# Patient Record
Sex: FEMALE | Race: WHITE | NOT HISPANIC OR LATINO | Employment: OTHER | RURAL
[De-identification: names, ages, dates, MRNs, and addresses within clinical notes are randomized per-mention and may not be internally consistent; named-entity substitution may affect disease eponyms.]

---

## 2024-02-25 ENCOUNTER — HOSPITAL ENCOUNTER (EMERGENCY)
Facility: HOSPITAL | Age: 84
Discharge: STILL ADMITTED OR ADMINISTRATIVE | End: 2024-02-25
Payer: MEDICARE

## 2024-02-25 VITALS
TEMPERATURE: 98 F | HEIGHT: 58 IN | HEART RATE: 79 BPM | RESPIRATION RATE: 15 BRPM | BODY MASS INDEX: 22.04 KG/M2 | SYSTOLIC BLOOD PRESSURE: 166 MMHG | OXYGEN SATURATION: 97 % | WEIGHT: 105 LBS | DIASTOLIC BLOOD PRESSURE: 75 MMHG

## 2024-02-25 DIAGNOSIS — E86.0 DEHYDRATION: ICD-10-CM

## 2024-02-25 DIAGNOSIS — R00.0 TACHYCARDIA: ICD-10-CM

## 2024-02-25 DIAGNOSIS — K56.609 SMALL BOWEL OBSTRUCTION: Primary | ICD-10-CM

## 2024-02-25 DIAGNOSIS — R05.9 COUGH: ICD-10-CM

## 2024-02-25 LAB
ALBUMIN SERPL BCP-MCNC: 4.2 G/DL (ref 3.5–5)
ALBUMIN/GLOB SERPL: 0.8 {RATIO}
ALP SERPL-CCNC: 87 U/L (ref 55–142)
ALT SERPL W P-5'-P-CCNC: 11 U/L (ref 13–56)
ANION GAP SERPL CALCULATED.3IONS-SCNC: 21 MMOL/L (ref 7–16)
AST SERPL W P-5'-P-CCNC: 12 U/L (ref 15–37)
BASOPHILS # BLD AUTO: 0.02 K/UL (ref 0–0.2)
BASOPHILS NFR BLD AUTO: 0.2 % (ref 0–1)
BILIRUB SERPL-MCNC: 0.3 MG/DL (ref ?–1.2)
BILIRUB UR QL STRIP: NEGATIVE
BUN SERPL-MCNC: 42 MG/DL (ref 7–18)
BUN/CREAT SERPL: 18 (ref 6–20)
CALCIUM SERPL-MCNC: 11.6 MG/DL (ref 8.5–10.1)
CHLORIDE SERPL-SCNC: 90 MMOL/L (ref 98–107)
CLARITY UR: CLEAR
CO2 SERPL-SCNC: 25 MMOL/L (ref 21–32)
COLOR UR: YELLOW
CREAT SERPL-MCNC: 2.37 MG/DL (ref 0.55–1.02)
DIFFERENTIAL METHOD BLD: ABNORMAL
EGFR (NO RACE VARIABLE) (RUSH/TITUS): 20 ML/MIN/1.73M2
EOSINOPHIL # BLD AUTO: 0 K/UL (ref 0–0.5)
EOSINOPHIL NFR BLD AUTO: 0 % (ref 1–4)
ERYTHROCYTE [DISTWIDTH] IN BLOOD BY AUTOMATED COUNT: 15.8 % (ref 11.5–14.5)
GLOBULIN SER-MCNC: 5.4 G/DL (ref 2–4)
GLUCOSE SERPL-MCNC: 234 MG/DL (ref 74–106)
GLUCOSE UR STRIP-MCNC: NEGATIVE MG/DL
HCT VFR BLD AUTO: 41.8 % (ref 38–47)
HGB BLD-MCNC: 13.1 G/DL (ref 12–16)
HYALINE CASTS #/AREA URNS LPF: ABNORMAL /LPF
KETONES UR STRIP-SCNC: NEGATIVE MG/DL
LEUKOCYTE ESTERASE UR QL STRIP: NEGATIVE
LIPASE SERPL-CCNC: 46 U/L (ref 16–77)
LYMPHOCYTES # BLD AUTO: 0.79 K/UL (ref 1–4.8)
LYMPHOCYTES NFR BLD AUTO: 6.2 % (ref 27–41)
MCH RBC QN AUTO: 26.4 PG (ref 27–31)
MCHC RBC AUTO-ENTMCNC: 31.3 G/DL (ref 32–36)
MCV RBC AUTO: 84.3 FL (ref 80–96)
MONOCYTES # BLD AUTO: 0.34 K/UL (ref 0–0.8)
MONOCYTES NFR BLD AUTO: 2.7 % (ref 2–6)
MPC BLD CALC-MCNC: 10.4 FL (ref 9.4–12.4)
NEUTROPHILS # BLD AUTO: 11.58 K/UL (ref 1.8–7.7)
NEUTROPHILS NFR BLD AUTO: 90.9 % (ref 53–65)
NITRITE UR QL STRIP: NEGATIVE
OCCULT BLOOD: POSITIVE
PH UR STRIP: 5.5 PH UNITS
PLATELET # BLD AUTO: 584 K/UL (ref 150–400)
POTASSIUM SERPL-SCNC: 3.7 MMOL/L (ref 3.5–5.1)
PROT SERPL-MCNC: 9.6 G/DL (ref 6.4–8.2)
PROT UR QL STRIP: 30
RBC # BLD AUTO: 4.96 M/UL (ref 4.2–5.4)
RBC # UR STRIP: NEGATIVE /UL
SODIUM SERPL-SCNC: 132 MMOL/L (ref 136–145)
SP GR UR STRIP: 1.02
SQUAMOUS #/AREA URNS LPF: ABNORMAL /LPF
TROPONIN I SERPL DL<=0.01 NG/ML-MCNC: 7.3 PG/ML
UROBILINOGEN UR STRIP-ACNC: 0.2 MG/DL
WBC # BLD AUTO: 12.73 K/UL (ref 4.5–11)
WBC #/AREA URNS HPF: ABNORMAL /HPF

## 2024-02-25 PROCEDURE — 80053 COMPREHEN METABOLIC PANEL: CPT | Performed by: NURSE PRACTITIONER

## 2024-02-25 PROCEDURE — 25000003 PHARM REV CODE 250: Performed by: NURSE PRACTITIONER

## 2024-02-25 PROCEDURE — 63600175 PHARM REV CODE 636 W HCPCS

## 2024-02-25 PROCEDURE — 93005 ELECTROCARDIOGRAM TRACING: CPT

## 2024-02-25 PROCEDURE — 84484 ASSAY OF TROPONIN QUANT: CPT | Performed by: NURSE PRACTITIONER

## 2024-02-25 PROCEDURE — 63600175 PHARM REV CODE 636 W HCPCS: Performed by: NURSE PRACTITIONER

## 2024-02-25 PROCEDURE — 96375 TX/PRO/DX INJ NEW DRUG ADDON: CPT

## 2024-02-25 PROCEDURE — 93010 ELECTROCARDIOGRAM REPORT: CPT | Mod: ,,, | Performed by: INTERNAL MEDICINE

## 2024-02-25 PROCEDURE — 82272 OCCULT BLD FECES 1-3 TESTS: CPT | Performed by: NURSE PRACTITIONER

## 2024-02-25 PROCEDURE — 81003 URINALYSIS AUTO W/O SCOPE: CPT | Performed by: NURSE PRACTITIONER

## 2024-02-25 PROCEDURE — 99285 EMERGENCY DEPT VISIT HI MDM: CPT | Mod: GF

## 2024-02-25 PROCEDURE — 99285 EMERGENCY DEPT VISIT HI MDM: CPT | Mod: 25

## 2024-02-25 PROCEDURE — 85025 COMPLETE CBC W/AUTO DIFF WBC: CPT | Performed by: NURSE PRACTITIONER

## 2024-02-25 PROCEDURE — 83690 ASSAY OF LIPASE: CPT | Performed by: NURSE PRACTITIONER

## 2024-02-25 PROCEDURE — 96374 THER/PROPH/DIAG INJ IV PUSH: CPT

## 2024-02-25 RX ORDER — CELECOXIB 200 MG/1
200 CAPSULE ORAL
COMMUNITY
Start: 2023-11-25

## 2024-02-25 RX ORDER — ONDANSETRON HYDROCHLORIDE 2 MG/ML
4 INJECTION, SOLUTION INTRAVENOUS
Status: COMPLETED | OUTPATIENT
Start: 2024-02-25 | End: 2024-02-25

## 2024-02-25 RX ORDER — SODIUM CHLORIDE 9 MG/ML
1000 INJECTION, SOLUTION INTRAVENOUS CONTINUOUS
Status: DISCONTINUED | OUTPATIENT
Start: 2024-02-25 | End: 2024-02-25 | Stop reason: HOSPADM

## 2024-02-25 RX ORDER — ZOLPIDEM TARTRATE 10 MG/1
10 TABLET ORAL NIGHTLY
COMMUNITY

## 2024-02-25 RX ORDER — LEVOTHYROXINE SODIUM 112 UG/1
112 TABLET ORAL EVERY MORNING
COMMUNITY

## 2024-02-25 RX ORDER — TRIAMTERENE/HYDROCHLOROTHIAZID 37.5-25 MG
1 TABLET ORAL EVERY MORNING
Status: ON HOLD | COMMUNITY
End: 2024-03-06 | Stop reason: HOSPADM

## 2024-02-25 RX ORDER — LIDOCAINE HYDROCHLORIDE 20 MG/ML
5 SOLUTION OROPHARYNGEAL
Status: COMPLETED | OUTPATIENT
Start: 2024-02-25 | End: 2024-02-25

## 2024-02-25 RX ORDER — POTASSIUM CHLORIDE 750 MG/1
10 TABLET, EXTENDED RELEASE ORAL
Status: ON HOLD | COMMUNITY
Start: 2024-02-03 | End: 2024-03-06 | Stop reason: HOSPADM

## 2024-02-25 RX ORDER — DILTIAZEM HYDROCHLORIDE 120 MG/1
120 CAPSULE, EXTENDED RELEASE ORAL
COMMUNITY
Start: 2024-02-03

## 2024-02-25 RX ORDER — RIVAROXABAN 20 MG/1
20 TABLET, FILM COATED ORAL
COMMUNITY
Start: 2024-02-03

## 2024-02-25 RX ADMIN — LIDOCAINE HYDROCHLORIDE 5 ML: 20 SOLUTION ORAL at 02:02

## 2024-02-25 RX ADMIN — ONDANSETRON 4 MG: 2 INJECTION INTRAMUSCULAR; INTRAVENOUS at 04:02

## 2024-02-25 RX ADMIN — SODIUM CHLORIDE 1000 ML: 9 INJECTION, SOLUTION INTRAVENOUS at 01:02

## 2024-02-25 NOTE — ED PROVIDER NOTES
Encounter Date: 2/25/2024       History     Chief Complaint   Patient presents with    Vomiting    Diarrhea     83 yr old with PMH of HTN, OA, DVT a few months ago - she is taking xeralto, CA of thyroid approx 40 yrs ago- she continues to take synthroid, colon which required bowel resection and chemo approx 19-20 years ago, renal CA which required partial nephrectomy approx 16 yr ago to ED with c/o vomiting and diarrhea for the past week.  Constipation since yesterday.  Has taken Linzess and stool softener with no relief.  She has increased weakness today.      The history is provided by the patient.   Emesis   This is a new problem. The current episode started several days ago. The problem has been unchanged. The emesis has an appearance of bilious material. Risk factors include ill contacts.     Review of patient's allergies indicates:  No Known Allergies  Past Medical History:   Diagnosis Date    Anticoagulant long-term use     Cancer     Hypertension     Thyroid disease      Past Surgical History:   Procedure Laterality Date    HYSTERECTOMY       History reviewed. No pertinent family history.  Social History     Tobacco Use    Smoking status: Every Day     Current packs/day: 0.50     Average packs/day: 0.5 packs/day for 2.1 years (1.0 ttl pk-yrs)     Types: Cigarettes     Start date: 2/1/2022    Smokeless tobacco: Never    Tobacco comments:     [id number]   Substance Use Topics    Alcohol use: Not Currently    Drug use: Never     Review of Systems   Constitutional: Negative.    HENT:  Positive for ear pain.         Reports ears stopped up   Respiratory: Negative.     Gastrointestinal:  Positive for vomiting.   Genitourinary:  Negative for difficulty urinating.   Skin: Negative.    Psychiatric/Behavioral: Negative.         Physical Exam     Initial Vitals [02/25/24 1206]   BP Pulse Resp Temp SpO2   (!) 166/75 79 15 98 °F (36.7 °C) 97 %      MAP       --         Physical Exam    Nursing note and vitals  reviewed.  HENT:   Right Ear: Hearing, tympanic membrane, external ear and ear canal normal.   Left Ear: Hearing, tympanic membrane, external ear and ear canal normal.   Neck: Neck supple.   Cardiovascular:  Normal rate and regular rhythm.           Pulmonary/Chest: Breath sounds normal.   Abdominal: She exhibits distension. Bowel sounds are decreased. There is generalized abdominal tenderness.   Musculoskeletal:         General: Normal range of motion.      Cervical back: Neck supple.     Neurological: She is alert and oriented to person, place, and time.   Skin: There is pallor.   Psychiatric: She has a normal mood and affect.         Medical Screening Exam   See Full Note    ED Course   Procedures  Labs Reviewed   COMPREHENSIVE METABOLIC PANEL - Abnormal; Notable for the following components:       Result Value    Sodium 132 (*)     Chloride 90 (*)     Anion Gap 21 (*)     Glucose 234 (*)     BUN 42 (*)     Creatinine 2.37 (*)     Calcium 11.6 (*)     Total Protein 9.6 (*)     Globulin 5.4 (*)     ALT 11 (*)     AST 12 (*)     eGFR 20 (*)     All other components within normal limits   URINALYSIS, REFLEX TO URINE CULTURE - Abnormal; Notable for the following components:    Protein, UA 30 (*)     All other components within normal limits   CBC WITH DIFFERENTIAL - Abnormal; Notable for the following components:    WBC 12.73 (*)     MCH 26.4 (*)     MCHC 31.3 (*)     RDW 15.8 (*)     Platelet Count 584 (*)     Neutrophils % 90.9 (*)     Lymphocytes % 6.2 (*)     Neutrophils, Abs 11.58 (*)     Lymphocytes, Absolute 0.79 (*)     Eosinophils % 0.0 (*)     All other components within normal limits   OCCULT BLOOD X 1, STOOL - Abnormal; Notable for the following components:    Occult Blood Positive (*)     All other components within normal limits   URINALYSIS, MICROSCOPIC - Abnormal; Notable for the following components:    Squamous Epithelial Cells, UA Few (*)     Hyaline Casts, UA 0-2 (*)     All other components  within normal limits   LIPASE - Normal   TROPONIN I - Normal   CBC W/ AUTO DIFFERENTIAL    Narrative:     The following orders were created for panel order CBC W/ AUTO DIFFERENTIAL.  Procedure                               Abnormality         Status                     ---------                               -----------         ------                     CBC with Differential[[id number]]       Abnormal            Final result               Manual Differential[[id number]]                                                          Please view results for these tests on the individual orders.        ECG Results              EKG 12-lead (Final result)        Collection Time Result Time QRS Duration OHS QTC Calculation    02/25/24 12:24:12 02/26/24 09:01:45 78 418                     Final result by Interface, Lab In [facility] (02/26/24 09:01:50)                   Narrative:    Test Reason : R00.0,    Vent. Rate : 087 BPM     Atrial Rate : 087 BPM     P-R Int : 196 ms          QRS Dur : 078 ms      QT Int : 348 ms       P-R-T Axes : -08 -16 059 degrees     QTc Int : 418 ms    Normal sinus rhythm  Inferior infarct ,age undetermined  Cannot rule out Anterior infarct ,age undetermined  Abnormal ECG  No previous ECGs available  Confirmed by Evgeny Thomas MD ([id number]) on 2/26/2024 9:01:44 AM    Referred By: [email or web address]   SELF           Confirmed By:Evgeny Thomas MD                                  Imaging Results              CT Abdomen Pelvis  Without Contrast (Final result)  Result time 02/25/24 13:54:10      Final result by Allan Love MD (02/25/24 13:54:10)                   Impression:      1. His findings most compatible with a distal high-grade small bowel obstruction as detailed above.  Concede year in G-tube decompression of the distal esophagus and stomach is.    2.  no other acute abnormality within the abdomen or pelvis.    3.   Probable hemorrhagic or proteinaceous contents within several renal cysts.   Renal ultrasound would be recommended on a nonemergent basis if no prior imaging can be made available for comparison.    Other incidental findings as above.    Place of service: [facility]      Electronically signed by: Allan Love  Date:    02/25/2024  Time:    13:54               Narrative:    EXAMINATION:  CT ABDOMEN PELVIS WITHOUT CONTRAST    CLINICAL HISTORY:  Abdominal pain, acute, nonlocalized;    TECHNIQUE:  5 mm axial images were obtained from the lung bases through the ischial tuberosities without IV contrast. Coronal and sagittal reformatted images were additionally created and submitted for review. Total DLP: 365 mGy/cm.    Dose reduction:    The CT exam was performed using one or more dose reduction techniques: Automatic exposure control, automated adjustment of the MA and/or kVP according to patient size, or use of iterative reconstruction technique.    COMPARISON:  None avail    FINDINGS:  ABDOMEN:    Lung bases: Level lung bases are predominantly clear.  No significant pleural/pericardial effusion.      Liver/gallbladder: Unenhanced liver is grossly unremarkable.  No gallstones.  No biliary ductal dilatation.    Spleen: The spleen is not enlarged.    Pancreas: Negative noncontrast appearance.    Adrenals: Unremarkable.    Kidneys: Both kidneys are symmetric in size and demonstrate no nephrolithiasis.  Predominantly exophytic hypodense lesions bilaterally are most compatible with simple cysts.  There are also several slightly hyperdense similar appearing lesions on the left which are favored to represent cyst containing hemorrhagic or proteinaceous contents.  Solid lesions are however not entirely excluded.    Bowel/mesentery: There is marked small bowel dilatation primarily within the left hemiabdomen, most consistent with distal small-bowel obstruction.  There is suggestion of transition to nondilated small bowel in the pelvis, but a definitive transition point is not definitely identified.   Findings are most compatible with distal high-grade small bowel obstruction.  There is also moderate-severe dilatation of the distal esophagus which is also fluid-filled.    Aorta/Retroperitoneum: No aneurysm. No enlarged retroperitoneal lymph nodes.    PELVIS:    Bladder: Bladder is unremarkable for degree of distention and noncontrast study.    Lymph nodes: No adenopathy    Pelvic organs: Prior hysterectomy.    Fluid: No free fluid    BONES:    Osseous structures: No suspicious appearing osseous abnormalities noted.  Multilevel moderate degenerative changes including disc space loss and endplate sclerosis at the thoracolumbar junction is noted.  Moderate disc space loss at L5-S1 with facet arthropathy also noted.                                       X-Ray Chest AP Portable (Final result)  Result time 02/25/24 13:55:17      Final result by Allan Love MD (02/25/24 13:55:17)                   Impression:      No acute cardiopulmonary process or significant change.    Place of service: [facility]      Electronically signed by: Allan Love  Date:    02/25/2024  Time:    13:55               Narrative:    EXAMINATION:  XR CHEST AP PORTABLE    CLINICAL HISTORY:  Cough, unspecified    TECHNIQUE:  AP portable    COMPARISON:  Prior radiograph 03/26/2019    FINDINGS:  The cardiomediastinal silhouette is stable from prior..  Lungs are predominantly clear with minimal perihilar interstitial prominence suggestive of mild scarring.  There is no pneumothorax or pleural effusion.    There is no acute osseous or soft tissue abnormality.                                       RADIOLOGY REPORT (Final result)  Result time 02/28/24 10:15:49      Final result by Unknown User (02/28/24 10:15:49)                                         Medications   LIDOcaine viscous HCl 2% oral solution 5 mL (5 mLs Oral Given 2/25/24 0721)   LORazepam (ATIVAN) injection 0.5 mg (0.5 mg Intravenous Given 2/25/24 2277)    ondansetron injection 4 mg (4 mg Intravenous Given 2/25/24 2455)     Medical Decision Making  83 yr old with PMH of HTN, OA, DVT a few months ago - she is taking xeralto, CA of thyroid approx 40 yrs ago- she continues to take synthroid, colon which required bowel resection and chemo approx 19-20 years ago, renal CA which required partial nephrectomy approx 16 yr ago to ED with c/o vomiting and diarrhea for the past week.  Constipation since yesterday.  Has taken Linzess and stool softener with no relief.  She has increased weakness today.      Nausea improved after NG tube place.      Pt with over 2000 ml output of greenish gastric content    Problems Addressed:  Small bowel obstruction: acute illness or injury    Amount and/or Complexity of Data Reviewed  Labs: ordered.     Details: Increased BUN/creat  Radiology: ordered.     Details: SBO  Discussion of management or test interpretation with external provider(s): Discussed pt status and POC with Dr. Hinton, on-call general surgeon at [facility] and Dr. Steve MORILLO MD at [facility] who accept for transfer to [facility]. (Per pt request).      Risk  Prescription drug management.               ED Course as of 03/04/24 0739   Sun Feb 25, 2024   1433 NG tube attempt to right nare , unsuccessful, tube rolled into patients mouth.  Pt tearful, anxious, shaky.  Will give Ativan, then try again.   [CG]   1458 Attempt to place NG on the left, tube rolled out mouth again.  Will wait on placement at this time [CG]   1524 Discussed pt status and POC with Dr. Wilson, [facility] ED MD who states will speak with general surg and call back [CG]   1556 Discussed pt status and POC with Dr. Hinton, general surg on-call at [facility], who agrees to consult on pt.  Transfer center states Dr. Wilson will be accepting MD [CG]   1199 NG placed per RINKU Santamaria RN.  Pt tolerate well, noted green gastric content in suction cannister.  Will place on intermittent suction [CG]   1704 EMS here for transport.  Pt NG patent.  Reports  relief of nausea.   [CG]      ED Course User Index  [CG] Tanna Viramontes FNP                           Clinical Impression:   Final diagnoses:  [R00.0] Tachycardia  [R05.9] Cough  [K56.609] Small bowel obstruction (Primary)  [E86.0] Dehydration        ED Disposition Condition    Transfer to Another Facility Stable                Tanna Viramontes FNP  02/25/24 0680       Tanna Viramontes FNP  03/04/24 7873

## 2024-02-26 LAB
OHS QRS DURATION: 78 MS
OHS QTC CALCULATION: 418 MS

## 2024-02-29 ENCOUNTER — HOSPITAL ENCOUNTER (INPATIENT)
Facility: HOSPITAL | Age: 84
LOS: 6 days | Discharge: HOME HEALTH | DRG: 390 | End: 2024-03-06
Attending: HOSPITALIST | Admitting: HOSPITALIST
Payer: MEDICARE

## 2024-02-29 DIAGNOSIS — R53.81 PHYSICAL DECONDITIONING: Primary | ICD-10-CM

## 2024-02-29 DIAGNOSIS — K56.609 SMALL BOWEL OBSTRUCTION: ICD-10-CM

## 2024-02-29 PROCEDURE — 11000004 HC SNF PRIVATE

## 2024-02-29 PROCEDURE — 25000003 PHARM REV CODE 250: Performed by: NURSE PRACTITIONER

## 2024-02-29 PROCEDURE — 27000958

## 2024-02-29 RX ORDER — ZOLPIDEM TARTRATE 5 MG/1
10 TABLET ORAL NIGHTLY PRN
Status: DISCONTINUED | OUTPATIENT
Start: 2024-02-29 | End: 2024-03-06 | Stop reason: HOSPADM

## 2024-02-29 RX ORDER — AMOXICILLIN 250 MG
1 CAPSULE ORAL 2 TIMES DAILY PRN
Status: DISCONTINUED | OUTPATIENT
Start: 2024-02-29 | End: 2024-03-06 | Stop reason: HOSPADM

## 2024-02-29 RX ORDER — CALCIUM CARBONATE 200(500)MG
500 TABLET,CHEWABLE ORAL 2 TIMES DAILY PRN
Status: DISCONTINUED | OUTPATIENT
Start: 2024-02-29 | End: 2024-03-06 | Stop reason: HOSPADM

## 2024-02-29 RX ORDER — POTASSIUM CHLORIDE 750 MG/1
10 CAPSULE, EXTENDED RELEASE ORAL DAILY
Status: DISCONTINUED | OUTPATIENT
Start: 2024-03-01 | End: 2024-03-01

## 2024-02-29 RX ORDER — CELECOXIB 100 MG/1
100 CAPSULE ORAL 2 TIMES DAILY
Status: DISCONTINUED | OUTPATIENT
Start: 2024-02-29 | End: 2024-03-06 | Stop reason: HOSPADM

## 2024-02-29 RX ORDER — LEVOTHYROXINE SODIUM 112 UG/1
112 TABLET ORAL
Status: DISCONTINUED | OUTPATIENT
Start: 2024-03-01 | End: 2024-03-06 | Stop reason: HOSPADM

## 2024-02-29 RX ORDER — TRIAMTERENE/HYDROCHLOROTHIAZID 37.5-25 MG
1 TABLET ORAL DAILY
Status: DISCONTINUED | OUTPATIENT
Start: 2024-03-01 | End: 2024-03-01

## 2024-02-29 RX ORDER — ACETAMINOPHEN 325 MG/1
650 TABLET ORAL EVERY 6 HOURS PRN
Status: DISCONTINUED | OUTPATIENT
Start: 2024-02-29 | End: 2024-03-06 | Stop reason: HOSPADM

## 2024-02-29 RX ORDER — PANTOPRAZOLE SODIUM 40 MG/1
40 TABLET, DELAYED RELEASE ORAL DAILY
Status: DISCONTINUED | OUTPATIENT
Start: 2024-03-01 | End: 2024-03-06 | Stop reason: HOSPADM

## 2024-02-29 RX ORDER — DILTIAZEM HYDROCHLORIDE 120 MG/1
120 CAPSULE, COATED, EXTENDED RELEASE ORAL DAILY
Status: DISCONTINUED | OUTPATIENT
Start: 2024-03-01 | End: 2024-03-06 | Stop reason: HOSPADM

## 2024-02-29 RX ORDER — TALC
6 POWDER (GRAM) TOPICAL NIGHTLY PRN
Status: DISCONTINUED | OUTPATIENT
Start: 2024-02-29 | End: 2024-03-06 | Stop reason: HOSPADM

## 2024-02-29 RX ORDER — GENTAMICIN SULFATE 3 MG/ML
1 SOLUTION/ DROPS OPHTHALMIC 3 TIMES DAILY
Status: DISCONTINUED | OUTPATIENT
Start: 2024-03-01 | End: 2024-03-06 | Stop reason: HOSPADM

## 2024-02-29 RX ADMIN — CELECOXIB 100 MG: 100 CAPSULE ORAL at 08:02

## 2024-02-29 RX ADMIN — ZOLPIDEM TARTRATE 10 MG: 5 TABLET ORAL at 10:02

## 2024-02-29 NOTE — PLAN OF CARE
[facility] Medical Surgical Unit  Initial Discharge Assessment       Primary Care Provider: Rani, Primary Doctor    Admission Diagnosis: Small bowel obstruction [K56.609]    Admission Date: 2/29/2024  Expected Discharge Date:     Transition of Care Barriers: None    Payor: MEDICARE / Plan: MEDICARE PART A & B / Product Type: Government /     Extended Emergency Contact Information  Primary Emergency Contact: Adiel Viramontes  Mobile Phone: [phone]  Relation: Son  Preferred language: English   needed? No    Discharge Plan A: [employer]         [facility] [place] [place] - [address]  [address]  [place] [address]  Phone: [phone] Fax: [phone]      Initial Assessment (most recent)       Adult Discharge Assessment - 02/29/24 1439          Discharge Assessment    Assessment Type Discharge Planning Assessment     Confirmed/corrected address, phone number and insurance Yes     Source of Information family;patient     Does patient/caregiver understand observation status Yes     Communicated QUINN with patient/caregiver Date not available/Unable to determine     Reason For Admission inpatient therapy     People in Home alone     Do you expect to return to your current living situation? Yes     Do you have help at home or someone to help you manage your care at home? Yes     Prior to hospitilization cognitive status: Alert/Oriented     Current cognitive status: Alert/Oriented     Walking or Climbing Stairs Difficulty yes     Walking or Climbing Stairs ambulation difficulty, requires equipment     Dressing/Bathing Difficulty no     Home Layout Able to live on 1st floor     Equipment Currently Used at Home cane, straight     Readmission within 30 days? No     Patient currently being followed by outpatient case management? No     Do you currently have service(s) that help you manage your care at home? No     Do you take prescription medications? Yes     Do you have prescription  coverage? Yes     Do you have any problems affording any of your prescribed medications? No     Is the patient taking medications as prescribed? yes     How do you get to doctors appointments? family or friend will provide;car, drives self     Are you on dialysis? No     Do you take coumadin? No     Discharge Plan A Home Health     DME Needed Upon Discharge  other (see comments)   TBD    Discharge Plan discussed with: Patient     Transition of Care Barriers None        Physical Activity    On average, how many days per week do you engage in moderate to strenuous exercise (like a brisk walk)? 0 days     On average, how many minutes do you engage in exercise at this level? 0 min        Financial Resource Strain    How hard is it for you to pay for the very basics like food, housing, medical care, and heating? Not hard at all        Housing Stability    In the last 12 months, was there a time when you were not able to pay the mortgage or rent on time? No     In the last 12 months, how many places have you lived? 1     In the last 12 months, was there a time when you did not have a steady place to sleep or slept in a shelter (including now)? No        Transportation Needs    In the past 12 months, has lack of transportation kept you from medical appointments or from getting medications? No     In the past 12 months, has lack of transportation kept you from meetings, work, or from getting things needed for daily living? No        Food Insecurity    Within the past 12 months, you worried that your food would run out before you got the money to buy more. Never true     Within the past 12 months, the food you bought just didn't last and you didn't have money to get more. Never true        Stress    Do you feel stress - tense, restless, nervous, or anxious, or unable to sleep at night because your mind is troubled all the time - these days? Only a little        Social Connections    In a typical week, how many times do you  talk on the phone with family, friends, or neighbors? More than three times a week     How often do you get together with friends or relatives? More than three times a week     How often do you attend Christianity or Congregation services? More than 4 times per year     Do you belong to any clubs or organizations such as Christianity groups, unions, fraternal or athletic groups, or school groups? No     How often do you attend meetings of the clubs or organizations you belong to? Never     Are you , , , , never , or living with a partner?         Alcohol Use    Q1: How often do you have a drink containing alcohol? Never     Q2: How many drinks containing alcohol do you have on a typical day when you are drinking? Patient does not drink     Q3: How often do you have six or more drinks on one occasion? Never

## 2024-02-29 NOTE — NURSING
Received awake et alert to room 16 via wheelchair. Daughter present. Oriented to room and call light. Notified MD of arrival.

## 2024-02-29 NOTE — CONSULTS
[facility] - Medical Surgical Unit  Adult Nutrition  Consult Note         Reason for Assessment  Reason For Assessment: consult   Nutrition Risk Screen: no indicators present    Assessment and Plan    Consult received and appreciated. Patient admitted today with no active principal problem noted. Noted patient admitted from [facility]. Patient is ordered a regular diet. No noted chewing/swallowing issues on nursing screen. SLP eval pending.    Patient with documented weight of 115 pounds with a BMI of 24.21. Recommend to continue current diet. Encourage po intakes. RD Following.           Learning Needs/Social Determinants of Health  Learning Assessment       02/29/2024 1300 [facility] (2/29/2024 - Present)   Created by Sujata Skinner, RN - RN (Nurse) Status: Complete                 PRIMARY LEARNER     Primary Learner Name:  Yanet Viramontes  - 02/29/2024 1300    Relationship:  Patient AG - 02/29/2024 1300    Does the primary learner have any barriers to learning?:  No Barriers AG - 02/29/2024 1300    What is the preferred language of the primary learner?:  English AG - 02/29/2024 1300    Is an  required?:  No AG - 02/29/2024 1300    How does the primary learner prefer to learn new concepts?:  Listening, Reading, Demonstration AG - 02/29/2024 1300    How often do you need to have someone help you read instructions, pamphlets, or written material from your doctor or pharmacy?:  Never AG - 02/29/2024 1300        CO-LEARNER #1     No question answered        CO-LEARNER #2     No question answered        SPECIAL TOPICS     No question answered        ANSWERED BY:     No question answered        Edit History       Sujata Skinner, RN - RN (Nurse)   02/29/2024 1300                           Social Determinants of Health     Tobacco Use: High Risk (2/29/2024)    Patient History     Smoking Tobacco Use: Every Day     Smokeless Tobacco Use: Never     Passive  "Exposure: Not on file   Alcohol Use: Not on file   Financial Resource Strain: Not on file   Food Insecurity: Not on file   Transportation Needs: Not on file   Physical Activity: Not on file   Stress: Not on file   Social Connections: Not on file   Housing Stability: Not on file   Depression: Not on file            Malnutrition  No    Nutrition Diagnosis  No Nutritional Diagnosis at this time r    No results for input(s): "GLU", "POCGLU" in the last 72 hours.  Comments on Glucose:     Nutrition Prescription / Recommendations  Recommendation/Intervention: continue diet  as tolerated; encourage po intakes  Goals: po intake 50-75% by follow up  Nutrition Goal Status: new  Current Diet Order: regular  Chewing or Swallowing Difficulty?: no documented chewing/swallowing issue; SLP eval pending  Recommended Diet: Regular  Recommended Oral Supplement: No Oral Supplements  Is Nutrition Support Recommended: [facility] Nutrition Support: No  Is Nutrition Education Recommended: No    Monitor and Evaluation  % current Intake: New Diet order with no P.O. intake documented currently  % intake to meet estimated needs: 50 - 75 %  Food and Nutrient Intake: energy intake, food and beverage intake  Food and Nutrient Adminstration: diet order  Anthropometric Measurements: height/length, weight, weight change, body mass index  Biochemical Data, Medical Tests and Procedures: electrolyte and renal panel, gastrointestinal profile, glucose/endocrine profile, inflammatory profile       Current Medical Diagnosis and Past Medical History     Past Medical History:   Diagnosis Date    Anticoagulant long-term use     Cancer     Hypertension     Thyroid disease        Nutrition/Diet History  Food Allergies: NKFA    Lab/Procedures/Meds  No results for input(s): "NA", "K", "BUN", "CREATININE", "CALCIUM", "ALBUMIN", "CL", "ALT", "AST", "PHOS" in the last 72 hours.  Last A1c: No results found for: "HGBA1C"  Lab Results   Component Value Date    RBC " "4.96 02/25/2024    HGB 13.1 02/25/2024    HCT 41.8 02/25/2024    MCV 84.3 02/25/2024    MCH 26.4 (L) 02/25/2024    MCHC 31.3 (L) 02/25/2024     Pertinent Labs Reviewed: reviewed  Pertinent Medications Reviewed: reviewed  Scheduled Meds:   celecoxib  100 mg Oral BID    [START ON 3/1/2024] diltiaZEM  120 mg Oral Daily    [START ON 3/1/2024] gentamicin  1 drop Both Eyes TID    [START ON 3/1/2024] levothyroxine  112 mcg Oral Before breakfast    [START ON 3/1/2024] pantoprazole  40 mg Oral Daily    [START ON 3/1/2024] potassium chloride  10 mEq Oral Daily    rivaroxaban  10 mg Oral Daily with dinner    [START ON 3/1/2024] triamterene-hydrochlorothiazide 37.5-25 mg  1 tablet Oral Daily     Continuous Infusions:  PRN Meds:.acetaminophen, calcium carbonate, melatonin, senna-docusate 8.6-50 mg, zolpidem    Anthropometrics  Temp: 97.3 °F (36.3 °C)  Height Method: Stated  Height: 4' 9.99" (147.3 cm)  Height (inches): 57.99 in  Weight Method: Bed Scale  Weight: 52.5 kg (115 lb 12.8 oz)  Weight (lb): 115.8 lb  Ideal Body Weight (IBW), Female: 89.95 lb  % Ideal Body Weight, Female (lb): 128.74 %  BMI (Calculated): 24.2  BMI Grade: 18.5-24.9 - normal       Estimated/Assessed Needs      Temp: 97.3 °F (36.3 °C)Oral  Weight Used For Calorie Calculations: 52.2 kg (115 lb)   Energy Need Method: Kcal/kg Energy Calorie Requirements (kcal): 4587-2077  Weight Used For Protein Calculations: 52.2 kg (115 lb)  Protein Requirements: 42-52  Estimated Fluid Requirement Method: RDA Method    RDA Method (mL): 1304       Nutrition by Nursing              Last Bowel Movement: 02/29/24                Nutrition Follow-Up  RD Follow-up?: Yes      Nutrition Discharge Planning: too soon to determine            Available via Secure Chat  "

## 2024-03-01 PROBLEM — E07.9 DISEASE OF THYROID GLAND: Status: ACTIVE | Noted: 2024-03-01

## 2024-03-01 PROBLEM — R53.81 PHYSICAL DECONDITIONING: Status: ACTIVE | Noted: 2024-03-01

## 2024-03-01 PROBLEM — K21.9 GERD (GASTROESOPHAGEAL REFLUX DISEASE): Status: ACTIVE | Noted: 2024-03-01

## 2024-03-01 PROBLEM — Z87.19 S/P SMALL BOWEL OBSTRUCTION: Status: ACTIVE | Noted: 2024-03-01

## 2024-03-01 PROBLEM — I10 HYPERTENSION: Status: ACTIVE | Noted: 2024-03-01

## 2024-03-01 PROBLEM — E87.6 HYPOKALEMIA: Status: ACTIVE | Noted: 2024-03-01

## 2024-03-01 PROBLEM — Z86.718 HISTORY OF DVT (DEEP VEIN THROMBOSIS): Status: ACTIVE | Noted: 2024-02-25

## 2024-03-01 LAB
ANION GAP SERPL CALCULATED.3IONS-SCNC: 9 MMOL/L (ref 7–16)
BASOPHILS # BLD AUTO: 0.01 K/UL (ref 0–0.2)
BASOPHILS NFR BLD AUTO: 0.2 % (ref 0–1)
BUN SERPL-MCNC: 17 MG/DL (ref 7–18)
BUN/CREAT SERPL: 17 (ref 6–20)
CALCIUM SERPL-MCNC: 8 MG/DL (ref 8.5–10.1)
CHLORIDE SERPL-SCNC: 102 MMOL/L (ref 98–107)
CO2 SERPL-SCNC: 29 MMOL/L (ref 21–32)
CREAT SERPL-MCNC: 1 MG/DL (ref 0.55–1.02)
DIFFERENTIAL METHOD BLD: ABNORMAL
EGFR (NO RACE VARIABLE) (RUSH/TITUS): 56 ML/MIN/1.73M2
EOSINOPHIL # BLD AUTO: 0.12 K/UL (ref 0–0.5)
EOSINOPHIL NFR BLD AUTO: 2.2 % (ref 1–4)
ERYTHROCYTE [DISTWIDTH] IN BLOOD BY AUTOMATED COUNT: 15 % (ref 11.5–14.5)
GLUCOSE SERPL-MCNC: 99 MG/DL (ref 74–106)
HCT VFR BLD AUTO: 29.5 % (ref 38–47)
HGB BLD-MCNC: 9.1 G/DL (ref 12–16)
LYMPHOCYTES # BLD AUTO: 0.78 K/UL (ref 1–4.8)
LYMPHOCYTES NFR BLD AUTO: 14.3 % (ref 27–41)
MCH RBC QN AUTO: 26.7 PG (ref 27–31)
MCHC RBC AUTO-ENTMCNC: 30.8 G/DL (ref 32–36)
MCV RBC AUTO: 86.5 FL (ref 80–96)
MONOCYTES # BLD AUTO: 0.57 K/UL (ref 0–0.8)
MONOCYTES NFR BLD AUTO: 10.5 % (ref 2–6)
MPC BLD CALC-MCNC: 10.6 FL (ref 9.4–12.4)
NEUTROPHILS # BLD AUTO: 3.97 K/UL (ref 1.8–7.7)
NEUTROPHILS NFR BLD AUTO: 72.8 % (ref 53–65)
PLATELET # BLD AUTO: 291 K/UL (ref 150–400)
POTASSIUM SERPL-SCNC: 2.8 MMOL/L (ref 3.5–5.1)
RBC # BLD AUTO: 3.41 M/UL (ref 4.2–5.4)
SODIUM SERPL-SCNC: 137 MMOL/L (ref 136–145)
WBC # BLD AUTO: 5.45 K/UL (ref 4.5–11)

## 2024-03-01 PROCEDURE — 97165 OT EVAL LOW COMPLEX 30 MIN: CPT

## 2024-03-01 PROCEDURE — 11000004 HC SNF PRIVATE

## 2024-03-01 PROCEDURE — 25000003 PHARM REV CODE 250: Performed by: NURSE PRACTITIONER

## 2024-03-01 PROCEDURE — 92610 EVALUATE SWALLOWING FUNCTION: CPT

## 2024-03-01 PROCEDURE — 99305 1ST NF CARE MODERATE MDM 35: CPT | Mod: AI,,, | Performed by: HOSPITALIST

## 2024-03-01 PROCEDURE — 27000958

## 2024-03-01 PROCEDURE — 85025 COMPLETE CBC W/AUTO DIFF WBC: CPT | Performed by: NURSE PRACTITIONER

## 2024-03-01 PROCEDURE — 25000003 PHARM REV CODE 250: Performed by: HOSPITALIST

## 2024-03-01 PROCEDURE — 97163 PT EVAL HIGH COMPLEX 45 MIN: CPT

## 2024-03-01 PROCEDURE — 80048 BASIC METABOLIC PNL TOTAL CA: CPT | Performed by: NURSE PRACTITIONER

## 2024-03-01 RX ORDER — POTASSIUM CHLORIDE 20 MEQ/1
40 TABLET, EXTENDED RELEASE ORAL 3 TIMES DAILY
Status: DISCONTINUED | OUTPATIENT
Start: 2024-03-01 | End: 2024-03-02

## 2024-03-01 RX ADMIN — ZINC OXIDE TOPICAL OINT.: at 05:03

## 2024-03-01 RX ADMIN — RIVAROXABAN 10 MG: 10 TABLET, FILM COATED ORAL at 05:03

## 2024-03-01 RX ADMIN — POTASSIUM CHLORIDE 40 MEQ: 1500 TABLET, EXTENDED RELEASE ORAL at 03:03

## 2024-03-01 RX ADMIN — PANTOPRAZOLE SODIUM 40 MG: 40 TABLET, DELAYED RELEASE ORAL at 09:03

## 2024-03-01 RX ADMIN — POTASSIUM CHLORIDE 40 MEQ: 1500 TABLET, EXTENDED RELEASE ORAL at 08:03

## 2024-03-01 RX ADMIN — CELECOXIB 100 MG: 100 CAPSULE ORAL at 09:03

## 2024-03-01 RX ADMIN — CELECOXIB 100 MG: 100 CAPSULE ORAL at 08:03

## 2024-03-01 RX ADMIN — POTASSIUM CHLORIDE 40 MEQ: 1500 TABLET, EXTENDED RELEASE ORAL at 09:03

## 2024-03-01 RX ADMIN — GENTAMICIN SULFATE 1 DROP: 3 SOLUTION/ DROPS OPHTHALMIC at 03:03

## 2024-03-01 RX ADMIN — DILTIAZEM HYDROCHLORIDE 120 MG: 120 CAPSULE, COATED, EXTENDED RELEASE ORAL at 09:03

## 2024-03-01 RX ADMIN — TRIAMTERENE AND HYDROCHLOROTHIAZIDE 1 TABLET: 37.5; 25 TABLET ORAL at 09:03

## 2024-03-01 RX ADMIN — ZOLPIDEM TARTRATE 10 MG: 5 TABLET ORAL at 08:03

## 2024-03-01 RX ADMIN — LEVOTHYROXINE SODIUM 112 MCG: 0.11 TABLET ORAL at 05:03

## 2024-03-01 RX ADMIN — GENTAMICIN SULFATE 1 DROP: 3 SOLUTION/ DROPS OPHTHALMIC at 08:03

## 2024-03-01 RX ADMIN — GENTAMICIN SULFATE 1 DROP: 3 SOLUTION/ DROPS OPHTHALMIC at 09:03

## 2024-03-01 NOTE — PLAN OF CARE
"  Problem: Physical Therapy  Goal: Physical Therapy Goal  Description: STG - 1 week  1. Pt SBA with bed mob.  2. Pt SBA with STS transfers.  3. Pt CGA to amb 100 ft using appropriate assistive device.    LTG - 3 weeks  1. Pt svn with bed mob.  2. Pt svn with transfers.  3. Pt svn to amb 250 ft using appropriate assistive device.  4. Pt's Tinetti Balance/Gait test score will be at least 15/28 points.  5. Pt will negotiate 4" step with SBA.  6. Pt will have gross BLE strength of 4-/5  Outcome: Ongoing, Progressing     "

## 2024-03-01 NOTE — PLAN OF CARE
Problem: Occupational Therapy  Goal: Occupational Therapy Goal  Description: STG: within 2 wks  Pt will perform grooming with setup and independence at sinkside  Pt will bathe with sponge bathing method at sinkside with setup  Pt will perform UE dressing with independence after setup  Pt will perform LE dressing with independence after setup  Pt will sit EOB x 30 min with no assistance  Pt will transfer bed/chair/bsc with svn  Pt will perform standing task x 10 min with svn assistance  Pt will tolerate 45 minutes of tx without fatigue      LT.Restore to max I with self care and mobility.    Outcome: Ongoing, Progressing

## 2024-03-01 NOTE — HOSPITAL COURSE
2/29 Patient supine in bed with eyes open in NAD. Daughter at bedside. Patient is pleasant and answers all questions appropriately. She denies abd pain/tenderness, nausea, or vomiting. States she has been having loose stools. Per nurse patient did have one loose stool this evening since admission to Swing bed.    3/4 Looks great today, working with therapy, no major issues     3/6 Back to baseline wants to go home.

## 2024-03-01 NOTE — ASSESSMENT & PLAN NOTE
Patient has hypokalemia which is Acute and currently uncontrolled. Most recent potassium levels reviewed-   Lab Results   Component Value Date    K 2.8 (L) 03/01/2024   . Will continue potassium replacement per protocol and recheck repeat levels after replacement completed.

## 2024-03-01 NOTE — PROGRESS NOTES
[facility]  Progress Note    Patient Name: Yanet Viramontes  MRN: [id number]  Patient Class: IP- Swing   Admission Date: 2/29/2024  Length of Stay: 1 days  Attending Physician: Daren Nicole DO  Primary Care Provider: Rani, Primary Doctor        Subjective:     Principal Problem:Physical deconditioning        HPI:  84 y/o WF with PMH of HTN, OA, DVT, Thyroid CA 40 years ago, colon CA 20 years ago, and renal CA 16 years ago. Treatment of colon CA required bowel resection and chemo while renal CA required partial nephrectomy. Patient presented to [facility] ED on 2/25/24 with c/o n/v/d x a week with last BM the day before ED visit. CT Abd/Pelvis indicated SBO. NG tube was places with LCS and patient was transfer to [facility] in [place] where SBO resolved without surgical intervention. Today patient is accepted to [facility] for PT/OT due to physical deconditioning.    Overview/Hospital Course:  2/29 Patient supine in bed with eyes open in NAD. Daughter at bedside. Patient is pleasant and answers all questions appropriately. She denies abd pain/tenderness, nausea, or vomiting. States she has been having loose stools. Per nurse patient did have one loose stool this evening since admission to Swing bed.    Review of Systems   Constitutional:  Negative for appetite change, chills and fever.   HENT:  Negative for congestion, sore throat and trouble swallowing.    Respiratory:  Negative for apnea, cough, choking, chest tightness, shortness of breath, wheezing and stridor.    Cardiovascular:  Negative for chest pain, palpitations and leg swelling.   Gastrointestinal:  Negative for abdominal distention, abdominal pain, anal bleeding, blood in stool, constipation, diarrhea, nausea, rectal pain and vomiting.   Genitourinary: Negative.    Musculoskeletal: Negative.    Neurological: Negative.    Psychiatric/Behavioral: Negative.     All other systems reviewed and are  negative.    Objective:     Vital Signs (Most Recent):  Temp: 97.9 °F (36.6 °C) (02/29/24 1914)  Pulse: (!) 55 (02/29/24 1914)  Resp: 18 (02/29/24 1914)  BP: 136/72 (02/29/24 1914)  SpO2: 97 % (02/29/24 1914) Vital Signs (24h Range):  Temp:  [97.3 °F (36.3 °C)-97.9 °F (36.6 °C)] 97.9 °F (36.6 °C)  Pulse:  [53-55] 55  Resp:  [18] 18  SpO2:  [95 %-97 %] 97 %  BP: (120-136)/(38-72) 136/72     Weight: 52.5 kg (115 lb 12.8 oz)  Body mass index is 24.21 kg/m².    Intake/Output Summary (Last 24 hours) at 3/1/2024 0300  Last data filed at 2/29/2024 2234  Gross per 24 hour   Intake 240 ml   Output --   Net 240 ml         Physical Exam  Vitals and nursing note reviewed.   Constitutional:       General: She is not in acute distress.     Appearance: Normal appearance. She is normal weight. She is not ill-appearing.   HENT:      Mouth/Throat:      Mouth: Mucous membranes are moist.   Eyes:      General: No scleral icterus.     Extraocular Movements: Extraocular movements intact.      Conjunctiva/sclera: Conjunctivae normal.   Cardiovascular:      Rate and Rhythm: Normal rate and regular rhythm.      Pulses: Normal pulses.      Heart sounds: Normal heart sounds. No murmur heard.     No friction rub. No gallop.   Pulmonary:      Effort: Pulmonary effort is normal. No respiratory distress.      Breath sounds: Normal breath sounds. No wheezing.   Abdominal:      General: Abdomen is flat. Bowel sounds are normal. There is no distension.      Palpations: Abdomen is soft.      Tenderness: There is no abdominal tenderness.   Musculoskeletal:         General: No tenderness. Normal range of motion.      Cervical back: Normal range of motion and neck supple.   Skin:     General: Skin is warm and dry.      Capillary Refill: Capillary refill takes 2 to 3 seconds.   Neurological:      General: No focal deficit present.      Mental Status: She is alert. She is disoriented.   Psychiatric:         Mood and Affect: Mood normal.         Behavior:  Behavior normal.         Thought Content: Thought content normal.         Judgment: Judgment normal.             Significant Labs: All pertinent labs within the past 24 hours have been reviewed.    Significant Imaging: I have reviewed all pertinent imaging results/findings within the past 24 hours.    Assessment/Plan:      S/p small bowel obstruction          VTE Risk Mitigation (From admission, onward)           Ordered     rivaroxaban tablet 10 mg  With dinner         02/29/24 1315     IP VTE HIGH RISK PATIENT  Once         02/29/24 1313     Place sequential compression device  Until discontinued         02/29/24 1313                    Discharge Planning   QUINN:      Code Status: DNR   Is the patient medically ready for discharge?: No    Reason for patient still in hospital (select all that apply): Patient trending condition, Laboratory test, Treatment, and PT / OT recommendations  Discharge Plan A: Home Health                  YUSUF Root  Department of Hospital Medicine   [facility]

## 2024-03-01 NOTE — PT/OT/SLP EVAL
Speech Language Pathology Evaluation  Bedside Swallow    Patient Name:  Yanet Viramontes   MRN:  [id number]  Admitting Diagnosis: Physical deconditioning    Recommendations:                 General Recommendations:  Follow-up not indicated  Diet recommendations:  Regular, Thin   Aspiration Precautions: Standard aspiration precautions   General Precautions: Standard, aspiration  Communication strategies:  none    Assessment:     Yanet Viramontes is a 83 y.o. female with an SLP diagnosis of potential Dysphagia with hospital admission post small bowel obstruction.  Pt indicated coughing/choking at times during ST screen warranting ST full evaluation.   ST recc: regular  diet, thin liquids with recommendation of small sips cup edge. NO overt s/sx aspiration noted     History:     Past Medical History:   Diagnosis Date    Anticoagulant long-term use     Cancer     Hypertension     Thyroid disease        Past Surgical History:   Procedure Laterality Date    HYSTERECTOMY         Prior Intubation HX:  na    Modified Barium Swallow: na    Chest X-Rays: see chart for recent     Prior diet: regular , thin.    Occupation/hobbies/homemaking: none stated .    Subjective     Pt alert, agree to ST eval. Daughter present   Patient goals: get stronger      Pain/Comfort:       Respiratory Status: Room air    Objective:     Oral Musculature Evaluation  Oral Musculature: WNL  Dentition: upper and lower dentures  Secretion Management: adequate  Mucosal Quality: adequate  Mandibular Strength and Mobility: WNL  Oral Labial Strength and Mobility: WNL  Lingual Strength and Mobility: WNL  Velar Elevation: WNL  Buccal Strength and Mobility: WNL  Volitional Cough: WNL  Volitional Swallow: WNL  Voice Prior to PO Intake: clear    Bedside Swallow Eval:   Consistencies Assessed:  Thin liquids x6  Soft solids x3      Oral Phase:   WFL    Pharyngeal Phase:   throat clearing x1 secondary to multiple back to back sips with 1 swallow    Compensatory  Strategies  Small sips, 1 at a time     Treatment: Eval only    Goals:   Multidisciplinary Problems       SLP Goals       Not on file                    Plan:     Patient to be seen:  1 x/week   Plan of Care expires:  03/01/24 (eval only)  Plan of Care reviewed with:  patient, daughter   SLP Follow-Up:  No       Discharge recommendations:  No Therapy Indicated   Barriers to Discharge:  None    Time Tracking:     SLP Treatment Date:      Speech Start Time:  0930  Speech Stop Time:  0952     Speech Total Time (min):  22 min    Billable Minutes: Eval Swallow and Oral Function 22 03/01/2024

## 2024-03-01 NOTE — PT/OT/SLP EVAL
"Physical Therapy Evaluation    Patient Name:  Yanet Viramontes   MRN:  [id number]    Recommendations:     Discharge Recommendations: Moderate Intensity Therapy   Discharge Equipment Recommendations: none   Barriers to discharge: Inaccessible home and high falls risk    Assessment:     Yanet Viramontes is a 83 y.o. female admitted with a medical diagnosis of Physical deconditioning.  She presents with the following impairments/functional limitations: weakness, impaired endurance, impaired functional mobility, gait instability, impaired balance, impaired cognition, decreased lower extremity function .    Recent hosp'n for SBO without surgical intervention. Pt now debilitated.    Rehab Prognosis: Good; patient would benefit from acute skilled PT services to address these deficits and reach maximum level of function.    Recent Surgery: * No surgery found *      Plan:     During this hospitalization, patient to be seen 5 x/week to address the identified rehab impairments via gait training, therapeutic activities, therapeutic exercises, neuromuscular re-education and progress toward the following goals:    Plan of Care Expires:  24    Subjective     Chief Complaint: Dtr states pt is very weak.  Patient/Family Comments/goals: DC home with family.  Pain/Comfort:  Pain Rating 1: 0/10  Pain Addressed 1: Pre-medicate for activity    Patients cultural, spiritual, Mandaen conflicts given the current situation:      Living Environment:  Pt's son moved into pt's home which in one-story with a 4" threshold step.   Prior to admission, patients level of function was indep without AD.  Equipment used at home: hospital bed, rollator, bedside commode, grab bar, other (see comments) (Pt did not use AD PTA. This DME was from her  .).  DME owned (not currently used): bedside commode, hospital bed, and rollator, grab bars .  Upon discharge, patient will have assistance from family.    Objective:     Communicated with OT prior " "to session.  Patient found up in chair with    upon PT entry to room.    General Precautions: Standard, fall  Orthopedic Precautions:    Braces:    Respiratory Status: Room air    Exams:  LUE Strength: Deficits: grossly 3-/5  RLE ROM: Deficits: tight calf  RLE Strength: Deficits: grossly 3-/5  LLE ROM: Deficits: tight calf    Functional Mobility:  Bed Mobility:     Rolling Left:  minimum assistance  Supine to Sit: minimum assistance  Sit to Supine: min/mod Ax1  Transfers:     Sit to Stand:  min/mod Ax1 with 4 wheeled walker  Bed to Chair: minimum assistance with  4 wheeled walker  using  Stand Pivot  Gait: Amb'd 60 ft with minAx1 using rollator. WBOS. Flexed posture d/t thoracic kyphosis. Lacks step through josselyn, lacks heel strike josselyn.  Tinetti Total Score: 9/28 using rollator   < 18 = High Risk of Falls, 19-23 = Moderate Risk of Falls, > 24 = Low Risk of Falls      AM-PAC 6 CLICK MOBILITY  Total Score:15       Treatment & Education:  Eval completed. Pt's dtr present throughout eval. POC and general goals discussed.    Patient left HOB elevated with call button in reach and dtr. present.    GOALS:   Multidisciplinary Problems       Physical Therapy Goals          Problem: Physical Therapy    Goal Priority Disciplines Outcome Goal Variances Interventions   Physical Therapy Goal     PT, PT/OT Ongoing, Progressing     Description: STG - 1 week  1. Pt SBA with bed mob.  2. Pt SBA with STS transfers.  3. Pt CGA to amb 100 ft using appropriate assistive device.    LTG - 3 weeks  1. Pt svn with bed mob.  2. Pt svn with transfers.  3. Pt svn to amb 250 ft using appropriate assistive device.  4. Pt's Tinetti Balance/Gait test score will be at least 15/28 points.  5. Pt will negotiate 4" step with SBA.  6. Pt will have gross BLE strength of 4-/5                       History:     Past Medical History:   Diagnosis Date    Anticoagulant long-term use     Cancer     Hypertension     Thyroid disease        Past Surgical History: "   Procedure Laterality Date    HYSTERECTOMY         Time Tracking:     PT Received On: 03/01/24  PT Start Time: 1255     PT Stop Time: 1322  PT Total Time (min): 27 min     Billable Minutes: Evaluation 27 min.      03/01/2024

## 2024-03-01 NOTE — PT/OT/SLP EVAL
Occupational Therapy Evaluation     Name: Yanet Viramontes  MRN: [id number]  Admitting Diagnosis: Physical deconditioning  Recent Surgery: * No surgery found *      Recommendations:     Discharge Recommendations: Low Intensity Therapy  Level of Assistance Recommended: Intermittent supervision  Discharge Equipment Recommendations:  (TBD)  Barriers to discharge: None    Assessment:     Yanet Viramontes is a 83 y.o. female with a medical diagnosis of Physical deconditioning. She presents with performance deficits affecting function including weakness, impaired endurance, impaired self care skills, impaired functional mobility, gait instability, impaired balance.     Rehab Prognosis: Good; patient would benefit from acute OT services to address these deficits and reach maximum level of function.    Plan:     Patient to be seen 5 x/week to address the above listed problems via self-care/home management, community/work re-entry, therapeutic activities, therapeutic exercises, wheelchair management/training  Plan of Care Expires: 04/05/24  Plan of Care Reviewed with: patient, caregiver, daughter    Subjective     Chief Complaint: weakness  Patient Comments/Goals: home with her son, intermittent svn  Pain/Comfort:       Patients cultural, spiritual, Jainism conflicts given the current situation: no    Social History:  Living Environment: Patient lives with their child(carin) in a single story home   Prior Level of Function: Prior to admission, patient was independent with all basic adls and IADLs including driving and was modified independent with bathing method  Roles and Routines: Patient was driving prior to admission.  Equipment Used at Home: cane, straight  DME owned (not currently used): single point cane  Assistance Upon Discharge: family    Objective:     Communicated with pt and her daughter, Kassi, prior to session. Patient found HOB elevated with no lines or drains   upon OT entry to room.    General Precautions: Standard,  aspiration, fall   Orthopedic Precautions:  (na)   Braces: N/A    Respiratory Status: Room air    Occupational Performance    Gait belt applied - No    Bed Mobility:   Rolling/Turning to Left with minimum assistance  Scooting to HOB in supine: minimum assistance  Scooting anteriorly to EOB to have both feet planted on floor: minimum assistance  Supine to sit from left side of bed with minimum assistance    Functional Mobility/Transfers:  Sit <> Stand Transfer with minimum assistance with hand-held assist  Bed <> Chair Transfer using Step Transfer technique with minimum assistance with hand-held assist  Functional Mobility: no walking attempted during OT evaluation, just transfer to w/c in preparation for lunch tray about to arrive    Activities of Daily Living:  Feeding: independence  Grooming: set up assistance  Bathing: minimum assistance  Upper Body Dressing: minimum assistance  Lower Body Dressing: minimum assistance  Toileting: minimum assistance    Cognitive/Visual Perceptual:  Cognitive/Psychosocial Skills:    -     Oriented to: Person, Place, Time, Situation  -     Follows Commands/attention: Follows multistep  commands  -     Communication: clear/fluent  -     Memory: No Deficits noted  -     Safety awareness/insight to disability: intact  -     Mood/Affect/Coping skills/emotional control: Appropriate to situation, Cooperative, and Pleasant    Physical Exam:  Balance:    -     Sitting: supervision  -     Standing: minimum assistance  Dominant hand: Right  Upper Extremity Range of Motion:     -       Right Upper Extremity: WFL  -       Left Upper Extremity: WFL  Upper Extremity Strength:    -       Right Upper Extremity: Deficits: 3/5  -       Left Upper Extremity: Deficits: 3/5   Strength:    -       Right Upper Extremity: Deficits: 3+/5  -       Left Upper Extremity: Deficits: 3+/5  Fine Motor Coordination:    -       Intact  Gross motor coordination:   WFL    AMPAC 6 Click ADL:  AMPAC Total Score:  17    Treatment & Education:  Therapist provided facilitation and instruction of proper body mechanics, energy conservation, and fall prevention strategies during tasks listed above  Patient educated on role of OT, POC, and goals for therapy  Patient educated on importance of OOB activities with staff member assistance and sitting OOB majority of the day  Pt educated on OT role/POC.   Importance of OOB activity with staff assistance.  Importance of sitting up in the chair throughout the day as tolerated, especially for meals   Safety during functional t/f and mobility with use of BSC or RW at this time  Importance of assisting with self-care activities   All questions/concerns answered within OT scope of practice     Patient clear to stand pivot transfer with RN/PCT, assist jana mitchell .    Patient left up in chair with call button in reach on tabletop in front of pt, and daughter present.  Pt encouraged to eat and drink well to begin process of improving strength and endurance.  Pt and her daughter relate good understanding.    GOALS:   Multidisciplinary Problems       Occupational Therapy Goals          Problem: Occupational Therapy    Goal Priority Disciplines Outcome Interventions   Occupational Therapy Goal     OT, PT/OT Ongoing, Progressing    Description: STG: within 2 wks  Pt will perform grooming with setup and independence at sinkside  Pt will bathe with sponge bathing method at sinkside with setup  Pt will perform UE dressing with independence after setup  Pt will perform LE dressing with independence after setup  Pt will sit EOB x 30 min with no assistance  Pt will transfer bed/chair/bsc with svn  Pt will perform standing task x 10 min with svn assistance  Pt will tolerate 45 minutes of tx without fatigue      LT.Restore to max I with self care and mobility.                         History:     Past Medical History:   Diagnosis Date    Anticoagulant long-term use     Cancer     Hypertension     Thyroid  disease          Past Surgical History:   Procedure Laterality Date    HYSTERECTOMY         Time Tracking:     OT Date of Treatment: 03/01/24  OT Start Time: 1105  OT Stop Time: 1124  OT Total Time (min): 19 min    Billable Minutes: Evaluation 19    3/1/2024

## 2024-03-01 NOTE — HPI
82 y/o WF with PMH of HTN, OA, DVT, Thyroid CA 40 years ago, colon CA 20 years ago, and renal CA 16 years ago. Treatment of colon CA required bowel resection and chemo while renal CA required partial nephrectomy. Patient presented to [facility] ED on 2/25/24 with c/o n/v/d x a week with last BM the day before ED visit. CT Abd/Pelvis indicated SBO. NG tube was places with LCS and patient was transfer to [facility] in [place] where SBO resolved without surgical intervention. Today patient is accepted to [facility] for PT/OT due to physical deconditioning.

## 2024-03-01 NOTE — ASSESSMENT & PLAN NOTE
Chronic, controlled. Latest blood pressure and vitals reviewed-     Temp:  [97.3 °F (36.3 °C)-98.3 °F (36.8 °C)]   Pulse:  [52-55]   Resp:  [18]   BP: (120-168)/(38-88)   SpO2:  [95 %-97 %] .   Home meds for hypertension were reviewed and noted below.   Hypertension Medications               diltiaZEM HCl (TIAZAC) 120 mg 24 hr capsule Take 120 mg by mouth.    triamterene-hydrochlorothiazide 37.5-25 mg (MAXZIDE-25) 37.5-25 mg per tablet Take 1 tablet by mouth every morning.            While in the hospital, will manage blood pressure as follows; Adjust home antihypertensive regimen as follows- stop HCTZ    Will utilize p.r.n. blood pressure medication only if patient's blood pressure greater than 180/110 and she develops symptoms such as worsening chest pain or shortness of breath.

## 2024-03-01 NOTE — H&P
[facility] Medicine  History & Physical    Patient Name: Yanet Viramontes  MRN: [id number]  Patient Class: IP- Swing  Admission Date: 2/29/2024  Attending Physician: Daren Nicole DO   Primary Care Provider: Rani, Primary Doctor         Patient information was obtained from patient, past medical records, and ER records.     Subjective:     Principal Problem:Physical deconditioning    Chief Complaint: No chief complaint on file.       HPI: 84 y/o WF with PMH of HTN, OA, DVT, Thyroid CA 40 years ago, colon CA 20 years ago, and renal CA 16 years ago. Treatment of colon CA required bowel resection and chemo while renal CA required partial nephrectomy. Patient presented to [facility] ED on 2/25/24 with c/o n/v/d x a week with last BM the day before ED visit. CT Abd/Pelvis indicated SBO. NG tube was places with LCS and patient was transfer to [facility] in [place] where SBO resolved without surgical intervention. Today patient is accepted to [facility] for PT/OT due to physical deconditioning.    Past Medical History:   Diagnosis Date    Anticoagulant long-term use     Cancer     Hypertension     Thyroid disease        Past Surgical History:   Procedure Laterality Date    HYSTERECTOMY         Review of patient's allergies indicates:  No Known Allergies    No current facility-administered medications on file prior to encounter.     Current Outpatient Medications on File Prior to Encounter   Medication Sig    celecoxib (CELEBREX) 200 MG capsule Take 200 mg by mouth.    diltiaZEM HCl (TIAZAC) 120 mg 24 hr capsule Take 120 mg by mouth.    levothyroxine (SYNTHROID) 112 MCG tablet Take 112 mcg by mouth every morning.    potassium chloride SA (K-DUR,KLOR-CON M) 10 MEQ tablet Take 10 mEq by mouth.    triamterene-hydrochlorothiazide 37.5-25 mg (MAXZIDE-25) 37.5-25 mg per tablet Take 1 tablet by mouth every morning.    XARELTO 20 mg Tab Take 20 mg by mouth.    zolpidem (AMBIEN) 10 mg Tab  Take 10 mg by mouth every evening.     Family History    None       Tobacco Use    Smoking status: Every Day     Current packs/day: 0.50     Average packs/day: 0.5 packs/day for 2.1 years (1.0 ttl pk-yrs)     Types: Cigarettes     Start date: 2/1/2022    Smokeless tobacco: Never    Tobacco comments:     [id number]   Substance and Sexual Activity    Alcohol use: Not Currently    Drug use: Never    Sexual activity: Not Currently     Review of Systems   Constitutional:  Negative for appetite change, chills and fever.   Respiratory:  Negative for cough, shortness of breath and wheezing.    Cardiovascular:  Negative for chest pain, palpitations and leg swelling.   Gastrointestinal:  Positive for diarrhea. Negative for abdominal distention, nausea and vomiting.   Genitourinary:  Negative for dysuria.   Skin:  Negative for rash.   Neurological:  Positive for weakness. Negative for dizziness, seizures and syncope.   Psychiatric/Behavioral:  Negative for agitation, behavioral problems and confusion.    All other systems reviewed and are negative.    Objective:     Vital Signs (Most Recent):  Temp: 98.3 °F (36.8 °C) (03/01/24 0753)  Pulse: (!) 52 (03/01/24 0753)  Resp: 18 (03/01/24 0753)  BP: (!) 168/88 (03/01/24 0753)  SpO2: 95 % (03/01/24 0753) Vital Signs (24h Range):  Temp:  [97.3 °F (36.3 °C)-98.3 °F (36.8 °C)] 98.3 °F (36.8 °C)  Pulse:  [52-55] 52  Resp:  [18] 18  SpO2:  [95 %-97 %] 95 %  BP: (120-168)/(38-88) 168/88     Weight: 52.5 kg (115 lb 12.8 oz)  Body mass index is 24.21 kg/m².     Physical Exam  Vitals reviewed.   Constitutional:       General: She is not in acute distress.     Appearance: Normal appearance.   HENT:      Head: Normocephalic and atraumatic.   Eyes:      General: No scleral icterus.     Extraocular Movements: Extraocular movements intact.      Conjunctiva/sclera: Conjunctivae normal.      Pupils: Pupils are equal, round, and reactive to light.   Cardiovascular:      Rate and Rhythm: Normal  rate and regular rhythm.      Heart sounds: No murmur heard.     No friction rub. No gallop.   Pulmonary:      Effort: Pulmonary effort is normal. No respiratory distress.      Breath sounds: Normal breath sounds. No wheezing or rales.   Abdominal:      General: Abdomen is flat. Bowel sounds are normal. There is no distension.      Palpations: Abdomen is soft.      Tenderness: There is no abdominal tenderness. There is no guarding.   Musculoskeletal:         General: No swelling.      Right lower leg: No edema.      Left lower leg: No edema.   Skin:     General: Skin is warm and dry.      Coloration: Skin is not jaundiced.      Findings: No rash.   Neurological:      General: No focal deficit present.      Mental Status: She is alert and oriented to person, place, and time.      Sensory: No sensory deficit.      Motor: Weakness present.   Psychiatric:         Mood and Affect: Mood normal.         Thought Content: Thought content normal.         Judgment: Judgment normal.              CRANIAL NERVES     CN III, IV, VI   Pupils are equal, round, and reactive to light.       Significant Labs: All pertinent labs within the past 24 hours have been reviewed.  Recent Lab Results         03/01/24  0523        Anion Gap 9       Baso # 0.01       Basophil % 0.2       BUN 17       BUN/CREAT RATIO 17       Calcium 8.0       Chloride 102       CO2 29       Creatinine 1.00       Differential Method Auto       eGFR 56       Eos # 0.12       Eos % 2.2       Glucose 99       Hematocrit 29.5       Hemoglobin 9.1       Lymph # 0.78       Lymph % 14.3       MCH 26.7       MCHC 30.8       MCV 86.5       Mono # 0.57       Mono % 10.5       MPV 10.6       Neutrophils, Abs 3.97       Neutrophils Relative 72.8       Platelet Count 291       Potassium 2.8       RBC 3.41       RDW 15.0       Sodium 137       WBC 5.45               Significant Imaging: I have reviewed all pertinent imaging results/findings within the past 24  hours.  Assessment/Plan:     * Physical deconditioning  PT/OT      Hypokalemia  Patient has hypokalemia which is Acute and currently uncontrolled. Most recent potassium levels reviewed-   Lab Results   Component Value Date    K 2.8 (L) 03/01/2024   . Will continue potassium replacement per protocol and recheck repeat levels after replacement completed.     History of DVT (deep vein thrombosis)  Resume Xarelto at renal dose.       Hypertension  Chronic, controlled. Latest blood pressure and vitals reviewed-     Temp:  [97.3 °F (36.3 °C)-98.3 °F (36.8 °C)]   Pulse:  [52-55]   Resp:  [18]   BP: (120-168)/(38-88)   SpO2:  [95 %-97 %] .   Home meds for hypertension were reviewed and noted below.   Hypertension Medications               diltiaZEM HCl (TIAZAC) 120 mg 24 hr capsule Take 120 mg by mouth.    triamterene-hydrochlorothiazide 37.5-25 mg (MAXZIDE-25) 37.5-25 mg per tablet Take 1 tablet by mouth every morning.            While in the hospital, will manage blood pressure as follows; Adjust home antihypertensive regimen as follows- stop HCTZ    Will utilize p.r.n. blood pressure medication only if patient's blood pressure greater than 180/110 and she develops symptoms such as worsening chest pain or shortness of breath.    S/p small bowel obstruction  Monitor PO intake and any issues.          VTE Risk Mitigation (From admission, onward)           Ordered     rivaroxaban tablet 10 mg  With dinner         02/29/24 1315     IP VTE HIGH RISK PATIENT  Once         02/29/24 1313     Place sequential compression device  Until discontinued         02/29/24 1313                               [facility] Medicine  Progress Note    Patient Name: Yanet Viramontes  MRN: [id number]  Patient Class: IP- Swing   Admission Date: 2/29/2024  Length of Stay: 1 days  Attending Physician: Daren Nicole DO  Primary Care Provider: No, Primary Doctor        Subjective:     Principal  Problem:Physical deconditioning        HPI:  84 y/o WF with PMH of HTN, OA, DVT, Thyroid CA 40 years ago, colon CA 20 years ago, and renal CA 16 years ago. Treatment of colon CA required bowel resection and chemo while renal CA required partial nephrectomy. Patient presented to [facility] ED on 2/25/24 with c/o n/v/d x a week with last BM the day before ED visit. CT Abd/Pelvis indicated SBO. NG tube was places with LCS and patient was transfer to [facility] in [place] where SBO resolved without surgical intervention. Today patient is accepted to [facility] for PT/OT due to physical deconditioning.    Overview/Hospital Course:  2/29 Patient supine in bed with eyes open in NAD. Daughter at bedside. Patient is pleasant and answers all questions appropriately. She denies abd pain/tenderness, nausea, or vomiting. States she has been having loose stools. Per nurse patient did have one loose stool this evening since admission to Swing bed.    Review of Systems   Constitutional:  Negative for appetite change, chills and fever.   HENT:  Negative for congestion, sore throat and trouble swallowing.    Respiratory:  Negative for apnea, cough, choking, chest tightness, shortness of breath, wheezing and stridor.    Cardiovascular:  Negative for chest pain, palpitations and leg swelling.   Gastrointestinal:  Negative for abdominal distention, abdominal pain, anal bleeding, blood in stool, constipation, diarrhea, nausea, rectal pain and vomiting.   Genitourinary: Negative.    Musculoskeletal: Negative.    Neurological: Negative.    Psychiatric/Behavioral: Negative.     All other systems reviewed and are negative.    Objective:     Vital Signs (Most Recent):  Temp: 97.9 °F (36.6 °C) (02/29/24 1914)  Pulse: (!) 55 (02/29/24 1914)  Resp: 18 (02/29/24 1914)  BP: 136/72 (02/29/24 1914)  SpO2: 97 % (02/29/24 1914) Vital Signs (24h Range):  Temp:  [97.3 °F (36.3 °C)-97.9 °F (36.6 °C)] 97.9 °F (36.6 °C)  Pulse:  [53-55] 55  Resp:  [18]  18  SpO2:  [95 %-97 %] 97 %  BP: (120-136)/(38-72) 136/72     Weight: 52.5 kg (115 lb 12.8 oz)  Body mass index is 24.21 kg/m².    Intake/Output Summary (Last 24 hours) at 3/1/2024 0300  Last data filed at 2/29/2024 2234  Gross per 24 hour   Intake 240 ml   Output --   Net 240 ml         Physical Exam  Vitals and nursing note reviewed.   Constitutional:       General: She is not in acute distress.     Appearance: Normal appearance. She is normal weight. She is not ill-appearing.   HENT:      Mouth/Throat:      Mouth: Mucous membranes are moist.   Eyes:      General: No scleral icterus.     Extraocular Movements: Extraocular movements intact.      Conjunctiva/sclera: Conjunctivae normal.   Cardiovascular:      Rate and Rhythm: Normal rate and regular rhythm.      Pulses: Normal pulses.      Heart sounds: Normal heart sounds. No murmur heard.     No friction rub. No gallop.   Pulmonary:      Effort: Pulmonary effort is normal. No respiratory distress.      Breath sounds: Normal breath sounds. No wheezing.   Abdominal:      General: Abdomen is flat. Bowel sounds are normal. There is no distension.      Palpations: Abdomen is soft.      Tenderness: There is no abdominal tenderness.   Musculoskeletal:         General: No tenderness. Normal range of motion.      Cervical back: Normal range of motion and neck supple.   Skin:     General: Skin is warm and dry.      Capillary Refill: Capillary refill takes 2 to 3 seconds.   Neurological:      General: No focal deficit present.      Mental Status: She is alert. She is disoriented.   Psychiatric:         Mood and Affect: Mood normal.         Behavior: Behavior normal.         Thought Content: Thought content normal.         Judgment: Judgment normal.             Significant Labs: All pertinent labs within the past 24 hours have been reviewed.    Significant Imaging: I have reviewed all pertinent imaging results/findings within the past 24 hours.    Assessment/Plan:      S/p  small bowel obstruction          VTE Risk Mitigation (From admission, onward)           Ordered     rivaroxaban tablet 10 mg  With dinner         02/29/24 1315     IP VTE HIGH RISK PATIENT  Once         02/29/24 1313     Place sequential compression device  Until discontinued         02/29/24 1313                    Discharge Planning   QUINN:      Code Status: DNR   Is the patient medically ready for discharge?: No    Reason for patient still in hospital (select all that apply): Patient trending condition, Laboratory test, Treatment, and PT / OT recommendations  Discharge Plan A: Home Health                  YUSUF Root  Department of [facility] Medicine   [facility]      Daren Nicole DO  Department of [facility] Medicine  [facility]

## 2024-03-01 NOTE — SUBJECTIVE & OBJECTIVE
Review of Systems   Constitutional:  Negative for appetite change, chills and fever.   HENT:  Negative for congestion, sore throat and trouble swallowing.    Respiratory:  Negative for apnea, cough, choking, chest tightness, shortness of breath, wheezing and stridor.    Cardiovascular:  Negative for chest pain, palpitations and leg swelling.   Gastrointestinal:  Negative for abdominal distention, abdominal pain, anal bleeding, blood in stool, constipation, diarrhea, nausea, rectal pain and vomiting.   Genitourinary: Negative.    Musculoskeletal: Negative.    Neurological: Negative.    Psychiatric/Behavioral: Negative.     All other systems reviewed and are negative.    Objective:     Vital Signs (Most Recent):  Temp: 97.9 °F (36.6 °C) (02/29/24 1914)  Pulse: (!) 55 (02/29/24 1914)  Resp: 18 (02/29/24 1914)  BP: 136/72 (02/29/24 1914)  SpO2: 97 % (02/29/24 1914) Vital Signs (24h Range):  Temp:  [97.3 °F (36.3 °C)-97.9 °F (36.6 °C)] 97.9 °F (36.6 °C)  Pulse:  [53-55] 55  Resp:  [18] 18  SpO2:  [95 %-97 %] 97 %  BP: (120-136)/(38-72) 136/72     Weight: 52.5 kg (115 lb 12.8 oz)  Body mass index is 24.21 kg/m².    Intake/Output Summary (Last 24 hours) at 3/1/2024 0300  Last data filed at 2/29/2024 2234  Gross per 24 hour   Intake 240 ml   Output --   Net 240 ml         Physical Exam  Vitals and nursing note reviewed.   Constitutional:       General: She is not in acute distress.     Appearance: Normal appearance. She is normal weight. She is not ill-appearing.   HENT:      Mouth/Throat:      Mouth: Mucous membranes are moist.   Eyes:      General: No scleral icterus.     Extraocular Movements: Extraocular movements intact.      Conjunctiva/sclera: Conjunctivae normal.   Cardiovascular:      Rate and Rhythm: Normal rate and regular rhythm.      Pulses: Normal pulses.      Heart sounds: Normal heart sounds. No murmur heard.     No friction rub. No gallop.   Pulmonary:      Effort: Pulmonary effort is normal. No respiratory  distress.      Breath sounds: Normal breath sounds. No wheezing.   Abdominal:      General: Abdomen is flat. Bowel sounds are normal. There is no distension.      Palpations: Abdomen is soft.      Tenderness: There is no abdominal tenderness.   Musculoskeletal:         General: No tenderness. Normal range of motion.      Cervical back: Normal range of motion and neck supple.   Skin:     General: Skin is warm and dry.      Capillary Refill: Capillary refill takes 2 to 3 seconds.   Neurological:      General: No focal deficit present.      Mental Status: She is alert. She is disoriented.   Psychiatric:         Mood and Affect: Mood normal.         Behavior: Behavior normal.         Thought Content: Thought content normal.         Judgment: Judgment normal.             Significant Labs: All pertinent labs within the past 24 hours have been reviewed.    Significant Imaging: I have reviewed all pertinent imaging results/findings within the past 24 hours.

## 2024-03-01 NOTE — SUBJECTIVE & OBJECTIVE
Past Medical History:   Diagnosis Date    Anticoagulant long-term use     Cancer     Hypertension     Thyroid disease        Past Surgical History:   Procedure Laterality Date    HYSTERECTOMY         Review of patient's allergies indicates:  No Known Allergies    No current facility-administered medications on file prior to encounter.     Current Outpatient Medications on File Prior to Encounter   Medication Sig    celecoxib (CELEBREX) 200 MG capsule Take 200 mg by mouth.    diltiaZEM HCl (TIAZAC) 120 mg 24 hr capsule Take 120 mg by mouth.    levothyroxine (SYNTHROID) 112 MCG tablet Take 112 mcg by mouth every morning.    potassium chloride SA (K-DUR,KLOR-CON M) 10 MEQ tablet Take 10 mEq by mouth.    triamterene-hydrochlorothiazide 37.5-25 mg (MAXZIDE-25) 37.5-25 mg per tablet Take 1 tablet by mouth every morning.    XARELTO 20 mg Tab Take 20 mg by mouth.    zolpidem (AMBIEN) 10 mg Tab Take 10 mg by mouth every evening.     Family History    None       Tobacco Use    Smoking status: Every Day     Current packs/day: 0.50     Average packs/day: 0.5 packs/day for 2.1 years (1.0 ttl pk-yrs)     Types: Cigarettes     Start date: 2/1/2022    Smokeless tobacco: Never    Tobacco comments:     [id number]   Substance and Sexual Activity    Alcohol use: Not Currently    Drug use: Never    Sexual activity: Not Currently     Review of Systems   Constitutional:  Negative for appetite change, chills and fever.   Respiratory:  Negative for cough, shortness of breath and wheezing.    Cardiovascular:  Negative for chest pain, palpitations and leg swelling.   Gastrointestinal:  Positive for diarrhea. Negative for abdominal distention, nausea and vomiting.   Genitourinary:  Negative for dysuria.   Skin:  Negative for rash.   Neurological:  Positive for weakness. Negative for dizziness, seizures and syncope.   Psychiatric/Behavioral:  Negative for agitation, behavioral problems and confusion.    All other systems reviewed and are  negative.    Objective:     Vital Signs (Most Recent):  Temp: 98.3 °F (36.8 °C) (03/01/24 0753)  Pulse: (!) 52 (03/01/24 0753)  Resp: 18 (03/01/24 0753)  BP: (!) 168/88 (03/01/24 0753)  SpO2: 95 % (03/01/24 0753) Vital Signs (24h Range):  Temp:  [97.3 °F (36.3 °C)-98.3 °F (36.8 °C)] 98.3 °F (36.8 °C)  Pulse:  [52-55] 52  Resp:  [18] 18  SpO2:  [95 %-97 %] 95 %  BP: (120-168)/(38-88) 168/88     Weight: 52.5 kg (115 lb 12.8 oz)  Body mass index is 24.21 kg/m².     Physical Exam  Vitals reviewed.   Constitutional:       General: She is not in acute distress.     Appearance: Normal appearance.   HENT:      Head: Normocephalic and atraumatic.   Eyes:      General: No scleral icterus.     Extraocular Movements: Extraocular movements intact.      Conjunctiva/sclera: Conjunctivae normal.      Pupils: Pupils are equal, round, and reactive to light.   Cardiovascular:      Rate and Rhythm: Normal rate and regular rhythm.      Heart sounds: No murmur heard.     No friction rub. No gallop.   Pulmonary:      Effort: Pulmonary effort is normal. No respiratory distress.      Breath sounds: Normal breath sounds. No wheezing or rales.   Abdominal:      General: Abdomen is flat. Bowel sounds are normal. There is no distension.      Palpations: Abdomen is soft.      Tenderness: There is no abdominal tenderness. There is no guarding.   Musculoskeletal:         General: No swelling.      Right lower leg: No edema.      Left lower leg: No edema.   Skin:     General: Skin is warm and dry.      Coloration: Skin is not jaundiced.      Findings: No rash.   Neurological:      General: No focal deficit present.      Mental Status: She is alert and oriented to person, place, and time.      Sensory: No sensory deficit.      Motor: Weakness present.   Psychiatric:         Mood and Affect: Mood normal.         Thought Content: Thought content normal.         Judgment: Judgment normal.              CRANIAL NERVES     CN III, IV, VI   Pupils are  equal, round, and reactive to light.       Significant Labs: All pertinent labs within the past 24 hours have been reviewed.  Recent Lab Results         03/01/24  0523        Anion Gap 9       Baso # 0.01       Basophil % 0.2       BUN 17       BUN/CREAT RATIO 17       Calcium 8.0       Chloride 102       CO2 29       Creatinine 1.00       Differential Method Auto       eGFR 56       Eos # 0.12       Eos % 2.2       Glucose 99       Hematocrit 29.5       Hemoglobin 9.1       Lymph # 0.78       Lymph % 14.3       MCH 26.7       MCHC 30.8       MCV 86.5       Mono # 0.57       Mono % 10.5       MPV 10.6       Neutrophils, Abs 3.97       Neutrophils Relative 72.8       Platelet Count 291       Potassium 2.8       RBC 3.41       RDW 15.0       Sodium 137       WBC 5.45               Significant Imaging: I have reviewed all pertinent imaging results/findings within the past 24 hours.

## 2024-03-02 LAB
ANION GAP SERPL CALCULATED.3IONS-SCNC: 11 MMOL/L (ref 7–16)
BUN SERPL-MCNC: 13 MG/DL (ref 7–18)
BUN/CREAT SERPL: 11 (ref 6–20)
CALCIUM SERPL-MCNC: 8.2 MG/DL (ref 8.5–10.1)
CHLORIDE SERPL-SCNC: 103 MMOL/L (ref 98–107)
CO2 SERPL-SCNC: 30 MMOL/L (ref 21–32)
CREAT SERPL-MCNC: 1.21 MG/DL (ref 0.55–1.02)
EGFR (NO RACE VARIABLE) (RUSH/TITUS): 44 ML/MIN/1.73M2
GLUCOSE SERPL-MCNC: 98 MG/DL (ref 74–106)
POTASSIUM SERPL-SCNC: 4.6 MMOL/L (ref 3.5–5.1)
SODIUM SERPL-SCNC: 139 MMOL/L (ref 136–145)

## 2024-03-02 PROCEDURE — 80048 BASIC METABOLIC PNL TOTAL CA: CPT | Performed by: HOSPITALIST

## 2024-03-02 PROCEDURE — 11000004 HC SNF PRIVATE

## 2024-03-02 PROCEDURE — 27000958

## 2024-03-02 PROCEDURE — 25000003 PHARM REV CODE 250: Performed by: HOSPITALIST

## 2024-03-02 PROCEDURE — 25000003 PHARM REV CODE 250: Performed by: NURSE PRACTITIONER

## 2024-03-02 RX ORDER — POTASSIUM CHLORIDE 750 MG/1
10 CAPSULE, EXTENDED RELEASE ORAL DAILY
Status: DISCONTINUED | OUTPATIENT
Start: 2024-03-03 | End: 2024-03-06 | Stop reason: HOSPADM

## 2024-03-02 RX ADMIN — GENTAMICIN SULFATE 1 DROP: 3 SOLUTION/ DROPS OPHTHALMIC at 05:03

## 2024-03-02 RX ADMIN — CELECOXIB 100 MG: 100 CAPSULE ORAL at 08:03

## 2024-03-02 RX ADMIN — GENTAMICIN SULFATE 1 DROP: 3 SOLUTION/ DROPS OPHTHALMIC at 08:03

## 2024-03-02 RX ADMIN — ZOLPIDEM TARTRATE 10 MG: 5 TABLET ORAL at 08:03

## 2024-03-02 RX ADMIN — POTASSIUM CHLORIDE 40 MEQ: 1500 TABLET, EXTENDED RELEASE ORAL at 08:03

## 2024-03-02 RX ADMIN — RIVAROXABAN 10 MG: 10 TABLET, FILM COATED ORAL at 05:03

## 2024-03-02 RX ADMIN — PANTOPRAZOLE SODIUM 40 MG: 40 TABLET, DELAYED RELEASE ORAL at 08:03

## 2024-03-02 RX ADMIN — DILTIAZEM HYDROCHLORIDE 120 MG: 120 CAPSULE, COATED, EXTENDED RELEASE ORAL at 08:03

## 2024-03-02 RX ADMIN — LEVOTHYROXINE SODIUM 112 MCG: 0.11 TABLET ORAL at 06:03

## 2024-03-03 LAB
ANION GAP SERPL CALCULATED.3IONS-SCNC: 15 MMOL/L (ref 7–16)
BUN SERPL-MCNC: 16 MG/DL (ref 7–18)
BUN/CREAT SERPL: 14 (ref 6–20)
CALCIUM SERPL-MCNC: 8.8 MG/DL (ref 8.5–10.1)
CHLORIDE SERPL-SCNC: 102 MMOL/L (ref 98–107)
CO2 SERPL-SCNC: 24 MMOL/L (ref 21–32)
CREAT SERPL-MCNC: 1.12 MG/DL (ref 0.55–1.02)
EGFR (NO RACE VARIABLE) (RUSH/TITUS): 49 ML/MIN/1.73M2
GLUCOSE SERPL-MCNC: 94 MG/DL (ref 74–106)
POTASSIUM SERPL-SCNC: 4.6 MMOL/L (ref 3.5–5.1)
SODIUM SERPL-SCNC: 136 MMOL/L (ref 136–145)

## 2024-03-03 PROCEDURE — 25000003 PHARM REV CODE 250: Performed by: NURSE PRACTITIONER

## 2024-03-03 PROCEDURE — 80048 BASIC METABOLIC PNL TOTAL CA: CPT | Performed by: HOSPITALIST

## 2024-03-03 PROCEDURE — 27000958

## 2024-03-03 PROCEDURE — 11000004 HC SNF PRIVATE

## 2024-03-03 RX ADMIN — GENTAMICIN SULFATE 1 DROP: 3 SOLUTION/ DROPS OPHTHALMIC at 08:03

## 2024-03-03 RX ADMIN — RIVAROXABAN 10 MG: 10 TABLET, FILM COATED ORAL at 05:03

## 2024-03-03 RX ADMIN — CELECOXIB 100 MG: 100 CAPSULE ORAL at 08:03

## 2024-03-03 RX ADMIN — DILTIAZEM HYDROCHLORIDE 120 MG: 120 CAPSULE, COATED, EXTENDED RELEASE ORAL at 08:03

## 2024-03-03 RX ADMIN — POTASSIUM CHLORIDE 10 MEQ: 750 CAPSULE, EXTENDED RELEASE ORAL at 08:03

## 2024-03-03 RX ADMIN — GENTAMICIN SULFATE 1 DROP: 3 SOLUTION/ DROPS OPHTHALMIC at 03:03

## 2024-03-03 RX ADMIN — LEVOTHYROXINE SODIUM 112 MCG: 0.11 TABLET ORAL at 05:03

## 2024-03-03 RX ADMIN — PANTOPRAZOLE SODIUM 40 MG: 40 TABLET, DELAYED RELEASE ORAL at 08:03

## 2024-03-03 RX ADMIN — ZOLPIDEM TARTRATE 10 MG: 5 TABLET ORAL at 08:03

## 2024-03-03 NOTE — PLAN OF CARE
Problem: Fall Injury Risk  Goal: Absence of Fall and Fall-Related Injury  Outcome: Ongoing, Progressing     Problem: Adult Inpatient Plan of Care  Goal: Readiness for Transition of Care  Outcome: Ongoing, Progressing

## 2024-03-04 LAB
ANION GAP SERPL CALCULATED.3IONS-SCNC: 13 MMOL/L (ref 7–16)
BUN SERPL-MCNC: 16 MG/DL (ref 7–18)
BUN/CREAT SERPL: 14 (ref 6–20)
CALCIUM SERPL-MCNC: 8.5 MG/DL (ref 8.5–10.1)
CHLORIDE SERPL-SCNC: 103 MMOL/L (ref 98–107)
CO2 SERPL-SCNC: 23 MMOL/L (ref 21–32)
CREAT SERPL-MCNC: 1.12 MG/DL (ref 0.55–1.02)
EGFR (NO RACE VARIABLE) (RUSH/TITUS): 49 ML/MIN/1.73M2
GLUCOSE SERPL-MCNC: 91 MG/DL (ref 74–106)
POTASSIUM SERPL-SCNC: 4.5 MMOL/L (ref 3.5–5.1)
SODIUM SERPL-SCNC: 134 MMOL/L (ref 136–145)

## 2024-03-04 PROCEDURE — 97110 THERAPEUTIC EXERCISES: CPT

## 2024-03-04 PROCEDURE — 80048 BASIC METABOLIC PNL TOTAL CA: CPT | Performed by: HOSPITALIST

## 2024-03-04 PROCEDURE — 25000003 PHARM REV CODE 250: Performed by: HOSPITALIST

## 2024-03-04 PROCEDURE — 11000004 HC SNF PRIVATE

## 2024-03-04 PROCEDURE — 99307 SBSQ NF CARE SF MDM 10: CPT | Mod: ,,, | Performed by: HOSPITALIST

## 2024-03-04 PROCEDURE — 97116 GAIT TRAINING THERAPY: CPT

## 2024-03-04 PROCEDURE — 97530 THERAPEUTIC ACTIVITIES: CPT

## 2024-03-04 PROCEDURE — 27000958

## 2024-03-04 PROCEDURE — 25000003 PHARM REV CODE 250: Performed by: NURSE PRACTITIONER

## 2024-03-04 RX ADMIN — CELECOXIB 100 MG: 100 CAPSULE ORAL at 08:03

## 2024-03-04 RX ADMIN — ZOLPIDEM TARTRATE 10 MG: 5 TABLET ORAL at 08:03

## 2024-03-04 RX ADMIN — DILTIAZEM HYDROCHLORIDE 120 MG: 120 CAPSULE, COATED, EXTENDED RELEASE ORAL at 08:03

## 2024-03-04 RX ADMIN — RIVAROXABAN 10 MG: 10 TABLET, FILM COATED ORAL at 05:03

## 2024-03-04 RX ADMIN — GENTAMICIN SULFATE 1 DROP: 3 SOLUTION/ DROPS OPHTHALMIC at 08:03

## 2024-03-04 RX ADMIN — PANTOPRAZOLE SODIUM 40 MG: 40 TABLET, DELAYED RELEASE ORAL at 08:03

## 2024-03-04 RX ADMIN — GENTAMICIN SULFATE 1 DROP: 3 SOLUTION/ DROPS OPHTHALMIC at 02:03

## 2024-03-04 RX ADMIN — POTASSIUM CHLORIDE 10 MEQ: 750 CAPSULE, EXTENDED RELEASE ORAL at 08:03

## 2024-03-04 RX ADMIN — LEVOTHYROXINE SODIUM 112 MCG: 0.11 TABLET ORAL at 05:03

## 2024-03-04 NOTE — PT/OT/SLP PROGRESS
Occupational Therapy  Treatment    Yanet Viramontes   MRN: [id number]   Admitting Diagnosis: Physical deconditioning    OT Date of Treatment: 03/04/24   OT Start Time: 0931  OT Stop Time: 1010  OT Total Time (min): 39 min    Billable Minutes:  Therapeutic Activity 12 and Therapeutic Exercise 27    OT/CRISELDA: OT          General Precautions: Standard, aspiration, fall  Orthopedic Precautions:  (na)  Braces: N/A  Respiratory Status: Room air         Subjective:  Communicated with pt prior to session.  Pt sitting up in bedside chair already dressed upon OT getting to pt room to initiate tx  Pain/Comfort  Pain Rating 1: 0/10    Objective:        Functional Mobility:  Bed Mobility: not attempted with OT today       Transfers: min a/CGA        Functional Ambulation: see PT    Activities of Daily Living:     Feeding adaptive equipment:  none needed     UE adaptive equipment: none     LE adaptive equipment: TBD                    Bathing adaptive equipment: TBD    Balance:   Static Sit: FAIR+: Able to take MINIMAL challenges from all directions  Dynamic Sit: FAIR+: Maintains balance through MINIMAL excursions of active trunk motion  Static Stand: POOR+: Needs MINIMAL assist to maintain  Dynamic stand: POOR+: Needs MIN (minimal ) assist during gait    Therapeutic Activities and Exercises:  To improve endurance, strength, OT facilitated pt with:  UBE x 6 min with no RB's throughout, low level resist, F/R motion  RED PUTTY to simulate kitchen prep task and improve FM and  x 10 min    To improve reach, AROM, FM/ and trunk rotation with core forward leaning engagement:   Clothespins vertical board BUE weighted with no wristweights but in standing with crossing midline component as well as reaching above head component as facilitated by OT with CGA throughout  seated Reciprocal pulleys x 5 min in sh flexion and abduction, alternatively  Reacher was distributed to pt with opportunity for practice with small balls and items on floor  "to grasp and release into a basket; pt was successful with all but last small slick ball    To increase functional mobility skills:  EOM scooting x 1 rounds with no assist after initiation and min a transfer from w/c to mat to w/c again      AM-PAC 6 CLICK ADL   How much help from another person does this patient currently need?   1 = Unable, Total/Dependent Assistance  2 = A lot, Maximum/Moderate Assistance  3 = A little, Minimum/Contact Guard/Supervision  4 = None, Modified Turner/Independent    Putting on and taking off regular lower body clothing? : 3  Bathing (including washing, rinsing, drying)?: 3  Toileting, which includes using toilet, bedpan, or urinal? : 3  Putting on and taking off regular upper body clothing?: 3  Taking care of personal grooming such as brushing teeth?: 4  Eating meals?: 4  Daily Activity Total Score: 20     AM-PAC Raw Score CMS "G-Code Modifier Level of Impairment Assistance   6 % Total / Unable   7 - 8 CM 80 - 100% Maximal Assist   9-13 CL 60 - 80% Moderate Assist   14 - 19 CK 40 - 60% Moderate Assist   20 - 22 CJ 20 - 40% Minimal Assist   23 CI 1-20% SBA / CGA   24 CH 0% Independent/ Mod I       Patient left up in chair with call button in reach and nursing notified    ASSESSMENT:  Yanet Viramontes is a 83 y.o. female with a medical diagnosis of Physical deconditioning and presents with no new complaints today.    Rehab identified problem list/impairments:  weakness, impaired endurance, impaired self care skills, impaired functional mobility, gait instability, impaired balance    Rehab potential is excellent.    Activity tolerance: Excellent    Discharge recommendations: Low Intensity Therapy   Barriers to discharge: Barriers to Discharge: None    Equipment recommendations:  (TBD)    GOALS:   Multidisciplinary Problems       Occupational Therapy Goals          Problem: Occupational Therapy    Goal Priority Disciplines Outcome Interventions   Occupational Therapy Goal     OT, " PT/OT Ongoing, Progressing    Description: STG: within 2 wks  Pt will perform grooming with setup and independence at sinkside ONGOING/PROGRESSING  Pt will bathe with sponge bathing method at sinkside with setup ONGOING/PROGRESSING  Pt will perform UE dressing with independence after setup ONGOING/PROGRESSING  Pt will perform LE dressing with independence after setup ONGOING/PROGRESSING  Pt will sit EOB x 30 min with no assistance ONGOING/PROGRESSING  Pt will transfer bed/chair/bsc with svn ONGOING/PROGRESSING  Pt will perform standing task x 10 min with svn assistance ONGOING/PROGRESSING  Pt will tolerate 45 minutes of tx without fatigue ONGOING/PROGRESSING      LT.Restore to max I with self care and mobility.                         Plan:  Patient to be seen 5 x/week to address the above listed problems via self-care/home management, community/work re-entry, therapeutic activities, therapeutic exercises, wheelchair management/training  Plan of Care expires: 24  Plan of Care reviewed with: patient, caregiver, daughter         2024

## 2024-03-04 NOTE — PLAN OF CARE
Problem: Occupational Therapy  Goal: Occupational Therapy Goal  Description: STG: within 2 wks  Pt will perform grooming with setup and independence at sinkside ONGOING/PROGRESSING  Pt will bathe with sponge bathing method at sinkside with setup ONGOING/PROGRESSING  Pt will perform UE dressing with independence after setup ONGOING/PROGRESSING  Pt will perform LE dressing with independence after setup ONGOING/PROGRESSING  Pt will sit EOB x 30 min with no assistance ONGOING/PROGRESSING  Pt will transfer bed/chair/bsc with svn ONGOING/PROGRESSING  Pt will perform standing task x 10 min with svn assistance ONGOING/PROGRESSING  Pt will tolerate 45 minutes of tx without fatigue ONGOING/PROGRESSING      LT.Restore to max I with self care and mobility.    Outcome: Ongoing, Progressing

## 2024-03-04 NOTE — SUBJECTIVE & OBJECTIVE
Interval History: doing good, working with therapy     Review of Systems   HENT:          Hard of hearing    Respiratory:  Negative for shortness of breath.    Cardiovascular:  Negative for chest pain.   Gastrointestinal:  Negative for abdominal pain, nausea and vomiting.   Musculoskeletal:  Positive for arthralgias.   Psychiatric/Behavioral:  Negative for agitation and confusion.    All other systems reviewed and are negative.    Objective:     Vital Signs (Most Recent):  Temp: 97.3 °F (36.3 °C) (03/04/24 0745)  Pulse: (!) 58 (03/04/24 0745)  Resp: 18 (03/04/24 0745)  BP: (!) 152/64 (03/04/24 0745)  SpO2: (!) 94 % (03/04/24 0745) Vital Signs (24h Range):  Temp:  [97.3 °F (36.3 °C)-97.5 °F (36.4 °C)] 97.3 °F (36.3 °C)  Pulse:  [58-72] 58  Resp:  [18] 18  SpO2:  [94 %-97 %] 94 %  BP: (109-152)/(64-65) 152/64     Weight: 52.5 kg (115 lb 12.8 oz)  Body mass index is 24.21 kg/m².    Intake/Output Summary (Last 24 hours) at 3/4/2024 1227  Last data filed at 3/3/2024 1748  Gross per 24 hour   Intake 480 ml   Output --   Net 480 ml         Physical Exam  Vitals reviewed.   Constitutional:       General: She is not in acute distress.     Appearance: Normal appearance.   HENT:      Head: Normocephalic and atraumatic.   Eyes:      General: No scleral icterus.     Extraocular Movements: Extraocular movements intact.      Conjunctiva/sclera: Conjunctivae normal.      Pupils: Pupils are equal, round, and reactive to light.   Cardiovascular:      Rate and Rhythm: Normal rate and regular rhythm.      Heart sounds: No murmur heard.     No friction rub. No gallop.   Pulmonary:      Effort: Pulmonary effort is normal. No respiratory distress.      Breath sounds: Normal breath sounds. No wheezing or rales.   Abdominal:      General: Abdomen is flat. Bowel sounds are normal. There is no distension.      Palpations: Abdomen is soft.      Tenderness: There is no abdominal tenderness. There is no guarding.   Musculoskeletal:          General: No swelling.      Right lower leg: No edema.      Left lower leg: No edema.   Skin:     General: Skin is warm and dry.      Coloration: Skin is not jaundiced.      Findings: No rash.   Neurological:      General: No focal deficit present.      Mental Status: She is alert and oriented to person, place, and time.      Sensory: No sensory deficit.      Motor: Weakness present.   Psychiatric:         Mood and Affect: Mood normal.         Thought Content: Thought content normal.         Judgment: Judgment normal.             Significant Labs: All pertinent labs within the past 24 hours have been reviewed.  Recent Lab Results         03/04/24  0509        Anion Gap 13       BUN 16       BUN/CREAT RATIO 14       Calcium 8.5       Chloride 103       CO2 23       Creatinine 1.12       eGFR 49       Glucose 91       Potassium 4.5       Sodium 134               Significant Imaging: I have reviewed all pertinent imaging results/findings within the past 24 hours.

## 2024-03-04 NOTE — PROGRESS NOTES
[facility] Medicine  Progress Note    Patient Name: Yanet Viramontes  MRN: [id number]  Patient Class: IP- Swing   Admission Date: 2/29/2024  Length of Stay: 4 days  Attending Physician: Daren Nicole DO  Primary Care Provider: Rani, Primary Doctor        Subjective:     Principal Problem:Physical deconditioning        HPI:  82 y/o WF with PMH of HTN, OA, DVT, Thyroid CA 40 years ago, colon CA 20 years ago, and renal CA 16 years ago. Treatment of colon CA required bowel resection and chemo while renal CA required partial nephrectomy. Patient presented to [facility] ED on 2/25/24 with c/o n/v/d x a week with last BM the day before ED visit. CT Abd/Pelvis indicated SBO. NG tube was places with LCS and patient was transfer to [facility] in [place] where SBO resolved without surgical intervention. Today patient is accepted to [facility] for PT/OT due to physical deconditioning.    Overview/Hospital Course:  2/29 Patient supine in bed with eyes open in NAD. Daughter at bedside. Patient is pleasant and answers all questions appropriately. She denies abd pain/tenderness, nausea, or vomiting. States she has been having loose stools. Per nurse patient did have one loose stool this evening since admission to Swing bed.    3/4 Looks great today, working with therapy, no major issues     Interval History: doing good, working with therapy     Review of Systems   HENT:          Hard of hearing    Respiratory:  Negative for shortness of breath.    Cardiovascular:  Negative for chest pain.   Gastrointestinal:  Negative for abdominal pain, nausea and vomiting.   Musculoskeletal:  Positive for arthralgias.   Psychiatric/Behavioral:  Negative for agitation and confusion.    All other systems reviewed and are negative.    Objective:     Vital Signs (Most Recent):  Temp: 97.3 °F (36.3 °C) (03/04/24 0745)  Pulse: (!) 58 (03/04/24 0745)  Resp: 18 (03/04/24 0745)  BP: (!) 152/64 (03/04/24  0745)  SpO2: (!) 94 % (03/04/24 0745) Vital Signs (24h Range):  Temp:  [97.3 °F (36.3 °C)-97.5 °F (36.4 °C)] 97.3 °F (36.3 °C)  Pulse:  [58-72] 58  Resp:  [18] 18  SpO2:  [94 %-97 %] 94 %  BP: (109-152)/(64-65) 152/64     Weight: 52.5 kg (115 lb 12.8 oz)  Body mass index is 24.21 kg/m².    Intake/Output Summary (Last 24 hours) at 3/4/2024 1227  Last data filed at 3/3/2024 1748  Gross per 24 hour   Intake 480 ml   Output --   Net 480 ml         Physical Exam  Vitals reviewed.   Constitutional:       General: She is not in acute distress.     Appearance: Normal appearance.   HENT:      Head: Normocephalic and atraumatic.   Eyes:      General: No scleral icterus.     Extraocular Movements: Extraocular movements intact.      Conjunctiva/sclera: Conjunctivae normal.      Pupils: Pupils are equal, round, and reactive to light.   Cardiovascular:      Rate and Rhythm: Normal rate and regular rhythm.      Heart sounds: No murmur heard.     No friction rub. No gallop.   Pulmonary:      Effort: Pulmonary effort is normal. No respiratory distress.      Breath sounds: Normal breath sounds. No wheezing or rales.   Abdominal:      General: Abdomen is flat. Bowel sounds are normal. There is no distension.      Palpations: Abdomen is soft.      Tenderness: There is no abdominal tenderness. There is no guarding.   Musculoskeletal:         General: No swelling.      Right lower leg: No edema.      Left lower leg: No edema.   Skin:     General: Skin is warm and dry.      Coloration: Skin is not jaundiced.      Findings: No rash.   Neurological:      General: No focal deficit present.      Mental Status: She is alert and oriented to person, place, and time.      Sensory: No sensory deficit.      Motor: Weakness present.   Psychiatric:         Mood and Affect: Mood normal.         Thought Content: Thought content normal.         Judgment: Judgment normal.             Significant Labs: All pertinent labs within the past 24 hours have been  reviewed.  Recent Lab Results         03/04/24  0509        Anion Gap 13       BUN 16       BUN/CREAT RATIO 14       Calcium 8.5       Chloride 103       CO2 23       Creatinine 1.12       eGFR 49       Glucose 91       Potassium 4.5       Sodium 134               Significant Imaging: I have reviewed all pertinent imaging results/findings within the past 24 hours.    Assessment/Plan:      * Physical deconditioning  PT/OT      Hypokalemia  Patient has hypokalemia which is Acute and currently uncontrolled. Most recent potassium levels reviewed-   Lab Results   Component Value Date    K 2.8 (L) 03/01/2024   . Will continue potassium replacement per protocol and recheck repeat levels after replacement completed.     History of DVT (deep vein thrombosis)  Resume Xarelto at renal dose.       Hypertension  Chronic, controlled. Latest blood pressure and vitals reviewed-     Temp:  [97.3 °F (36.3 °C)-98.3 °F (36.8 °C)]   Pulse:  [52-55]   Resp:  [18]   BP: (120-168)/(38-88)   SpO2:  [95 %-97 %] .   Home meds for hypertension were reviewed and noted below.   Hypertension Medications               diltiaZEM HCl (TIAZAC) 120 mg 24 hr capsule Take 120 mg by mouth.    triamterene-hydrochlorothiazide 37.5-25 mg (MAXZIDE-25) 37.5-25 mg per tablet Take 1 tablet by mouth every morning.            While in the hospital, will manage blood pressure as follows; Adjust home antihypertensive regimen as follows- stop HCTZ    Will utilize p.r.n. blood pressure medication only if patient's blood pressure greater than 180/110 and she develops symptoms such as worsening chest pain or shortness of breath.    S/p small bowel obstruction  Monitor PO intake and any issues.          VTE Risk Mitigation (From admission, onward)           Ordered     rivaroxaban tablet 10 mg  With dinner         03/04/24 1029     IP VTE HIGH RISK PATIENT  Once         02/29/24 1313     Place sequential compression device  Until discontinued         02/29/24 1313                     Discharge Planning   QUINN:      Code Status: DNR   Is the patient medically ready for discharge?: No    Reason for patient still in hospital (select all that apply): Patient trending condition and PT / OT recommendations  Discharge Plan A: Home Health                  Daren Nicole DO  Department of Hospital Medicine   [facility]

## 2024-03-04 NOTE — PT/OT/SLP PROGRESS
"Physical Therapy Treatment    Patient Name:  Yanet Viramontes   MRN:  [id number]    Recommendations:     Discharge Recommendations: Moderate Intensity Therapy  Discharge Equipment Recommendations: none  Barriers to discharge:  falls risk    Assessment:     Yanet Viramontes is a 83 y.o. female admitted with a medical diagnosis of Physical deconditioning.  She presents with the following impairments/functional limitations: weakness, impaired endurance, impaired functional mobility, gait instability, impaired balance .    Rehab Prognosis: Good; patient would benefit from acute skilled PT services to address these deficits and reach maximum level of function.    Recent Surgery: * No surgery found *      Plan:     During this hospitalization, patient to be seen 5 x/week to address the identified rehab impairments via gait training, neuromuscular re-education, therapeutic activities, therapeutic exercises and progress toward the following goals:    Plan of Care Expires:  03/22/24    Subjective     Chief Complaint: Pt states she transferred herself wc to bed without assistance.  Patient/Family Comments/goals: Pt states she wants to dc home tomorrow.  Pain/Comfort:  Pain Rating 1: 0/10      Objective:     Communicated with pt prior to session.  Patient found HOB elevated with   upon PT entry to room.     General Precautions: Standard, fall  Orthopedic Precautions:    Braces:    Respiratory Status: Room air     Functional Mobility:  Bed Mobility:     Rolling Left:  supervision  Scooting: stand by assistance  Supine to Sit: stand by assistance  Sit to Supine: stand by assistance  Transfers:     Sit to Stand:  stand by assistance with 4 wheeled walker  Bed to Chair: stand by assistance with  4 wheeled walker  using  Stand Pivot  Gait: Pt amb'd x 300 ft with SBA using rollator.  Stairs:  Pt ascended/descended 4" curb step with Grab bars with bilateral handrails with Stand-by Assistance.       AM-PAC 6 CLICK MOBILITY          Treatment & " "Education:  Nu-step Level 3.0 x 8.5 min using BLE only.    Patient left HOB elevated with all lines intact..    GOALS:   Multidisciplinary Problems       Physical Therapy Goals          Problem: Physical Therapy    Goal Priority Disciplines Outcome Goal Variances Interventions   Physical Therapy Goal     PT, PT/OT Ongoing, Progressing     Description: STG - 1 week  1. Pt SBA with bed mob.  2. Pt SBA with STS transfers.  3. Pt CGA to amb 100 ft using appropriate assistive device.    LTG - 3 weeks  1. Pt svn with bed mob.  2. Pt svn with transfers.  3. Pt svn to amb 250 ft using appropriate assistive device.  4. Pt's Tinetti Balance/Gait test score will be at least 15/28 points.  5. Pt will negotiate 4" step with SBA.  6. Pt will have gross BLE strength of 4-/5                       Time Tracking:     PT Received On: 03/04/24  PT Start Time: 1308     PT Stop Time: 1331  PT Total Time (min): 23 min     Billable Minutes: Gait Training 12, Therapeutic Activity 2, Therapeutic Exercise 9, and Total Time 23 min    Treatment Type: Treatment  PT/PTA: PT     Number of PTA visits since last PT visit: 0     03/04/2024  "

## 2024-03-05 PROCEDURE — 25000003 PHARM REV CODE 250: Performed by: NURSE PRACTITIONER

## 2024-03-05 PROCEDURE — 11000004 HC SNF PRIVATE

## 2024-03-05 PROCEDURE — 97110 THERAPEUTIC EXERCISES: CPT | Mod: CQ

## 2024-03-05 PROCEDURE — 27000958

## 2024-03-05 PROCEDURE — 97110 THERAPEUTIC EXERCISES: CPT

## 2024-03-05 PROCEDURE — 97530 THERAPEUTIC ACTIVITIES: CPT

## 2024-03-05 PROCEDURE — 25000003 PHARM REV CODE 250: Performed by: HOSPITALIST

## 2024-03-05 RX ADMIN — CELECOXIB 100 MG: 100 CAPSULE ORAL at 08:03

## 2024-03-05 RX ADMIN — GENTAMICIN SULFATE 1 DROP: 3 SOLUTION/ DROPS OPHTHALMIC at 08:03

## 2024-03-05 RX ADMIN — POTASSIUM CHLORIDE 10 MEQ: 750 CAPSULE, EXTENDED RELEASE ORAL at 08:03

## 2024-03-05 RX ADMIN — PANTOPRAZOLE SODIUM 40 MG: 40 TABLET, DELAYED RELEASE ORAL at 08:03

## 2024-03-05 RX ADMIN — ZOLPIDEM TARTRATE 10 MG: 5 TABLET ORAL at 08:03

## 2024-03-05 RX ADMIN — DILTIAZEM HYDROCHLORIDE 120 MG: 120 CAPSULE, COATED, EXTENDED RELEASE ORAL at 08:03

## 2024-03-05 RX ADMIN — RIVAROXABAN 10 MG: 10 TABLET, FILM COATED ORAL at 05:03

## 2024-03-05 RX ADMIN — GENTAMICIN SULFATE 1 DROP: 3 SOLUTION/ DROPS OPHTHALMIC at 02:03

## 2024-03-05 RX ADMIN — LEVOTHYROXINE SODIUM 112 MCG: 0.11 TABLET ORAL at 05:03

## 2024-03-05 NOTE — PT/OT/SLP PROGRESS
Occupational Therapy   Treatment    Name: Yanet Viramontes  MRN: [id number]  Admitting Diagnosis:  Physical deconditioning       Recommendations:     Discharge Recommendations: Low Intensity Therapy  Discharge Equipment Recommendations:   (TBD)  Barriers to discharge:  None    Assessment:     Yanet Viramontes is a 83 y.o. female with a medical diagnosis of Physical deconditioning.  She presents with no new complaints; pt was telling PT yesterday per PT report that she wanted to go home today; OT has no further knowledge of pt d/c plans at this time. Performance deficits affecting function are weakness, impaired endurance, impaired self care skills, impaired functional mobility, gait instability, impaired balance.     Rehab Prognosis:  Good; patient would benefit from acute skilled OT services to address these deficits and reach maximum level of function.       Plan:     Patient to be seen 5 x/week to address the above listed problems via self-care/home management, community/work re-entry, therapeutic activities, therapeutic exercises, wheelchair management/training  Plan of Care Expires: 04/05/24  Plan of Care Reviewed with: patient, caregiver, daughter    Subjective     Chief Complaint: continued weakness though getting better  Patient/Family Comments/goals: home alone with intermittent svn  Pain/Comfort:   No significant complaints    Objective:     Communicated with: pt prior to session.  Patient found up in chair with no lines or drains   upon OT entry to room.    General Precautions: Standard, aspiration, fall    Orthopedic Precautions: (na)  Braces: N/A  Respiratory Status: Room air     Occupational Performance:     Bed Mobility:    Na with OT    Functional Mobility/Transfers:  See PT    Activities of Daily Living:  setup      [facility] 6 Click ADL:      Treatment & Education:  To improve endurance, strength, OT facilitated pt with:  UBE x 5min with from standing and CGA/SBA with no RB's throughout, low level resist    To  improve reach, AROM, FM/ and trunk rotation with core forward leaning engagement:   OT initiated pt with vertical card board matching activity, pt eager to perform and participate in this activity today with 1# wrist weights BUE, requiring much stretching and reaching above head from seated position today; pt successful and completed well  seated Reciprocal pulleys x 5 min in sh flexion and abduction    Pt also was assisted to the regular bathroom with her Rollator by OT; pt utilized rollator in an unsafe manner attempting to get into the bathroom; she required CGA/close SVN.  OT also instructed her on proper use of Rollator for safer ambulation and ADL ability.  She stated she understood.  Pt able to finish toileting with CGA.  Then she walked to the sink and performed hand washing with close SVN.  She also wanted to walk from there to PT session with rollator assisted by OT, which she did with just SBA in hallway.      Patient left up in chair with  PTA present    GOALS:   Multidisciplinary Problems       Occupational Therapy Goals          Problem: Occupational Therapy    Goal Priority Disciplines Outcome Interventions   Occupational Therapy Goal     OT, PT/OT Ongoing, Progressing    Description: STG: within 2 wks  Pt will perform grooming with setup and independence at sinkside ONGOING/PROGRESSING  Pt will bathe with sponge bathing method at sinkside with setup ONGOING/PROGRESSING  Pt will perform UE dressing with independence after setup ONGOING/PROGRESSING  Pt will perform LE dressing with independence after setup ONGOING/PROGRESSING  Pt will sit EOB x 30 min with no assistance ONGOING/PROGRESSING  Pt will transfer bed/chair/bsc with svn ONGOING/PROGRESSING  Pt will perform standing task x 10 min with svn assistance ONGOING/PROGRESSING  Pt will tolerate 45 minutes of tx without fatigue ONGOING/PROGRESSING      LT.Restore to max I with self care and mobility.                         Time Tracking:      OT Date of Treatment: 03/05/24  OT Start Time: 1034  OT Stop Time: 1104  OT Total Time (min): 30 min    Billable Minutes:Therapeutic Activity 15  Therapeutic Exercise 15    OT/CRISELDA: OT          3/5/2024

## 2024-03-05 NOTE — PROGRESS NOTES
Clinical Pharmacy Chart Review Note      Admit Date: 2/29/2024   LOS: 5 days       Yanet Viramontes is a 83 y.o. female admitted to SNF for PT/OT after hospitalization for small bowel obstruction.    Active Hospital Problems    Diagnosis  POA    *Physical deconditioning [R53.81]  Yes    S/p small bowel obstruction [Z87.19]  Not Applicable    Hypokalemia [E87.6]  Yes    Hypertension [I10]  Yes     Last Assessment & Plan:    Formatting of this note might be different from the original.   BP controlled.   Holding meds      History of DVT (deep vein thrombosis) [Z86.718]  Not Applicable     Last Assessment & Plan:    Formatting of this note might be different from the original.   Holding Xarelto for now.   Repeat venous dopplers - negative.        Resolved Hospital Problems   No resolved problems to display.     Review of patient's allergies indicates:  No Known Allergies  Patient Active Problem List    Diagnosis Date Noted    Physical deconditioning 03/01/2024    S/p small bowel obstruction 03/01/2024    Hypertension 03/01/2024    GERD (gastroesophageal reflux disease) 03/01/2024    Disease of thyroid gland 03/01/2024    Hypokalemia 03/01/2024    History of DVT (deep vein thrombosis) 02/25/2024       Scheduled Meds:    celecoxib  100 mg Oral BID    diltiaZEM  120 mg Oral Daily    gentamicin  1 drop Both Eyes TID    levothyroxine  112 mcg Oral Before breakfast    pantoprazole  40 mg Oral Daily    potassium chloride  10 mEq Oral Daily    rivaroxaban  10 mg Oral Daily with dinner     Continuous Infusions:   PRN Meds: acetaminophen, calcium carbonate, melatonin, senna-docusate 8.6-50 mg, zinc oxide, zolpidem    OBJECTIVE:     Vital Signs (Last 24H)  Temp:  [97.4 °F (36.3 °C)-98.1 °F (36.7 °C)]   Pulse:  [58-74]   Resp:  [18-22]   BP: (137-190)/(70)   SpO2:  [95 %-98 %]     Laboratory:  CBC:   Recent Labs   Lab 03/01/24  0523   WBC 5.45   RBC 3.41*   HGB 9.1*   HCT 29.5*      MCV 86.5   MCH 26.7*   MCHC 30.8*     BMP:    Recent Labs   Lab 03/02/24  0459 03/03/24  0628 03/04/24  0509   GLU 98 94 91    136 134*   K 4.6 4.6 4.5    102 103   CO2 30 24 23   BUN 13 16 16   CREATININE 1.21* 1.12* 1.12*   CALCIUM 8.2* 8.8 8.5     .    ASSESSMENT/PLAN:     Estimated Creatinine Clearance: 27.8 mL/min (A) (based on SCr of 1.12 mg/dL (H)).Medications reviewed, no dose adjustments needed. TSH with next lab draw.  Weekly swing bed medication regimen review complete.  Will continue to monitor.  Brian Barriga, Pharm. D.  Director of Pharmacy  [facility]  [phone]  [email or web address]

## 2024-03-05 NOTE — PLAN OF CARE
[facility] - Medical Surgical Unit - Swing Bed   Interdisciplinary Team Meeting    Patient: Yanet Viramontes   Today's Date: 3/5/2024   Estimated D/C Date: 3/6/24        Physician: Daren Nicole DO Nurse Practitioner: Stone Cheng NP   Pharmacy: Brian Barriga PharmD Unit Director: Temitope Chun RN   : Israel Graham RN Physical/Occupational Therapy: Cynthia Flores OT   Speech Therapy: ST Jasmina Activity Therapy: Geetha Carter   Nursing: Temitope Chun RN  Respiratory: Lia Watkins,  Dietary: Kacey Tinoco RD  Other: n/a     Nursing  New Symptoms/Problems: n/a  Last Bowel Movement: 03/03/24  Urine: continent  [place]: No  Bowel: continent   Constipated: No  Diarrhea: No   Isolation: No  Wound Care: No  Wound Location/Tx: n/a  Cognition: WNL  Aspiration Precautions: No  Comment(s): n/a    Respiratory:   O2 Device: Room Air  O2 Flow: n/a  SpO2: 96%  Neb Tx: No  Comment(s): n/a    Dietary    Nutrition: Regular  Comment(s): n/a    Speech Therapy  Speech/Swallowing: No current speech or swallowing issues  Comment(s): No    Physical Therapy  Gait/Assistive Device: ambulatory with rollator ELOS: Plan to DC     Transfers: Contact Guard Assistance  Bed Mobility: Standby Assistance Range of Motion/Restrictions: n/a  Comment(s): n/a     Occupational Therapy  Eating/Grooming: Independent Toileting: Contact Guard Assistance   Bathing: Contact Guard Assistance Dressing (Upper Body): Independent   Dressing (Lower Body): Supervision or Set-up Assistance Comment(s): n/a     Activity Therapy  Level of participation: Active participation  Comment(s): n/a    Pharmacy   Medication Changes (see MD orders in chart): No  Labs Reviewed: Yes  New Lab Orders: No  Comment(s): n/a      Tx Plan/Recommendations reviewed with family and/or patient on (date) 3/6/24.  Additional family Conference/Training: n/a  D/C Plan/Recommendations: Home with HH  QUINN: 3/6/24  Comment(s): n/a

## 2024-03-05 NOTE — CONSULTS
[facility] - Medical Surgical Unit  Adult Nutrition  Follow up          Reason for Assessment  Reason For Assessment: consult   Nutrition Risk Screen: no indicators present    Assessment and Plan    3/05/2024 RD Follow up: Patient is ordered a regular diet. No diet modification needed per SLP eval. Intake per flowsheets 25%. Last BM 3/2. Continue current diet. Add Boost Glucose control Max protein BID. Encourage po intakes. RD Following.     Consult received and appreciated. Patient admitted today with no active principal problem noted. Noted patient admitted from [facility] s/p SBO. Patient is ordered a regular diet. No noted chewing/swallowing issues on nursing screen. SLP eval pending.    Patient with documented weight of 115 pounds with a BMI of 24.21. Recommend to continue current diet. Encourage po intakes. RD Following.           Learning Needs/Social Determinants of Health  Learning Assessment       02/29/2024 1300 [facility] (2/29/2024 - Present)   Created by Sujata Skinner RN - RN (Nurse) Status: Complete                 PRIMARY LEARNER     Primary Learner Name:  Yanet Viramontes  - 02/29/2024 1300    Relationship:  Patient AG - 02/29/2024 1300    Does the primary learner have any barriers to learning?:  No Barriers  - 02/29/2024 1300    What is the preferred language of the primary learner?:  English AG - 02/29/2024 1300    Is an  required?:  No  - 02/29/2024 1300    How does the primary learner prefer to learn new concepts?:  Listening, Reading, Demonstration  - 02/29/2024 1300    How often do you need to have someone help you read instructions, pamphlets, or written material from your doctor or pharmacy?:  Never  - 02/29/2024 1300        CO-LEARNER #1     No question answered        CO-LEARNER #2     No question answered        SPECIAL TOPICS     No question answered        ANSWERED BY:     No question answered        Edit History        Sujata Skinner, RN - RN (Nurse)   02/29/2024 1300                           Social Determinants of Health     Tobacco Use: High Risk (2/29/2024)    Patient History     Smoking Tobacco Use: Every Day     Smokeless Tobacco Use: Never     Passive Exposure: Not on file   Alcohol Use: Not At Risk (2/29/2024)    AUDIT-C     Frequency of Alcohol Consumption: Never     Average Number of Drinks: Patient does not drink     Frequency of Binge Drinking: Never   Financial Resource Strain: Low Risk  (2/29/2024)    Overall Financial Resource Strain (CARDIA)     Difficulty of Paying Living Expenses: Not hard at all   Food Insecurity: No Food Insecurity (2/29/2024)    Hunger Vital Sign     Worried About Running Out of Food in the Last Year: Never true     Ran Out of Food in the Last Year: Never true   Transportation Needs: No Transportation Needs (2/29/2024)    PRAPARE - Transportation     Lack of Transportation (Medical): No     Lack of Transportation (Non-Medical): No   Physical Activity: Inactive (2/29/2024)    Exercise Vital Sign     Days of Exercise per Week: 0 days     Minutes of Exercise per Session: 0 min   Stress: No Stress Concern Present (2/29/2024)    [employer] - Occupational Stress Questionnaire     Feeling of Stress : Only a little   Social Connections: Moderately Isolated (2/29/2024)    Social Connection and Isolation Panel [NHANES]     Frequency of Communication with Friends and Family: More than three times a week     Frequency of Social Gatherings with Friends and Family: More than three times a week     Attends Nondenominational Services: More than 4 times per year     Active Member of Clubs or Organizations: No     Attends Club or Organization Meetings: Never     Marital Status:    Housing Stability: Low Risk  (2/29/2024)    Housing Stability Vital Sign     Unable to Pay for Housing in the Last Year: No     Number of Places Lived in the Last Year: 1     Unstable Housing in the Last  "Year: No   Depression: Not on file            Malnutrition  No    Nutrition Diagnosis  Inadequate energy intake related to medical condition as evidenced by po intakes 25%  Add boost TID  Recent Labs   Lab 03/04/24  0509   GLU 91     Comments on Glucose: wnl    Nutrition Prescription / Recommendations  Recommendation/Intervention: continue diet  as tolerated; encourage po intakes  Goals: po intake 50-75% by follow up  Nutrition Goal Status: new  Current Diet Order: regular  Chewing or Swallowing Difficulty?: no documented chewing/swallowing issue; SLP eval pending  Recommended Diet: Regular  Recommended Oral Supplement: No Oral Supplements  Is Nutrition Support Recommended: [facility] Nutrition Support: No  Is Nutrition Education Recommended: No    Monitor and Evaluation  % current Intake: New Diet order with no P.O. intake documented currently  % intake to meet estimated needs: 50 - 75 %  Food and Nutrient Intake: energy intake, food and beverage intake  Food and Nutrient Adminstration: diet order  Anthropometric Measurements: height/length, weight, weight change, body mass index  Biochemical Data, Medical Tests and Procedures: electrolyte and renal panel, gastrointestinal profile, glucose/endocrine profile, inflammatory profile       Current Medical Diagnosis and Past Medical History     Past Medical History:   Diagnosis Date    Anticoagulant long-term use     Cancer     Hypertension     Thyroid disease        Nutrition/Diet History  Spiritual, Cultural Beliefs, Mormon Practices, Values that Affect Care: no  Food Allergies: NKFA    Lab/Procedures/Meds  Recent Labs   Lab 03/04/24  0509   *   K 4.5   BUN 16   CREATININE 1.12*   CALCIUM 8.5      Na low Cr elevated   Last A1c: No results found for: "HGBA1C"  Lab Results   Component Value Date    RBC 3.41 (L) 03/01/2024    HGB 9.1 (L) 03/01/2024    HCT 29.5 (L) 03/01/2024    MCV 86.5 03/01/2024    MCH 26.7 (L) 03/01/2024    MCHC 30.8 (L) 03/01/2024 " "    Pertinent Labs Reviewed: reviewed  Pertinent Medications Reviewed: reviewed  Scheduled Meds:   celecoxib  100 mg Oral BID    diltiaZEM  120 mg Oral Daily    gentamicin  1 drop Both Eyes TID    levothyroxine  112 mcg Oral Before breakfast    pantoprazole  40 mg Oral Daily    potassium chloride  10 mEq Oral Daily    rivaroxaban  10 mg Oral Daily with dinner     Continuous Infusions:  PRN Meds:.acetaminophen, calcium carbonate, melatonin, senna-docusate 8.6-50 mg, zinc oxide, zolpidem    Anthropometrics  Temp: 98.1 °F (36.7 °C)  Height Method: Stated  Height: 4' 9.99" (147.3 cm)  Height (inches): 57.99 in  Weight Method: Bed Scale  Weight: 52.5 kg (115 lb 12.8 oz)  Weight (lb): 115.8 lb  Ideal Body Weight (IBW), Female: 89.95 lb  % Ideal Body Weight, Female (lb): 128.74 %  BMI (Calculated): 24.2  BMI Grade: 18.5-24.9 - normal       Estimated/Assessed Needs      Temp: 98.1 °F (36.7 °C)Oral  Weight Used For Calorie Calculations: 52.2 kg (115 lb)   Energy Need Method: Kcal/kg Energy Calorie Requirements (kcal): 5574-7219  Weight Used For Protein Calculations: 52.2 kg (115 lb)  Protein Requirements: 42-52  Estimated Fluid Requirement Method: RDA Method    RDA Method (mL): 1304       Nutrition by Nursing  Diet/Nutrition Received: regular  Intake (%): 25%  Diet/Feeding Assistance: none  Diet/Feeding Tolerance: good  Last Bowel Movement: 03/03/24                Nutrition Follow-Up  RD Follow-up?: Yes      Nutrition Discharge Planning: too soon to determine            Available via Secure Chat  "

## 2024-03-05 NOTE — PT/OT/SLP PROGRESS
Physical Therapy Treatment    Patient Name:  Yanet Viramontes   MRN:  [id number]    Recommendations:     Discharge Recommendations: Moderate Intensity Therapy  Discharge Equipment Recommendations: none  Barriers to discharge:  falls risk    Assessment:     Yanet Viramontes is a 83 y.o. female admitted with a medical diagnosis of Physical deconditioning.  She presents with the following impairments/functional limitations: weakness, impaired endurance, impaired functional mobility, gait instability, impaired balance. PTA provided patient with visual and verbal cues for proper form of treatment tasks. Patient with no complaints after completion of treatment.     Rehab Prognosis: Good; patient would benefit from acute skilled PT services to address these deficits and reach maximum level of function.    Recent Surgery: * No surgery found *      Plan:     During this hospitalization, patient to be seen 5 x/week to address the identified rehab impairments via gait training, therapeutic activities, therapeutic exercises, neuromuscular re-education and progress toward the following goals:    Plan of Care Expires:  03/22/24    Subjective     Chief Complaint: Patient stated that she had no pain prior to treatment.   Patient/Family Comments/goals: Pt states she wants to dc home tomorrow.  Pain/Comfort:  Pain Rating 1: 0/10      Objective:     Communicated with OT prior to session. Patient  ambulated to PT department with OT.    General Precautions: Standard, fall  Orthopedic Precautions:    Braces:    Respiratory Status: Room air     Functional Mobility:  Transfers:     Sit to Stand:  stand by assistance with 4 wheeled walker  Gait: Pt ambulated approx. 250 ft with SBA/CGA using rollator.      AM-PAC 6 CLICK MOBILITY  Turning over in bed (including adjusting bedclothes, sheets and blankets)?: 3  Sitting down on and standing up from a chair with arms (e.g., wheelchair, bedside commode, etc.): 2  Moving from lying on back to sitting on the  "side of the bed?: 3  Moving to and from a bed to a chair (including a wheelchair)?: 3  Need to walk in hospital room?: 3  Climbing 3-5 steps with a railing?: 1  Basic Mobility Total Score: 15       Treatment & Education:  Nu-step Level 3.0 x 8 min using BLE only. Step ups on 6 in step 5x on BLE, Standing hip ABD 10x on BLE, standing heel raises 10x, standing marches 10x    Patient left up in chair with call button in reach.    GOALS:   Multidisciplinary Problems       Physical Therapy Goals          Problem: Physical Therapy    Goal Priority Disciplines Outcome Goal Variances Interventions   Physical Therapy Goal     PT, PT/OT Ongoing, Progressing     Description: STG - 1 week  1. Pt SBA with bed mob.  2. Pt SBA with STS transfers.  3. Pt CGA to amb 100 ft using appropriate assistive device.    LTG - 3 weeks  1. Pt svn with bed mob.  2. Pt svn with transfers.  3. Pt svn to amb 250 ft using appropriate assistive device.  4. Pt's Tinetti Balance/Gait test score will be at least 15/28 points.  5. Pt will negotiate 4" step with SBA.  6. Pt will have gross BLE strength of 4-/5                       Time Tracking:     PT Received On: 03/05/24  PT Start Time: 1114     PT Stop Time: 1136  PT Total Time (min): 22 min     Billable Minutes: Gait Training 7 and Therapeutic Exercise 15    Treatment Type: Treatment  PT/PTA: PTA     Number of PTA visits since last PT visit: 1 03/05/2024  "

## 2024-03-06 VITALS
TEMPERATURE: 97 F | HEIGHT: 58 IN | DIASTOLIC BLOOD PRESSURE: 62 MMHG | OXYGEN SATURATION: 97 % | HEART RATE: 55 BPM | SYSTOLIC BLOOD PRESSURE: 133 MMHG | BODY MASS INDEX: 24.31 KG/M2 | WEIGHT: 115.81 LBS | RESPIRATION RATE: 18 BRPM

## 2024-03-06 PROBLEM — Z87.19 S/P SMALL BOWEL OBSTRUCTION: Status: RESOLVED | Noted: 2024-03-01 | Resolved: 2024-03-06

## 2024-03-06 PROBLEM — E87.6 HYPOKALEMIA: Status: RESOLVED | Noted: 2024-03-01 | Resolved: 2024-03-06

## 2024-03-06 PROBLEM — R53.81 PHYSICAL DECONDITIONING: Status: RESOLVED | Noted: 2024-03-01 | Resolved: 2024-03-06

## 2024-03-06 PROCEDURE — 25000003 PHARM REV CODE 250: Performed by: NURSE PRACTITIONER

## 2024-03-06 PROCEDURE — 99316 NF DSCHRG MGMT 30 MIN+: CPT | Mod: ,,, | Performed by: HOSPITALIST

## 2024-03-06 PROCEDURE — 27000958

## 2024-03-06 RX ADMIN — DILTIAZEM HYDROCHLORIDE 120 MG: 120 CAPSULE, COATED, EXTENDED RELEASE ORAL at 08:03

## 2024-03-06 RX ADMIN — PANTOPRAZOLE SODIUM 40 MG: 40 TABLET, DELAYED RELEASE ORAL at 08:03

## 2024-03-06 RX ADMIN — POTASSIUM CHLORIDE 10 MEQ: 750 CAPSULE, EXTENDED RELEASE ORAL at 08:03

## 2024-03-06 RX ADMIN — LEVOTHYROXINE SODIUM 112 MCG: 0.11 TABLET ORAL at 05:03

## 2024-03-06 RX ADMIN — CELECOXIB 100 MG: 100 CAPSULE ORAL at 08:03

## 2024-03-06 RX ADMIN — GENTAMICIN SULFATE 1 DROP: 3 SOLUTION/ DROPS OPHTHALMIC at 08:03

## 2024-03-06 NOTE — PLAN OF CARE
[facility] - Medical Surgical Unit  Discharge Final Note    Primary Care Provider: No, Primary Doctor    Expected Discharge Date: 3/6/24    Final Discharge Note (most recent)       Final Note - 03/06/24 0802          Final Note    Assessment Type Final Discharge Note     Anticipated Discharge Disposition Home-Health Care Svc     What phone number can be called within the next 1-3 days to see how you are doing after discharge? [phone]     Hospital Resources/Appts/Education Provided Provided patient/caregiver with written discharge plan information        Post-Acute Status    Post-Acute Authorization Home Health     Home Health Status Referrals Sent     Patient choice form signed by patient/caregiver List with quality metrics by geographic area provided;List from CMS Compare;List from System Post-Acute Care     Discharge Delays None known at this time                     Important Message from Medicare  Important Message from Medicare regarding Discharge Appeal Rights: Given to patient/caregiver, Explained to patient/caregiver (NOMNC)     Date IMM was signed: 03/06/24  Time IMM was signed: 0802

## 2024-03-06 NOTE — PLAN OF CARE
Problem: Adult Inpatient Plan of Care  Goal: Plan of Care Review  3/6/2024 1017 by Sujata Skinner RN  Outcome: Adequate for Care Transition  3/6/2024 1017 by Sujata Skinner RN  Outcome: Ongoing, Progressing  Goal: Patient-Specific Goal (Individualized)  3/6/2024 1017 by Sujata Skinner RN  Outcome: Adequate for Care Transition  3/6/2024 1017 by Sujata Skinner RN  Outcome: Ongoing, Progressing  Goal: Absence of Hospital-Acquired Illness or Injury  3/6/2024 1017 by Sujata Skinner RN  Outcome: Adequate for Care Transition  3/6/2024 1017 by Sujata Skinner RN  Outcome: Ongoing, Progressing  Goal: Optimal Comfort and Wellbeing  3/6/2024 1017 by Sujata Skinner RN  Outcome: Adequate for Care Transition  3/6/2024 1017 by Sujata Skinner RN  Outcome: Ongoing, Progressing  Goal: Readiness for Transition of Care  3/6/2024 1017 by Sujata Skinner RN  Outcome: Adequate for Care Transition  3/6/2024 1017 by Sujata Skinner RN  Outcome: Ongoing, Progressing     Problem: Fall Injury Risk  Goal: Absence of Fall and Fall-Related Injury  3/6/2024 1017 by Sujata Skinner RN  Outcome: Adequate for Care Transition  3/6/2024 1017 by Sujata Skinner RN  Outcome: Ongoing, Progressing

## 2024-03-06 NOTE — DISCHARGE SUMMARY
[facility] - Medical Surgical [facility]  Hospital Medicine  Discharge Summary      Patient Name: Yanet Viramontes  MRN: [id number]  LUPE: [id number]  Patient Class: IP- Swing  Admission Date: 2/29/2024  Hospital Length of Stay: 6 days  Discharge Date and Time:  03/06/2024 10:39 AM  Attending Physician: Daren Nicole DO   Discharging Provider: Daren Nicole DO  Primary Care Provider: Rani, Primary Doctor    Primary Care Team: Networked reference to record PCT     HPI:   82 y/o WF with PMH of HTN, OA, DVT, Thyroid CA 40 years ago, colon CA 20 years ago, and renal CA 16 years ago. Treatment of colon CA required bowel resection and chemo while renal CA required partial nephrectomy. Patient presented to [facility] ED on 2/25/24 with c/o n/v/d x a week with last BM the day before ED visit. CT Abd/Pelvis indicated SBO. NG tube was places with LCS and patient was transfer to [facility] in [place] where SBO resolved without surgical intervention. Today patient is accepted to [facility] Swing Bed Services for PT/OT due to physical deconditioning.    * No surgery found *      Hospital Course:   2/29 Patient supine in bed with eyes open in NAD. Daughter at bedside. Patient is pleasant and answers all questions appropriately. She denies abd pain/tenderness, nausea, or vomiting. States she has been having loose stools. Per nurse patient did have one loose stool this evening since admission to Swing bed.    3/4 Looks great today, working with therapy, no major issues     3/6 Back to baseline wants to go home.      Goals of Care Treatment Preferences:  Code Status: DNR      Consults:   Consults (From admission, onward)          Status Ordering Provider     Inpatient consult to Registered Dietitian/Nutritionist  Once        Provider:  (Not yet assigned)    Completed ALESSANDRA KATE            No new Assessment & Plan notes have been filed under this hospital service since the last note was generated.  Service: Hospital Medicine    Final Active  "Diagnoses:    Diagnosis Date Noted POA    Hypertension [I10] 03/01/2024 Yes    History of DVT (deep vein thrombosis) [Z86.718] 02/25/2024 Not Applicable      Problems Resolved During this Admission:    Diagnosis Date Noted Date Resolved POA    PRINCIPAL PROBLEM:  Physical deconditioning [R53.81] 03/01/2024 03/06/2024 Yes    S/p small bowel obstruction [Z87.19] 03/01/2024 03/06/2024 Not Applicable    Hypokalemia [E87.6] 03/01/2024 03/06/2024 Yes       Discharged Condition: good    Disposition: Home-Health Care [facility]    Follow Up:   Follow-up Information       No, Primary Doctor Follow up in 1 week(s).    Why: Check Potassium, stopped Triamterne/HCTZ in hospital                         Patient Instructions:   No discharge procedures on file.    Significant Diagnostic Studies: Labs: BMP: No results for input(s): "GLU", "NA", "K", "CL", "CO2", "BUN", "CREATININE", "CALCIUM", "MG" in the last 48 hours.    Pending Diagnostic Studies:       None           Medications:  Reconciled Home Medications:      Medication List        CONTINUE taking these medications      celecoxib 200 MG capsule  Commonly known as: CeleBREX  Take 200 mg by mouth.     diltiaZEM HCl 120 mg 24 hr capsule  Commonly known as: TIAZAC  Take 120 mg by mouth.     levothyroxine 112 MCG tablet  Commonly known as: SYNTHROID  Take 112 mcg by mouth every morning.     XARELTO 20 mg Tab  Generic drug: rivaroxaban  Take 20 mg by mouth.     zolpidem 10 mg Tab  Commonly known as: AMBIEN  Take 10 mg by mouth every evening.            STOP taking these medications      potassium chloride SA 10 MEQ tablet  Commonly known as: K-DUR,KLOR-CON M     triamterene-hydrochlorothiazide 37.5-25 mg 37.5-25 mg per tablet  Commonly known as: MAXZIDE-25              Indwelling Lines/Drains at time of discharge:   Lines/Drains/Airways       None                   Time spent on the discharge of patient: 33 minutes         Daren Nicole DO  Department of Hospital Medicine  [facility] - Medical Surgical Unit

## 2024-03-06 NOTE — PLAN OF CARE
Problem: Occupational Therapy  Goal: Occupational Therapy Goal  Description: STG: within 2 wks  Pt will perform grooming with setup and independence at sinkside MET  Pt will bathe with sponge bathing method at sinkside with setup MET  Pt will perform UE dressing with independence after setup MET  Pt will perform LE dressing with independence after setup MET  Pt will sit EOB x 30 min with no assistance MET  Pt will transfer bed/chair/bsc with svn MET  Pt will perform standing task x 10 min with svn assistance ONGOING/PROGRESSING  Pt will tolerate 45 minutes of tx without fatigue ONGOING/PROGRESSING      LT.Restore to max I with self care and mobility.    Pt ready to d/c home by her own report, family in agreement per her report; d/c today and recommend home health therapy for best transition to optimal home safety    Outcome: Adequate for Care Transition

## 2024-03-07 ENCOUNTER — PATIENT OUTREACH (OUTPATIENT)
Dept: ADMINISTRATIVE | Facility: CLINIC | Age: 84
End: 2024-03-07

## 2024-03-07 NOTE — PROGRESS NOTES
C3 nurse spoke with Yanet Viramontes  for a TCC post hospital discharge follow up call. The patient reports does not have a scheduled HOSFU appointment. C3 nurse was unable to schedule [facility]OSFU appointment [facility]. Patient advised to contact their PCP to schedule a HOSPFU within 5-7 days.   Pt reports taking Xarelto 20mg daily, diltiazem 120mg daily and celebrex 200mg daily.

## 2024-08-23 ENCOUNTER — HOSPITAL ENCOUNTER (INPATIENT)
Facility: HOSPITAL | Age: 84
LOS: 43 days | Discharge: HOME | DRG: 559 | End: 2024-10-05
Attending: HOSPITALIST | Admitting: HOSPITALIST
Payer: MEDICARE

## 2024-08-23 DIAGNOSIS — S72.142A INTERTROCHANTERIC FRACTURE OF LEFT FEMUR: ICD-10-CM

## 2024-08-23 DIAGNOSIS — Z87.81 S/P LEFT HIP FRACTURE: Primary | ICD-10-CM

## 2024-08-23 LAB
ANION GAP SERPL CALCULATED.3IONS-SCNC: 12 MMOL/L (ref 7–16)
BASOPHILS # BLD AUTO: 0.01 K/UL (ref 0–0.2)
BASOPHILS NFR BLD AUTO: 0.2 % (ref 0–1)
BUN SERPL-MCNC: 14 MG/DL (ref 7–18)
BUN/CREAT SERPL: 12 (ref 6–20)
CALCIUM SERPL-MCNC: 7.9 MG/DL (ref 8.5–10.1)
CHLORIDE SERPL-SCNC: 106 MMOL/L (ref 98–107)
CO2 SERPL-SCNC: 23 MMOL/L (ref 21–32)
CREAT SERPL-MCNC: 1.18 MG/DL (ref 0.55–1.02)
DIFFERENTIAL METHOD BLD: ABNORMAL
EGFR (NO RACE VARIABLE) (RUSH/TITUS): 45 ML/MIN/1.73M2
EOSINOPHIL # BLD AUTO: 0.01 K/UL (ref 0–0.5)
EOSINOPHIL NFR BLD AUTO: 0.2 % (ref 1–4)
ERYTHROCYTE [DISTWIDTH] IN BLOOD BY AUTOMATED COUNT: 13.9 % (ref 11.5–14.5)
GLUCOSE SERPL-MCNC: 111 MG/DL (ref 74–106)
HCT VFR BLD AUTO: 30.4 % (ref 38–47)
HGB BLD-MCNC: 9.5 G/DL (ref 12–16)
LYMPHOCYTES # BLD AUTO: 0.5 K/UL (ref 1–4.8)
LYMPHOCYTES NFR BLD AUTO: 10.8 % (ref 27–41)
MCH RBC QN AUTO: 29.7 PG (ref 27–31)
MCHC RBC AUTO-ENTMCNC: 31.3 G/DL (ref 32–36)
MCV RBC AUTO: 95 FL (ref 80–96)
MONOCYTES # BLD AUTO: 0.36 K/UL (ref 0–0.8)
MONOCYTES NFR BLD AUTO: 7.8 % (ref 2–6)
MPC BLD CALC-MCNC: 11.2 FL (ref 9.4–12.4)
NEUTROPHILS # BLD AUTO: 3.76 K/UL (ref 1.8–7.7)
NEUTROPHILS NFR BLD AUTO: 81 % (ref 53–65)
PLATELET # BLD AUTO: 130 K/UL (ref 150–400)
POTASSIUM SERPL-SCNC: 4.2 MMOL/L (ref 3.5–5.1)
RBC # BLD AUTO: 3.2 M/UL (ref 4.2–5.4)
SODIUM SERPL-SCNC: 137 MMOL/L (ref 136–145)
WBC # BLD AUTO: 4.64 K/UL (ref 4.5–11)

## 2024-08-23 PROCEDURE — 94761 N-INVAS EAR/PLS OXIMETRY MLT: CPT

## 2024-08-23 PROCEDURE — 27000221 HC OXYGEN, UP TO 24 HOURS

## 2024-08-23 PROCEDURE — 25000242 PHARM REV CODE 250 ALT 637 W/ HCPCS: Performed by: NURSE PRACTITIONER

## 2024-08-23 PROCEDURE — 85025 COMPLETE CBC W/AUTO DIFF WBC: CPT | Performed by: NURSE PRACTITIONER

## 2024-08-23 PROCEDURE — 25000003 PHARM REV CODE 250: Performed by: NURSE PRACTITIONER

## 2024-08-23 PROCEDURE — 94640 AIRWAY INHALATION TREATMENT: CPT

## 2024-08-23 PROCEDURE — 80048 BASIC METABOLIC PNL TOTAL CA: CPT | Performed by: NURSE PRACTITIONER

## 2024-08-23 PROCEDURE — 99900035 HC TECH TIME PER 15 MIN (STAT)

## 2024-08-23 PROCEDURE — 25000003 PHARM REV CODE 250: Performed by: HOSPITALIST

## 2024-08-23 PROCEDURE — 36415 COLL VENOUS BLD VENIPUNCTURE: CPT | Performed by: NURSE PRACTITIONER

## 2024-08-23 PROCEDURE — 11000004 HC SNF PRIVATE

## 2024-08-23 PROCEDURE — 63600175 PHARM REV CODE 636 W HCPCS: Performed by: NURSE PRACTITIONER

## 2024-08-23 RX ORDER — IPRATROPIUM BROMIDE AND ALBUTEROL SULFATE 2.5; .5 MG/3ML; MG/3ML
3 SOLUTION RESPIRATORY (INHALATION) EVERY 8 HOURS
Status: DISCONTINUED | OUTPATIENT
Start: 2024-08-24 | End: 2024-08-24

## 2024-08-23 RX ORDER — LANOLIN ALCOHOL/MO/W.PET/CERES
1 CREAM (GRAM) TOPICAL
Status: DISCONTINUED | OUTPATIENT
Start: 2024-08-24 | End: 2024-10-05 | Stop reason: HOSPADM

## 2024-08-23 RX ORDER — DOCUSATE SODIUM 100 MG/1
100 CAPSULE, LIQUID FILLED ORAL 2 TIMES DAILY
Status: ON HOLD | COMMUNITY
End: 2024-10-04 | Stop reason: HOSPADM

## 2024-08-23 RX ORDER — BUDESONIDE 0.5 MG/2ML
0.5 INHALANT ORAL EVERY 12 HOURS
Status: DISCONTINUED | OUTPATIENT
Start: 2024-08-23 | End: 2024-09-25

## 2024-08-23 RX ORDER — OXYCODONE HYDROCHLORIDE 5 MG/1
5 CAPSULE ORAL EVERY 4 HOURS PRN
Status: ON HOLD | COMMUNITY
End: 2024-10-04 | Stop reason: HOSPADM

## 2024-08-23 RX ORDER — ACETAMINOPHEN 325 MG/1
650 TABLET ORAL EVERY 6 HOURS PRN
Status: DISCONTINUED | OUTPATIENT
Start: 2024-08-23 | End: 2024-08-24

## 2024-08-23 RX ORDER — POTASSIUM CHLORIDE 750 MG/1
10 CAPSULE, EXTENDED RELEASE ORAL DAILY
Status: DISCONTINUED | OUTPATIENT
Start: 2024-08-24 | End: 2024-10-05 | Stop reason: HOSPADM

## 2024-08-23 RX ORDER — PANTOPRAZOLE SODIUM 40 MG/1
40 TABLET, DELAYED RELEASE ORAL DAILY
Status: DISCONTINUED | OUTPATIENT
Start: 2024-08-24 | End: 2024-10-05 | Stop reason: HOSPADM

## 2024-08-23 RX ORDER — LEVOTHYROXINE SODIUM 112 UG/1
112 TABLET ORAL EVERY MORNING
Status: DISCONTINUED | OUTPATIENT
Start: 2024-08-24 | End: 2024-09-05

## 2024-08-23 RX ORDER — POTASSIUM CHLORIDE 750 MG/1
10 TABLET, EXTENDED RELEASE ORAL DAILY
COMMUNITY

## 2024-08-23 RX ORDER — SODIUM CHLORIDE 9 MG/ML
INJECTION, SOLUTION INTRAVENOUS
Status: DISCONTINUED | OUTPATIENT
Start: 2024-08-23 | End: 2024-10-05 | Stop reason: HOSPADM

## 2024-08-23 RX ORDER — IPRATROPIUM BROMIDE AND ALBUTEROL SULFATE 2.5; .5 MG/3ML; MG/3ML
3 SOLUTION RESPIRATORY (INHALATION) EVERY 6 HOURS PRN
Status: DISCONTINUED | OUTPATIENT
Start: 2024-08-23 | End: 2024-08-23

## 2024-08-23 RX ORDER — ESOMEPRAZOLE MAGNESIUM 40 MG/1
40 CAPSULE, DELAYED RELEASE ORAL
COMMUNITY

## 2024-08-23 RX ORDER — CALCIUM CARBONATE 200(500)MG
500 TABLET,CHEWABLE ORAL 2 TIMES DAILY PRN
Status: DISCONTINUED | OUTPATIENT
Start: 2024-08-23 | End: 2024-10-05 | Stop reason: HOSPADM

## 2024-08-23 RX ORDER — AMOXICILLIN 250 MG
1 CAPSULE ORAL 2 TIMES DAILY PRN
Status: DISCONTINUED | OUTPATIENT
Start: 2024-08-23 | End: 2024-10-05 | Stop reason: HOSPADM

## 2024-08-23 RX ORDER — FERROUS SULFATE 325(65) MG
325 TABLET, DELAYED RELEASE (ENTERIC COATED) ORAL
COMMUNITY

## 2024-08-23 RX ORDER — ONDANSETRON 4 MG/1
4 TABLET, ORALLY DISINTEGRATING ORAL EVERY 8 HOURS PRN
Status: DISCONTINUED | OUTPATIENT
Start: 2024-08-23 | End: 2024-10-05 | Stop reason: HOSPADM

## 2024-08-23 RX ORDER — OXYCODONE AND ACETAMINOPHEN 5; 325 MG/1; MG/1
1 TABLET ORAL EVERY 4 HOURS PRN
Status: DISCONTINUED | OUTPATIENT
Start: 2024-08-23 | End: 2024-08-30

## 2024-08-23 RX ORDER — MORPHINE SULFATE 4 MG/ML
1 INJECTION, SOLUTION INTRAMUSCULAR; INTRAVENOUS EVERY 6 HOURS PRN
Status: DISCONTINUED | OUTPATIENT
Start: 2024-08-23 | End: 2024-08-25

## 2024-08-23 RX ORDER — TALC
6 POWDER (GRAM) TOPICAL NIGHTLY PRN
Status: DISCONTINUED | OUTPATIENT
Start: 2024-08-23 | End: 2024-10-05 | Stop reason: HOSPADM

## 2024-08-23 RX ORDER — TRIAMTERENE AND HYDROCHLOROTHIAZIDE 37.5; 25 MG/1; MG/1
1 CAPSULE ORAL EVERY MORNING
Status: ON HOLD | COMMUNITY
End: 2024-10-04 | Stop reason: HOSPADM

## 2024-08-23 RX ORDER — ONDANSETRON 4 MG/1
4 TABLET, FILM COATED ORAL EVERY 8 HOURS PRN
Status: ON HOLD | COMMUNITY
End: 2024-10-04 | Stop reason: HOSPADM

## 2024-08-23 RX ORDER — FAMOTIDINE 20 MG/1
20 TABLET, FILM COATED ORAL DAILY
Status: ON HOLD | COMMUNITY
End: 2024-10-04 | Stop reason: HOSPADM

## 2024-08-23 RX ORDER — IPRATROPIUM BROMIDE AND ALBUTEROL SULFATE 2.5; .5 MG/3ML; MG/3ML
3 SOLUTION RESPIRATORY (INHALATION) EVERY 6 HOURS PRN
Status: ON HOLD | COMMUNITY
End: 2024-10-04 | Stop reason: HOSPADM

## 2024-08-23 RX ORDER — DOCUSATE SODIUM 100 MG/1
100 CAPSULE, LIQUID FILLED ORAL 2 TIMES DAILY
Status: DISCONTINUED | OUTPATIENT
Start: 2024-08-23 | End: 2024-10-05 | Stop reason: HOSPADM

## 2024-08-23 RX ADMIN — DOCUSATE SODIUM 100 MG: 100 CAPSULE, LIQUID FILLED ORAL at 08:08

## 2024-08-23 RX ADMIN — Medication 6 MG: at 08:08

## 2024-08-23 RX ADMIN — SODIUM CHLORIDE: 900 INJECTION, SOLUTION INTRAVENOUS at 08:08

## 2024-08-23 RX ADMIN — PIPERACILLIN AND TAZOBACTAM 4.5 G: 4; .5 INJECTION, POWDER, FOR SOLUTION INTRAVENOUS; PARENTERAL at 08:08

## 2024-08-23 RX ADMIN — IPRATROPIUM BROMIDE AND ALBUTEROL SULFATE 3 ML: .5; 3 SOLUTION RESPIRATORY (INHALATION) at 11:08

## 2024-08-23 RX ADMIN — BUDESONIDE INHALATION 0.5 MG: 0.5 SUSPENSION RESPIRATORY (INHALATION) at 08:08

## 2024-08-23 RX ADMIN — ACETAMINOPHEN 650 MG: 325 TABLET ORAL at 08:08

## 2024-08-23 NOTE — SUBJECTIVE & OBJECTIVE
Past Medical History:   Diagnosis Date    Anticoagulant long-term use     Cancer     Hypertension     Thyroid disease        Past Surgical History:   Procedure Laterality Date    HYSTERECTOMY         Review of patient's allergies indicates:  No Known Allergies    No current facility-administered medications on file prior to encounter.     Current Outpatient Medications on File Prior to Encounter   Medication Sig    albuterol-ipratropium (DUO-NEB) 2.5 mg-0.5 mg/3 mL nebulizer solution Take 3 mLs by nebulization every 6 (six) hours as needed for Wheezing. Rescue    diltiaZEM HCl (TIAZAC) 120 mg 24 hr capsule Take 120 mg by mouth.    docusate sodium (COLACE) 100 MG capsule Take 100 mg by mouth 2 (two) times daily.    esomeprazole (NEXIUM) 40 MG capsule Take 40 mg by mouth before breakfast.    famotidine (PEPCID) 20 MG tablet Take 20 mg by mouth Daily.    ferrous sulfate 325 (65 FE) MG EC tablet Take 325 mg by mouth 3 (three) times daily with meals.    levothyroxine (SYNTHROID) 112 MCG tablet Take 112 mcg by mouth every morning.    ondansetron (ZOFRAN) 4 MG tablet Take 4 mg by mouth every 8 (eight) hours as needed for Nausea.    oxyCODONE (OXY-IR) 5 mg Cap Take 5 mg by mouth every 4 (four) hours as needed for Pain.    potassium chloride SA (K-DUR,KLOR-CON M) 10 MEQ tablet Take 10 mEq by mouth once daily.    triamterene-hydrochlorothiazide 37.5-25 mg (DYAZIDE) 37.5-25 mg per capsule Take 1 capsule by mouth every morning.    XARELTO 20 mg Tab Take 10 mg by mouth once daily.    zolpidem (AMBIEN) 10 mg Tab Take 10 mg by mouth nightly as needed.    celecoxib (CELEBREX) 200 MG capsule Take 200 mg by mouth.     Family History    None       Tobacco Use    Smoking status: Every Day     Current packs/day: 0.50     Average packs/day: 0.5 packs/day for 2.6 years (1.3 ttl pk-yrs)     Types: Cigarettes     Start date: 2/1/2022    Smokeless tobacco: Never    Tobacco comments:     [id number]   Substance and Sexual Activity     Alcohol use: Not Currently    Drug use: Never    Sexual activity: Not Currently     Review of Systems   Reason unable to perform ROS: per daughter, Karla.   Constitutional:  Positive for activity change and fatigue.   Respiratory:  Negative for shortness of breath.    Cardiovascular:  Negative for chest pain.   Gastrointestinal:  Negative for abdominal pain, nausea and vomiting.   Skin:  Positive for wound.        Dressing on left hip   Psychiatric/Behavioral:  Positive for confusion.         Confused at times     Objective:     Vital Signs (Most Recent):  Temp: 98.2 °F (36.8 °C) (08/23/24 1730)  Pulse: 68 (08/23/24 1730)  Resp: (!) 22 (08/23/24 1730)  BP: 117/66 (08/23/24 1730)  SpO2: 96 % (08/23/24 1730) Vital Signs (24h Range):  Temp:  [98.2 °F (36.8 °C)] 98.2 °F (36.8 °C)  Pulse:  [68] 68  Resp:  [22] 22  SpO2:  [96 %] 96 %  BP: (117)/(66) 117/66        There is no height or weight on file to calculate BMI.     Physical Exam  Constitutional:       General: She is awake.      Appearance: She is underweight.   HENT:      Mouth/Throat:      Mouth: Mucous membranes are dry.   Cardiovascular:      Rate and Rhythm: Normal rate and regular rhythm.   Pulmonary:      Effort: Pulmonary effort is normal.      Breath sounds: Normal breath sounds.   Abdominal:      General: Abdomen is flat. Bowel sounds are decreased.      Palpations: Abdomen is soft.   Musculoskeletal:         General: Tenderness present. No swelling.      Cervical back: Neck supple.      Comments: Tenderness to left hip with movement and with palpation   Skin:     General: Skin is warm and dry.      Capillary Refill: Capillary refill takes less than 2 seconds.      Findings: Bruising present.      Comments: Reddened area to bilateral thighs under edge of ADALBERTO hose, reddened area on buttocks and reddened area on back, bruise on back and bruise on forehead and bruise on bilateral forearm.   Neurological:      Comments: Confused to place, time and situation.    Psychiatric:         Behavior: Behavior is cooperative.                Significant Labs: All pertinent labs within the past 24 hours have been reviewed.    Significant Imaging: I have reviewed all pertinent imaging results/findings within the past 24 hours.

## 2024-08-23 NOTE — ASSESSMENT & PLAN NOTE
Chronic, controlled. Latest blood pressure and vitals reviewed-     Temp:  [98.2 °F (36.8 °C)]   Pulse:  [68]   Resp:  [22]   BP: (117)/(66)   SpO2:  [96 %] .   Home meds for hypertension were reviewed and noted below.   Hypertension Medications               diltiaZEM HCl (TIAZAC) 120 mg 24 hr capsule Take 120 mg by mouth.    triamterene-hydrochlorothiazide 37.5-25 mg (DYAZIDE) 37.5-25 mg per capsule Take 1 capsule by mouth every morning.            While in the hospital, will manage blood pressure as follows; BP has been low, will hold meds for now    Monitor BP, replace home medication as warrented.

## 2024-08-23 NOTE — HPI
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  This was discussed with family who do not wish to have further testing at this time.  She has been at [facility] since 8/19 following a fall at her home which resulted in fracture of the left greater trochanter which required surg. She had an episode of chest pain on Wed with no acute findings.  Echo revealed EF of 60%.  She is being admitted to [facility] for OT/ PT and medical management.       Pt is DNR

## 2024-08-23 NOTE — PROGRESS NOTES
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  This was discussed with family who do not wish to have further testing at this time.  She has been at [facility] since 8/19 following a fall at her home which resulted in fracture of the left greater trochanter which required surg. She had an episode of chest pain on Wed with no acute findings.  Echo revealed EF of 60%.  She is being admitted to [facility] for OT/ PT and medical management.       Pt is DNR     Overview/Hospital Course:  No notes on file    Past Medical History:   Diagnosis Date    Anticoagulant long-term use     Cancer     Hypertension     Thyroid disease        Past Surgical History:   Procedure Laterality Date    HYSTERECTOMY         Review of patient's allergies indicates:  No Known Allergies    No current facility-administered medications on file prior to encounter.     Current Outpatient Medications on File Prior to Encounter   Medication Sig    albuterol-ipratropium (DUO-NEB) 2.5 mg-0.5 mg/3 mL nebulizer solution Take 3 mLs by nebulization every 6 (six) hours as needed for Wheezing. Rescue    diltiaZEM HCl (TIAZAC) 120 mg 24 hr capsule Take 120 mg by mouth.    docusate sodium (COLACE) 100 MG capsule Take 100 mg by mouth 2 (two) times daily.    esomeprazole (NEXIUM) 40 MG capsule Take 40 mg by mouth before breakfast.    famotidine (PEPCID) 20 MG tablet Take 20 mg by mouth Daily.    ferrous sulfate 325 (65 FE) MG EC tablet Take 325 mg by mouth  3 (three) times daily with meals.    levothyroxine (SYNTHROID) 112 MCG tablet Take 112 mcg by mouth every morning.    ondansetron (ZOFRAN) 4 MG tablet Take 4 mg by mouth every 8 (eight) hours as needed for Nausea.    oxyCODONE (OXY-IR) 5 mg Cap Take 5 mg by mouth every 4 (four) hours as needed for Pain.    potassium chloride SA (K-DUR,KLOR-CON M) 10 MEQ tablet Take 10 mEq by mouth once daily.    triamterene-hydrochlorothiazide 37.5-25 mg (DYAZIDE) 37.5-25 mg per capsule Take 1 capsule by mouth every morning.    XARELTO 20 mg Tab Take 10 mg by mouth once daily.    zolpidem (AMBIEN) 10 mg Tab Take 10 mg by mouth nightly as needed.    celecoxib (CELEBREX) 200 MG capsule Take 200 mg by mouth.     Family History    None       Tobacco Use    Smoking status: Every Day     Current packs/day: 0.50     Average packs/day: 0.5 packs/day for 2.6 years (1.3 ttl pk-yrs)     Types: Cigarettes     Start date: 2/1/2022    Smokeless tobacco: Never    Tobacco comments:     [id number]   Substance and Sexual Activity    Alcohol use: Not Currently    Drug use: Never    Sexual activity: Not Currently     Review of Systems   Reason unable to perform ROS: per daughterKarla.   Constitutional:  Positive for activity change and fatigue.   Respiratory:  Negative for shortness of breath.    Cardiovascular:  Negative for chest pain.   Gastrointestinal:  Negative for abdominal pain, nausea and vomiting.   Skin:  Positive for wound.        Dressing on left hip   Psychiatric/Behavioral:  Positive for confusion.         Confused at times     Objective:     Vital Signs (Most Recent):  Temp: 98.2 °F (36.8 °C) (08/23/24 1730)  Pulse: 68 (08/23/24 1730)  Resp: (!) 22 (08/23/24 1730)  BP: 117/66 (08/23/24 1730)  SpO2: 96 % (08/23/24 1730) Vital Signs (24h Range):  Temp:  [98.2 °F (36.8 °C)] 98.2 °F (36.8 °C)  Pulse:  [68] 68  Resp:  [22] 22  SpO2:  [96 %] 96 %  BP: (117)/(66) 117/66        There is no height or weight on file to calculate BMI.      Physical Exam  Constitutional:       General: She is awake.      Appearance: She is underweight.   HENT:      Mouth/Throat:      Mouth: Mucous membranes are dry.   Cardiovascular:      Rate and Rhythm: Normal rate and regular rhythm.   Pulmonary:      Effort: Pulmonary effort is normal.      Breath sounds: Normal breath sounds.   Abdominal:      General: Abdomen is flat. Bowel sounds are decreased.      Palpations: Abdomen is soft.   Musculoskeletal:         General: Tenderness present. No swelling.      Cervical back: Neck supple.      Comments: Tenderness to left hip with movement and with palpation   Skin:     General: Skin is warm and dry.      Capillary Refill: Capillary refill takes less than 2 seconds.      Findings: Bruising present.      Comments: Reddened area to bilateral thighs under edge of ADALBERTO hose, reddened area on buttocks and reddened area on back, bruise on back and bruise on forehead and bruise on bilateral forearm.   Neurological:      Comments: Confused to place, time and situation.   Psychiatric:         Behavior: Behavior is cooperative.                Significant Labs: All pertinent labs within the past 24 hours have been reviewed.    Significant Imaging: I have reviewed all pertinent imaging results/findings within the past 24 hours.    Assessment/Plan:      * S/p left hip fracture  PT/ OT  Fall precautions  Pain control    Disease of thyroid gland  Continue home meds      GERD (gastroesophageal reflux disease)  Continue home meds      History of DVT (deep vein thrombosis)  Continue xeralto      Hypertension  Chronic, controlled. Latest blood pressure and vitals reviewed-     Temp:  [98.2 °F (36.8 °C)]   Pulse:  [68]   Resp:  [22]   BP: (117)/(66)   SpO2:  [96 %] .   Home meds for hypertension were reviewed and noted below.   Hypertension Medications               diltiaZEM HCl (TIAZAC) 120 mg 24 hr capsule Take 120 mg by mouth.    triamterene-hydrochlorothiazide 37.5-25 mg (DYAZIDE)  37.5-25 mg per capsule Take 1 capsule by mouth every morning.            While in the hospital, will manage blood pressure as follows; BP has been low, will hold meds for now    Monitor BP, replace home medication as warrented.      VTE Risk Mitigation (From admission, onward)           Ordered     rivaroxaban tablet 10 mg  Daily         08/23/24 1802     IP VTE LOW RISK PATIENT  Once         08/23/24 1725                    Discharge Planning   QUINN:      Code Status: DNR   Is the patient medically ready for discharge?:     Reason for patient still in hospital (select all that apply): Patient trending condition                     YUSUF Beck  Department of Hospital Medicine   [facility]

## 2024-08-24 LAB
ANION GAP SERPL CALCULATED.3IONS-SCNC: 8 MMOL/L (ref 7–16)
BASOPHILS # BLD AUTO: 0.01 K/UL (ref 0–0.2)
BASOPHILS NFR BLD AUTO: 0.2 % (ref 0–1)
BUN SERPL-MCNC: 14 MG/DL (ref 7–18)
BUN/CREAT SERPL: 11 (ref 6–20)
CALCIUM SERPL-MCNC: 8.4 MG/DL (ref 8.5–10.1)
CHLORIDE SERPL-SCNC: 106 MMOL/L (ref 98–107)
CO2 SERPL-SCNC: 27 MMOL/L (ref 21–32)
CREAT SERPL-MCNC: 1.3 MG/DL (ref 0.55–1.02)
DIFFERENTIAL METHOD BLD: ABNORMAL
EGFR (NO RACE VARIABLE) (RUSH/TITUS): 40 ML/MIN/1.73M2
EOSINOPHIL # BLD AUTO: 0.04 K/UL (ref 0–0.5)
EOSINOPHIL NFR BLD AUTO: 0.8 % (ref 1–4)
ERYTHROCYTE [DISTWIDTH] IN BLOOD BY AUTOMATED COUNT: 14 % (ref 11.5–14.5)
GLUCOSE SERPL-MCNC: 108 MG/DL (ref 74–106)
HCT VFR BLD AUTO: 29.9 % (ref 38–47)
HGB BLD-MCNC: 9.3 G/DL (ref 12–16)
LYMPHOCYTES # BLD AUTO: 0.5 K/UL (ref 1–4.8)
LYMPHOCYTES NFR BLD AUTO: 9.5 % (ref 27–41)
MCH RBC QN AUTO: 29.3 PG (ref 27–31)
MCHC RBC AUTO-ENTMCNC: 31.1 G/DL (ref 32–36)
MCV RBC AUTO: 94.3 FL (ref 80–96)
MONOCYTES # BLD AUTO: 0.27 K/UL (ref 0–0.8)
MONOCYTES NFR BLD AUTO: 5.1 % (ref 2–6)
MPC BLD CALC-MCNC: 11.7 FL (ref 9.4–12.4)
NEUTROPHILS # BLD AUTO: 4.44 K/UL (ref 1.8–7.7)
NEUTROPHILS NFR BLD AUTO: 84.4 % (ref 53–65)
PLATELET # BLD AUTO: 131 K/UL (ref 150–400)
POTASSIUM SERPL-SCNC: 3.6 MMOL/L (ref 3.5–5.1)
RBC # BLD AUTO: 3.17 M/UL (ref 4.2–5.4)
SODIUM SERPL-SCNC: 137 MMOL/L (ref 136–145)
WBC # BLD AUTO: 5.26 K/UL (ref 4.5–11)

## 2024-08-24 PROCEDURE — 94761 N-INVAS EAR/PLS OXIMETRY MLT: CPT

## 2024-08-24 PROCEDURE — 25000003 PHARM REV CODE 250: Performed by: NURSE PRACTITIONER

## 2024-08-24 PROCEDURE — 25000003 PHARM REV CODE 250: Performed by: HOSPITALIST

## 2024-08-24 PROCEDURE — 85025 COMPLETE CBC W/AUTO DIFF WBC: CPT | Performed by: HOSPITALIST

## 2024-08-24 PROCEDURE — 25000242 PHARM REV CODE 250 ALT 637 W/ HCPCS: Performed by: NURSE PRACTITIONER

## 2024-08-24 PROCEDURE — 36415 COLL VENOUS BLD VENIPUNCTURE: CPT | Performed by: HOSPITALIST

## 2024-08-24 PROCEDURE — 80048 BASIC METABOLIC PNL TOTAL CA: CPT | Performed by: HOSPITALIST

## 2024-08-24 PROCEDURE — 11000004 HC SNF PRIVATE

## 2024-08-24 PROCEDURE — 94640 AIRWAY INHALATION TREATMENT: CPT

## 2024-08-24 PROCEDURE — 63600175 PHARM REV CODE 636 W HCPCS: Performed by: HOSPITALIST

## 2024-08-24 RX ORDER — SODIUM CHLORIDE 9 MG/ML
INJECTION, SOLUTION INTRAVENOUS CONTINUOUS
Status: DISPENSED | OUTPATIENT
Start: 2024-08-24 | End: 2024-08-26

## 2024-08-24 RX ORDER — MUPIROCIN 20 MG/G
OINTMENT TOPICAL 2 TIMES DAILY
Status: DISPENSED | OUTPATIENT
Start: 2024-08-24 | End: 2024-08-29

## 2024-08-24 RX ORDER — ADHESIVE BANDAGE
30 BANDAGE TOPICAL DAILY PRN
Status: DISCONTINUED | OUTPATIENT
Start: 2024-08-24 | End: 2024-10-05 | Stop reason: HOSPADM

## 2024-08-24 RX ORDER — IPRATROPIUM BROMIDE AND ALBUTEROL SULFATE 2.5; .5 MG/3ML; MG/3ML
3 SOLUTION RESPIRATORY (INHALATION)
Status: DISCONTINUED | OUTPATIENT
Start: 2024-08-25 | End: 2024-09-17

## 2024-08-24 RX ORDER — ACETAMINOPHEN 325 MG/1
650 TABLET ORAL EVERY 6 HOURS PRN
Status: DISCONTINUED | OUTPATIENT
Start: 2024-08-24 | End: 2024-10-05 | Stop reason: HOSPADM

## 2024-08-24 RX ORDER — ALBUTEROL SULFATE 0.83 MG/ML
2.5 SOLUTION RESPIRATORY (INHALATION) EVERY 4 HOURS PRN
Status: DISCONTINUED | OUTPATIENT
Start: 2024-08-24 | End: 2024-09-25

## 2024-08-24 RX ORDER — GUAIFENESIN 100 MG/5ML
200 SOLUTION ORAL EVERY 4 HOURS PRN
Status: DISCONTINUED | OUTPATIENT
Start: 2024-08-24 | End: 2024-10-05 | Stop reason: HOSPADM

## 2024-08-24 RX ADMIN — ACETAMINOPHEN 650 MG: 325 TABLET ORAL at 07:08

## 2024-08-24 RX ADMIN — PIPERACILLIN SODIUM AND TAZOBACTAM SODIUM 4.5 G: 4; .5 INJECTION, POWDER, LYOPHILIZED, FOR SOLUTION INTRAVENOUS at 04:08

## 2024-08-24 RX ADMIN — DOCUSATE SODIUM 100 MG: 100 CAPSULE, LIQUID FILLED ORAL at 08:08

## 2024-08-24 RX ADMIN — PIPERACILLIN SODIUM AND TAZOBACTAM SODIUM 4.5 G: 4; .5 INJECTION, POWDER, LYOPHILIZED, FOR SOLUTION INTRAVENOUS at 08:08

## 2024-08-24 RX ADMIN — RIVAROXABAN 10 MG: 10 TABLET, FILM COATED ORAL at 07:08

## 2024-08-24 RX ADMIN — Medication 6 MG: at 08:08

## 2024-08-24 RX ADMIN — BUDESONIDE INHALATION 0.5 MG: 0.5 SUSPENSION RESPIRATORY (INHALATION) at 07:08

## 2024-08-24 RX ADMIN — IPRATROPIUM BROMIDE AND ALBUTEROL SULFATE 3 ML: .5; 3 SOLUTION RESPIRATORY (INHALATION) at 08:08

## 2024-08-24 RX ADMIN — MUPIROCIN: 20 OINTMENT TOPICAL at 07:08

## 2024-08-24 RX ADMIN — PANTOPRAZOLE SODIUM 40 MG: 40 TABLET, DELAYED RELEASE ORAL at 07:08

## 2024-08-24 RX ADMIN — SODIUM CHLORIDE: 900 INJECTION, SOLUTION INTRAVENOUS at 07:08

## 2024-08-24 RX ADMIN — GUAIFENESIN 200 MG: 100 SOLUTION ORAL at 11:08

## 2024-08-24 RX ADMIN — FERROUS SULFATE TAB 325 MG (65 MG ELEMENTAL FE) 1 EACH: 325 (65 FE) TAB at 04:08

## 2024-08-24 RX ADMIN — GLYCERIN 2 DROP: .002; .002; .01 SOLUTION/ DROPS OPHTHALMIC at 10:08

## 2024-08-24 RX ADMIN — DOCUSATE SODIUM 100 MG: 100 CAPSULE, LIQUID FILLED ORAL at 07:08

## 2024-08-24 RX ADMIN — ALBUTEROL SULFATE 2.5 MG: 2.5 SOLUTION RESPIRATORY (INHALATION) at 10:08

## 2024-08-24 RX ADMIN — MAGNESIUM HYDROXIDE 2400 MG: 2400 SUSPENSION ORAL at 07:08

## 2024-08-24 RX ADMIN — BUDESONIDE INHALATION 0.5 MG: 0.5 SUSPENSION RESPIRATORY (INHALATION) at 08:08

## 2024-08-24 RX ADMIN — FERROUS SULFATE TAB 325 MG (65 MG ELEMENTAL FE) 1 EACH: 325 (65 FE) TAB at 11:08

## 2024-08-24 RX ADMIN — OXYCODONE HYDROCHLORIDE AND ACETAMINOPHEN 1 TABLET: 5; 325 TABLET ORAL at 06:08

## 2024-08-24 RX ADMIN — PIPERACILLIN SODIUM AND TAZOBACTAM SODIUM 4.5 G: 4; .5 INJECTION, POWDER, LYOPHILIZED, FOR SOLUTION INTRAVENOUS at 11:08

## 2024-08-24 RX ADMIN — IPRATROPIUM BROMIDE AND ALBUTEROL SULFATE 3 ML: .5; 3 SOLUTION RESPIRATORY (INHALATION) at 03:08

## 2024-08-24 RX ADMIN — FERROUS SULFATE TAB 325 MG (65 MG ELEMENTAL FE) 1 EACH: 325 (65 FE) TAB at 07:08

## 2024-08-24 RX ADMIN — OXYCODONE HYDROCHLORIDE AND ACETAMINOPHEN 1 TABLET: 5; 325 TABLET ORAL at 11:08

## 2024-08-24 RX ADMIN — ALBUTEROL SULFATE 2.5 MG: 2.5 SOLUTION RESPIRATORY (INHALATION) at 11:08

## 2024-08-24 RX ADMIN — POTASSIUM CHLORIDE 10 MEQ: 750 CAPSULE, EXTENDED RELEASE ORAL at 07:08

## 2024-08-24 RX ADMIN — LEVOTHYROXINE SODIUM 112 MCG: 0.11 TABLET ORAL at 06:08

## 2024-08-24 RX ADMIN — ACETAMINOPHEN 650 MG: 325 TABLET ORAL at 04:08

## 2024-08-24 RX ADMIN — SODIUM CHLORIDE: 9 INJECTION, SOLUTION INTRAVENOUS at 12:08

## 2024-08-24 RX ADMIN — MUPIROCIN: 20 OINTMENT TOPICAL at 08:08

## 2024-08-24 NOTE — PROGRESS NOTES
Pharmacist Renal Dose Adjustment Note    Yanet Viramontes is a 84 y.o. female being treated with the medication Zosyn    Patient Data:    Vital Signs (Most Recent):  Temp: 97.8 °F (36.6 °C) (08/23/24 1956)  Pulse: 88 (08/23/24 2324)  Resp: 18 (08/23/24 2324)  BP: (!) 168/78 (08/23/24 1956)  SpO2: 95 % (08/23/24 2324) Vital Signs (72h Range):  Temp:  [97.8 °F (36.6 °C)-98.2 °F (36.8 °C)]   Pulse:  []   Resp:  [18-23]   BP: (117-168)/(66-78)   SpO2:  [94 %-98 %]      Recent Labs   Lab 08/23/24  1756 08/24/24  0618   CREATININE 1.18* 1.30*     Serum creatinine: 1.3 mg/dL (H) 08/24/24 0618  Estimated creatinine clearance: 24.4 mL/min (A)    Medication:Zosyn dose: 3.375 grams frequency q6h will be changed to medication:Zosyn dose:4.5 grams frequency:q8h    Pharmacist's Name: Liv Rodriguez  Pharmacist's Extension: [phone]

## 2024-08-24 NOTE — PLAN OF CARE
Problem: Adult Inpatient Plan of Care  Goal: Optimal Comfort and Wellbeing  Outcome: Progressing     Problem: Fall Injury Risk  Goal: Absence of Fall and Fall-Related Injury  Outcome: Progressing     Problem: Skin Injury Risk Increased  Goal: Skin Health and Integrity  Outcome: Progressing

## 2024-08-25 PROBLEM — E43 SEVERE PROTEIN-CALORIE MALNUTRITION: Status: ACTIVE | Noted: 2024-08-25

## 2024-08-25 PROCEDURE — 94761 N-INVAS EAR/PLS OXIMETRY MLT: CPT

## 2024-08-25 PROCEDURE — 63600175 PHARM REV CODE 636 W HCPCS: Performed by: HOSPITALIST

## 2024-08-25 PROCEDURE — 25000003 PHARM REV CODE 250: Performed by: HOSPITALIST

## 2024-08-25 PROCEDURE — 11000004 HC SNF PRIVATE

## 2024-08-25 PROCEDURE — 63600175 PHARM REV CODE 636 W HCPCS: Performed by: NURSE PRACTITIONER

## 2024-08-25 PROCEDURE — 25000003 PHARM REV CODE 250: Performed by: NURSE PRACTITIONER

## 2024-08-25 PROCEDURE — 25000242 PHARM REV CODE 250 ALT 637 W/ HCPCS: Performed by: NURSE PRACTITIONER

## 2024-08-25 PROCEDURE — 27000987 HC MATTRESS, MATRIX LOW PROFILE

## 2024-08-25 PROCEDURE — 27000958

## 2024-08-25 PROCEDURE — 94640 AIRWAY INHALATION TREATMENT: CPT

## 2024-08-25 RX ORDER — IBUPROFEN 200 MG
1 TABLET ORAL DAILY
Status: DISCONTINUED | OUTPATIENT
Start: 2024-08-26 | End: 2024-10-05 | Stop reason: HOSPADM

## 2024-08-25 RX ORDER — LORAZEPAM 0.5 MG/1
0.5 TABLET ORAL 2 TIMES DAILY
Status: DISCONTINUED | OUTPATIENT
Start: 2024-08-25 | End: 2024-08-26

## 2024-08-25 RX ORDER — MORPHINE SULFATE 4 MG/ML
2 INJECTION, SOLUTION INTRAMUSCULAR; INTRAVENOUS EVERY 4 HOURS PRN
Status: DISCONTINUED | OUTPATIENT
Start: 2024-08-25 | End: 2024-09-20

## 2024-08-25 RX ORDER — MORPHINE SULFATE 4 MG/ML
1 INJECTION, SOLUTION INTRAMUSCULAR; INTRAVENOUS ONCE
Status: COMPLETED | OUTPATIENT
Start: 2024-08-25 | End: 2024-08-25

## 2024-08-25 RX ADMIN — MUPIROCIN: 20 OINTMENT TOPICAL at 08:08

## 2024-08-25 RX ADMIN — ACETAMINOPHEN 650 MG: 325 TABLET ORAL at 12:08

## 2024-08-25 RX ADMIN — PIPERACILLIN SODIUM AND TAZOBACTAM SODIUM 4.5 G: 4; .5 INJECTION, POWDER, LYOPHILIZED, FOR SOLUTION INTRAVENOUS at 04:08

## 2024-08-25 RX ADMIN — FERROUS SULFATE TAB 325 MG (65 MG ELEMENTAL FE) 1 EACH: 325 (65 FE) TAB at 12:08

## 2024-08-25 RX ADMIN — BUDESONIDE INHALATION 0.5 MG: 0.5 SUSPENSION RESPIRATORY (INHALATION) at 07:08

## 2024-08-25 RX ADMIN — LORAZEPAM 0.5 MG: 0.5 TABLET ORAL at 08:08

## 2024-08-25 RX ADMIN — MORPHINE SULFATE 2 MG: 4 INJECTION INTRAVENOUS at 05:08

## 2024-08-25 RX ADMIN — ACETAMINOPHEN 650 MG: 325 TABLET ORAL at 04:08

## 2024-08-25 RX ADMIN — RIVAROXABAN 10 MG: 10 TABLET, FILM COATED ORAL at 08:08

## 2024-08-25 RX ADMIN — IPRATROPIUM BROMIDE AND ALBUTEROL SULFATE 3 ML: 2.5; .5 SOLUTION RESPIRATORY (INHALATION) at 07:08

## 2024-08-25 RX ADMIN — MORPHINE SULFATE 1 MG: 4 INJECTION INTRAVENOUS at 03:08

## 2024-08-25 RX ADMIN — POTASSIUM CHLORIDE 10 MEQ: 750 CAPSULE, EXTENDED RELEASE ORAL at 08:08

## 2024-08-25 RX ADMIN — PIPERACILLIN SODIUM AND TAZOBACTAM SODIUM 4.5 G: 4; .5 INJECTION, POWDER, LYOPHILIZED, FOR SOLUTION INTRAVENOUS at 11:08

## 2024-08-25 RX ADMIN — FERROUS SULFATE TAB 325 MG (65 MG ELEMENTAL FE) 1 EACH: 325 (65 FE) TAB at 05:08

## 2024-08-25 RX ADMIN — PIPERACILLIN SODIUM AND TAZOBACTAM SODIUM 4.5 G: 4; .5 INJECTION, POWDER, LYOPHILIZED, FOR SOLUTION INTRAVENOUS at 08:08

## 2024-08-25 RX ADMIN — OXYCODONE HYDROCHLORIDE AND ACETAMINOPHEN 1 TABLET: 5; 325 TABLET ORAL at 08:08

## 2024-08-25 RX ADMIN — IPRATROPIUM BROMIDE AND ALBUTEROL SULFATE 3 ML: 2.5; .5 SOLUTION RESPIRATORY (INHALATION) at 02:08

## 2024-08-25 RX ADMIN — FERROUS SULFATE TAB 325 MG (65 MG ELEMENTAL FE) 1 EACH: 325 (65 FE) TAB at 08:08

## 2024-08-25 RX ADMIN — OXYCODONE HYDROCHLORIDE AND ACETAMINOPHEN 1 TABLET: 5; 325 TABLET ORAL at 09:08

## 2024-08-25 RX ADMIN — OXYCODONE HYDROCHLORIDE AND ACETAMINOPHEN 1 TABLET: 5; 325 TABLET ORAL at 03:08

## 2024-08-25 RX ADMIN — DOCUSATE SODIUM 100 MG: 100 CAPSULE, LIQUID FILLED ORAL at 08:08

## 2024-08-25 RX ADMIN — PANTOPRAZOLE SODIUM 40 MG: 40 TABLET, DELAYED RELEASE ORAL at 08:08

## 2024-08-25 RX ADMIN — LORAZEPAM 0.5 MG: 2 INJECTION INTRAMUSCULAR; INTRAVENOUS at 10:08

## 2024-08-25 RX ADMIN — LEVOTHYROXINE SODIUM 112 MCG: 0.11 TABLET ORAL at 06:08

## 2024-08-25 RX ADMIN — LORAZEPAM 0.5 MG: 2 INJECTION INTRAMUSCULAR; INTRAVENOUS at 12:08

## 2024-08-25 NOTE — PLAN OF CARE
Problem: Adult Inpatient Plan of Care  Goal: Optimal Comfort and Wellbeing  Outcome: Progressing     Problem: Fall Injury Risk  Goal: Absence of Fall and Fall-Related Injury  Outcome: Progressing     Problem: Skin Injury Risk Increased  Goal: Skin Health and Integrity  Outcome: Progressing     Problem: Gas Exchange Impaired  Goal: Optimal Gas Exchange  Outcome: Progressing

## 2024-08-25 NOTE — PLAN OF CARE
Problem: Adult Inpatient Plan of Care  Goal: Patient-Specific Goal (Individualized)  Outcome: Progressing     Problem: Fall Injury Risk  Goal: Absence of Fall and Fall-Related Injury  Outcome: Progressing     Problem: Wound  Goal: Absence of Infection Signs and Symptoms  Outcome: Progressing

## 2024-08-25 NOTE — CONSULTS
[facility]  Adult Nutrition  Consult Note         Reason for Assessment  Reason For Assessment: consult assess/MST 3  Nutrition Risk Screen: difficulty chewing/swallowing    Assessment and Plan  Consult received and appreciated. Patient admitted 8/23 with a dx of s/p L Hip fx and hx of cancer. Patient is ordered a full liquid diet with issues swallowing. RN reports patient having to be suctioned and having trouble clearing secretions. SLP evaluation pending.     Patient is 54 kg with a BMI of 24.04 which is WNL. Patient with significant weight loss over the last 6 months of 14% per chart review with weight at prior facility of 62.6 kg noted Feb 2024. Patient with poor po intakes endorsed on MST screen.    Patient meets ASPEN criteria for severe protein calorie malnutrition due to weight loss and poor intakes. See malnutrition assessment.     Recommend to add Boost Plus as tolerated. RD Following.           Learning Needs/Social Determinants of Health  Learning Assessment       08/23/2024 1906 [facility] (8/23/2024 - Present)   Created by Addie Alegre, RN - RN (Nurse) Status: Complete                 PRIMARY LEARNER     Primary Learner Name:  Karla [facility] 08/23/2024 1906    Relationship:  Family [facility] 08/23/2024 1906    Does the primary learner have any barriers to learning?:  No Barriers [facility] 08/23/2024 1906    What is the preferred language of the primary learner?:  English [facility] 08/23/2024 1906    Is an  required?:  No [facility] 08/23/2024 1906    How does the primary learner prefer to learn new concepts?:  Listening [facility] 08/23/2024 1906    How often do you need to have someone help you read instructions, pamphlets, or written material from your doctor or pharmacy?:  Never [facility] 08/23/2024 1906        CO-LEARNER #1     No question answered        CO-LEARNER #2     No question answered        SPECIAL TOPICS     No question answered         ANSWERED BY:     No question answered        Comments         Edit History       Addie Alegre, RN - RN (Nurse)   08/23/2024 1906                           Social Determinants of Health     Tobacco Use: High Risk (8/23/2024)    Patient History     Smoking Tobacco Use: Every Day     Smokeless Tobacco Use: Never     Passive Exposure: Not on file   Alcohol Use: Not At Risk (2/29/2024)    AUDIT-C     Frequency of Alcohol Consumption: Never     Average Number of Drinks: Patient does not drink     Frequency of Binge Drinking: Never   Financial Resource Strain: Not At Risk (4/3/2024)    Received from [facility]    BOC Financial Resource Needs     Financial Resource Strain: 1   Food Insecurity: No Food Insecurity (2/29/2024)    Hunger Vital Sign     Worried About Running Out of Food in the Last Year: Never true     Ran Out of Food in the Last Year: Never true   Transportation Needs: Not At Risk (4/3/2024)    Received from [facility]    BOC Transportation Needs     Transportation Needs: 1   Physical Activity: Inactive (2/29/2024)    Exercise Vital Sign     Days of Exercise per Week: 0 days     Minutes of Exercise per Session: 0 min   Stress: No Stress Concern Present (2/29/2024)    [place] [facility] - Occupational Stress Questionnaire     Feeling of Stress : Only a little   Housing Stability: Not At Risk (4/3/2024)    Received from [facility]    BOC Housing Stability Source     Housing Insecurity: 1   Depression: Not on file   Utilities: Not on file (2/28/2024)   Health Literacy: Not on file   Social Isolation: Not on file            Malnutrition  Is Patient Malnourished: Yes Malnutrition Assessment  Malnutrition Context: chronic illness  Malnutrition Level: severe          Weight Loss (Malnutrition): greater than 10% in 6 months  Energy Intake (Malnutrition): less than 75% for greater than or equal to 1  month                         Nutrition Diagnosis  Malnutrition (Severe) related to Appetite loss, Chronic illness, and Dysphagia/ difficulty swallowing as evidenced by weight loss of greater than 10% in 6 months, and energy intakes less than 75% for greater than or equal to 1 month  Comments: SLP jaison pending; patient having issues swallowing    Recent Labs   Lab 08/24/24  0618   *     Comments on Glucose: elevated     Nutrition Prescription / Recommendations  Recommendation/Intervention: Recommend to advance diet appropriate and tolerated; add Boost Plus all meals  Goals: po intakes 50% during admission  Nutrition Goal Status: new  Communication of RD Recs: reviewed with RN  Current Diet Order: Full liquids  Oral Nutrition Supplement: add Boost Plus all meals  Chewing or Swallowing Difficulty?: Swallowing difficulty  Recommended Diet:  as tolerated  Recommended Oral Supplement: Boost Plus [360kcals, 14g Protein, 45g Carbs] 3 times a day  Is Nutrition Support Recommended: [facility] Nutrition Support: No  Is Nutrition Education Recommended: No    Monitor and Evaluation  % current Intake: P.O. intake of 0 - 10%  % intake to meet estimated needs: 50 - 75 %  Food and Nutrient Intake: energy intake, food and beverage intake  Food and Nutrient Adminstration: diet order  Anthropometric Measurements: height/length, weight, weight change, body mass index  Biochemical Data, Medical Tests and Procedures: electrolyte and renal panel, gastrointestinal profile, glucose/endocrine profile, inflammatory profile, lipid profile       Current Medical Diagnosis and Past Medical History     Past Medical History:   Diagnosis Date    Anticoagulant long-term use     Cancer     Hypertension     Thyroid disease        Nutrition/Diet History  Food Allergies: NKFA    Lab/Procedures/Meds  Recent Labs   Lab 08/24/24  0618      K 3.6   BUN 14   CREATININE 1.30*   CALCIUM 8.4*        Last A1c: No results found for:  ""HGBA1C"  Lab Results   Component Value Date    RBC 3.17 (L) 08/24/2024    HGB 9.3 (L) 08/24/2024    HCT 29.9 (L) 08/24/2024    MCV 94.3 08/24/2024    MCH 29.3 08/24/2024    MCHC 31.1 (L) 08/24/2024     Pertinent Labs Reviewed: reviewed  Pertinent Medications Reviewed: reviewed  Scheduled Meds:   albuterol-ipratropium  3 mL Nebulization Q6H WAKE    budesonide  0.5 mg Nebulization Q12H    docusate sodium  100 mg Oral BID    ferrous sulfate  1 tablet Oral TID WM    levothyroxine  112 mcg Oral QAM    mupirocin   Nasal BID    pantoprazole  40 mg Oral Daily    piperacillin-tazobactam (Zosyn) IV (PEDS and ADULTS) (extended infusion is not appropriate)  4.5 g Intravenous Q8H    potassium chloride  10 mEq Oral Daily    rivaroxaban  10 mg Oral Daily     Continuous Infusions:   0.9% NaCl   Intravenous Continuous 50 mL/hr at 08/25/24 0352 Rate Verify at 08/25/24 0352     PRN Meds:.  Current Facility-Administered Medications:     0.9% NaCl, , Intravenous, PRN    acetaminophen, 650 mg, Oral, Q6H PRN    albuterol sulfate, 2.5 mg, Nebulization, Q4H PRN    calcium carbonate, 500 mg, Oral, BID PRN    guaiFENesin 100 mg/5 ml, 200 mg, Oral, Q4H PRN    lorazepam, 0.5 mg, Intravenous, Q2H PRN    magnesium hydroxide 400 mg/5 ml, 30 mL, Oral, Daily PRN    melatonin, 6 mg, Oral, Nightly PRN    morphine, 1 mg, Intravenous, Q6H PRN    ondansetron, 4 mg, Oral, Q8H PRN    oxyCODONE-acetaminophen, 1 tablet, Oral, Q4H PRN    peg 400-hypromellose-glycerin, 2 drop, Ophthalmic, PRN    senna-docusate 8.6-50 mg, 1 tablet, Oral, BID PRN    Anthropometrics  Temp: 97.8 °F (36.6 °C)  Height Method: Stated  Height: 4' 11" (149.9 cm)  Height (inches): 59 in  Weight Method: Bed Scale  Weight: 54 kg (119 lb)  Weight (lb): 119 lb  Ideal Body Weight (IBW), Female: 95 lb  % Ideal Body Weight, Female (lb): 125.26 %  BMI (Calculated): 24       Estimated/Assessed Needs      Temp: 97.8 °F (36.6 °C)Oral  Weight Used For Calorie Calculations: 54 kg (119 lb 0.8 oz) "   Energy Need Method: Kcal/kg Energy Calorie Requirements (kcal): 2814-9870  Weight Used For Protein Calculations: 54 kg (119 lb 0.8 oz)  Protein Requirements: 43-54  Estimated Fluid Requirement Method: RDA Method    RDA Method (mL): 1350       Nutrition by Nursing  Diet/Nutrition Received: full liquid  Intake (%):  (10%)  Diet/Feeding Assistance: tray set-up  Diet/Feeding Tolerance: poor  Last Bowel Movement: 08/18/24                Nutrition Follow-Up         Nutrition Discharge Planning: new admit to [facility]; rd following for d/c needs            Available via Secure Chat

## 2024-08-26 PROBLEM — R41.0 DELIRIUM: Status: ACTIVE | Noted: 2024-08-26

## 2024-08-26 LAB
ANION GAP SERPL CALCULATED.3IONS-SCNC: 12 MMOL/L (ref 7–16)
BASOPHILS # BLD AUTO: 0.01 K/UL (ref 0–0.2)
BASOPHILS NFR BLD AUTO: 0.1 % (ref 0–1)
BUN SERPL-MCNC: 12 MG/DL (ref 7–18)
BUN/CREAT SERPL: 10 (ref 6–20)
CALCIUM SERPL-MCNC: 9.2 MG/DL (ref 8.5–10.1)
CHLORIDE SERPL-SCNC: 102 MMOL/L (ref 98–107)
CO2 SERPL-SCNC: 28 MMOL/L (ref 21–32)
CREAT SERPL-MCNC: 1.18 MG/DL (ref 0.55–1.02)
DIFFERENTIAL METHOD BLD: ABNORMAL
EGFR (NO RACE VARIABLE) (RUSH/TITUS): 45 ML/MIN/1.73M2
EOSINOPHIL # BLD AUTO: 0.1 K/UL (ref 0–0.5)
EOSINOPHIL NFR BLD AUTO: 1.1 % (ref 1–4)
ERYTHROCYTE [DISTWIDTH] IN BLOOD BY AUTOMATED COUNT: 13.9 % (ref 11.5–14.5)
GLUCOSE SERPL-MCNC: 74 MG/DL (ref 74–106)
HCT VFR BLD AUTO: 34.9 % (ref 38–47)
HGB BLD-MCNC: 11.2 G/DL (ref 12–16)
LYMPHOCYTES # BLD AUTO: 0.42 K/UL (ref 1–4.8)
LYMPHOCYTES NFR BLD AUTO: 4.4 % (ref 27–41)
MCH RBC QN AUTO: 29.8 PG (ref 27–31)
MCHC RBC AUTO-ENTMCNC: 32.1 G/DL (ref 32–36)
MCV RBC AUTO: 92.8 FL (ref 80–96)
MONOCYTES # BLD AUTO: 0.69 K/UL (ref 0–0.8)
MONOCYTES NFR BLD AUTO: 7.3 % (ref 2–6)
MPC BLD CALC-MCNC: 11.2 FL (ref 9.4–12.4)
NEUTROPHILS # BLD AUTO: 8.29 K/UL (ref 1.8–7.7)
NEUTROPHILS NFR BLD AUTO: 87.1 % (ref 53–65)
PLATELET # BLD AUTO: 318 K/UL (ref 150–400)
POTASSIUM SERPL-SCNC: 4.1 MMOL/L (ref 3.5–5.1)
RBC # BLD AUTO: 3.76 M/UL (ref 4.2–5.4)
SODIUM SERPL-SCNC: 138 MMOL/L (ref 136–145)
WBC # BLD AUTO: 9.51 K/UL (ref 4.5–11)

## 2024-08-26 PROCEDURE — 25000242 PHARM REV CODE 250 ALT 637 W/ HCPCS: Performed by: NURSE PRACTITIONER

## 2024-08-26 PROCEDURE — 11000004 HC SNF PRIVATE

## 2024-08-26 PROCEDURE — 80048 BASIC METABOLIC PNL TOTAL CA: CPT | Performed by: NURSE PRACTITIONER

## 2024-08-26 PROCEDURE — 25000003 PHARM REV CODE 250: Performed by: HOSPITALIST

## 2024-08-26 PROCEDURE — 25000003 PHARM REV CODE 250: Performed by: NURSE PRACTITIONER

## 2024-08-26 PROCEDURE — 94761 N-INVAS EAR/PLS OXIMETRY MLT: CPT

## 2024-08-26 PROCEDURE — 99900035 HC TECH TIME PER 15 MIN (STAT)

## 2024-08-26 PROCEDURE — 92610 EVALUATE SWALLOWING FUNCTION: CPT

## 2024-08-26 PROCEDURE — 63600175 PHARM REV CODE 636 W HCPCS: Performed by: NURSE PRACTITIONER

## 2024-08-26 PROCEDURE — 36415 COLL VENOUS BLD VENIPUNCTURE: CPT | Performed by: NURSE PRACTITIONER

## 2024-08-26 PROCEDURE — 99305 1ST NF CARE MODERATE MDM 35: CPT | Mod: AI,,, | Performed by: HOSPITALIST

## 2024-08-26 PROCEDURE — 85025 COMPLETE CBC W/AUTO DIFF WBC: CPT | Performed by: NURSE PRACTITIONER

## 2024-08-26 PROCEDURE — 97167 OT EVAL HIGH COMPLEX 60 MIN: CPT

## 2024-08-26 PROCEDURE — 63600175 PHARM REV CODE 636 W HCPCS: Performed by: HOSPITALIST

## 2024-08-26 PROCEDURE — S4991 NICOTINE PATCH NONLEGEND: HCPCS | Performed by: NURSE PRACTITIONER

## 2024-08-26 PROCEDURE — 94640 AIRWAY INHALATION TREATMENT: CPT

## 2024-08-26 RX ORDER — QUETIAPINE FUMARATE 25 MG/1
25 TABLET, FILM COATED ORAL DAILY
Status: DISCONTINUED | OUTPATIENT
Start: 2024-08-26 | End: 2024-08-29

## 2024-08-26 RX ORDER — QUETIAPINE FUMARATE 25 MG/1
25 TABLET, FILM COATED ORAL NIGHTLY
Status: DISCONTINUED | OUTPATIENT
Start: 2024-08-26 | End: 2024-08-26

## 2024-08-26 RX ORDER — SODIUM CHLORIDE 9 MG/ML
INJECTION, SOLUTION INTRAVENOUS CONTINUOUS
Status: DISCONTINUED | OUTPATIENT
Start: 2024-08-26 | End: 2024-08-28

## 2024-08-26 RX ORDER — BISACODYL 10 MG/1
10 SUPPOSITORY RECTAL DAILY PRN
Status: DISCONTINUED | OUTPATIENT
Start: 2024-08-26 | End: 2024-10-05 | Stop reason: HOSPADM

## 2024-08-26 RX ORDER — HALOPERIDOL 5 MG/ML
2 INJECTION INTRAMUSCULAR ONCE
Status: COMPLETED | OUTPATIENT
Start: 2024-08-26 | End: 2024-08-26

## 2024-08-26 RX ORDER — LORAZEPAM 0.5 MG/1
0.5 TABLET ORAL EVERY 12 HOURS PRN
Status: DISCONTINUED | OUTPATIENT
Start: 2024-08-26 | End: 2024-10-05 | Stop reason: HOSPADM

## 2024-08-26 RX ADMIN — PANTOPRAZOLE SODIUM 40 MG: 40 TABLET, DELAYED RELEASE ORAL at 09:08

## 2024-08-26 RX ADMIN — NICOTINE 1 PATCH: 21 PATCH, EXTENDED RELEASE TRANSDERMAL at 09:08

## 2024-08-26 RX ADMIN — PIPERACILLIN SODIUM AND TAZOBACTAM SODIUM 4.5 G: 4; .5 INJECTION, POWDER, LYOPHILIZED, FOR SOLUTION INTRAVENOUS at 04:08

## 2024-08-26 RX ADMIN — LORAZEPAM 0.5 MG: 2 INJECTION INTRAMUSCULAR; INTRAVENOUS at 05:08

## 2024-08-26 RX ADMIN — BUDESONIDE INHALATION 0.5 MG: 0.5 SUSPENSION RESPIRATORY (INHALATION) at 08:08

## 2024-08-26 RX ADMIN — IPRATROPIUM BROMIDE AND ALBUTEROL SULFATE 3 ML: 2.5; .5 SOLUTION RESPIRATORY (INHALATION) at 07:08

## 2024-08-26 RX ADMIN — SODIUM CHLORIDE: 9 INJECTION, SOLUTION INTRAVENOUS at 12:08

## 2024-08-26 RX ADMIN — LORAZEPAM 0.5 MG: 2 INJECTION INTRAMUSCULAR; INTRAVENOUS at 01:08

## 2024-08-26 RX ADMIN — IPRATROPIUM BROMIDE AND ALBUTEROL SULFATE 3 ML: 2.5; .5 SOLUTION RESPIRATORY (INHALATION) at 08:08

## 2024-08-26 RX ADMIN — OXYCODONE HYDROCHLORIDE AND ACETAMINOPHEN 1 TABLET: 5; 325 TABLET ORAL at 06:08

## 2024-08-26 RX ADMIN — SODIUM CHLORIDE: 9 INJECTION, SOLUTION INTRAVENOUS at 09:08

## 2024-08-26 RX ADMIN — RIVAROXABAN 10 MG: 10 TABLET, FILM COATED ORAL at 09:08

## 2024-08-26 RX ADMIN — MUPIROCIN: 20 OINTMENT TOPICAL at 09:08

## 2024-08-26 RX ADMIN — ACETAMINOPHEN 650 MG: 325 TABLET ORAL at 11:08

## 2024-08-26 RX ADMIN — BUDESONIDE INHALATION 0.5 MG: 0.5 SUSPENSION RESPIRATORY (INHALATION) at 07:08

## 2024-08-26 RX ADMIN — LORAZEPAM 0.5 MG: 0.5 TABLET ORAL at 09:08

## 2024-08-26 RX ADMIN — HALOPERIDOL LACTATE 2 MG: 5 INJECTION, SOLUTION INTRAMUSCULAR at 02:08

## 2024-08-26 RX ADMIN — PIPERACILLIN SODIUM AND TAZOBACTAM SODIUM 4.5 G: 4; .5 INJECTION, POWDER, LYOPHILIZED, FOR SOLUTION INTRAVENOUS at 09:08

## 2024-08-26 RX ADMIN — IPRATROPIUM BROMIDE AND ALBUTEROL SULFATE 3 ML: 2.5; .5 SOLUTION RESPIRATORY (INHALATION) at 02:08

## 2024-08-26 RX ADMIN — BISACODYL 10 MG: 10 SUPPOSITORY RECTAL at 02:08

## 2024-08-26 NOTE — ASSESSMENT & PLAN NOTE
Likely sundowning.  Will try some Seroquel for tonight.  Try to see if she will reset and can wean off.

## 2024-08-26 NOTE — PROGRESS NOTES
"PT checked on pt again, but she is still very confused and agitated. She yells out and thrashes her limbs intermittently. Son and dtr request PT not eval her "until her mind clears up."  They stated that they suspect polypharmacy is cause of delerium, but pt is weaning off several meds. The son then called Tanna Viramontes NP and she stated to withhold PT eval until tomorrow.  "

## 2024-08-26 NOTE — PT/OT/SLP EVAL
Occupational Therapy Evaluation     Name: Yanet Viramontes  MRN: [id number]  Admitting Diagnosis: S/p left hip fracture    Recent Surgery:   hip repair in last week post 8/19/2024    Recommendations:     Discharge Recommendations: Low Intensity Therapy  Level of Assistance Recommended: 24 hours significant assistance  Discharge Equipment Recommendations:  (TBD)  Barriers to discharge: None    Assessment:     Yanet Viramontes is a 84 y.o. female with a medical diagnosis of S/p left hip fracture. She presents with performance deficits affecting function including weakness, impaired endurance, impaired self care skills, impaired functional mobility, gait instability, impaired balance, impaired cognition, decreased safety awareness, impaired skin, orthopedic precautions.  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  This was discussed with family who do not wish to have further testing at this time.  She has been at [facility] since 8/19 following a fall at her home which resulted in fracture of the left greater trochanter which required surg. She had an episode of chest pain on Wed with no acute findings.  Echo revealed EF of 60%.  She is being admitted to [facility] for OT/ PT and medical management.        Pt is DNR     Rehab Prognosis: Poor and at present cognitive status ; patient would benefit from acute OT services to address these deficits and reach maximum level of function.  Staff is hopeful to get pt medically stabilized and cognitive status improved, so that she can participate in therapy services to return to [facility].  Will cont to assess pt and encourage participation.  Today, pt is unable to participate due to drowsy and severely decreased level of alertness.    Plan:     Patient to be seen 5 x/week to address the above listed problems via self-care/home management,  community/work re-entry, therapeutic activities, therapeutic exercises, neuromuscular re-education, cognitive retraining, sensory integration, wheelchair management/training  Plan of Care Expires: 09/27/24  Plan of Care Reviewed with: patient, caregiver, daughter, family    Subjective     Chief Complaint: somnolence with periods of agitation and anxiety when alert  Patient Comments/Goals: family states they do not understand what is causing her agitation at this time; will have to see how she responds to medical care before determining next level  of care.  Pain/Comfort:  Pain Rating 1:  (pt unable to respond appropriately to questionning)    Patients cultural, spiritual, Confucianist conflicts given the current situation: no    Social History:  Living Environment: Patient lives with their child(carin) in a single story home   Prior Level of Function: Prior to admission, patient was independent with all basic adls and IADLs including driving and was modified independent with bathing method  Roles and Routines: Patient was driving prior to admission.  Equipment Used at Home: cane, straight  DME owned (not currently used): single point cane  Assistance Upon Discharge: family    Objective:     Communicated with pt and her DIL and daughter, nursing as well prior to session. Patient found  up in [facility] with her family members besider her ensuring she remains upright in [facility]  with peripheral IV, sesay catheter upon OT entry to room.    General Precautions: Standard, fall, aspiration   Orthopedic Precautions: LLE weight bearing as tolerated   Braces: N/A    Respiratory Status: Room air    Occupational Performance    Gait belt applied - No    Bed Mobility:   None attempted with OT due to pt somnolence; family states when she is alert, she is very agitated and flailing around in bed, in angered state in the last few days per family report.    Functional Mobility/Transfers:  Not attempted due to somnolence; family reports  "they hoisted her up and put her in gerichair with max assist this morning.  Pt found upright in gerichair with family on both sides of her to make sure she stays upright (upon OT entrance)    Activities of Daily Living:  At this time, pt is requiring max assist with all ADL's including self feeding    Cognitive/Visual Perceptual:  Cognitive/Psychosocial Skills:    -     Oriented to: her name when called  -     Follows Commands/attention: Inattentive and somnolent  -     Communication: not communicating but somnolent  -     Memory: unable to determine due to pt inability to participate  -     Safety awareness/insight to disability: impaired  -     Mood/Affect/Coping skills/emotional control: Lethargic and somnolent    Physical Exam:  Balance:    -     Sitting: maximal assistance  Dominant hand: Right  Upper Extremity Range of Motion:     -       Right Upper Extremity: limited in PROM to approx 75% sh flexion  -       Left Upper Extremity: same  Upper Extremity Strength:    -       Right Upper Extremity: unable to determine due to somnolence  -       Left Upper Extremity: same   Strength:    -       Right Upper Extremity: unable to determine due to somnolence  -       Left Upper Extremity: same  Fine Motor Coordination:    -       Impaired  somnolence and unable to test  Gross motor coordination:   family reports she is "moving her arms and legs real good when she is in the bed and agitated."  Unable to formally test due to pt somnolence today    AMPA 6 Click ADL:  AMPAC Total Score: 6    Treatment & Education:  Educated on the importance of mobility to maximize recovery  Educated on the importance of OOB mobility within safe range in order to decrease adverse effects of prolonged bedrest  Educated on safety with functional mobility; hand placement to ensure safe transfers to various surfaces in prep for ADLs  Educated on performing functional mobility and ADLs in adherence to orthopedic precautions  Will " continue to educate as needed  Pt cognitive status at this time is barrier to her being able to effectively participate.  Family educated to please call for therapists if pt wakes up and seems more at herself so that she can effectively participate.  They relate excellent understanding and agreement.  Pt educated on OT role/POC.   Importance of OOB activity with staff assistance.  Importance of sitting up in the chair throughout the day as tolerated, especially for meals   Safety during functional t/f and mobility with use of RW when pt becomes more cognitively aware  Importance of assisting with self-care activities   All questions/concerns answered within OT scope of practice     Patient left  up in [facility] supported by her family  with all lines intact and call button in reach.    GOALS:   Multidisciplinary Problems       Occupational Therapy Goals          Problem: Occupational Therapy    Goal Priority Disciplines Outcome Interventions   Occupational Therapy Goal     OT, PT/OT Progressing    Description: STG: within 2 weeks  Pt will perform grooming with min a at sinkside from seated  Pt will bathe with mod a  Pt will perform UE dressing with mod a  Pt will perform LE dressing with mod a  Pt will sit EOB x 5 min with mod to min assistance  Pt will transfer bed/chair/bsc with mod a  Pt will perform standing task x 1 min with mod assistance x 1  Pt will tolerate 15 minutes of tx without fatigue      LT.Restore to max I with self care and mobility.                         History:     Past Medical History:   Diagnosis Date    Anticoagulant long-term use     Cancer     Hypertension     Thyroid disease          Past Surgical History:   Procedure Laterality Date    HYSTERECTOMY         Time Tracking:     OT Date of Treatment: 24  OT Start Time: 1108  OT Stop Time: 1120  OT Total Time (min): 12 min    Billable Minutes: Evaluation 12    2024

## 2024-08-26 NOTE — PLAN OF CARE
Problem: Occupational Therapy  Goal: Occupational Therapy Goal  Description: STG: within 2 weeks  Pt will perform grooming with min a at sinkside from seated  Pt will bathe with mod a  Pt will perform UE dressing with mod a  Pt will perform LE dressing with mod a  Pt will sit EOB x 5 min with mod to min assistance  Pt will transfer bed/chair/bsc with mod a  Pt will perform standing task x 1 min with mod assistance x 1  Pt will tolerate 15 minutes of tx without fatigue      LT.Restore to max I with self care and mobility.    Outcome: Progressing

## 2024-08-26 NOTE — SUBJECTIVE & OBJECTIVE
Past Medical History:   Diagnosis Date    Anticoagulant long-term use     Cancer     Hypertension     Thyroid disease        Past Surgical History:   Procedure Laterality Date    HYSTERECTOMY         Review of patient's allergies indicates:  No Known Allergies    No current facility-administered medications on file prior to encounter.     Current Outpatient Medications on File Prior to Encounter   Medication Sig    albuterol-ipratropium (DUO-NEB) 2.5 mg-0.5 mg/3 mL nebulizer solution Take 3 mLs by nebulization every 6 (six) hours as needed for Wheezing. Rescue    diltiaZEM HCl (TIAZAC) 120 mg 24 hr capsule Take 120 mg by mouth.    docusate sodium (COLACE) 100 MG capsule Take 100 mg by mouth 2 (two) times daily.    esomeprazole (NEXIUM) 40 MG capsule Take 40 mg by mouth before breakfast.    famotidine (PEPCID) 20 MG tablet Take 20 mg by mouth Daily.    ferrous sulfate 325 (65 FE) MG EC tablet Take 325 mg by mouth 3 (three) times daily with meals.    levothyroxine (SYNTHROID) 112 MCG tablet Take 112 mcg by mouth every morning.    ondansetron (ZOFRAN) 4 MG tablet Take 4 mg by mouth every 8 (eight) hours as needed for Nausea.    oxyCODONE (OXY-IR) 5 mg Cap Take 5 mg by mouth every 4 (four) hours as needed for Pain.    potassium chloride SA (K-DUR,KLOR-CON M) 10 MEQ tablet Take 10 mEq by mouth once daily.    triamterene-hydrochlorothiazide 37.5-25 mg (DYAZIDE) 37.5-25 mg per capsule Take 1 capsule by mouth every morning.    XARELTO 20 mg Tab Take 10 mg by mouth once daily.    zolpidem (AMBIEN) 10 mg Tab Take 10 mg by mouth nightly as needed.    celecoxib (CELEBREX) 200 MG capsule Take 200 mg by mouth.     Family History    None       Tobacco Use    Smoking status: Every Day     Current packs/day: 0.50     Average packs/day: 0.5 packs/day for 2.6 years (1.3 ttl pk-yrs)     Types: Cigarettes     Start date: 2/1/2022    Smokeless tobacco: Never    Tobacco comments:     [id number]   Substance and Sexual Activity     Alcohol use: Not Currently    Drug use: Never    Sexual activity: Not Currently     Review of Systems   Genitourinary:  Positive for difficulty urinating.   Neurological:  Positive for weakness.   Psychiatric/Behavioral:  Positive for agitation, behavioral problems and confusion.    All other systems reviewed and are negative.    Objective:     Vital Signs (Most Recent):  Temp: 97.8 °F (36.6 °C) (08/26/24 0926)  Pulse: (!) 147 (08/26/24 0926)  Resp: 18 (08/26/24 0926)  BP: (!) 171/81 (08/26/24 0926)  SpO2: (!) 94 % (08/26/24 0926) Vital Signs (24h Range):  Temp:  [97.7 °F (36.5 °C)-97.8 °F (36.6 °C)] 97.8 °F (36.6 °C)  Pulse:  [] 147  Resp:  [18-26] 18  SpO2:  [94 %-99 %] 94 %  BP: (158-171)/(77-81) 171/81     Weight: 54 kg (119 lb)  Body mass index is 24.04 kg/m².     Physical Exam  Vitals reviewed.   Constitutional:       General: She is not in acute distress.     Appearance: Normal appearance.   HENT:      Head: Normocephalic and atraumatic.   Eyes:      General: No scleral icterus.     Extraocular Movements: Extraocular movements intact.      Conjunctiva/sclera: Conjunctivae normal.      Pupils: Pupils are equal, round, and reactive to light.   Cardiovascular:      Rate and Rhythm: Normal rate and regular rhythm.      Heart sounds: No murmur heard.     No friction rub. No gallop.   Pulmonary:      Effort: Pulmonary effort is normal. No respiratory distress.      Breath sounds: Normal breath sounds. No wheezing or rales.   Abdominal:      General: Abdomen is flat. Bowel sounds are normal. There is no distension.      Palpations: Abdomen is soft.      Tenderness: There is no abdominal tenderness. There is no guarding.   Genitourinary:     Comments: + sesay   Musculoskeletal:         General: No swelling.      Right lower leg: No edema.      Left lower leg: No edema.   Skin:     General: Skin is warm and dry.      Coloration: Skin is not jaundiced.      Findings: No rash.   Neurological:      General: No  focal deficit present.      Mental Status: She is alert. She is disoriented.      Sensory: No sensory deficit.      Motor: Weakness present.              CRANIAL NERVES     CN III, IV, VI   Pupils are equal, round, and reactive to light.       Significant Labs: All pertinent labs within the past 24 hours have been reviewed.  Recent Lab Results         08/26/24  0905        Anion Gap 12       Baso # 0.01       Basophil % 0.1       BUN 12       BUN/CREAT RATIO 10       Calcium 9.2       Chloride 102       CO2 28       Creatinine 1.18       Differential Method Scan Smear       eGFR 45       Eos # 0.10       Eos % 1.1       Glucose 74       Hematocrit 34.9       Hemoglobin 11.2       Lymph # 0.42       Lymph % 4.4       MCH 29.8       MCHC 32.1       MCV 92.8       Mono # 0.69       Mono % 7.3       MPV 11.2       Neutrophils, Abs 8.29       Neutrophils Relative 87.1       Platelet Count 318       Potassium 4.1       RBC 3.76       RDW 13.9       Sodium 138       WBC 9.51               Significant Imaging: I have reviewed all pertinent imaging results/findings within the past 24 hours.   0

## 2024-08-26 NOTE — H&P
[facility] Medicine  History & Physical    Patient Name: Yanet Viramontes  MRN: [id number]  Patient Class: IP- Swing  Admission Date: 8/23/2024  Attending Physician: Daren Nicole DO   Primary Care Provider: Rani, Primary Doctor         Patient information was obtained from patient, past medical records, and ER records.     Subjective:     Principal Problem:S/p left hip fracture    Chief Complaint: No chief complaint on file.       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  This was discussed with family who do not wish to have further testing at this time.  She has been at [facility] since 8/19 following a fall at her home which resulted in fracture of the left greater trochanter which required surg. She had an episode of chest pain on Wed with no acute findings.  Echo revealed EF of 60%.  She is being admitted to [facility] for OT/ PT and medical management.       Pt is DNR     Past Medical History:   Diagnosis Date    Anticoagulant long-term use     Cancer     Hypertension     Thyroid disease        Past Surgical History:   Procedure Laterality Date    HYSTERECTOMY         Review of patient's allergies indicates:  No Known Allergies    No current facility-administered medications on file prior to encounter.     Current Outpatient Medications on File Prior to Encounter   Medication Sig    albuterol-ipratropium (DUO-NEB) 2.5 mg-0.5 mg/3 mL nebulizer solution Take 3 mLs by nebulization every 6 (six) hours as needed for Wheezing. Rescue    diltiaZEM HCl (TIAZAC) 120 mg 24 hr capsule Take 120 mg by mouth.    docusate sodium (COLACE) 100 MG capsule Take 100 mg by mouth 2 (two) times daily.    esomeprazole (NEXIUM) 40 MG capsule Take 40 mg by mouth before breakfast.    famotidine (PEPCID) 20 MG tablet Take 20 mg by mouth  Daily.    ferrous sulfate 325 (65 FE) MG EC tablet Take 325 mg by mouth 3 (three) times daily with meals.    levothyroxine (SYNTHROID) 112 MCG tablet Take 112 mcg by mouth every morning.    ondansetron (ZOFRAN) 4 MG tablet Take 4 mg by mouth every 8 (eight) hours as needed for Nausea.    oxyCODONE (OXY-IR) 5 mg Cap Take 5 mg by mouth every 4 (four) hours as needed for Pain.    potassium chloride SA (K-DUR,KLOR-CON M) 10 MEQ tablet Take 10 mEq by mouth once daily.    triamterene-hydrochlorothiazide 37.5-25 mg (DYAZIDE) 37.5-25 mg per capsule Take 1 capsule by mouth every morning.    XARELTO 20 mg Tab Take 10 mg by mouth once daily.    zolpidem (AMBIEN) 10 mg Tab Take 10 mg by mouth nightly as needed.    celecoxib (CELEBREX) 200 MG capsule Take 200 mg by mouth.     Family History    None       Tobacco Use    Smoking status: Every Day     Current packs/day: 0.50     Average packs/day: 0.5 packs/day for 2.6 years (1.3 ttl pk-yrs)     Types: Cigarettes     Start date: 2/1/2022    Smokeless tobacco: Never    Tobacco comments:     [id number]   Substance and Sexual Activity    Alcohol use: Not Currently    Drug use: Never    Sexual activity: Not Currently     Review of Systems   Genitourinary:  Positive for difficulty urinating.   Neurological:  Positive for weakness.   Psychiatric/Behavioral:  Positive for agitation, behavioral problems and confusion.    All other systems reviewed and are negative.    Objective:     Vital Signs (Most Recent):  Temp: 97.8 °F (36.6 °C) (08/26/24 0926)  Pulse: (!) 147 (08/26/24 0926)  Resp: 18 (08/26/24 0926)  BP: (!) 171/81 (08/26/24 0926)  SpO2: (!) 94 % (08/26/24 0926) Vital Signs (24h Range):  Temp:  [97.7 °F (36.5 °C)-97.8 °F (36.6 °C)] 97.8 °F (36.6 °C)  Pulse:  [] 147  Resp:  [18-26] 18  SpO2:  [94 %-99 %] 94 %  BP: (158-171)/(77-81) 171/81     Weight: 54 kg (119 lb)  Body mass index is 24.04 kg/m².     Physical Exam  Vitals reviewed.   Constitutional:       General: She is  not in acute distress.     Appearance: Normal appearance.   HENT:      Head: Normocephalic and atraumatic.   Eyes:      General: No scleral icterus.     Extraocular Movements: Extraocular movements intact.      Conjunctiva/sclera: Conjunctivae normal.      Pupils: Pupils are equal, round, and reactive to light.   Cardiovascular:      Rate and Rhythm: Normal rate and regular rhythm.      Heart sounds: No murmur heard.     No friction rub. No gallop.   Pulmonary:      Effort: Pulmonary effort is normal. No respiratory distress.      Breath sounds: Normal breath sounds. No wheezing or rales.   Abdominal:      General: Abdomen is flat. Bowel sounds are normal. There is no distension.      Palpations: Abdomen is soft.      Tenderness: There is no abdominal tenderness. There is no guarding.   Genitourinary:     Comments: + sesay   Musculoskeletal:         General: No swelling.      Right lower leg: No edema.      Left lower leg: No edema.   Skin:     General: Skin is warm and dry.      Coloration: Skin is not jaundiced.      Findings: No rash.   Neurological:      General: No focal deficit present.      Mental Status: She is alert. She is disoriented.      Sensory: No sensory deficit.      Motor: Weakness present.              CRANIAL NERVES     CN III, IV, VI   Pupils are equal, round, and reactive to light.       Significant Labs: All pertinent labs within the past 24 hours have been reviewed.  Recent Lab Results         08/26/24  0905        Anion Gap 12       Baso # 0.01       Basophil % 0.1       BUN 12       BUN/CREAT RATIO 10       Calcium 9.2       Chloride 102       CO2 28       Creatinine 1.18       Differential Method Scan Smear       eGFR 45       Eos # 0.10       Eos % 1.1       Glucose 74       Hematocrit 34.9       Hemoglobin 11.2       Lymph # 0.42       Lymph % 4.4       MCH 29.8       MCHC 32.1       MCV 92.8       Mono # 0.69       Mono % 7.3       MPV 11.2       Neutrophils, Abs 8.29       Neutrophils  Relative 87.1       Platelet Count 318       Potassium 4.1       RBC 3.76       RDW 13.9       Sodium 138       WBC 9.51               Significant Imaging: I have reviewed all pertinent imaging results/findings within the past 24 hours.  Assessment/Plan:     * S/p left hip fracture  PT/ OT  Fall precautions  Pain control    Delirium  Likely sundowning.  Will try some Seroquel for tonight.  Try to see if she will reset and can wean off.        Severe protein-calorie malnutrition  Nutrition consulted. Most recent weight and BMI monitored-     Measurements:  Wt Readings from Last 1 Encounters:   08/23/24 54 kg (119 lb)   Body mass index is 24.04 kg/m².    Patient has been screened and assessed by RD.    Malnutrition Type:  Context: chronic illness  Level: severe    Malnutrition Characteristic Summary:  Weight Loss (Malnutrition): greater than 10% in 6 months  Energy Intake (Malnutrition): less than 75% for greater than or equal to 1 month    Interventions/Recommendations (treatment strategy):  Recommend to advance diet appropriate and tolerated; add Boost Plus all meals      Disease of thyroid gland  Continue home meds      GERD (gastroesophageal reflux disease)  Continue home meds      History of DVT (deep vein thrombosis)  Continue xeralto      Hypertension  Chronic, controlled. Latest blood pressure and vitals reviewed-     Temp:  [98.2 °F (36.8 °C)]   Pulse:  [68]   Resp:  [22]   BP: (117)/(66)   SpO2:  [96 %] .   Home meds for hypertension were reviewed and noted below.   Hypertension Medications               diltiaZEM HCl (TIAZAC) 120 mg 24 hr capsule Take 120 mg by mouth.    triamterene-hydrochlorothiazide 37.5-25 mg (DYAZIDE) 37.5-25 mg per capsule Take 1 capsule by mouth every morning.            While in the hospital, will manage blood pressure as follows; BP has been low, will hold meds for now    Monitor BP, replace home medication as warrented.      VTE Risk Mitigation (From admission, onward)            Ordered     rivaroxaban tablet 10 mg  Daily         08/23/24 1802     IP VTE LOW RISK PATIENT  Once         08/23/24 1725                               Pharmacist Renal Dose Adjustment Note    Yanet Viramontes is a 84 y.o. female being treated with the medication Zosyn    Patient Data:    Vital Signs (Most Recent):  Temp: 97.8 °F (36.6 °C) (08/23/24 1956)  Pulse: 88 (08/23/24 2324)  Resp: 18 (08/23/24 2324)  BP: (!) 168/78 (08/23/24 1956)  SpO2: 95 % (08/23/24 2324) Vital Signs (72h Range):  Temp:  [97.8 °F (36.6 °C)-98.2 °F (36.8 °C)]   Pulse:  []   Resp:  [18-23]   BP: (117-168)/(66-78)   SpO2:  [94 %-98 %]      Recent Labs   Lab 08/23/24 1756 08/24/24  0618   CREATININE 1.18* 1.30*     Serum creatinine: 1.3 mg/dL (H) 08/24/24 0618  Estimated creatinine clearance: 24.4 mL/min (A)    Medication:Zosyn dose: 3.375 grams frequency q6h will be changed to medication:Zosyn dose:4.5 grams frequency:q8h    Pharmacist's Name: Liv Rodriguez  Pharmacist's Extension: [phone]         Daren Nicole DO  Department of Hospital Medicine  [facility]

## 2024-08-26 NOTE — PROGRESS NOTES
PT yelling/crying out loudly. DON states to withhold PT eval, right now, due to severe agitation. Nsg to give pt Ativan. PT will try to return later today, if able.

## 2024-08-26 NOTE — PLAN OF CARE
Problem: Adult Inpatient Plan of Care  Goal: Plan of Care Review  Outcome: Progressing  Goal: Patient-Specific Goal (Individualized)  Outcome: Progressing  Goal: Absence of Hospital-Acquired Illness or Injury  Outcome: Progressing  Goal: Optimal Comfort and Wellbeing  Outcome: Progressing     Problem: Fall Injury Risk  Goal: Absence of Fall and Fall-Related Injury  Outcome: Progressing     Problem: Skin Injury Risk Increased  Goal: Skin Health and Integrity  Outcome: Progressing

## 2024-08-26 NOTE — PROGRESS NOTES
Clinical Pharmacy Chart Review Note      Admit Date: 8/23/2024   LOS: 3 days       Yanet Viramontes is a 84 y.o. female admitted to [facility] for PT/OT after hospitalization for s/p left hip fracture.      Active Hospital Problems    Diagnosis  POA    *S/p left hip fracture [Z87.81]  Not Applicable    Delirium [R41.0]  Clinically Undetermined    Severe protein-calorie malnutrition [E43]  Yes    Hypertension [I10]  Yes     Last Assessment & Plan:    Formatting of this note might be different from the original.   BP controlled.   Holding meds      GERD (gastroesophageal reflux disease) [K21.9]  Yes    Disease of thyroid gland [E07.9]  Yes    History of DVT (deep vein thrombosis) [Z86.718]  Not Applicable     Last Assessment & Plan:    Formatting of this note might be different from the original.   Holding Xarelto for now.   Repeat venous dopplers - negative.        Resolved Hospital Problems   No resolved problems to display.     Review of patient's allergies indicates:  No Known Allergies  Patient Active Problem List    Diagnosis Date Noted    Delirium 08/26/2024    Severe protein-calorie malnutrition 08/25/2024    S/p left hip fracture 08/23/2024    Hypertension 03/01/2024    GERD (gastroesophageal reflux disease) 03/01/2024    Disease of thyroid gland 03/01/2024    History of DVT (deep vein thrombosis) 02/25/2024       Scheduled Meds:    albuterol-ipratropium  3 mL Nebulization Q6H WAKE    budesonide  0.5 mg Nebulization Q12H    docusate sodium  100 mg Oral BID    ferrous sulfate  1 tablet Oral TID WM    levothyroxine  112 mcg Oral QAM    mupirocin   Nasal BID    nicotine  1 patch Transdermal Daily    pantoprazole  40 mg Oral Daily    piperacillin-tazobactam (Zosyn) IV (PEDS and ADULTS) (extended infusion is not appropriate)  4.5 g Intravenous Q8H    potassium chloride  10 mEq Oral Daily    QUEtiapine  25 mg Oral QHS    rivaroxaban  10 mg Oral Daily     Continuous Infusions:    0.9% NaCl   Intravenous Continuous         PRN  Meds:   Current Facility-Administered Medications:     0.9% NaCl, , Intravenous, PRN    acetaminophen, 650 mg, Oral, Q6H PRN    albuterol sulfate, 2.5 mg, Nebulization, Q4H PRN    calcium carbonate, 500 mg, Oral, BID PRN    guaiFENesin 100 mg/5 ml, 200 mg, Oral, Q4H PRN    lorazepam, 0.5 mg, Intravenous, Q2H PRN    LORazepam, 0.5 mg, Oral, Q12H PRN    magnesium hydroxide 400 mg/5 ml, 30 mL, Oral, Daily PRN    melatonin, 6 mg, Oral, Nightly PRN    morphine, 2 mg, Intravenous, Q4H PRN    ondansetron, 4 mg, Oral, Q8H PRN    oxyCODONE-acetaminophen, 1 tablet, Oral, Q4H PRN    peg 400-hypromellose-glycerin, 2 drop, Ophthalmic, PRN    senna-docusate 8.6-50 mg, 1 tablet, Oral, BID PRN    OBJECTIVE:     Vital Signs (Last 24H)  Temp:  [97.7 °F (36.5 °C)-97.8 °F (36.6 °C)]   Pulse:  []   Resp:  [18-26]   BP: (158-171)/(77-81)   SpO2:  [94 %-99 %]     Laboratory:  BMP:   Recent Labs   Lab 08/23/24 1756 08/24/24  0618 08/26/24  0905   * 108* 74    137 138   K 4.2 3.6 4.1    106 102   CO2 23 27 28   BUN 14 14 12   CREATININE 1.18* 1.30* 1.18*   CALCIUM 7.9* 8.4* 9.2     CMP:   Recent Labs   Lab 08/23/24 1756 08/24/24  0618 08/26/24  0905   * 108* 74   CALCIUM 7.9* 8.4* 9.2    137 138   K 4.2 3.6 4.1   CO2 23 27 28    106 102   BUN 14 14 12   CREATININE 1.18* 1.30* 1.18*       ASSESSMENT/PLAN:     Estimated Creatinine Clearance: 26.9 mL/min (A) (based on SCr of 1.18 mg/dL (H)).  Medications reviewed, no dose adjustments needed. Weekly swing bed medication regimen review completed. Will continue to monitor weekly.

## 2024-08-26 NOTE — ASSESSMENT & PLAN NOTE
Nutrition consulted. Most recent weight and BMI monitored-     Measurements:  Wt Readings from Last 1 Encounters:   08/23/24 54 kg (119 lb)   Body mass index is 24.04 kg/m².    Patient has been screened and assessed by RD.    Malnutrition Type:  Context: chronic illness  Level: severe    Malnutrition Characteristic Summary:  Weight Loss (Malnutrition): greater than 10% in 6 months  Energy Intake (Malnutrition): less than 75% for greater than or equal to 1 month    Interventions/Recommendations (treatment strategy):  Recommend to advance diet appropriate and tolerated; add Boost Plus all meals

## 2024-08-26 NOTE — PT/OT/SLP EVAL
Speech Language Pathology Evaluation  Bedside Swallow    Patient Name:  Yanet Viramontes   MRN:  [id number]  Admitting Diagnosis: S/p left hip fracture    Recommendations:                 General Recommendations:  Follow-up not indicated, reconsult upon change in status  Diet recommendations:  Mechanical soft, Crushed meat, Thin   Aspiration Precautions: 1 bite/sip at a time, Alternating bites/sips, Assistance with meals, Feed only when awake/alert, Small bites/sips, and Strict aspiration precautions   General Precautions: Standard, aspiration, fall    Assessment:     Yanet Viramontes is a 84 y.o. female with an SLP diagnosis of Dysphagia.  She presents with new SWB admission post hospitalization secondary to fall with left trochanter fracture.   Pt with decreased alertness due to change in mental status with medications administered PRN for agitation, uncooperativeness.  Difficulty in full assessment of oral motor due to pt alertness varying while ST present.    Pt with family present and education/discussion regarding pt varying alertness, high aspiration risk. ST edu regarding s/sx aspiration , only feed pt if fully alert and following commands, family return verbalization understanding. Family wishes to liberalize diet with awareness of aspiration risk and understanding of only feeding pt if fully alert.     ST eval only this date with recommendation and discussion/education with nursing and dietary regarding diet recommendation of: MCS, ground meats with gravy, thin liquids. Yogurt, applesauce, pudding, mashed potatoes as soft sides to be offered along with meal trays.     Reconsult upon change in status     History:     Past Medical History:   Diagnosis Date    Anticoagulant long-term use     Cancer     Hypertension     Thyroid disease        Past Surgical History:   Procedure Laterality Date    HYSTERECTOMY           Modified Barium Swallow: Modified Barium Swallow completed at prior hospital with poor participation,  however findings suggested potential esophageal stricture    Chest X-Rays: no updated chest xray since admission    Prior diet: soft diet, thin liquids.      Subjective     Pt lethargic, sitting edge of bed with family assist x2 on either side to assist with positioning. Tactile and verbal cues to remain alert for intake swallow.      Objective:     Oral Musculature Evaluation  Oral Musculature: general weakness  Secretion Management: adequate  Mucosal Quality: good  Mandibular Strength and Mobility: impaired  Volitional Cough: present  Volitional Swallow: present    Bedside Swallow Eval:   Consistencies Assessed:  Thin liquids x3  Puree x1      Oral Phase:   Decreased closure around utensil    Pharyngeal Phase:   no overt clinical signs/symptoms of aspiration  Delayed swallow initiation    Compensatory Strategies  Only feed if fully alert, least restrictive diet, high aspiration risk       Goals:   Multidisciplinary Problems       SLP Goals       Not on file                    Plan:     Patient to be seen:  1 x/week   Plan of Care expires:  08/26/24  Plan of Care reviewed with:  patient, caregiver, family   SLP Follow-Up:  No       Discharge recommendations:  No Therapy Indicated   Barriers to Discharge:  None, aspiration education provided with family preference for least restrictive liberalized diet    Time Tracking:     SLP Treatment Date:      Speech Start Time:  0950  Speech Stop Time:  1025     Speech Total Time (min):  35 min    Billable Minutes: Eval Swallow and Oral Function 35    08/26/2024

## 2024-08-27 LAB
ANION GAP SERPL CALCULATED.3IONS-SCNC: 12 MMOL/L (ref 7–16)
BUN SERPL-MCNC: 18 MG/DL (ref 7–18)
BUN/CREAT SERPL: 12 (ref 6–20)
CALCIUM SERPL-MCNC: 9.2 MG/DL (ref 8.5–10.1)
CHLORIDE SERPL-SCNC: 101 MMOL/L (ref 98–107)
CO2 SERPL-SCNC: 31 MMOL/L (ref 21–32)
CREAT SERPL-MCNC: 1.48 MG/DL (ref 0.55–1.02)
EGFR (NO RACE VARIABLE) (RUSH/TITUS): 35 ML/MIN/1.73M2
GLUCOSE SERPL-MCNC: 123 MG/DL (ref 74–106)
POTASSIUM SERPL-SCNC: 3.5 MMOL/L (ref 3.5–5.1)
SODIUM SERPL-SCNC: 140 MMOL/L (ref 136–145)

## 2024-08-27 PROCEDURE — 63600175 PHARM REV CODE 636 W HCPCS: Performed by: HOSPITALIST

## 2024-08-27 PROCEDURE — 25000242 PHARM REV CODE 250 ALT 637 W/ HCPCS: Performed by: NURSE PRACTITIONER

## 2024-08-27 PROCEDURE — 97530 THERAPEUTIC ACTIVITIES: CPT

## 2024-08-27 PROCEDURE — 27000958

## 2024-08-27 PROCEDURE — 97535 SELF CARE MNGMENT TRAINING: CPT

## 2024-08-27 PROCEDURE — 27000987 HC MATTRESS, MATRIX LOW PROFILE

## 2024-08-27 PROCEDURE — 11000004 HC SNF PRIVATE

## 2024-08-27 PROCEDURE — 63600175 PHARM REV CODE 636 W HCPCS: Performed by: NURSE PRACTITIONER

## 2024-08-27 PROCEDURE — 99309 SBSQ NF CARE MODERATE MDM 30: CPT | Mod: ,,, | Performed by: HOSPITALIST

## 2024-08-27 PROCEDURE — 36415 COLL VENOUS BLD VENIPUNCTURE: CPT | Performed by: HOSPITALIST

## 2024-08-27 PROCEDURE — 80048 BASIC METABOLIC PNL TOTAL CA: CPT | Performed by: HOSPITALIST

## 2024-08-27 PROCEDURE — 25000003 PHARM REV CODE 250: Performed by: NURSE PRACTITIONER

## 2024-08-27 PROCEDURE — 25000003 PHARM REV CODE 250: Performed by: HOSPITALIST

## 2024-08-27 PROCEDURE — S4991 NICOTINE PATCH NONLEGEND: HCPCS | Performed by: NURSE PRACTITIONER

## 2024-08-27 PROCEDURE — 94640 AIRWAY INHALATION TREATMENT: CPT

## 2024-08-27 PROCEDURE — 97112 NEUROMUSCULAR REEDUCATION: CPT

## 2024-08-27 PROCEDURE — 94761 N-INVAS EAR/PLS OXIMETRY MLT: CPT

## 2024-08-27 PROCEDURE — 97163 PT EVAL HIGH COMPLEX 45 MIN: CPT

## 2024-08-27 RX ADMIN — RIVAROXABAN 10 MG: 10 TABLET, FILM COATED ORAL at 08:08

## 2024-08-27 RX ADMIN — SODIUM CHLORIDE: 900 INJECTION, SOLUTION INTRAVENOUS at 12:08

## 2024-08-27 RX ADMIN — IPRATROPIUM BROMIDE AND ALBUTEROL SULFATE 3 ML: 2.5; .5 SOLUTION RESPIRATORY (INHALATION) at 02:08

## 2024-08-27 RX ADMIN — NICOTINE 1 PATCH: 21 PATCH, EXTENDED RELEASE TRANSDERMAL at 08:08

## 2024-08-27 RX ADMIN — QUETIAPINE FUMARATE 25 MG: 25 TABLET ORAL at 05:08

## 2024-08-27 RX ADMIN — PANTOPRAZOLE SODIUM 40 MG: 40 TABLET, DELAYED RELEASE ORAL at 08:08

## 2024-08-27 RX ADMIN — DOCUSATE SODIUM 100 MG: 100 CAPSULE, LIQUID FILLED ORAL at 08:08

## 2024-08-27 RX ADMIN — IPRATROPIUM BROMIDE AND ALBUTEROL SULFATE 3 ML: 2.5; .5 SOLUTION RESPIRATORY (INHALATION) at 08:08

## 2024-08-27 RX ADMIN — SODIUM CHLORIDE: 9 INJECTION, SOLUTION INTRAVENOUS at 04:08

## 2024-08-27 RX ADMIN — SODIUM CHLORIDE: 9 INJECTION, SOLUTION INTRAVENOUS at 03:08

## 2024-08-27 RX ADMIN — ACETAMINOPHEN 650 MG: 325 TABLET ORAL at 08:08

## 2024-08-27 RX ADMIN — PIPERACILLIN SODIUM AND TAZOBACTAM SODIUM 4.5 G: 4; .5 INJECTION, POWDER, LYOPHILIZED, FOR SOLUTION INTRAVENOUS at 04:08

## 2024-08-27 RX ADMIN — PIPERACILLIN SODIUM AND TAZOBACTAM SODIUM 4.5 G: 4; .5 INJECTION, POWDER, LYOPHILIZED, FOR SOLUTION INTRAVENOUS at 12:08

## 2024-08-27 RX ADMIN — LORAZEPAM 0.5 MG: 2 INJECTION INTRAMUSCULAR; INTRAVENOUS at 09:08

## 2024-08-27 RX ADMIN — LEVOTHYROXINE SODIUM 112 MCG: 0.11 TABLET ORAL at 06:08

## 2024-08-27 RX ADMIN — ACETAMINOPHEN 650 MG: 325 TABLET ORAL at 05:08

## 2024-08-27 RX ADMIN — PIPERACILLIN SODIUM AND TAZOBACTAM SODIUM 4.5 G: 4; .5 INJECTION, POWDER, LYOPHILIZED, FOR SOLUTION INTRAVENOUS at 09:08

## 2024-08-27 RX ADMIN — BUDESONIDE INHALATION 0.5 MG: 0.5 SUSPENSION RESPIRATORY (INHALATION) at 08:08

## 2024-08-27 RX ADMIN — LORAZEPAM 0.5 MG: 2 INJECTION INTRAMUSCULAR; INTRAVENOUS at 07:08

## 2024-08-27 NOTE — HOSPITAL COURSE
8/27 Agitation yesterday, did sleep last night and was good this am, however, after PT she became agitated and aggressive.  Will monitor and redirect.  Try Seroquel this PM this time.      8/28 Maybe a little better today.  She did sleep last night.  Was sitting up this am and not as agitated.  Will continue plan of care for now.  Hopefully some better tomorrow.     8/29 better night and this am, recognizing people and nurses.  Not eating or drinking much though.      8/30 Didn't sleep good last night, but she is alert today just tired     9/2 repair of hip fracture 2 weeks ago, will DC staples    9/6 some increased swelling in surgical leg, on Xarelto ppx already.  Did get a lot of fluid over last few days.     9/9 still some swelling, will dose lasix x 1 today and see how she responds.     9/12 Lasix did help with single dose, will dose again today.     9/13 Swelling better, sitting up eating lunch.  Doing well.     9/16 working with therapy, continues to do well     9/23 She is much better, showing progress.     9/30 Continues to improve.  Working with therapy     10/03 Pt has continued to improved.  Is working well with therapy.  She is hoping to DC home this weekend but will need medical equipment to do so.  She will need a semi-electric/ electric hospital bed to assist with frequent position changes.  She has a lesion on the upper back that resulted from the fall and will need to keep from placing pressure on that area to allow healing.  She will also need to be able to elevate the HOB at least 35-40 degrees due to COPD.  As well as, elevate the head and feet to help reduce back pain related to kyphosis. She will also need a bedside commode chair due to her room at home is 150 feet from the bathroom and the farthest she has walked with therapy is  feet using a walker with occasional rest stops. She will need a shower chair since she is unable to set over into the tub due to recent hip fracture/ repair.    We will place an order for items needed.      10/05 Pt has improved greatly with therapy and is going home with family.

## 2024-08-27 NOTE — PT/OT/SLP PROGRESS
Occupational Therapy   Treatment    Name: Yanet Viramontes  MRN: [id number]  Admitting Diagnosis:  S/p left hip fracture       Recommendations:     Discharge Recommendations: Low Intensity Therapy  Discharge Equipment Recommendations:   (TBD)  Barriers to discharge:  None    Assessment:     Yanet Viramontes is a 84 y.o. female with a medical diagnosis of S/p left hip fracture.  She presents with slightly more alert and oriented today to self and her caregivers, sitting up in [facility][note 3]. Performance deficits affecting function are weakness, impaired endurance, impaired self care skills, impaired functional mobility, gait instability, impaired balance, impaired cognition, decreased safety awareness, impaired skin, orthopedic precautions.     Rehab Prognosis:  Fair; patient would benefit from acute skilled OT services to address these deficits and reach maximum level of function.       Plan:     Patient to be seen 5 x/week to address the above listed problems via self-care/home management, community/work re-entry, therapeutic activities, therapeutic exercises, neuromuscular re-education, cognitive retraining, sensory integration, wheelchair management/training  Plan of Care Expires: 09/27/24  Plan of Care Reviewed with: patient, caregiver, daughter, family    Subjective     Chief Complaint: pain during any movement  Patient/Family Comments/goals: TBD upon pt mental status change resolving  Pain/Comfort:   Pt has low pain tolerance per her daughter.    Objective:     Communicated with: pt, daughter, nurse prior to session.  Patient found  up in [facility][note 3]  with peripheral IV, sesay catheter upon OT entry to room.    General Precautions: Standard, fall, aspiration    Orthopedic Precautions:LLE weight bearing as tolerated  Braces: N/A  Respiratory Status: Room air     Occupational Performance:     Bed Mobility:    Max assist sit to supine    Functional Mobility/Transfers:  Patient completed Sit <> Stand Transfer with maximal  "assistance  with  hand-held assist and rolling walker   Patient completed Bed <> Chair Transfer using Stand Pivot technique with maximal assistance with hand-held assist, OT close to pt and pivoted her from standing at [facility] to bedside with max assist, could not use RW at this time as pt with "pushers syndrome" and much anxiety in attempts to stand as well as pain in left hip  Functional Mobility: max assist at present    Activities of Daily Living:  Feeding:  minimum assistance holding ENSURE shake with both hands and needing verbal directions as well as demonstration on how to suck from straw; OT attempting to orient pt to environment, attending in more meaningful way, white board orientation to date and day.      [facility] 6 Click ADL:      Treatment & Education:  Nursing contacted OT to let OT know that family was asking if therapist could come while pt was more alert this morning and to try to engage her before she got back in the bed (approx 9am), so OT went to pt room at that time.  Upon getting there, pt nurse was working on getting pt IV situated.  OT was able to distract pt and begin the following activity:  OT engaged pt in LUE AAROM, cervical rotation and forward flexion activity, BLE movement generally; OT attempted to have pt mobilize herself to [facility] to prepare for sit to stand and encourage core forward engagement as well as cueing to improve balance, coordination, kinesthetic sense, posture, and proprioception in seated and standing while increasing endurance as well for all functional mobility and self care skills and to reduce fall risk.   This required max assist and also assist from pt daughter, Kassi, who was present this morning during OT session.  Daughter very willing to assist OT to engage pt in attending environment more meaningfully.      To encourage nutritional intake prior to pt going back to bed, while in upright position, OT initiated pt in choosing flavor of ENSURE " "shake.  Pt chose chocolate, and OT retrieved for her with straw; pt able to hold shake with both hands and min a from OT to accurately handle without spillage.  She needed very specific instructions to suck through straw effectively, but she was able to take in half of shake.    Then, OT and pt daughter, Kassi, assisted pt with max assist stand pivot transfer to bedside.  Pt able to sit upright with only minimal assist on EOB x 30 sec to 1 min.  At this time, OT attempted to cue pt verbally and with tactile form on scooting toward HOB for better bed position upon sit to supine.  Pt was unable, so OT and pt daughter assisted her to supine with max assist.  Pt expressing pain during this movement.  OT and pt daughter able to get pt pulled up in bed, max assist, to proper position, floating heels, and pt up at raised position HOB.      OT encouraged family to engage pt in any meaningful activity that would help her orient to her environment more adequately for progression toward goals.  Family agreeable.    Patient left comfortable and in HOB elevated with call button in reach and daughter, Kassi, present.  Daughter expressed they were "good and don't need anything else right now."      GOALS:   Multidisciplinary Problems       Occupational Therapy Goals          Problem: Occupational Therapy    Goal Priority Disciplines Outcome Interventions   Occupational Therapy Goal     OT, PT/OT Progressing    Description: STG: within 2 weeks  Pt will perform grooming with min a at sinkside from seated  Pt will bathe with mod a  Pt will perform UE dressing with mod a  Pt will perform LE dressing with mod a  Pt will sit EOB x 5 min with mod to min assistance  Pt will transfer bed/chair/bsc with mod a  Pt will perform standing task x 1 min with mod assistance x 1  Pt will tolerate 15 minutes of tx without fatigue      LT.Restore to max I with self care and mobility.                         Time Tracking:     OT Date of " Treatment: 08/27/24  OT Start Time: 0900  OT Stop Time: 0945  OT Total Time (min): 45 min    Billable Minutes:Self Care/Home Management 15  Therapeutic Activity 15  Neuromuscular Re-education 14    OT/CRISELDA: OT          8/27/2024

## 2024-08-27 NOTE — PLAN OF CARE
Problem: Adult Inpatient Plan of Care  Goal: Plan of Care Review  Outcome: Progressing  Goal: Patient-Specific Goal (Individualized)  Outcome: Progressing  Goal: Absence of Hospital-Acquired Illness or Injury  Outcome: Progressing  Intervention: Identify and Manage Fall Risk  Flowsheets (Taken 8/26/2024 2132)  Safety Promotion/Fall Prevention:   assistive device/personal item within reach   room near unit station   nonskid shoes/socks when out of bed   side rails raised x 2   medications reviewed  Intervention: Prevent Skin Injury  Flowsheets (Taken 8/26/2024 2132)  Body Position: weight shifting  Skin Protection:   incontinence pads utilized   skin sealant/moisture barrier applied   protective footwear used  Device Skin Pressure Protection:   absorbent pad utilized/changed   positioning supports utilized   pressure points protected  Intervention: Prevent and Manage VTE (Venous Thromboembolism) Risk  Flowsheets (Taken 8/26/2024 2132)  VTE Prevention/Management:   fluids promoted   intravenous hydration  Goal: Optimal Comfort and Wellbeing  Outcome: Progressing  Intervention: Provide Person-Centered Care  Flowsheets (Taken 8/26/2024 2132)  Trust Relationship/Rapport:   care explained   emotional support provided   empathic listening provided  Goal: Readiness for Transition of Care  Outcome: Progressing     Problem: Fall Injury Risk  Goal: Absence of Fall and Fall-Related Injury  Outcome: Progressing  Intervention: Identify and Manage Contributors  Flowsheets (Taken 8/26/2024 2132)  Medication Review/Management: medications reviewed  Intervention: Promote Injury-Free Environment  Flowsheets (Taken 8/26/2024 2132)  Safety Promotion/Fall Prevention:   assistive device/personal item within reach   room near unit station   nonskid shoes/socks when out of bed   side rails raised x 2   medications reviewed     Problem: Skin Injury Risk Increased  Goal: Skin Health and Integrity  Outcome: Progressing  Intervention:  Optimize Skin Protection  Flowsheets (Taken 8/26/2024 2132)  Skin Protection:   incontinence pads utilized   skin sealant/moisture barrier applied   protective footwear used  Activity Management: Rolling - L1

## 2024-08-27 NOTE — NURSING
Family of client at bedside approached nurses station letting us know her mother is resting comfortably, and she does not wish for her to be disturbed for routine medication administration. Will administer medication as client becomes more alert. Client resting comfortably with even rise and fall of the chest. Will continue to monitor.

## 2024-08-27 NOTE — PLAN OF CARE
Problem: Physical Therapy  Goal: Physical Therapy Goal  Description: STG - 2 weeks  1. Pt will transfer supine to/from sit with mod Ax1.  2. Pt will perform STS and bed transfer with mod Ax1.  3. Pt will have LLE strength of 3-/5.  4. Pt will amb with RW x 20 ft with mod Ax1.    LTG - 4 weeks  1. Pt will transfer supine to/from sit with min Ax1.  2. Pt will perform STS and bed transfer with min Ax1.  3. Pt will have LLE strength of 3/5.  4. Pt will amb with RW x 50 ft with min Ax1.  5. Pt's ROM LLE will be WFL.  Outcome: Progressing

## 2024-08-27 NOTE — PROGRESS NOTES
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  This was discussed with family who do not wish to have further testing at this time.  She has been at [facility] since 8/19 following a fall at her home which resulted in fracture of the left greater trochanter which required surg. She had an episode of chest pain on Wed with no acute findings.  Echo revealed EF of 60%.  She is being admitted to [facility] for OT/ PT and medical management.       Pt is DNR     Overview/Hospital Course:  8/27 Agitation yesterday, did sleep last night and was good this am, however, after PT she became agitated and aggressive.  Will monitor and redirect.  Try Seroquel this PM this time.      Interval History: continue to monitor behavior, try seroquel tonight     Review of Systems   Reason unable to perform ROS: agitated.     Objective:     Vital Signs (Most Recent):  Temp: 97 °F (36.1 °C) (08/27/24 0807)  Pulse: 80 (08/27/24 0850)  Resp: (!) 24 (08/27/24 0850)  BP: 126/75 (08/27/24 0807)  SpO2: 100 % (08/27/24 0850) Vital Signs (24h Range):  Temp:  [97 °F (36.1 °C)-98.2 °F (36.8 °C)] 97 °F (36.1 °C)  Pulse:  [79-96] 80  Resp:  [18-24] 24  SpO2:  [94 %-100 %] 100 %  BP: ()/(55-82) 126/75     Weight: 54 kg (119 lb)  Body mass index is 24.04 kg/m².    Intake/Output Summary (Last 24 hours) at 8/27/2024 1235  Last data filed at 8/27/2024 0807  Gross per 24 hour   Intake 631.08 ml   Output 1000 ml   Net -368.92 ml         Physical  Exam  Vitals reviewed.   Constitutional:       General: She is not in acute distress.     Appearance: Normal appearance.   HENT:      Head: Normocephalic and atraumatic.   Eyes:      General: No scleral icterus.     Extraocular Movements: Extraocular movements intact.      Conjunctiva/sclera: Conjunctivae normal.      Pupils: Pupils are equal, round, and reactive to light.   Cardiovascular:      Rate and Rhythm: Normal rate and regular rhythm.      Heart sounds: No murmur heard.     No friction rub. No gallop.   Pulmonary:      Effort: Pulmonary effort is normal. No respiratory distress.      Breath sounds: Normal breath sounds. No wheezing or rales.   Abdominal:      General: Abdomen is flat. Bowel sounds are normal. There is no distension.      Palpations: Abdomen is soft.      Tenderness: There is no abdominal tenderness. There is no guarding.   Genitourinary:     Comments: + sesay   Musculoskeletal:         General: No swelling.      Right lower leg: No edema.      Left lower leg: No edema.   Skin:     General: Skin is warm and dry.      Coloration: Skin is not jaundiced.      Findings: No rash.   Neurological:      General: No focal deficit present.      Mental Status: She is alert. She is disoriented.      Sensory: No sensory deficit.      Motor: Weakness present.   Psychiatric:      Comments: Agitated today, was good this am however.              Significant Labs: All pertinent labs within the past 24 hours have been reviewed.  Recent Lab Results         08/27/24  0935        Anion Gap 12       BUN 18       BUN/CREAT RATIO 12       Calcium 9.2       Chloride 101       CO2 31       Creatinine 1.48       eGFR 35       Glucose 123       Potassium 3.5       Sodium 140               Significant Imaging: I have reviewed all pertinent imaging results/findings within the past 24 hours.    Assessment/Plan:      * S/p left hip fracture  PT/ OT  Fall precautions  Pain control    Delirium  Likely sundowning.  Will try  some Seroquel for tonight.  Try to see if she will reset and can wean off.        Severe protein-calorie malnutrition  Nutrition consulted. Most recent weight and BMI monitored-     Measurements:  Wt Readings from Last 1 Encounters:   08/23/24 54 kg (119 lb)   Body mass index is 24.04 kg/m².    Patient has been screened and assessed by RD.    Malnutrition Type:  Context: chronic illness  Level: severe    Malnutrition Characteristic Summary:  Weight Loss (Malnutrition): greater than 10% in 6 months  Energy Intake (Malnutrition): less than 75% for greater than or equal to 1 month    Interventions/Recommendations (treatment strategy):  Recommend to advance diet appropriate and tolerated; add Boost Plus all meals      Disease of thyroid gland  Continue home meds      GERD (gastroesophageal reflux disease)  Continue home meds      History of DVT (deep vein thrombosis)  Continue xeralto      Hypertension  Chronic, controlled. Latest blood pressure and vitals reviewed-     Temp:  [98.2 °F (36.8 °C)]   Pulse:  [68]   Resp:  [22]   BP: (117)/(66)   SpO2:  [96 %] .   Home meds for hypertension were reviewed and noted below.   Hypertension Medications               diltiaZEM HCl (TIAZAC) 120 mg 24 hr capsule Take 120 mg by mouth.    triamterene-hydrochlorothiazide 37.5-25 mg (DYAZIDE) 37.5-25 mg per capsule Take 1 capsule by mouth every morning.            While in the hospital, will manage blood pressure as follows; BP has been low, will hold meds for now    Monitor BP, replace home medication as warrented.      VTE Risk Mitigation (From admission, onward)           Ordered     rivaroxaban tablet 10 mg  Daily         08/23/24 1802     IP VTE LOW RISK PATIENT  Once         08/23/24 1725                    Discharge Planning   QUINN:      Code Status: DNR   Is the patient medically ready for discharge?:     Reason for patient still in hospital (select all that apply): Patient trending condition and Treatment  Discharge Plan A:  Other                  Daren Nicole DO  Department of Hospital Medicine   [facility]

## 2024-08-27 NOTE — PLAN OF CARE
Problem: Adult Inpatient Plan of Care  Goal: Plan of Care Review  8/27/2024 1814 by Felisa Bajwa RN  Outcome: Progressing  8/27/2024 1814 by Felisa Bajwa RN  Outcome: Progressing  Goal: Patient-Specific Goal (Individualized)  Outcome: Progressing  Goal: Absence of Hospital-Acquired Illness or Injury  Outcome: Progressing  Goal: Optimal Comfort and Wellbeing  Outcome: Progressing     Problem: Fall Injury Risk  Goal: Absence of Fall and Fall-Related Injury  Outcome: Progressing     Problem: Skin Injury Risk Increased  Goal: Skin Health and Integrity  Outcome: Progressing

## 2024-08-27 NOTE — PT/OT/SLP EVAL
Physical Therapy Evaluation    Patient Name:  Yanet Viramontes   MRN:  [id number]    Recommendations:     Discharge Recommendations: Low Intensity Therapy   Discharge Equipment Recommendations: bedside commode, walker, rolling   Barriers to discharge: Inaccessible home and very high falls risk, confusion, significant loss of function    Assessment:     Yanet Viramontes is a 84 y.o. female admitted with a medical diagnosis of S/p left hip fracture.  She presents with the following impairments/functional limitations: weakness, impaired endurance, impaired self care skills, impaired functional mobility, gait instability, impaired balance, impaired cognition, decreased lower extremity function, decreased safety awareness, pain, decreased ROM, impaired coordination, orthopedic precautions, impaired skin .    PT evaluation partially completed. Pt till had significant confusion and not following directions. However, she did not seem agitated as she did yesterday. Pt's progress will be directly related to her cognitive status.    Pt fell 8/18/24 in her home and was found the next morning. She had ORIF left hip due to fx of left Greater Trochanter with garry placement. She is WBAT and there are no listed movement precautions.    PMHX: cancer of thyroid, renal and bowel, per notes. HTN, hx DVT, kidney mass. **DNR**    Rehab Prognosis: Fair; patient would benefit from acute skilled PT services to address these deficits and reach maximum level of function.    Recent Surgery: * No surgery found *      Plan:     During this hospitalization, patient to be seen 5 x/week to address the identified rehab impairments via gait training, therapeutic activities, therapeutic exercises, neuromuscular re-education and progress toward the following goals:    Plan of Care Expires:  09/20/24    Subjective     Chief Complaint: Dtr and pt agreeable to PT evaluation. Dtr states pt had Tylenol for pain this morning.  Patient/Family Comments/goals: Karla Quiroz  "states pt to dc to her house, but not sure if it will be temporary of more permanent.  Pain/Comfort:  Pain Rating 1:  (pt unable to rate d/t decreased cognition)  Location - Side 1: Left  Location - Orientation 1: lower  Location 1: hip  Pain Addressed 1: Pre-medicate for activity (dtr states pt had a Tylenol some time prior to PT arrival)    Patients cultural, spiritual, Mormonism conflicts given the current situation: no    Living Environment:  Pt and her son live in a one-story home with threshold step of 3". However, pt will be discharging to her dtr's home which has a ramp.  Prior to admission, patients level of function was indep in home and community without AD. Pt was driving herself.  Equipment used at home: wheelchair.  DME owned (not currently used): wheelchair.  Upon discharge, patient will have assistance from dtr, family.    Objective:     Communicated with NICK Stewart and pt's dtr prior to session.  Patient found supine with sesay catheter, peripheral IV  upon PT entry to room.    General Precautions: Standard, fall  Orthopedic Precautions:LLE weight bearing as tolerated   Braces:    Respiratory Status: Room air    Exams:  Cognitive Exam:  Patient is oriented to Person  RLE ROM: WFL  RLE Strength: Deficits: 4-/5 PF, but then pt would no longer follow directions for MMT  LLE ROM: limited throughout d/t pain  LLE Strength: Deficits: unable to assess d/t pt not following directions to MMT    Functional Mobility:  Bed Mobility:     Supine to Sit: maximal assistance  Sit to Supine: maximal assistance  Transfers:     Sit to Stand:  maximal assistance and of 1 persons with standard walker  Gait: pt took one 1/2 step laterally to the right using SW with mod Ax2 and PT assisting pt to move left foot. Pt placed her head on PT's shldr x 3.      AM-PAC 6 CLICK MOBILITY  Total Score:11       Treatment & Education:  Evaluation partially performed. PT unable to assess strength d/t cognitive deficits and unable to " assess ROM LLE d/t pt yelling out in obvious pain. Therefore, eval kept to bare minimum.     Patient left supine with all lines intact, call button in reach, and dtr and  son present.    GOALS:   Multidisciplinary Problems       Physical Therapy Goals          Problem: Physical Therapy    Goal Priority Disciplines Outcome Goal Variances Interventions   Physical Therapy Goal     PT, PT/OT Progressing     Description: STG - 2 weeks  1. Pt will transfer supine to/from sit with mod Ax1.  2. Pt will perform STS and bed transfer with mod Ax1.  3. Pt will have LLE strength of 3-/5.  4. Pt will amb with RW x 20 ft with mod Ax1.    LTG - 4 weeks  1. Pt will transfer supine to/from sit with min Ax1.  2. Pt will perform STS and bed transfer with min Ax1.  3. Pt will have LLE strength of 3/5.  4. Pt will amb with RW x 50 ft with min Ax1.  5. Pt's ROM LLE will be WFL.                       History:     Past Medical History:   Diagnosis Date    Anticoagulant long-term use     Cancer     Hypertension     Thyroid disease        Past Surgical History:   Procedure Laterality Date    HYSTERECTOMY         Time Tracking:     PT Received On: 08/27/24  PT Start Time: 1015     PT Stop Time: 1030  PT Total Time (min): 15 min     Billable Minutes: Evaluation 15 minutes      08/27/2024

## 2024-08-27 NOTE — SUBJECTIVE & OBJECTIVE
Interval History: continue to monitor behavior, try seroquel tonight     Review of Systems   Reason unable to perform ROS: agitated.     Objective:     Vital Signs (Most Recent):  Temp: 97 °F (36.1 °C) (08/27/24 0807)  Pulse: 80 (08/27/24 0850)  Resp: (!) 24 (08/27/24 0850)  BP: 126/75 (08/27/24 0807)  SpO2: 100 % (08/27/24 0850) Vital Signs (24h Range):  Temp:  [97 °F (36.1 °C)-98.2 °F (36.8 °C)] 97 °F (36.1 °C)  Pulse:  [79-96] 80  Resp:  [18-24] 24  SpO2:  [94 %-100 %] 100 %  BP: ()/(55-82) 126/75     Weight: 54 kg (119 lb)  Body mass index is 24.04 kg/m².    Intake/Output Summary (Last 24 hours) at 8/27/2024 1235  Last data filed at 8/27/2024 0807  Gross per 24 hour   Intake 631.08 ml   Output 1000 ml   Net -368.92 ml         Physical Exam  Vitals reviewed.   Constitutional:       General: She is not in acute distress.     Appearance: Normal appearance.   HENT:      Head: Normocephalic and atraumatic.   Eyes:      General: No scleral icterus.     Extraocular Movements: Extraocular movements intact.      Conjunctiva/sclera: Conjunctivae normal.      Pupils: Pupils are equal, round, and reactive to light.   Cardiovascular:      Rate and Rhythm: Normal rate and regular rhythm.      Heart sounds: No murmur heard.     No friction rub. No gallop.   Pulmonary:      Effort: Pulmonary effort is normal. No respiratory distress.      Breath sounds: Normal breath sounds. No wheezing or rales.   Abdominal:      General: Abdomen is flat. Bowel sounds are normal. There is no distension.      Palpations: Abdomen is soft.      Tenderness: There is no abdominal tenderness. There is no guarding.   Genitourinary:     Comments: + sesay   Musculoskeletal:         General: No swelling.      Right lower leg: No edema.      Left lower leg: No edema.   Skin:     General: Skin is warm and dry.      Coloration: Skin is not jaundiced.      Findings: No rash.   Neurological:      General: No focal deficit present.      Mental Status:  She is alert. She is disoriented.      Sensory: No sensory deficit.      Motor: Weakness present.   Psychiatric:      Comments: Agitated today, was good this am however.              Significant Labs: All pertinent labs within the past 24 hours have been reviewed.  Recent Lab Results         08/27/24  0935        Anion Gap 12       BUN 18       BUN/CREAT RATIO 12       Calcium 9.2       Chloride 101       CO2 31       Creatinine 1.48       eGFR 35       Glucose 123       Potassium 3.5       Sodium 140               Significant Imaging: I have reviewed all pertinent imaging results/findings within the past 24 hours.

## 2024-08-28 PROCEDURE — S4991 NICOTINE PATCH NONLEGEND: HCPCS | Performed by: NURSE PRACTITIONER

## 2024-08-28 PROCEDURE — 97535 SELF CARE MNGMENT TRAINING: CPT

## 2024-08-28 PROCEDURE — 99309 SBSQ NF CARE MODERATE MDM 30: CPT | Mod: ,,, | Performed by: HOSPITALIST

## 2024-08-28 PROCEDURE — 27000987 HC MATTRESS, MATRIX LOW PROFILE

## 2024-08-28 PROCEDURE — 11000004 HC SNF PRIVATE

## 2024-08-28 PROCEDURE — 25000242 PHARM REV CODE 250 ALT 637 W/ HCPCS: Performed by: NURSE PRACTITIONER

## 2024-08-28 PROCEDURE — 63600175 PHARM REV CODE 636 W HCPCS: Performed by: HOSPITALIST

## 2024-08-28 PROCEDURE — 27000958

## 2024-08-28 PROCEDURE — 94640 AIRWAY INHALATION TREATMENT: CPT

## 2024-08-28 PROCEDURE — 94761 N-INVAS EAR/PLS OXIMETRY MLT: CPT

## 2024-08-28 PROCEDURE — 97116 GAIT TRAINING THERAPY: CPT

## 2024-08-28 PROCEDURE — 97110 THERAPEUTIC EXERCISES: CPT

## 2024-08-28 PROCEDURE — 25000003 PHARM REV CODE 250: Performed by: HOSPITALIST

## 2024-08-28 PROCEDURE — 25000003 PHARM REV CODE 250: Performed by: NURSE PRACTITIONER

## 2024-08-28 RX ORDER — SODIUM CHLORIDE 9 MG/ML
1000 INJECTION, SOLUTION INTRAVENOUS CONTINUOUS
Status: ACTIVE | OUTPATIENT
Start: 2024-08-28 | End: 2024-08-29

## 2024-08-28 RX ADMIN — RIVAROXABAN 10 MG: 10 TABLET, FILM COATED ORAL at 08:08

## 2024-08-28 RX ADMIN — SODIUM CHLORIDE 1000 ML: 9 INJECTION, SOLUTION INTRAVENOUS at 08:08

## 2024-08-28 RX ADMIN — BUDESONIDE INHALATION 0.5 MG: 0.5 SUSPENSION RESPIRATORY (INHALATION) at 07:08

## 2024-08-28 RX ADMIN — PIPERACILLIN SODIUM AND TAZOBACTAM SODIUM 4.5 G: 4; .5 INJECTION, POWDER, LYOPHILIZED, FOR SOLUTION INTRAVENOUS at 12:08

## 2024-08-28 RX ADMIN — ACETAMINOPHEN 650 MG: 325 TABLET ORAL at 08:08

## 2024-08-28 RX ADMIN — LEVOTHYROXINE SODIUM 112 MCG: 0.11 TABLET ORAL at 06:08

## 2024-08-28 RX ADMIN — IPRATROPIUM BROMIDE AND ALBUTEROL SULFATE 3 ML: 2.5; .5 SOLUTION RESPIRATORY (INHALATION) at 01:08

## 2024-08-28 RX ADMIN — QUETIAPINE FUMARATE 25 MG: 25 TABLET ORAL at 08:08

## 2024-08-28 RX ADMIN — IPRATROPIUM BROMIDE AND ALBUTEROL SULFATE 3 ML: 2.5; .5 SOLUTION RESPIRATORY (INHALATION) at 07:08

## 2024-08-28 RX ADMIN — NICOTINE 1 PATCH: 21 PATCH, EXTENDED RELEASE TRANSDERMAL at 08:08

## 2024-08-28 RX ADMIN — PANTOPRAZOLE SODIUM 40 MG: 40 TABLET, DELAYED RELEASE ORAL at 08:08

## 2024-08-28 RX ADMIN — ACETAMINOPHEN 650 MG: 325 TABLET ORAL at 02:08

## 2024-08-28 RX ADMIN — PIPERACILLIN SODIUM AND TAZOBACTAM SODIUM 4.5 G: 4; .5 INJECTION, POWDER, LYOPHILIZED, FOR SOLUTION INTRAVENOUS at 04:08

## 2024-08-28 RX ADMIN — PIPERACILLIN SODIUM AND TAZOBACTAM SODIUM 4.5 G: 4; .5 INJECTION, POWDER, LYOPHILIZED, FOR SOLUTION INTRAVENOUS at 08:08

## 2024-08-28 NOTE — SUBJECTIVE & OBJECTIVE
Interval History: some better as far as agitation.  Continue plan for now.     Review of Systems   Reason unable to perform ROS: asleep.     Objective:     Vital Signs (Most Recent):  Temp: 96.8 °F (36 °C) (08/28/24 0715)  Pulse: 60 (08/28/24 0715)  Resp: 20 (08/28/24 0715)  BP: (!) 155/75 (08/28/24 0715)  SpO2: 95 % (08/28/24 0715) Vital Signs (24h Range):  Temp:  [96.8 °F (36 °C)-97.5 °F (36.4 °C)] 96.8 °F (36 °C)  Pulse:  [60-88] 60  Resp:  [19-22] 20  SpO2:  [90 %-97 %] 95 %  BP: (149-155)/(75-76) 155/75     Weight: 54 kg (119 lb)  Body mass index is 24.04 kg/m².    Intake/Output Summary (Last 24 hours) at 8/28/2024 1244  Last data filed at 8/28/2024 0833  Gross per 24 hour   Intake 2178.38 ml   Output 1950 ml   Net 228.38 ml         Physical Exam  Vitals reviewed.   Constitutional:       General: She is not in acute distress.     Appearance: Normal appearance.   HENT:      Head: Normocephalic and atraumatic.   Eyes:      General: No scleral icterus.     Extraocular Movements: Extraocular movements intact.      Conjunctiva/sclera: Conjunctivae normal.      Pupils: Pupils are equal, round, and reactive to light.   Cardiovascular:      Rate and Rhythm: Normal rate and regular rhythm.      Heart sounds: No murmur heard.     No friction rub. No gallop.   Pulmonary:      Effort: Pulmonary effort is normal. No respiratory distress.      Breath sounds: Normal breath sounds. No wheezing or rales.   Abdominal:      General: Abdomen is flat. Bowel sounds are normal. There is no distension.      Palpations: Abdomen is soft.      Tenderness: There is no abdominal tenderness. There is no guarding.   Genitourinary:     Comments: + sesay   Musculoskeletal:         General: No swelling.      Right lower leg: No edema.      Left lower leg: No edema.   Skin:     General: Skin is warm and dry.      Coloration: Skin is not jaundiced.      Findings: No rash.   Neurological:      General: No focal deficit present.      Mental Status:  She is alert.      Sensory: No sensory deficit.      Motor: Weakness present.      Comments: Less agitated today, asleep.  Family states a little better.   Psychiatric:      Comments: Agitated today, was good this am however.              Significant Labs: All pertinent labs within the past 24 hours have been reviewed.  Recent Lab Results       None            Significant Imaging: I have reviewed all pertinent imaging results/findings within the past 24 hours.

## 2024-08-28 NOTE — PT/OT/SLP PROGRESS
Occupational Therapy   Treatment    Name: Yanet Viramontes  MRN: [id number]  Admitting Diagnosis:  S/p left hip fracture       Recommendations:     Discharge Recommendations: Low Intensity Therapy  Discharge Equipment Recommendations:   (TBD)  Barriers to discharge:  None    Assessment:     Yanet Viramontes is a 84 y.o. female with a medical diagnosis of S/p left hip fracture.  She presents with about the same level of alertness as yesterday, her DIL by her side attempting to keep her awake so that patient can work with therapy this morning. Performance deficits affecting function are weakness, impaired endurance, impaired self care skills, impaired functional mobility, gait instability, impaired balance, impaired cognition, decreased safety awareness, impaired skin, orthopedic precautions.     Rehab Prognosis:  Poor at present level of alertness but hopeful pt will turn a corner with her mental status and be able to make a good recovery; patient would benefit from acute skilled OT services to address these deficits and reach maximum level of function.       Plan:     Patient to be seen 5 x/week to address the above listed problems via self-care/home management, community/work re-entry, therapeutic activities, therapeutic exercises, neuromuscular re-education, cognitive retraining, sensory integration, wheelchair management/training  Plan of Care Expires: 09/27/24  Plan of Care Reviewed with: patient, caregiver, daughter, family    Subjective     Chief Complaint: pain all over, especially left hip,  and fear of falling  Patient/Family Comments/goals: family just wanting pt to get better so that she can go home; daughter reported to PT that patient might go home with daughter for awhile depending upon her level of progression to PLOF  Pain/Comfort:   Generalized pain in all movement due to stiffness from much bed rest    Objective:     Communicated with: pt and SANYA TOVAR prior to session.  Patient found  up in [facility]   with peripheral IV, sesay catheter upon OT entry to room.    General Precautions: Standard, fall, aspiration    Orthopedic Precautions:LLE weight bearing as tolerated  Braces: N/A  Respiratory Status: Room air     Occupational Performance:     Bed Mobility:    Not attempted today    Functional Mobility/Transfers:  Patient completed Sit <> Stand Transfer with maximal assistance  with  rolling walker and 2 person assist, needing much tactile cueing and verbal cueing to utilize RW safely, but pt was able to get to standing position and hold herself in standing position with RW with just min a and remained up standing x 30 to 45s before needing to sit back in g/c.  At this time, she required max assist from 2 persons to get positioned properly in g/c again.   Functional Mobility: no attempts to step today or transfer with OT    Activities of Daily Living:  Feeding:  moderate assistance today pt required mod a to take in milk shake with straw, but she did intake portion of shake, needing moderate cueing for utilizing straw correctly  Grooming: total assistance pt was unable to use bathcloth to wipe her face despite much verbal and tactile cueing; family member at her side attempting to verbally encourage her as well, but pt could not engage, eyes closed mostly, opening only when heavily verbally cued to do so.      [facility] 6 Click ADL:      Treatment & Education:  OT facilitated pt with some AAROM exercises of BUE today x 3 min, pt unable to perform hand squeezes more than once, needing max cueing.    OT initiated pt with use of spirometer that was on her shelf from previous hospital.  Pt was actually able to complete 2 good sucking in attempts with this.  OT encouraged DIL who is at bedside to have pt try this again a few more times daily to encourage good air flow and lung hygiene.    During attempts at using straw or spoon today, pt moving tongue in impaired way, unclear if this is cognitive decline or just from poor  level of alertness.      Discussed with LA who is nurse practitioner, regarding pt timeline of medical status changes.  LA states that pt was ok for a day after her hip surgery, but as soon as she had the chest pains, she has been like this since.  NO CT or imaging has been done due to pt having poor ability to tolerate lying still and also her kyphotic back would pose problem for positioning for MRI or CT scanning.    Will cont to work toward improving pt overall attending to environment more accurately and improve strength as able.  She is sitting up for portion of the morning now in gerichair but back in bed by lunch and for the rest of the night from appearances.     Patient left  up in gerichair with LA at her side attempting to help patient finish ENSURE shake again this morning  with call button in reach and nursing notified    GOALS:   Multidisciplinary Problems       Occupational Therapy Goals          Problem: Occupational Therapy    Goal Priority Disciplines Outcome Interventions   Occupational Therapy Goal     OT, PT/OT Progressing    Description: STG: within 2 weeks  Pt will perform grooming with min a at sinkside from seated  Pt will bathe with mod a  Pt will perform UE dressing with mod a  Pt will perform LE dressing with mod a  Pt will sit EOB x 5 min with mod to min assistance  Pt will transfer bed/chair/bsc with mod a  Pt will perform standing task x 1 min with mod assistance x 1  Pt will tolerate 15 minutes of tx without fatigue      LT.Restore to max I with self care and mobility.                         Time Tracking:     OT Date of Treatment: 24  OT Start Time: 944  OT Stop Time: 1005  OT Total Time (min): 21 min    Billable Minutes:Self Care/Home Management 12, theract 4 min, therex 5 min    OT/CRISELDA: OT          2024

## 2024-08-28 NOTE — PROGRESS NOTES
[facility] Hospital Medicine  Progress Note    Patient Name: Yanet Viramontes  MRN: [id number]  Patient Class: IP- Swing   Admission Date: 8/23/2024  Length of Stay: 5 days  Attending Physician: Daren Nicole DO  Primary Care Provider: Rani, Primary Doctor        Subjective:     Principal Problem:S/p left hip fracture    [facility] Hospital Admission Certification    I certify that skilled nursing facility services are required to be given on an inpatient basis due to the following required skilled nursing and/or skilled rehabilitation services on a continuing basis:    management & evaluation of a patient plan of care that requires skilled nursing or rehabilitation services    I estimate that the additional period of SNF inpatient care will be 1-21 days.    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  This was discussed with family who do not wish to have further testing at this time.  She has been at [facility] since 8/19 following a fall at her home which resulted in fracture of the left greater trochanter which required surg. She had an episode of chest pain on Wed with no acute findings.  Echo revealed EF of 60%.  She is being admitted to [facility] for OT/ PT and medical management.       Pt is DNR     Overview/Hospital Course:  8/27 Agitation yesterday, did sleep last night and was good this am, however, after PT she became agitated and aggressive.  Will monitor and redirect.  Try Seroquel this PM this time.      8/28 Maybe a little better today.  She did sleep last night.  Was sitting up this am and not as agitated.  Will continue plan of care for now.  Hopefully  some better tomorrow.     Interval History: some better as far as agitation.  Continue plan for now.     Review of Systems   Reason unable to perform ROS: asleep.     Objective:     Vital Signs (Most Recent):  Temp: 96.8 °F (36 °C) (08/28/24 0715)  Pulse: 60 (08/28/24 0715)  Resp: 20 (08/28/24 0715)  BP: (!) 155/75 (08/28/24 0715)  SpO2: 95 % (08/28/24 0715) Vital Signs (24h Range):  Temp:  [96.8 °F (36 °C)-97.5 °F (36.4 °C)] 96.8 °F (36 °C)  Pulse:  [60-88] 60  Resp:  [19-22] 20  SpO2:  [90 %-97 %] 95 %  BP: (149-155)/(75-76) 155/75     Weight: 54 kg (119 lb)  Body mass index is 24.04 kg/m².    Intake/Output Summary (Last 24 hours) at 8/28/2024 1244  Last data filed at 8/28/2024 0833  Gross per 24 hour   Intake 2178.38 ml   Output 1950 ml   Net 228.38 ml         Physical Exam  Vitals reviewed.   Constitutional:       General: She is not in acute distress.     Appearance: Normal appearance.   HENT:      Head: Normocephalic and atraumatic.   Eyes:      General: No scleral icterus.     Extraocular Movements: Extraocular movements intact.      Conjunctiva/sclera: Conjunctivae normal.      Pupils: Pupils are equal, round, and reactive to light.   Cardiovascular:      Rate and Rhythm: Normal rate and regular rhythm.      Heart sounds: No murmur heard.     No friction rub. No gallop.   Pulmonary:      Effort: Pulmonary effort is normal. No respiratory distress.      Breath sounds: Normal breath sounds. No wheezing or rales.   Abdominal:      General: Abdomen is flat. Bowel sounds are normal. There is no distension.      Palpations: Abdomen is soft.      Tenderness: There is no abdominal tenderness. There is no guarding.   Genitourinary:     Comments: + sesay   Musculoskeletal:         General: No swelling.      Right lower leg: No edema.      Left lower leg: No edema.   Skin:     General: Skin is warm and dry.      Coloration: Skin is not jaundiced.      Findings: No rash.   Neurological:      General: No focal deficit  present.      Mental Status: She is alert.      Sensory: No sensory deficit.      Motor: Weakness present.      Comments: Less agitated today, asleep.  Family states a little better.   Psychiatric:      Comments: Agitated today, was good this am however.              Significant Labs: All pertinent labs within the past 24 hours have been reviewed.  Recent Lab Results       None            Significant Imaging: I have reviewed all pertinent imaging results/findings within the past 24 hours.    Assessment/Plan:      * S/p left hip fracture  PT/ OT  Fall precautions  Pain control    Delirium  Likely sundowning.  Has Seroquel prn.  Maybe a little better today.       Severe protein-calorie malnutrition  Nutrition consulted. Most recent weight and BMI monitored-     Measurements:  Wt Readings from Last 1 Encounters:   08/23/24 54 kg (119 lb)   Body mass index is 24.04 kg/m².    Patient has been screened and assessed by RD.    Malnutrition Type:  Context: chronic illness  Level: severe    Malnutrition Characteristic Summary:  Weight Loss (Malnutrition): greater than 10% in 6 months  Energy Intake (Malnutrition): less than 75% for greater than or equal to 1 month    Interventions/Recommendations (treatment strategy):  Recommend to advance diet appropriate and tolerated; add Boost Plus all meals      Disease of thyroid gland  Continue home meds      GERD (gastroesophageal reflux disease)  Continue home meds      History of DVT (deep vein thrombosis)  Continue xeralto      Hypertension  Chronic, controlled. Latest blood pressure and vitals reviewed-     Temp:  [98.2 °F (36.8 °C)]   Pulse:  [68]   Resp:  [22]   BP: (117)/(66)   SpO2:  [96 %] .   Home meds for hypertension were reviewed and noted below.   Hypertension Medications               diltiaZEM HCl (TIAZAC) 120 mg 24 hr capsule Take 120 mg by mouth.    triamterene-hydrochlorothiazide 37.5-25 mg (DYAZIDE) 37.5-25 mg per capsule Take 1 capsule by mouth every morning.             While in the hospital, will manage blood pressure as follows; BP has been low, will hold meds for now    Monitor BP, replace home medication as warrented.      VTE Risk Mitigation (From admission, onward)           Ordered     rivaroxaban tablet 10 mg  Daily         08/23/24 1802     IP VTE LOW RISK PATIENT  Once         08/23/24 1725                    Discharge Planning   QUINN:      Code Status: DNR   Is the patient medically ready for discharge?:     Reason for patient still in hospital (select all that apply): Patient trending condition and Treatment  Discharge Plan A: Other                  Daren Nicole DO  Department of Hospital Medicine   [facility]

## 2024-08-28 NOTE — PLAN OF CARE
Problem: Adult Inpatient Plan of Care  Goal: Plan of Care Review  Outcome: Progressing  Goal: Patient-Specific Goal (Individualized)  Outcome: Progressing  Goal: Absence of Hospital-Acquired Illness or Injury  Outcome: Progressing  Goal: Optimal Comfort and Wellbeing  Outcome: Progressing  Goal: Readiness for Transition of Care  Outcome: Progressing     Problem: Fall Injury Risk  Goal: Absence of Fall and Fall-Related Injury  Outcome: Progressing     Problem: Skin Injury Risk Increased  Goal: Skin Health and Integrity  Outcome: Progressing     Problem: Wound  Goal: Optimal Coping  Outcome: Progressing  Goal: Optimal Functional Ability  Outcome: Progressing  Goal: Absence of Infection Signs and Symptoms  Outcome: Progressing     Problem: Infection  Goal: Absence of Infection Signs and Symptoms  Outcome: Progressing

## 2024-08-28 NOTE — PT/OT/SLP PROGRESS
Physical Therapy Treatment    Patient Name:  Yanet Viramontes   MRN:  [id number]    Recommendations:     Discharge Recommendations: Low Intensity Therapy  Discharge Equipment Recommendations: walker, rolling  Barriers to discharge: Inaccessible home and falls risk, cognitive deficits    Assessment:     Yanet Viramontes is a 84 y.o. female admitted with a medical diagnosis of S/p left hip fracture.  She presents with the following impairments/functional limitations: pain, weakness, impaired endurance, impaired self care skills, impaired functional mobility, gait instability, impaired balance, impaired cognition, decreased lower extremity function, decreased safety awareness, decreased ROM, impaired skin, orthopedic precautions .    Pt improved this visit with supine to sit transfer, sit to stand transfer, scooting on bed and ambulation. Pt had no agitation this visit, but did  express her left hip pain during bed mobility. She is significantly more alert when upright in sitting or standing. However, she is not following directions during ROM ex's of right or left leg.    Rehab Prognosis: Fair; patient would benefit from acute skilled PT services to address these deficits and reach maximum level of function.    Recent Surgery: * No surgery found *      Plan:     During this hospitalization, patient to be seen 5 x/week to address the identified rehab impairments via gait training, therapeutic activities, therapeutic exercises and progress toward the following goals:    Plan of Care Expires:  09/20/24    Subjective     Chief Complaint: Pt raised her voice to express left hip pain during supine to/from sit transfers.  Patient/Family Comments/goals: Pt to dc to dtr's home ([place]).  Pain/Comfort:  Pain Rating 1:  (Pt unable to rate d/t cognitive deficits.)  Pain Addressed 1: Pre-medicate for activity, Cessation of Activity, Distraction      Objective:     Communicated with pt and her family prior to session.  Patient found HOB  "elevated with sesay catheter, peripheral IV upon PT entry to room.     General Precautions: Standard, fall  Orthopedic Precautions: LLE weight bearing as tolerated  Braces:    Respiratory Status: Room air     Functional Mobility:  Bed Mobility:     Scooting: pt observed to scoot posteriorly onto bed 1-2" with CGA.  Supine to Sit: maximal assistance and of 1-2 persons  Sit to Supine: maximal assistance and of 1-2 persons  Transfers:     Sit to Stand:  mod/max Ax1 from bed surface with no AD and standard walker  Gait: Pt amb'd using SW laterally to the right along EOB x 2 ft with max Ax1 plus mod A x 1-2 to block left knee in mid stance and to advance each foot.  Balance: Pt sat on EOB with CGA/SBA, but did have a posterior LOB posteriorly x 1.      AM-PAC 6 CLICK MOBILITY  Turning over in bed (including adjusting bedclothes, sheets and blankets)?: 3  Sitting down on and standing up from a chair with arms (e.g., wheelchair, bedside commode, etc.): 2  Moving from lying on back to sitting on the side of the bed?: 2  Moving to and from a bed to a chair (including a wheelchair)?: 2  Need to walk in hospital room?: 2  Climbing 3-5 steps with a railing?: 1  Basic Mobility Total Score: 12       Treatment & Education:  Pt's family instructed in gentle PROM/AAROM LLE for hip flex, rotation and abd with one hand under pt's heel and other hand under pt's knee for support and protection.    In supine, pt received PROM BLE x 5-10 reps: hip flex, hip abd/add, hip IR/ER (in various degrees of hip flex), SAQ, ankle pump. Passive stretching left calf 1x30". PT stood pt at bedside after walking so that her family could perform hygiene care.    Patient left supine with all lines intact, call button in reach, and multiple family members present..    GOALS:   Multidisciplinary Problems       Physical Therapy Goals          Problem: Physical Therapy    Goal Priority Disciplines Outcome Goal Variances Interventions   Physical Therapy Goal "     PT, PT/OT Progressing     Description: STG - 2 weeks  1. Pt will transfer supine to/from sit with mod Ax1.  2. Pt will perform STS and bed transfer with mod Ax1.  3. Pt will have LLE strength of 3-/5.  4. Pt will amb with RW x 20 ft with mod Ax1.    LTG - 4 weeks  1. Pt will transfer supine to/from sit with min Ax1.  2. Pt will perform STS and bed transfer with min Ax1.  3. Pt will have LLE strength of 3/5.  4. Pt will amb with RW x 50 ft with min Ax1.  5. Pt's ROM LLE will be WFL.                       Time Tracking:     PT Received On: 08/28/24  PT Start Time: 1320     PT Stop Time: 1344  PT Total Time (min): 24 min     Billable Minutes: Gait Training 8, Therapeutic Activity 5, Therapeutic Exercise 11, and Total Time 24    Treatment Type: Treatment  PT/PTA: PT     Number of PTA visits since last PT visit: 0     08/28/2024

## 2024-08-29 PROCEDURE — 63600175 PHARM REV CODE 636 W HCPCS: Performed by: HOSPITALIST

## 2024-08-29 PROCEDURE — 27000958

## 2024-08-29 PROCEDURE — S4991 NICOTINE PATCH NONLEGEND: HCPCS | Performed by: NURSE PRACTITIONER

## 2024-08-29 PROCEDURE — 27000221 HC OXYGEN, UP TO 24 HOURS

## 2024-08-29 PROCEDURE — 25000003 PHARM REV CODE 250: Performed by: HOSPITALIST

## 2024-08-29 PROCEDURE — 97110 THERAPEUTIC EXERCISES: CPT

## 2024-08-29 PROCEDURE — 27000987 HC MATTRESS, MATRIX LOW PROFILE

## 2024-08-29 PROCEDURE — 97535 SELF CARE MNGMENT TRAINING: CPT

## 2024-08-29 PROCEDURE — 25000003 PHARM REV CODE 250: Performed by: NURSE PRACTITIONER

## 2024-08-29 PROCEDURE — 11000004 HC SNF PRIVATE

## 2024-08-29 PROCEDURE — 25000242 PHARM REV CODE 250 ALT 637 W/ HCPCS: Performed by: NURSE PRACTITIONER

## 2024-08-29 PROCEDURE — 94761 N-INVAS EAR/PLS OXIMETRY MLT: CPT

## 2024-08-29 PROCEDURE — 97116 GAIT TRAINING THERAPY: CPT

## 2024-08-29 PROCEDURE — 99308 SBSQ NF CARE LOW MDM 20: CPT | Mod: ,,, | Performed by: HOSPITALIST

## 2024-08-29 PROCEDURE — 94640 AIRWAY INHALATION TREATMENT: CPT

## 2024-08-29 RX ORDER — QUETIAPINE FUMARATE 25 MG/1
25 TABLET, FILM COATED ORAL NIGHTLY
Status: DISCONTINUED | OUTPATIENT
Start: 2024-08-29 | End: 2024-10-05 | Stop reason: HOSPADM

## 2024-08-29 RX ORDER — SODIUM CHLORIDE 9 MG/ML
1000 INJECTION, SOLUTION INTRAVENOUS CONTINUOUS
Status: ACTIVE | OUTPATIENT
Start: 2024-08-29 | End: 2024-08-30

## 2024-08-29 RX ADMIN — FERROUS SULFATE TAB 325 MG (65 MG ELEMENTAL FE) 1 EACH: 325 (65 FE) TAB at 04:08

## 2024-08-29 RX ADMIN — LEVOTHYROXINE SODIUM 112 MCG: 0.11 TABLET ORAL at 08:08

## 2024-08-29 RX ADMIN — SODIUM CHLORIDE 1000 ML: 9 INJECTION, SOLUTION INTRAVENOUS at 08:08

## 2024-08-29 RX ADMIN — ACETAMINOPHEN 650 MG: 325 TABLET ORAL at 01:08

## 2024-08-29 RX ADMIN — RIVAROXABAN 10 MG: 10 TABLET, FILM COATED ORAL at 08:08

## 2024-08-29 RX ADMIN — PIPERACILLIN SODIUM AND TAZOBACTAM SODIUM 4.5 G: 4; .5 INJECTION, POWDER, LYOPHILIZED, FOR SOLUTION INTRAVENOUS at 08:08

## 2024-08-29 RX ADMIN — PANTOPRAZOLE SODIUM 40 MG: 40 TABLET, DELAYED RELEASE ORAL at 08:08

## 2024-08-29 RX ADMIN — IPRATROPIUM BROMIDE AND ALBUTEROL SULFATE 3 ML: 2.5; .5 SOLUTION RESPIRATORY (INHALATION) at 08:08

## 2024-08-29 RX ADMIN — ACETAMINOPHEN 650 MG: 325 TABLET ORAL at 10:08

## 2024-08-29 RX ADMIN — ACETAMINOPHEN 650 MG: 325 TABLET ORAL at 07:08

## 2024-08-29 RX ADMIN — BUDESONIDE INHALATION 0.5 MG: 0.5 SUSPENSION RESPIRATORY (INHALATION) at 07:08

## 2024-08-29 RX ADMIN — BUDESONIDE INHALATION 0.5 MG: 0.5 SUSPENSION RESPIRATORY (INHALATION) at 08:08

## 2024-08-29 RX ADMIN — PIPERACILLIN SODIUM AND TAZOBACTAM SODIUM 4.5 G: 4; .5 INJECTION, POWDER, LYOPHILIZED, FOR SOLUTION INTRAVENOUS at 04:08

## 2024-08-29 RX ADMIN — IPRATROPIUM BROMIDE AND ALBUTEROL SULFATE 3 ML: 2.5; .5 SOLUTION RESPIRATORY (INHALATION) at 02:08

## 2024-08-29 RX ADMIN — NICOTINE 1 PATCH: 21 PATCH, EXTENDED RELEASE TRANSDERMAL at 08:08

## 2024-08-29 RX ADMIN — QUETIAPINE FUMARATE 25 MG: 25 TABLET ORAL at 08:08

## 2024-08-29 RX ADMIN — IPRATROPIUM BROMIDE AND ALBUTEROL SULFATE 3 ML: 2.5; .5 SOLUTION RESPIRATORY (INHALATION) at 07:08

## 2024-08-29 NOTE — PLAN OF CARE
Problem: Breathing Pattern Ineffective  Goal: Effective Breathing Pattern  8/29/2024 1551 by Marcin Lizama, RRT  Outcome: Progressing  8/29/2024 1547 by Marcin Lizama, RRT  Outcome: Progressing     Problem: Gas Exchange Impaired  Goal: Optimal Gas Exchange  8/29/2024 1551 by Marcin Lizama, RRT  Outcome: Progressing  8/29/2024 1547 by Marcin Lizama, RRT  Outcome: Progressing

## 2024-08-29 NOTE — PLAN OF CARE
Problem: Breathing Pattern Ineffective  Goal: Effective Breathing Pattern  8/29/2024 1600 by Marcin Lizama, RRT  Outcome: Progressing  8/29/2024 1551 by Marcin Lizama, RRT  Outcome: Progressing  8/29/2024 1547 by Marcin Lizama, RRT  Outcome: Progressing     Problem: Gas Exchange Impaired  Goal: Optimal Gas Exchange  8/29/2024 1600 by Marcin Lizama, RRT  Outcome: Progressing  8/29/2024 1551 by Marcin Lizama, RRT  Outcome: Progressing  8/29/2024 1547 by Marcin Lizama, RRT  Outcome: Progressing

## 2024-08-29 NOTE — PT/OT/SLP PROGRESS
Physical Therapy Treatment    Patient Name:  Yanet Viramontes   MRN:  [id number]    Recommendations:     Discharge Recommendations: Moderate Intensity Therapy  Discharge Equipment Recommendations: walker, rolling, bedside commode  Barriers to discharge: Inaccessible home, Decreased caregiver support, and falls risk    Assessment:     Yanet Viramontes is a 84 y.o. female admitted with a medical diagnosis of S/p left hip fracture.  She presents with the following impairments/functional limitations: weakness, impaired endurance, impaired functional mobility, gait instability, impaired balance, impaired cognition, decreased lower extremity function, decreased safety awareness, pain, decreased ROM, impaired skin, edema, orthopedic precautions .    Pt much more alert and cognizant of her situation and surroundings. She was able to follow directions during ther ex, moving LLE more, calm and cooperative and she recognized her family members, stated them by name and who they were in birth order. She was significantly improved with bed mob, transfers and ambulation and was actually able to walk away from the bed. She did not walk far, due to fatigue, but she was able to relate to PT that she needed to sit to rest.    Rehab Prognosis: Fair; patient would benefit from acute skilled PT services to address these deficits and reach maximum level of function.    Recent Surgery: * No surgery found *      Plan:     During this hospitalization, patient to be seen 5 x/week to address the identified rehab impairments via gait training, therapeutic activities, therapeutic exercises, neuromuscular re-education and progress toward the following goals:    Plan of Care Expires:  09/20/24    Subjective     Chief Complaint: Dtr states pt more alert today.  Patient/Family Comments/goals: Pt asks if she is going to a [facility] home, but her dtr told her that she is to dc to dtr's home.  Pain/Comfort:  Pain Rating 1:  (pt unable to rate pain, but could tell PT  "when she had no pain at rest.)  Location - Side 1: Left  Location - Orientation 1: lower  Location 1: hip  Pain Addressed 1: Pre-medicate for activity  Pain Rating Post-Intervention 1: 0/10      Objective:     Communicated with Addie Alegre RN and pt's dtr prior to session.  Patient found HOB elevated with sesay catheter, peripheral IV upon PT entry to room.     General Precautions: Standard, fall  Orthopedic Precautions: LLE weight bearing as tolerated  Braces:    Respiratory Status: Room air     Functional Mobility:  Bed Mobility:     Scooting: total assistance and of 1 to EOB persons  Supine to Sit: moderate assistance and of 1 persons  Transfers:     Sit to Stand:  mod/max Ax1 from bed with rolling walker  Bed to Chair: moderate assistance and of 1 persons with  rolling walker  using  Stand Pivot  Gait: Pt amb'd using RW/wc trail x 5 ft with mod/max Ax1 with one person following with wc and a third person rolling IV pole.PT blocked left knee in mid stance intermittently and occasionally assisted with advancing left limb. Pt's step are only 2-3" long and lacks step through, unequal step lengths, WBOS, bradykinetic.      AM-PAC 6 CLICK MOBILITY  Turning over in bed (including adjusting bedclothes, sheets and blankets)?: 3  Sitting down on and standing up from a chair with arms (e.g., wheelchair, bedside commode, etc.): 2  Moving from lying on back to sitting on the side of the bed?: 2  Moving to and from a bed to a chair (including a wheelchair)?: 2  Need to walk in hospital room?: 2  Climbing 3-5 steps with a railing?: 1  Basic Mobility Total Score: 12       Treatment & Education:  Bed ex's BLE x 10 reps each with A/AROM: HS, hip abd/add, SAQ, hip abd/ER in hooklying.    Patient left up in chair with all lines intact and dtr and son present..    GOALS:   Multidisciplinary Problems       Physical Therapy Goals          Problem: Physical Therapy    Goal Priority Disciplines Outcome Goal Variances Interventions "   Physical Therapy Goal     PT, PT/OT Progressing     Description: STG - 2 weeks  1. Pt will transfer supine to/from sit with mod Ax1.  2. Pt will perform STS and bed transfer with mod Ax1.  3. Pt will have LLE strength of 3-/5.  4. Pt will amb with RW x 20 ft with mod Ax1.    LTG - 4 weeks  1. Pt will transfer supine to/from sit with min Ax1.  2. Pt will perform STS and bed transfer with min Ax1.  3. Pt will have LLE strength of 3/5.  4. Pt will amb with RW x 50 ft with min Ax1.  5. Pt's ROM LLE will be WFL.                       Time Tracking:     PT Received On: 08/29/24  PT Start Time: 1110     PT Stop Time: 1134  PT Total Time (min): 24 min     Billable Minutes: Gait Training 8, Therapeutic Activity 6, Therapeutic Exercise 10, and Total Time 24 min    Treatment Type: Treatment  PT/PTA: PT     Number of PTA visits since last PT visit: 0     08/29/2024

## 2024-08-29 NOTE — SUBJECTIVE & OBJECTIVE
Interval History: some better    Review of Systems   Respiratory:  Negative for shortness of breath.    Cardiovascular:  Negative for chest pain.   Gastrointestinal:  Negative for abdominal pain, nausea and vomiting.   Psychiatric/Behavioral:  Positive for confusion. Negative for agitation.         Confusion is better    All other systems reviewed and are negative.    Objective:     Vital Signs (Most Recent):  Temp: 97.1 °F (36.2 °C) (08/29/24 0713)  Pulse: 73 (08/29/24 0846)  Resp: 20 (08/29/24 0846)  BP: 127/76 (08/29/24 0713)  SpO2: 100 % (08/29/24 0846) Vital Signs (24h Range):  Temp:  [97.1 °F (36.2 °C)-97.2 °F (36.2 °C)] 97.1 °F (36.2 °C)  Pulse:  [67-75] 73  Resp:  [18-24] 20  SpO2:  [95 %-100 %] 100 %  BP: (127-132)/(76-77) 127/76     Weight: 54 kg (119 lb)  Body mass index is 24.04 kg/m².    Intake/Output Summary (Last 24 hours) at 8/29/2024 1057  Last data filed at 8/29/2024 0634  Gross per 24 hour   Intake 285.58 ml   Output 500 ml   Net -214.42 ml         Physical Exam  Vitals reviewed.   Constitutional:       General: She is not in acute distress.     Appearance: Normal appearance.   HENT:      Head: Normocephalic and atraumatic.   Eyes:      General: No scleral icterus.     Extraocular Movements: Extraocular movements intact.      Conjunctiva/sclera: Conjunctivae normal.      Pupils: Pupils are equal, round, and reactive to light.   Cardiovascular:      Rate and Rhythm: Normal rate and regular rhythm.      Heart sounds: No murmur heard.     No friction rub. No gallop.   Pulmonary:      Effort: Pulmonary effort is normal. No respiratory distress.      Breath sounds: Normal breath sounds. No wheezing or rales.   Abdominal:      General: Abdomen is flat. Bowel sounds are normal. There is no distension.      Palpations: Abdomen is soft.      Tenderness: There is no abdominal tenderness. There is no guarding.   Genitourinary:     Comments: + sesay   Musculoskeletal:         General: No swelling.      Right  lower leg: No edema.      Left lower leg: No edema.   Skin:     General: Skin is warm and dry.      Coloration: Skin is not jaundiced.      Findings: No rash.   Neurological:      General: No focal deficit present.      Mental Status: She is alert.      Sensory: No sensory deficit.      Motor: Weakness present.      Comments: Less agitated today, asleep.  Family states a little better.   Psychiatric:      Comments: Confusion seems better             Significant Labs: All pertinent labs within the past 24 hours have been reviewed.  Recent Lab Results       None            Significant Imaging: I have reviewed all pertinent imaging results/findings within the past 24 hours.

## 2024-08-29 NOTE — CONSULTS
[facility]  Adult Nutrition  Consult Note         Reason for Assessment  Reason For Assessment: consult assess/MST 3  Nutrition Risk Screen: no indicators present    Assessment and Plan    8/29/2024 RD follow up: Patient upgraded to a MCS diet with chopped meats per SLP with high aspiration risk and to feed only when patient alert enough to swallow. Patient continues with poor intakes with decreased alertness and delirium. 0% meal intakes documented over the last several meals per flowsheets.     IVF at 50 ml/hr ordered. Noted patient with some improvement in confusion today. Continue diet as tolerated and feed as patient alert. Consider alterate means of nutrition as aligns with plan of care if po intakes don't improve with decrease in confusion. RD Following.     Consult received and appreciated. Patient admitted 8/23 with a dx of s/p L Hip fx and hx of cancer. Patient is ordered a full liquid diet with issues swallowing. RN reports patient having to be suctioned and having trouble clearing secretions. SLP evaluation pending.     Patient is 54 kg with a BMI of 24.04 which is WNL. Patient with significant weight loss over the last 6 months of 14% per chart review with weight at prior facility of 62.6 kg noted Feb 2024. Patient with poor po intakes endorsed on MST screen.    Patient meets ASPEN criteria for severe protein calorie malnutrition due to weight loss and poor intakes. See malnutrition assessment.     Recommend to add Boost Plus as tolerated. RD Following.           Learning Needs/Social Determinants of Health  Learning Assessment       08/23/2024 1906 [facility] (8/23/2024 - Present)   Created by Addie Alegre, RN - RN (Nurse) Status: Complete                 PRIMARY LEARNER     Primary Learner Name:  Karla  - 08/23/2024 1906    Relationship:  Family  - 08/23/2024 1906    Does the primary learner have any barriers to  learning?:  No Barriers [facility] 08/23/2024 1906    What is the preferred language of the primary learner?:  English [facility] 08/23/2024 1906    Is an  required?:  No [facility] 08/23/2024 1906    How does the primary learner prefer to learn new concepts?:  Listening [facility] 08/23/2024 1906    How often do you need to have someone help you read instructions, pamphlets, or written material from your doctor or pharmacy?:  Never [facility] 08/23/2024 1906        CO-LEARNER #1     No question answered        CO-LEARNER #2     No question answered        SPECIAL TOPICS     No question answered        ANSWERED BY:     No question answered        Comments         Edit History       Addie Alegre, RN - RN (Nurse)   08/23/2024 1906                           Social Determinants of Health     Tobacco Use: High Risk (8/23/2024)    Patient History     Smoking Tobacco Use: Every Day     Smokeless Tobacco Use: Never     Passive Exposure: Not on file   Alcohol Use: Not At Risk (8/26/2024)    AUDIT-C     Frequency of Alcohol Consumption: Never     Average Number of Drinks: Patient does not drink     Frequency of Binge Drinking: Never   Financial Resource Strain: Low Risk  (8/26/2024)    Overall Financial Resource Strain (CARDIA)     Difficulty of Paying Living Expenses: Not hard at all   Food Insecurity: No Food Insecurity (8/26/2024)    Hunger Vital Sign     Worried About Running Out of Food in the Last Year: Never true     Ran Out of Food in the Last Year: Never true   Transportation Needs: No Transportation Needs (8/26/2024)    TRANSPORTATION NEEDS     Transportation : No   Physical Activity: Insufficiently Active (8/26/2024)    Exercise Vital Sign     Days of Exercise per Week: 4 days     Minutes of Exercise per Session: 30 min   Stress: No Stress Concern Present (8/26/2024)    [employer] - Occupational Stress Questionnaire     Feeling of Stress : Only a little   Housing Stability: Low Risk  (8/26/2024)     Housing Stability Vital Sign     Unable to Pay for Housing in the Last Year: No     Homeless in the Last Year: No   Depression: Not on file   Utilities: Not At Risk (8/26/2024)    [facility] Utilities     Threatened with loss of utilities: No   Health Literacy: Inadequate Health Literacy (8/26/2024)     Health Literacy     Frequency of need for help with medical instructions: Sometimes   Social Isolation: Socially Integrated (8/26/2024)    Social Isolation     Social Isolation: 1            Malnutrition  Is Patient Malnourished: Yes Malnutrition Assessment  Malnutrition Context: chronic illness  Malnutrition Level: severe          Weight Loss (Malnutrition): greater than 10% in 6 months  Energy Intake (Malnutrition): less than 75% for greater than or equal to 1 month                         Nutrition Diagnosis  Malnutrition (Severe) related to Appetite loss, Chronic illness, and Dysphagia/ difficulty swallowing as evidenced by weight loss of greater than 10% in 6 months, and energy intakes less than 75% for greater than or equal to 1 month  Comments: SLP eval pending; patient having issues swallowing with decreased LOC    Recent Labs   Lab 08/27/24  0935   *     Comments on Glucose: elevated     Nutrition Prescription / Recommendations  Recommendation/Intervention: Recommend to advance diet appropriate and tolerated; add Boost Plus all meals  Goals: po intakes 50% during admission  Nutrition Goal Status: progressing towards goal  Communication of RD Recs: discussed on rounds  Current Diet Order: dysphagia mechanical soft chopped meats  Oral Nutrition Supplement: add Boost Plus all meals  Chewing or Swallowing Difficulty?: Swallowing difficulty  Recommended Diet:  as tolerated  Recommended Oral Supplement: Boost Plus [360kcals, 14g Protein, 45g Carbs] 3 times a day  Is Nutrition Support Recommended: [facility] Nutrition Support: No  Is Nutrition Education Recommended: No    Monitor and Evaluation  % current  "Intake: P.O. intake of 0 - 10%  % intake to meet estimated needs: 50 - 75 %  Food and Nutrient Intake: energy intake, food and beverage intake  Food and Nutrient Adminstration: diet order  Anthropometric Measurements: height/length, weight, weight change, body mass index  Biochemical Data, Medical Tests and Procedures: electrolyte and renal panel, gastrointestinal profile, glucose/endocrine profile, inflammatory profile, lipid profile  Energy Calories Required: not meeting needs  Protein Required: not meeting needs  Fluid Required: not meeting needs    Current Medical Diagnosis and Past Medical History     Past Medical History:   Diagnosis Date    Anticoagulant long-term use     Cancer     Hypertension     Thyroid disease        Nutrition/Diet History  Spiritual, Cultural Beliefs, Caodaism Practices, Values that Affect Care: no  Food Allergies: NKFA    Lab/Procedures/Meds  Recent Labs   Lab 08/27/24  0935      K 3.5   BUN 18   CREATININE 1.48*   CALCIUM 9.2        Last A1c: No results found for: "HGBA1C"  Lab Results   Component Value Date    RBC 3.76 (L) 08/26/2024    HGB 11.2 (L) 08/26/2024    HCT 34.9 (L) 08/26/2024    MCV 92.8 08/26/2024    MCH 29.8 08/26/2024    MCHC 32.1 08/26/2024     Pertinent Labs Reviewed: reviewed  Pertinent Medications Reviewed: reviewed  Scheduled Meds:   albuterol-ipratropium  3 mL Nebulization Q6H WAKE    budesonide  0.5 mg Nebulization Q12H    docusate sodium  100 mg Oral BID    ferrous sulfate  1 tablet Oral TID WM    levothyroxine  112 mcg Oral QAM    nicotine  1 patch Transdermal Daily    pantoprazole  40 mg Oral Daily    piperacillin-tazobactam (Zosyn) IV (PEDS and ADULTS) (extended infusion is not appropriate)  4.5 g Intravenous Q8H    potassium chloride  10 mEq Oral Daily    QUEtiapine  25 mg Oral Daily    rivaroxaban  10 mg Oral Daily     Continuous Infusions:   0.9% NaCl  1,000 mL Intravenous Continuous 50 mL/hr at 08/29/24 1357 Restarted at 08/29/24 1357     PRN " "Meds:.  Current Facility-Administered Medications:     0.9% NaCl, , Intravenous, PRN    acetaminophen, 650 mg, Oral, Q6H PRN    albuterol sulfate, 2.5 mg, Nebulization, Q4H PRN    bisacodyL, 10 mg, Rectal, Daily PRN    calcium carbonate, 500 mg, Oral, BID PRN    guaiFENesin 100 mg/5 ml, 200 mg, Oral, Q4H PRN    lorazepam, 0.5 mg, Intravenous, Q2H PRN    LORazepam, 0.5 mg, Oral, Q12H PRN    magnesium hydroxide 400 mg/5 ml, 30 mL, Oral, Daily PRN    melatonin, 6 mg, Oral, Nightly PRN    morphine, 2 mg, Intravenous, Q4H PRN    ondansetron, 4 mg, Oral, Q8H PRN    oxyCODONE-acetaminophen, 1 tablet, Oral, Q4H PRN    peg 400-hypromellose-glycerin, 2 drop, Ophthalmic, PRN    senna-docusate 8.6-50 mg, 1 tablet, Oral, BID PRN    Anthropometrics  Temp: 97.1 °F (36.2 °C)  Height Method: Stated  Height: 4' 11" (149.9 cm)  Height (inches): 59 in  Weight Method: Bed Scale  Weight: 54 kg (119 lb)  Weight (lb): 119 lb  Ideal Body Weight (IBW), Female: 95 lb  % Ideal Body Weight, Female (lb): 125.26 %  BMI (Calculated): 24       Estimated/Assessed Needs      Temp: 97.1 °F (36.2 °C)Oral  Weight Used For Calorie Calculations: 54 kg (119 lb 0.8 oz)   Energy Need Method: Kcal/kg Energy Calorie Requirements (kcal): 6239-7603  Weight Used For Protein Calculations: 54 kg (119 lb 0.8 oz)  Protein Requirements: 43-54  Estimated Fluid Requirement Method: RDA Method    RDA Method (mL): 1350       Nutrition by Nursing  Diet/Nutrition Received: mechanical/dental soft  Intake (%): 0%  Diet/Feeding Assistance: tray set-up  Diet/Feeding Tolerance: poor  Last Bowel Movement: 08/29/24                Nutrition Follow-Up  RD Follow-up?: Yes      Nutrition Discharge Planning: new admit to [facility]; rd following for d/c needs            Available via Secure Chat  "

## 2024-08-29 NOTE — PT/OT/SLP PROGRESS
Occupational Therapy   Treatment    Name: Yanet Viramontes  MRN: [id number]  Admitting Diagnosis:  S/p left hip fracture       Recommendations:     Discharge Recommendations: Low Intensity Therapy  Discharge Equipment Recommendations:   (TBD)  Barriers to discharge:  None    Assessment:     Yanet Viramontes is a 84 y.o. female with a medical diagnosis of S/p left hip fracture.  She presents with improved JOSE EDUARDO today, improved mobility, needing cueing to remain focused on tasks and to keep eyes open. Performance deficits affecting function are weakness, impaired endurance, impaired self care skills, impaired functional mobility, gait instability, impaired balance, impaired cognition, decreased safety awareness, impaired skin, orthopedic precautions.     Rehab Prognosis:  Fair; patient would benefit from acute skilled OT services to address these deficits and reach maximum level of function.       Plan:     Patient to be seen 5 x/week to address the above listed problems via self-care/home management, community/work re-entry, therapeutic activities, therapeutic exercises, neuromuscular re-education, cognitive retraining, sensory integration, wheelchair management/training  Plan of Care Expires: 09/27/24  Plan of Care Reviewed with: patient, caregiver, daughter, family    Subjective     Chief Complaint: pain and weakness  Patient/Family Comments/goals: home with family and home health  Pain/Comfort:       Objective:     Communicated with: pt and family, nursing as well prior to session.  Patient found HOB elevated with peripheral IV, sesay catheter upon OT entry to room.    Nurse came in shortly after OT presented, nurse took out catheter so that OT could assist pt to BSC.    General Precautions: Standard, fall, aspiration    Orthopedic Precautions:LLE weight bearing as tolerated  Braces: N/A  Respiratory Status: Room air     Occupational Performance:     Bed Mobility:    Patient completed Rolling/Turning to Right with moderate  "assistance and maximal assistance  Patient completed Scooting/Bridging with moderate assistance and maximal assistance  Patient completed Supine to Sit with moderate assistance and maximal assistance     Functional Mobility/Transfers:  Patient completed Sit <> Stand Transfer with moderate assistance  with  rolling walker and 2 persons assisting   Patient completed Toilet Transfer Step Transfer technique with moderate assistance with  rolling walker and 2 persons assisting  Functional Mobility: needing tactile and verbal cues to get legs moving to transfer from BSC to bed, pt fatiguing during this portion of session    Activities of Daily Living:  Feeding:  minimum assistance pt needed hand over hand to hold her water cup and self drink today  Toileting: moderate assistance and maximal assistance with different aspects of task; mod a with functional transfer, max assist pericare and managing LB clothing and brief      AMPAC 6 Click ADL:      Treatment & Education:  ADL as noted above.  Pt able to sit EOB for 1 to 2 min with minimal support.  Pt tearful upon sitting on BSC for a few minutes, crying, "I want to get back on the bed!"  Pt very cooperative and pleasant, just upset.  After getting back to bedside on EOB, pt needed max assist from supine to sit.  Pt able to do 2 sets of arm exercises upon OT demonstration and instruction.  Pt daughter by bedside and watching exercises intently.  Upon finishing these, OT educated daughter that they can do those simple exercises again tonight, and OT also encouraged that pt sit up for her meal with assistance at dinner.  Pt daughter very agreeable and very happy that pt is more alert and at herself today.    Patient left HOB elevated with call button in reach and daughter present    GOALS:   Multidisciplinary Problems       Occupational Therapy Goals          Problem: Occupational Therapy    Goal Priority Disciplines Outcome Interventions   Occupational Therapy Goal     OT, " PT/OT Progressing    Description: STG: within 2 weeks  Pt will perform grooming with min a at sinkside from seated  Pt will bathe with mod a  Pt will perform UE dressing with mod a  Pt will perform LE dressing with mod a  Pt will sit EOB x 5 min with mod to min assistance  Pt will transfer bed/chair/bsc with mod a  Pt will perform standing task x 1 min with mod assistance x 1  Pt will tolerate 15 minutes of tx without fatigue      LT.Restore to max I with self care and mobility.                         Time Tracking:     OT Date of Treatment: 24  OT Start Time: 1330  OT Stop Time: 1400  OT Total Time (min): 30 min    Billable Minutes:Self Care/Home Management 22  Therapeutic Exercise 8    OT/CRISELDA: OT          2024

## 2024-08-29 NOTE — PLAN OF CARE
Problem: Adult Inpatient Plan of Care  Goal: Plan of Care Review  Outcome: Progressing  Goal: Patient-Specific Goal (Individualized)  Outcome: Progressing  Goal: Absence of Hospital-Acquired Illness or Injury  Outcome: Progressing  Goal: Optimal Comfort and Wellbeing  Outcome: Progressing  Goal: Readiness for Transition of Care  Outcome: Progressing     Problem: Fall Injury Risk  Goal: Absence of Fall and Fall-Related Injury  Outcome: Progressing     Problem: Skin Injury Risk Increased  Goal: Skin Health and Integrity  Outcome: Progressing     Problem: Wound  Goal: Optimal Coping  Outcome: Progressing  Goal: Optimal Functional Ability  Outcome: Progressing  Goal: Absence of Infection Signs and Symptoms  Outcome: Progressing

## 2024-08-29 NOTE — NURSING
Patient bladder scanned, no measurable amount of urine. NP notified of scan and that the patient isn't having much po intake.

## 2024-08-29 NOTE — PROGRESS NOTES
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  This was discussed with family who do not wish to have further testing at this time.  She has been at [facility] 8/[facility] following a fall at her home which resulted in fracture of the left greater trochanter which required surg. She had an episode of chest pain on Wed with no acute findings.  Echo revealed EF of 60%.  She is being admitted to [facility] for OT/ PT and medical management.       Pt is DNR     Overview/Hospital Course:  8/27 Agitation yesterday, did sleep last night and was good this am, however, after PT she became agitated and aggressive.  Will monitor and redirect.  Try Seroquel this PM this time.      8/28 Maybe a little better today.  She did sleep last night.  Was sitting up this am and not as agitated.  Will continue plan of care for now.  Hopefully some better tomorrow.     8/29 better night and this am, recognizing people and nurses.  Not eating or drinking much though.      Interval History: some better    Review of Systems   Respiratory:  Negative for shortness of breath.    Cardiovascular:  Negative for chest pain.   Gastrointestinal:  Negative for abdominal pain, nausea and vomiting.   Psychiatric/Behavioral:  Positive for confusion. Negative for agitation.         Confusion is better    All other systems reviewed and are negative.    Objective:     Vital Signs (Most Recent):  Temp: 97.1 °F (36.2 °C) (08/29/24 0713)  Pulse: 73  (08/29/24 0846)  Resp: 20 (08/29/24 0846)  BP: 127/76 (08/29/24 0713)  SpO2: 100 % (08/29/24 0846) Vital Signs (24h Range):  Temp:  [97.1 °F (36.2 °C)-97.2 °F (36.2 °C)] 97.1 °F (36.2 °C)  Pulse:  [67-75] 73  Resp:  [18-24] 20  SpO2:  [95 %-100 %] 100 %  BP: (127-132)/(76-77) 127/76     Weight: 54 kg (119 lb)  Body mass index is 24.04 kg/m².    Intake/Output Summary (Last 24 hours) at 8/29/2024 1057  Last data filed at 8/29/2024 0634  Gross per 24 hour   Intake 285.58 ml   Output 500 ml   Net -214.42 ml         Physical Exam  Vitals reviewed.   Constitutional:       General: She is not in acute distress.     Appearance: Normal appearance.   HENT:      Head: Normocephalic and atraumatic.   Eyes:      General: No scleral icterus.     Extraocular Movements: Extraocular movements intact.      Conjunctiva/sclera: Conjunctivae normal.      Pupils: Pupils are equal, round, and reactive to light.   Cardiovascular:      Rate and Rhythm: Normal rate and regular rhythm.      Heart sounds: No murmur heard.     No friction rub. No gallop.   Pulmonary:      Effort: Pulmonary effort is normal. No respiratory distress.      Breath sounds: Normal breath sounds. No wheezing or rales.   Abdominal:      General: Abdomen is flat. Bowel sounds are normal. There is no distension.      Palpations: Abdomen is soft.      Tenderness: There is no abdominal tenderness. There is no guarding.   Genitourinary:     Comments: + sesay   Musculoskeletal:         General: No swelling.      Right lower leg: No edema.      Left lower leg: No edema.   Skin:     General: Skin is warm and dry.      Coloration: Skin is not jaundiced.      Findings: No rash.   Neurological:      General: No focal deficit present.      Mental Status: She is alert.      Sensory: No sensory deficit.      Motor: Weakness present.      Comments: Less agitated today, asleep.  Family states a little better.   Psychiatric:      Comments: Confusion seems better             Significant  Labs: All pertinent labs within the past 24 hours have been reviewed.  Recent Lab Results       None            Significant Imaging: I have reviewed all pertinent imaging results/findings within the past 24 hours.    Assessment/Plan:      * S/p left hip fracture  PT/ OT  Fall precautions  Pain control    Delirium  Likely sundowning.  Has Seroquel prn.  Maybe a little better today.     Better last night       Severe protein-calorie malnutrition  Nutrition consulted. Most recent weight and BMI monitored-     Measurements:  Wt Readings from Last 1 Encounters:   08/23/24 54 kg (119 lb)   Body mass index is 24.04 kg/m².    Patient has been screened and assessed by RD.    Malnutrition Type:  Context: chronic illness  Level: severe    Malnutrition Characteristic Summary:  Weight Loss (Malnutrition): greater than 10% in 6 months  Energy Intake (Malnutrition): less than 75% for greater than or equal to 1 month    Interventions/Recommendations (treatment strategy):  Recommend to advance diet appropriate and tolerated; add Boost Plus all meals      Disease of thyroid gland  Continue home meds      GERD (gastroesophageal reflux disease)  Continue home meds      History of DVT (deep vein thrombosis)  Continue xeralto      Hypertension  Chronic, controlled. Latest blood pressure and vitals reviewed-     Temp:  [98.2 °F (36.8 °C)]   Pulse:  [68]   Resp:  [22]   BP: (117)/(66)   SpO2:  [96 %] .   Home meds for hypertension were reviewed and noted below.   Hypertension Medications               diltiaZEM HCl (TIAZAC) 120 mg 24 hr capsule Take 120 mg by mouth.    triamterene-hydrochlorothiazide 37.5-25 mg (DYAZIDE) 37.5-25 mg per capsule Take 1 capsule by mouth every morning.            While in the hospital, will manage blood pressure as follows; BP has been low, will hold meds for now    Monitor BP, replace home medication as warrented.      VTE Risk Mitigation (From admission, onward)           Ordered     rivaroxaban tablet 10 mg   Daily         08/23/24 1802     IP VTE LOW RISK PATIENT  Once         08/23/24 1725                    Discharge Planning   QUINN:      Code Status: DNR   Is the patient medically ready for discharge?:     Reason for patient still in hospital (select all that apply): Patient trending condition  Discharge Plan A: Other                  Daren Nicole DO  Department of Hospital Medicine   [facility]

## 2024-08-30 PROCEDURE — 25000003 PHARM REV CODE 250: Performed by: NURSE PRACTITIONER

## 2024-08-30 PROCEDURE — 97530 THERAPEUTIC ACTIVITIES: CPT

## 2024-08-30 PROCEDURE — S4991 NICOTINE PATCH NONLEGEND: HCPCS | Performed by: NURSE PRACTITIONER

## 2024-08-30 PROCEDURE — 99900035 HC TECH TIME PER 15 MIN (STAT)

## 2024-08-30 PROCEDURE — 63600175 PHARM REV CODE 636 W HCPCS: Performed by: HOSPITALIST

## 2024-08-30 PROCEDURE — 25000242 PHARM REV CODE 250 ALT 637 W/ HCPCS: Performed by: NURSE PRACTITIONER

## 2024-08-30 PROCEDURE — 25000003 PHARM REV CODE 250: Performed by: HOSPITALIST

## 2024-08-30 PROCEDURE — 99308 SBSQ NF CARE LOW MDM 20: CPT | Mod: ,,, | Performed by: HOSPITALIST

## 2024-08-30 PROCEDURE — 11000004 HC SNF PRIVATE

## 2024-08-30 PROCEDURE — 27000982 HC MATTRESS, MATRIX LAL RENTAL

## 2024-08-30 PROCEDURE — 97535 SELF CARE MNGMENT TRAINING: CPT

## 2024-08-30 PROCEDURE — 27000958

## 2024-08-30 PROCEDURE — 97110 THERAPEUTIC EXERCISES: CPT

## 2024-08-30 PROCEDURE — 94640 AIRWAY INHALATION TREATMENT: CPT

## 2024-08-30 RX ORDER — HYDROCODONE BITARTRATE AND ACETAMINOPHEN 5; 325 MG/1; MG/1
1 TABLET ORAL EVERY 6 HOURS PRN
Status: DISCONTINUED | OUTPATIENT
Start: 2024-08-30 | End: 2024-10-05 | Stop reason: HOSPADM

## 2024-08-30 RX ADMIN — ACETAMINOPHEN 650 MG: 325 TABLET ORAL at 04:08

## 2024-08-30 RX ADMIN — FERROUS SULFATE TAB 325 MG (65 MG ELEMENTAL FE) 1 EACH: 325 (65 FE) TAB at 04:08

## 2024-08-30 RX ADMIN — FERROUS SULFATE TAB 325 MG (65 MG ELEMENTAL FE) 1 EACH: 325 (65 FE) TAB at 11:08

## 2024-08-30 RX ADMIN — PANTOPRAZOLE SODIUM 40 MG: 40 TABLET, DELAYED RELEASE ORAL at 09:08

## 2024-08-30 RX ADMIN — DOCUSATE SODIUM 100 MG: 100 CAPSULE, LIQUID FILLED ORAL at 08:08

## 2024-08-30 RX ADMIN — IPRATROPIUM BROMIDE AND ALBUTEROL SULFATE 3 ML: 2.5; .5 SOLUTION RESPIRATORY (INHALATION) at 07:08

## 2024-08-30 RX ADMIN — PIPERACILLIN SODIUM AND TAZOBACTAM SODIUM 4.5 G: 4; .5 INJECTION, POWDER, LYOPHILIZED, FOR SOLUTION INTRAVENOUS at 04:08

## 2024-08-30 RX ADMIN — Medication 6 MG: at 08:08

## 2024-08-30 RX ADMIN — PIPERACILLIN SODIUM AND TAZOBACTAM SODIUM 4.5 G: 4; .5 INJECTION, POWDER, LYOPHILIZED, FOR SOLUTION INTRAVENOUS at 12:08

## 2024-08-30 RX ADMIN — ACETAMINOPHEN 650 MG: 325 TABLET ORAL at 03:08

## 2024-08-30 RX ADMIN — FERROUS SULFATE TAB 325 MG (65 MG ELEMENTAL FE) 1 EACH: 325 (65 FE) TAB at 07:08

## 2024-08-30 RX ADMIN — LEVOTHYROXINE SODIUM 112 MCG: 0.11 TABLET ORAL at 06:08

## 2024-08-30 RX ADMIN — POTASSIUM CHLORIDE 10 MEQ: 750 CAPSULE, EXTENDED RELEASE ORAL at 09:08

## 2024-08-30 RX ADMIN — HYDROCODONE BITARTRATE AND ACETAMINOPHEN 1 TABLET: 5; 325 TABLET ORAL at 11:08

## 2024-08-30 RX ADMIN — OXYCODONE HYDROCHLORIDE AND ACETAMINOPHEN 1 TABLET: 5; 325 TABLET ORAL at 06:08

## 2024-08-30 RX ADMIN — BUDESONIDE INHALATION 0.5 MG: 0.5 SUSPENSION RESPIRATORY (INHALATION) at 07:08

## 2024-08-30 RX ADMIN — DOCUSATE SODIUM 100 MG: 100 CAPSULE, LIQUID FILLED ORAL at 09:08

## 2024-08-30 RX ADMIN — NICOTINE 1 PATCH: 21 PATCH, EXTENDED RELEASE TRANSDERMAL at 09:08

## 2024-08-30 RX ADMIN — IPRATROPIUM BROMIDE AND ALBUTEROL SULFATE 3 ML: 2.5; .5 SOLUTION RESPIRATORY (INHALATION) at 02:08

## 2024-08-30 RX ADMIN — PIPERACILLIN SODIUM AND TAZOBACTAM SODIUM 4.5 G: 4; .5 INJECTION, POWDER, LYOPHILIZED, FOR SOLUTION INTRAVENOUS at 08:08

## 2024-08-30 RX ADMIN — HYDROCODONE BITARTRATE AND ACETAMINOPHEN 1 TABLET: 5; 325 TABLET ORAL at 09:08

## 2024-08-30 RX ADMIN — QUETIAPINE FUMARATE 25 MG: 25 TABLET ORAL at 08:08

## 2024-08-30 RX ADMIN — SODIUM CHLORIDE 1000 ML: 9 INJECTION, SOLUTION INTRAVENOUS at 04:08

## 2024-08-30 RX ADMIN — RIVAROXABAN 10 MG: 10 TABLET, FILM COATED ORAL at 09:08

## 2024-08-30 NOTE — NURSING
Bladder scanned patient and only 74 ml of urine was showing. Print out placed in chart. Notified DARRON Viramontes FNP of findings. No new orders received other than to closely monitor her output this shift.

## 2024-08-30 NOTE — NURSING
Checked diaper for urine output. None noted. Incontinent of BM. Diaper changed. Daughter at bedside and aware of her status.

## 2024-08-30 NOTE — NURSING
Urinary catheter discontinued during the day shift. Monitoring client for urinary retention. Assessed client for urinary output post- catheter removal. No output at this time.   Will continue to monitor.

## 2024-08-30 NOTE — NURSING
Patient was bladder scanned per ASIF Vargas RN & 271 ml of urine was showing in bladder. He and CNA assisted patient up to the bedside commode and she voided without difficulty. Unable to get a measurement due to patient having a BM also. DARRON Viramontes FNP notified of results.

## 2024-08-30 NOTE — PT/OT/SLP PROGRESS
Physical Therapy Treatment    Patient Name:  Yanet Viramontes   MRN:  [id number]    Recommendations:     Discharge Recommendations: Moderate Intensity Therapy  Discharge Equipment Recommendations: walker, rolling, bedside commode  Barriers to discharge: Inaccessible home and pain, functional loss    Assessment:     Yanet Viramontes is a 84 y.o. female admitted with a medical diagnosis of S/p left hip fracture.  She presents with the following impairments/functional limitations: weakness, impaired endurance, pain, impaired functional mobility, gait instability, impaired balance, impaired cognition, decreased lower extremity function, decreased safety awareness, decreased ROM, impaired skin, edema, orthopedic precautions .    Pt adamantly refused to ambulate this morning. Family states that it's because she did not sleep well last night. Pt was agreeable to standing at bedside and doing bedside ther ex. She expressed more pain today than yesterday. Pt was cognitive enough to rate her pain level which is the first time she's been able to do so with PT.    Rehab Prognosis: Good; patient would benefit from acute skilled PT services to address these deficits and reach maximum level of function.    Recent Surgery: * No surgery found *      Plan:     During this hospitalization, patient to be seen 5 x/week to address the identified rehab impairments via gait training, therapeutic activities, therapeutic exercises, neuromuscular re-education and progress toward the following goals:    Plan of Care Expires:  09/20/24    Subjective     Chief Complaint: Pt c/o being tired and hip hurting 8/10.  Patient/Family Comments/goals: Pt to dc to dtr Karla's home, per dtr.  Pain/Comfort:  Pain Rating 1: 8/10  Location - Side 1: Left  Location - Orientation 1: lower  Location 1: hip  Pain Addressed 1: Pre-medicate for activity, Cessation of Activity  Pain Rating Post-Intervention 1: 0/10      Objective:     Communicated with pt, dtr and son prior  "to session.  Patient found HOB elevated with peripheral IV upon PT entry to room.     General Precautions: Standard, fall  Orthopedic Precautions: LLE weight bearing as tolerated  Braces:    Respiratory Status: Room air     Functional Mobility:  Bed Mobility:     Scooting: dep x 2 to scoot to HOB, dep x 1 to scoot to EOB d/t pain.  Supine to Sit: maximal assistance  Sit to Supine: maximal assistance  Transfers:     Sit to Stand:  moderate assistance, of 1 persons, and from bed surface with rolling walker  Gait: Pt refused today.      AM-PAC 6 CLICK MOBILITY  Turning over in bed (including adjusting bedclothes, sheets and blankets)?: 3  Sitting down on and standing up from a chair with arms (e.g., wheelchair, bedside commode, etc.): 2  Moving from lying on back to sitting on the side of the bed?: 2  Moving to and from a bed to a chair (including a wheelchair)?: 2  Need to walk in hospital room?: 2  Climbing 3-5 steps with a railing?: 1  Basic Mobility Total Score: 12       Treatment & Education:  Bed ex's x 15 reps LLE: HS, SAQ, AP, GS, hip abd/add, hip abd/ER in hooklying, bridging x 5 reps. Passive stretching LLE 1x30": calf, quads, hip int and ext rotators, glutes. Pt transferred supine to sit to stand. She refused to amb, but she did stand about 2 minutes with min Ax1 statically with RW. Pt then transferred sit to supine. Dep x 2 to scoot up in bed.    Patient left HOB elevated with all lines intact, call button in reach, and son and dtr present..    GOALS:   Multidisciplinary Problems       Physical Therapy Goals          Problem: Physical Therapy    Goal Priority Disciplines Outcome Goal Variances Interventions   Physical Therapy Goal     PT, PT/OT Progressing     Description: STG - 2 weeks  1. Pt will transfer supine to/from sit with mod Ax1.  2. Pt will perform STS and bed transfer with mod Ax1.  3. Pt will have LLE strength of 3-/5.  4. Pt will amb with RW x 20 ft with mod Ax1.    LTG - 4 weeks  1. Pt will " transfer supine to/from sit with min Ax1.  2. Pt will perform STS and bed transfer with min Ax1.  3. Pt will have LLE strength of 3/5.  4. Pt will amb with RW x 50 ft with min Ax1.  5. Pt's ROM LLE will be WFL.                       Time Tracking:     PT Received On: 08/30/24  PT Start Time: 1039     PT Stop Time: 1102  PT Total Time (min): 23 min     Billable Minutes: Therapeutic Activity 8, Therapeutic Exercise 15, and Total Time 23 min    Treatment Type: Treatment  PT/PTA: PT     Number of PTA visits since last PT visit: 0     08/30/2024

## 2024-08-30 NOTE — PLAN OF CARE
[facility] - Medical Surgical Unit - Swing Bed   Interdisciplinary Team Meeting    Patient: Yanet Viramontes   Today's Date: 8/30/2024   Estimated D/C Date: 9/12/2024        Physician: Daren Nicole DO Nurse Practitioner: Brice Islas NP   Pharmacy: Brian Barriga, PharmD Unit Director: Temitope Chun RN   : Israel Graham RN Physical/Occupational Therapy: Cynthia Flores OT   Speech Therapy: ST Jasmina Activity Therapy: Geetha Carter   Nursing: Temitoep Chun RN  Respiratory: Lia Watkins,  Dietary: Kacey Tinoco RD  Other: n/a     Nursing  New Symptoms/Problems: n/a  Last Bowel Movement: 08/29/24  Urine: incontinent  Rockwell: No  Bowel: incontinent   Constipated: No  Diarrhea: No   Isolation: No  Wound Care: No  Wound Location/Tx: n/a  Cognition: Memory issues  Aspiration Precautions: No  Comment(s): n/a    Respiratory:   O2 Device: Room Air  O2 Flow: n/a  SpO2: 100%  Neb Tx: No  Comment(s): n/a    Dietary    Nutrition: Regular  Comment(s): n/a    Speech Therapy  Speech/Swallowing: No current speech or swallowing issues  Comment(s): No    Physical Therapy  Gait/Assistive Device: 5ft with w/c trail ELOS: Plan to DC 9/12/2024    Transfers: Moderate Assistance  Bed Mobility: Moderate Assistance Range of Motion/Restrictions: n/a  Comment(s): n\/a     Occupational Therapy  Eating/Grooming: Minimal Assistance Toileting: Maximum Assistance   Bathing: Maximum Assistance Dressing (Upper Body): Moderate Assistance   Dressing (Lower Body): Maximum Assistance Comment(s): n/a     Activity Therapy  Level of participation: Active participation  Comment(s): n/a    Pharmacy   Medication Changes (see MD orders in chart): No  Labs Reviewed: Yes  New Lab Orders: No  Comment(s): n/a      Tx Plan/Recommendations reviewed with family and/or patient on (date) 8./30.  Additional family Conference/Training: n/a  D/C Plan/Recommendations:      QUINN: 9/12/2024  Comment(s): discharge  discussed with patient's daughterChristy. Home hospice and transitioning patient to palliative care discussed. Will discuss among siblings.

## 2024-08-30 NOTE — PROGRESS NOTES
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  This was discussed with family who do not wish to have further testing at this time.  She has been at [facility] following a fall at her home which resulted in fracture of the left greater trochanter which required surg. She had an episode of chest pain on Wed with no acute findings.  Echo revealed EF of 60%.  She is being admitted to [facility] for OT/ PT and medical management.       Pt is DNR     Overview/Hospital Course:  8/27 Agitation yesterday, did sleep last night and was good this am, however, after PT she became agitated and aggressive.  Will monitor and redirect.  Try Seroquel this PM this time.      8/28 Maybe a little better today.  She did sleep last night.  Was sitting up this am and not as agitated.  Will continue plan of care for now.  Hopefully some better tomorrow.     8/29 better night and this am, recognizing people and nurses.  Not eating or drinking much though.      8/30 Didn't sleep good last night, but she is alert today just tired     Interval History: didn't sleep well but awake and alert today     Review of Systems   Respiratory:  Negative for shortness of breath.    Cardiovascular:  Negative for chest pain.   Gastrointestinal:  Negative for abdominal pain, nausea and vomiting.   Neurological:  Positive for weakness.   Psychiatric/Behavioral:  Positive for sleep disturbance. Negative for agitation and confusion.    All other  systems reviewed and are negative.    Objective:     Vital Signs (Most Recent):  Temp: 97.5 °F (36.4 °C) (08/30/24 0714)  Pulse: 63 (08/30/24 0747)  Resp: 20 (08/30/24 1115)  BP: 134/69 (08/30/24 0714)  SpO2: 100 % (08/30/24 0747) Vital Signs (24h Range):  Temp:  [97.5 °F (36.4 °C)-98.1 °F (36.7 °C)] 97.5 °F (36.4 °C)  Pulse:  [62-79] 63  Resp:  [18-20] 20  SpO2:  [96 %-100 %] 100 %  BP: (124-134)/(57-69) 134/69     Weight: 54 kg (119 lb)  Body mass index is 24.04 kg/m².    Intake/Output Summary (Last 24 hours) at 8/30/2024 1136  Last data filed at 8/30/2024 0745  Gross per 24 hour   Intake 887.77 ml   Output 101 ml   Net 786.77 ml         Physical Exam  Vitals reviewed.   Constitutional:       General: She is not in acute distress.     Appearance: Normal appearance.   HENT:      Head: Normocephalic and atraumatic.   Eyes:      General: No scleral icterus.     Extraocular Movements: Extraocular movements intact.      Conjunctiva/sclera: Conjunctivae normal.      Pupils: Pupils are equal, round, and reactive to light.   Cardiovascular:      Rate and Rhythm: Normal rate and regular rhythm.      Heart sounds: No murmur heard.     No friction rub. No gallop.   Pulmonary:      Effort: Pulmonary effort is normal. No respiratory distress.      Breath sounds: Normal breath sounds. No wheezing or rales.   Abdominal:      General: Abdomen is flat. Bowel sounds are normal. There is no distension.      Palpations: Abdomen is soft.      Tenderness: There is no abdominal tenderness. There is no guarding.   Genitourinary:     Comments: + sesay   Musculoskeletal:         General: No swelling.      Right lower leg: No edema.      Left lower leg: No edema.   Skin:     General: Skin is warm and dry.      Coloration: Skin is not jaundiced.      Findings: No rash.   Neurological:      General: No focal deficit present.      Mental Status: She is alert.      Sensory: No sensory deficit.      Motor: Weakness present.      Comments: Less  agitated today, asleep.  Family states a little better.   Psychiatric:      Comments: Confusion seems better             Significant Labs: All pertinent labs within the past 24 hours have been reviewed.  Recent Lab Results       None            Significant Imaging: I have reviewed all pertinent imaging results/findings within the past 24 hours.    Assessment/Plan:      * S/p left hip fracture  PT/ OT  Fall precautions  Pain control    Delirium  Likely sundowning.  Has Seroquel prn.  Maybe a little better today.     Better last night       Severe protein-calorie malnutrition  Nutrition consulted. Most recent weight and BMI monitored-     Measurements:  Wt Readings from Last 1 Encounters:   08/23/24 54 kg (119 lb)   Body mass index is 24.04 kg/m².    Patient has been screened and assessed by RD.    Malnutrition Type:  Context: chronic illness  Level: severe    Malnutrition Characteristic Summary:  Weight Loss (Malnutrition): greater than 10% in 6 months  Energy Intake (Malnutrition): less than 75% for greater than or equal to 1 month    Interventions/Recommendations (treatment strategy):  Recommend to advance diet appropriate and tolerated; add Boost Plus all meals      Disease of thyroid gland  Continue home meds      GERD (gastroesophageal reflux disease)  Continue home meds      History of DVT (deep vein thrombosis)  Continue xeralto      Hypertension  Chronic, controlled. Latest blood pressure and vitals reviewed-     Temp:  [98.2 °F (36.8 °C)]   Pulse:  [68]   Resp:  [22]   BP: (117)/(66)   SpO2:  [96 %] .   Home meds for hypertension were reviewed and noted below.   Hypertension Medications               diltiaZEM HCl (TIAZAC) 120 mg 24 hr capsule Take 120 mg by mouth.    triamterene-hydrochlorothiazide 37.5-25 mg (DYAZIDE) 37.5-25 mg per capsule Take 1 capsule by mouth every morning.            While in the hospital, will manage blood pressure as follows; BP has been low, will hold meds for now    Monitor BP,  replace home medication as warrented.      VTE Risk Mitigation (From admission, onward)           Ordered     rivaroxaban tablet 10 mg  Daily         08/23/24 1802     IP VTE LOW RISK PATIENT  Once         08/23/24 1725                    Discharge Planning   QUINN: 9/12/2024     Code Status: DNR   Is the patient medically ready for discharge?:     Reason for patient still in hospital (select all that apply): Patient trending condition and PT / OT recommendations  Discharge Plan A: Other                  Daren Nicole DO  Department of Hospital Medicine   [facility]

## 2024-08-30 NOTE — PT/OT/SLP PROGRESS
"Occupational Therapy   Treatment    Name: Yanet Viramontes  MRN: [id number]  Admitting Diagnosis:  S/p left hip fracture       Recommendations:     Discharge Recommendations: Low Intensity Therapy  Discharge Equipment Recommendations:   (TBD)  Barriers to discharge:  None    Assessment:     Yanet Viramontes is a 84 y.o. female with a medical diagnosis of S/p left hip fracture.  She presents with her daughter stating "she had a rough night and doesn't feel as well today.". Performance deficits affecting function are weakness, impaired endurance, impaired self care skills, impaired functional mobility, gait instability, impaired balance, impaired cognition, decreased safety awareness, impaired skin, orthopedic precautions.     Rehab Prognosis:  Fair; patient would benefit from acute skilled OT services to address these deficits and reach maximum level of function.       Plan:     Patient to be seen 5 x/week to address the above listed problems via self-care/home management, community/work re-entry, therapeutic activities, therapeutic exercises, neuromuscular re-education, cognitive retraining, sensory integration, wheelchair management/training  Plan of Care Expires: 09/27/24  Plan of Care Reviewed with: patient, caregiver, daughter, family    Subjective     Chief Complaint: tired  Patient/Family Comments/goals: family plans to take pt home with one of her children upon max gains in this environment5  Pain/Comfort:       Objective:     Communicated with: pt, pt daughter prior to session.  Patient found right sidelying with peripheral IV upon OT entry to room.    General Precautions: Standard, fall, aspiration    Orthopedic Precautions:LLE weight bearing as tolerated  Braces: N/A  Respiratory Status: Room air     Occupational Performance:     Bed Mobility:    Patient completed Rolling/Turning to Right with moderate assistance  Patient completed Scooting/Bridging with moderate assistance  Patient completed Supine to Sit with " moderate assistance     Functional Mobility/Transfers:  Patient completed Sit <> Stand Transfer with moderate assistance  with  hand-held assist, rolling walker, and grab bars(s)   Patient completed Bed <> Chair Transfer using Step Transfer technique with moderate assistance with hand-held assist, rolling walker, and grab bars(s)  Patient completed Toilet Transfer Step Transfer technique with moderate assistance with  hand-held assist, rolling walker, and grab bars  Functional Mobility: these transfers required assist of 2 persons overall between assisting pt and managing seating areas.    Activities of Daily Living:  Feeding:  contact guard assistance to min a occasionally as pt holding juice cup  Lower Body Dressing: maximal assistance with diaper brief; OT discussed with pt and her daughter that it might be appropriate for pt to begin using pullups next week to further normalize daily activity  Toileting: moderate assistance overall, needing max assist to       [facility] 6 Click ADL:      Treatment & Education:  OT engaged pt in bed mobility and transfer to toilet with assist of her daughter, Karla, who was present.  Pt very fearful of falling and even with repeated cueing for hand placement and feet placement as well as assist from OT for weight shifting in standing for improved transfer, she did not do as well today with step transfer.  She was able to stand x 2 min for pericare wiping which her daughter completed for her while OT assisting pt in safe standing and utilization of RW.  See ADL note above.  At end of session, OT assisted pt into g/c since she is still fatigued, needing to prop her feet up.  OT also retrieved an ENSURE juice to boost nutrition.  Pt only consumed small amount but was able to handle it with Setup and SBA overall, occasional min a to reposition it in her hands.    OT educated pt and her daughter once again regarding need of increasing nutritional intake as well as completing simple AROM  UB exercises twice daily with family over the weekend.  Daughter agreeable with all and pt relayed agreement.      Pt too fatigued to complete any additional exercises today.    Patient left up in chair with call button in reach and daughter present    GOALS:   Multidisciplinary Problems       Occupational Therapy Goals          Problem: Occupational Therapy    Goal Priority Disciplines Outcome Interventions   Occupational Therapy Goal     OT, PT/OT Progressing    Description: STG: within 2 weeks  Pt will perform grooming with min a at sinkside from seated  Pt will bathe with mod a  Pt will perform UE dressing with mod a  Pt will perform LE dressing with mod a  Pt will sit EOB x 5 min with mod to min assistance  Pt will transfer bed/chair/bsc with mod a  Pt will perform standing task x 1 min with mod assistance x 1  Pt will tolerate 15 minutes of tx without fatigue      LT.Restore to max I with self care and mobility.                         Time Tracking:     OT Date of Treatment: 24  OT Start Time: 1315  OT Stop Time: 1353  OT Total Time (min): 38 min    Billable Minutes:Self Care/Home Management 30  Therapeutic Activity 8    OT/CRISELDA: OT          2024

## 2024-08-30 NOTE — SUBJECTIVE & OBJECTIVE
Interval History: didn't sleep well but awake and alert today     Review of Systems   Respiratory:  Negative for shortness of breath.    Cardiovascular:  Negative for chest pain.   Gastrointestinal:  Negative for abdominal pain, nausea and vomiting.   Neurological:  Positive for weakness.   Psychiatric/Behavioral:  Positive for sleep disturbance. Negative for agitation and confusion.    All other systems reviewed and are negative.    Objective:     Vital Signs (Most Recent):  Temp: 97.5 °F (36.4 °C) (08/30/24 0714)  Pulse: 63 (08/30/24 0747)  Resp: 20 (08/30/24 1115)  BP: 134/69 (08/30/24 0714)  SpO2: 100 % (08/30/24 0747) Vital Signs (24h Range):  Temp:  [97.5 °F (36.4 °C)-98.1 °F (36.7 °C)] 97.5 °F (36.4 °C)  Pulse:  [62-79] 63  Resp:  [18-20] 20  SpO2:  [96 %-100 %] 100 %  BP: (124-134)/(57-69) 134/69     Weight: 54 kg (119 lb)  Body mass index is 24.04 kg/m².    Intake/Output Summary (Last 24 hours) at 8/30/2024 1136  Last data filed at 8/30/2024 0745  Gross per 24 hour   Intake 887.77 ml   Output 101 ml   Net 786.77 ml         Physical Exam  Vitals reviewed.   Constitutional:       General: She is not in acute distress.     Appearance: Normal appearance.   HENT:      Head: Normocephalic and atraumatic.   Eyes:      General: No scleral icterus.     Extraocular Movements: Extraocular movements intact.      Conjunctiva/sclera: Conjunctivae normal.      Pupils: Pupils are equal, round, and reactive to light.   Cardiovascular:      Rate and Rhythm: Normal rate and regular rhythm.      Heart sounds: No murmur heard.     No friction rub. No gallop.   Pulmonary:      Effort: Pulmonary effort is normal. No respiratory distress.      Breath sounds: Normal breath sounds. No wheezing or rales.   Abdominal:      General: Abdomen is flat. Bowel sounds are normal. There is no distension.      Palpations: Abdomen is soft.      Tenderness: There is no abdominal tenderness. There is no guarding.   Genitourinary:     Comments: +  lázaro   Musculoskeletal:         General: No swelling.      Right lower leg: No edema.      Left lower leg: No edema.   Skin:     General: Skin is warm and dry.      Coloration: Skin is not jaundiced.      Findings: No rash.   Neurological:      General: No focal deficit present.      Mental Status: She is alert.      Sensory: No sensory deficit.      Motor: Weakness present.      Comments: Less agitated today, asleep.  Family states a little better.   Psychiatric:      Comments: Confusion seems better             Significant Labs: All pertinent labs within the past 24 hours have been reviewed.  Recent Lab Results       None            Significant Imaging: I have reviewed all pertinent imaging results/findings within the past 24 hours.

## 2024-08-31 LAB
ANION GAP SERPL CALCULATED.3IONS-SCNC: 12 MMOL/L (ref 7–16)
BASOPHILS # BLD AUTO: 0.01 K/UL (ref 0–0.2)
BASOPHILS NFR BLD AUTO: 0.1 % (ref 0–1)
BUN SERPL-MCNC: 21 MG/DL (ref 7–18)
BUN/CREAT SERPL: 17 (ref 6–20)
CALCIUM SERPL-MCNC: 9.1 MG/DL (ref 8.5–10.1)
CHLORIDE SERPL-SCNC: 107 MMOL/L (ref 98–107)
CO2 SERPL-SCNC: 28 MMOL/L (ref 21–32)
CREAT SERPL-MCNC: 1.26 MG/DL (ref 0.55–1.02)
DIFFERENTIAL METHOD BLD: ABNORMAL
EGFR (NO RACE VARIABLE) (RUSH/TITUS): 42 ML/MIN/1.73M2
EOSINOPHIL # BLD AUTO: 0.13 K/UL (ref 0–0.5)
EOSINOPHIL NFR BLD AUTO: 1.7 % (ref 1–4)
ERYTHROCYTE [DISTWIDTH] IN BLOOD BY AUTOMATED COUNT: 14.6 % (ref 11.5–14.5)
GLUCOSE SERPL-MCNC: 106 MG/DL (ref 74–106)
HCT VFR BLD AUTO: 30 % (ref 38–47)
HGB BLD-MCNC: 9.5 G/DL (ref 12–16)
LYMPHOCYTES # BLD AUTO: 1.04 K/UL (ref 1–4.8)
LYMPHOCYTES NFR BLD AUTO: 13.7 % (ref 27–41)
MCH RBC QN AUTO: 29.9 PG (ref 27–31)
MCHC RBC AUTO-ENTMCNC: 31.7 G/DL (ref 32–36)
MCV RBC AUTO: 94.3 FL (ref 80–96)
MONOCYTES # BLD AUTO: 1.02 K/UL (ref 0–0.8)
MONOCYTES NFR BLD AUTO: 13.5 % (ref 2–6)
MPC BLD CALC-MCNC: 9.2 FL (ref 9.4–12.4)
NEUTROPHILS # BLD AUTO: 5.38 K/UL (ref 1.8–7.7)
NEUTROPHILS NFR BLD AUTO: 71 % (ref 53–65)
PLATELET # BLD AUTO: 616 K/UL (ref 150–400)
POTASSIUM SERPL-SCNC: 3.4 MMOL/L (ref 3.5–5.1)
RBC # BLD AUTO: 3.18 M/UL (ref 4.2–5.4)
SODIUM SERPL-SCNC: 144 MMOL/L (ref 136–145)
WBC # BLD AUTO: 7.58 K/UL (ref 4.5–11)

## 2024-08-31 PROCEDURE — 27000987 HC MATTRESS, MATRIX LOW PROFILE

## 2024-08-31 PROCEDURE — 63600175 PHARM REV CODE 636 W HCPCS: Performed by: HOSPITALIST

## 2024-08-31 PROCEDURE — 25000003 PHARM REV CODE 250: Performed by: HOSPITALIST

## 2024-08-31 PROCEDURE — 94761 N-INVAS EAR/PLS OXIMETRY MLT: CPT

## 2024-08-31 PROCEDURE — 25000003 PHARM REV CODE 250: Performed by: NURSE PRACTITIONER

## 2024-08-31 PROCEDURE — 36415 COLL VENOUS BLD VENIPUNCTURE: CPT | Performed by: NURSE PRACTITIONER

## 2024-08-31 PROCEDURE — 25000242 PHARM REV CODE 250 ALT 637 W/ HCPCS: Performed by: NURSE PRACTITIONER

## 2024-08-31 PROCEDURE — 80048 BASIC METABOLIC PNL TOTAL CA: CPT | Performed by: NURSE PRACTITIONER

## 2024-08-31 PROCEDURE — 11000004 HC SNF PRIVATE

## 2024-08-31 PROCEDURE — 85025 COMPLETE CBC W/AUTO DIFF WBC: CPT | Performed by: NURSE PRACTITIONER

## 2024-08-31 PROCEDURE — 27000958

## 2024-08-31 PROCEDURE — 94640 AIRWAY INHALATION TREATMENT: CPT

## 2024-08-31 PROCEDURE — S4991 NICOTINE PATCH NONLEGEND: HCPCS | Performed by: NURSE PRACTITIONER

## 2024-08-31 RX ORDER — SODIUM CHLORIDE 9 MG/ML
INJECTION, SOLUTION INTRAVENOUS CONTINUOUS
Status: DISPENSED | OUTPATIENT
Start: 2024-08-31 | End: 2024-09-02

## 2024-08-31 RX ADMIN — NICOTINE 1 PATCH: 21 PATCH, EXTENDED RELEASE TRANSDERMAL at 08:08

## 2024-08-31 RX ADMIN — Medication 6 MG: at 08:08

## 2024-08-31 RX ADMIN — DOCUSATE SODIUM 100 MG: 100 CAPSULE, LIQUID FILLED ORAL at 08:08

## 2024-08-31 RX ADMIN — HYDROCODONE BITARTRATE AND ACETAMINOPHEN 1 TABLET: 5; 325 TABLET ORAL at 08:08

## 2024-08-31 RX ADMIN — PIPERACILLIN SODIUM AND TAZOBACTAM SODIUM 4.5 G: 4; .5 INJECTION, POWDER, LYOPHILIZED, FOR SOLUTION INTRAVENOUS at 12:08

## 2024-08-31 RX ADMIN — LEVOTHYROXINE SODIUM 112 MCG: 0.11 TABLET ORAL at 06:08

## 2024-08-31 RX ADMIN — POTASSIUM CHLORIDE 10 MEQ: 750 CAPSULE, EXTENDED RELEASE ORAL at 08:08

## 2024-08-31 RX ADMIN — FERROUS SULFATE TAB 325 MG (65 MG ELEMENTAL FE) 1 EACH: 325 (65 FE) TAB at 11:08

## 2024-08-31 RX ADMIN — FERROUS SULFATE TAB 325 MG (65 MG ELEMENTAL FE) 1 EACH: 325 (65 FE) TAB at 08:08

## 2024-08-31 RX ADMIN — BUDESONIDE INHALATION 0.5 MG: 0.5 SUSPENSION RESPIRATORY (INHALATION) at 07:08

## 2024-08-31 RX ADMIN — ACETAMINOPHEN 650 MG: 325 TABLET ORAL at 08:08

## 2024-08-31 RX ADMIN — PANTOPRAZOLE SODIUM 40 MG: 40 TABLET, DELAYED RELEASE ORAL at 08:08

## 2024-08-31 RX ADMIN — QUETIAPINE FUMARATE 25 MG: 25 TABLET ORAL at 08:08

## 2024-08-31 RX ADMIN — SODIUM CHLORIDE: 9 INJECTION, SOLUTION INTRAVENOUS at 08:08

## 2024-08-31 RX ADMIN — BUDESONIDE INHALATION 0.5 MG: 0.5 SUSPENSION RESPIRATORY (INHALATION) at 09:08

## 2024-08-31 RX ADMIN — IPRATROPIUM BROMIDE AND ALBUTEROL SULFATE 3 ML: 2.5; .5 SOLUTION RESPIRATORY (INHALATION) at 02:08

## 2024-08-31 RX ADMIN — SODIUM CHLORIDE: 900 INJECTION, SOLUTION INTRAVENOUS at 09:08

## 2024-08-31 RX ADMIN — IPRATROPIUM BROMIDE AND ALBUTEROL SULFATE 3 ML: 2.5; .5 SOLUTION RESPIRATORY (INHALATION) at 09:08

## 2024-08-31 RX ADMIN — FERROUS SULFATE TAB 325 MG (65 MG ELEMENTAL FE) 1 EACH: 325 (65 FE) TAB at 04:08

## 2024-08-31 RX ADMIN — SODIUM CHLORIDE: 900 INJECTION, SOLUTION INTRAVENOUS at 03:08

## 2024-08-31 RX ADMIN — IPRATROPIUM BROMIDE AND ALBUTEROL SULFATE 3 ML: 2.5; .5 SOLUTION RESPIRATORY (INHALATION) at 07:08

## 2024-08-31 RX ADMIN — RIVAROXABAN 10 MG: 10 TABLET, FILM COATED ORAL at 08:08

## 2024-08-31 NOTE — NURSING
Daughter request to keep iv fluids running @ KVO to keep IV flowing in case we need IV access. NS started at 30ml/hr via pump to site left wrist.

## 2024-09-01 PROCEDURE — 25000003 PHARM REV CODE 250: Performed by: NURSE PRACTITIONER

## 2024-09-01 PROCEDURE — 94761 N-INVAS EAR/PLS OXIMETRY MLT: CPT

## 2024-09-01 PROCEDURE — 94640 AIRWAY INHALATION TREATMENT: CPT

## 2024-09-01 PROCEDURE — 11000004 HC SNF PRIVATE

## 2024-09-01 PROCEDURE — S4991 NICOTINE PATCH NONLEGEND: HCPCS | Performed by: NURSE PRACTITIONER

## 2024-09-01 PROCEDURE — 25000003 PHARM REV CODE 250: Performed by: HOSPITALIST

## 2024-09-01 PROCEDURE — 25000242 PHARM REV CODE 250 ALT 637 W/ HCPCS: Performed by: NURSE PRACTITIONER

## 2024-09-01 RX ADMIN — IPRATROPIUM BROMIDE AND ALBUTEROL SULFATE 3 ML: 2.5; .5 SOLUTION RESPIRATORY (INHALATION) at 01:09

## 2024-09-01 RX ADMIN — FERROUS SULFATE TAB 325 MG (65 MG ELEMENTAL FE) 1 EACH: 325 (65 FE) TAB at 05:09

## 2024-09-01 RX ADMIN — IPRATROPIUM BROMIDE AND ALBUTEROL SULFATE 3 ML: 2.5; .5 SOLUTION RESPIRATORY (INHALATION) at 07:09

## 2024-09-01 RX ADMIN — DOCUSATE SODIUM 100 MG: 100 CAPSULE, LIQUID FILLED ORAL at 08:09

## 2024-09-01 RX ADMIN — BUDESONIDE INHALATION 0.5 MG: 0.5 SUSPENSION RESPIRATORY (INHALATION) at 07:09

## 2024-09-01 RX ADMIN — HYDROCODONE BITARTRATE AND ACETAMINOPHEN 1 TABLET: 5; 325 TABLET ORAL at 08:09

## 2024-09-01 RX ADMIN — QUETIAPINE FUMARATE 25 MG: 25 TABLET ORAL at 08:09

## 2024-09-01 RX ADMIN — Medication 6 MG: at 08:09

## 2024-09-01 RX ADMIN — RIVAROXABAN 10 MG: 10 TABLET, FILM COATED ORAL at 08:09

## 2024-09-01 RX ADMIN — SODIUM CHLORIDE: 9 INJECTION, SOLUTION INTRAVENOUS at 11:09

## 2024-09-01 RX ADMIN — FERROUS SULFATE TAB 325 MG (65 MG ELEMENTAL FE) 1 EACH: 325 (65 FE) TAB at 07:09

## 2024-09-01 RX ADMIN — NICOTINE 1 PATCH: 21 PATCH, EXTENDED RELEASE TRANSDERMAL at 08:09

## 2024-09-01 RX ADMIN — PANTOPRAZOLE SODIUM 40 MG: 40 TABLET, DELAYED RELEASE ORAL at 08:09

## 2024-09-01 RX ADMIN — POTASSIUM CHLORIDE 10 MEQ: 750 CAPSULE, EXTENDED RELEASE ORAL at 08:09

## 2024-09-01 RX ADMIN — ACETAMINOPHEN 650 MG: 325 TABLET ORAL at 01:09

## 2024-09-01 RX ADMIN — FERROUS SULFATE TAB 325 MG (65 MG ELEMENTAL FE) 1 EACH: 325 (65 FE) TAB at 11:09

## 2024-09-01 RX ADMIN — IPRATROPIUM BROMIDE AND ALBUTEROL SULFATE 3 ML: 2.5; .5 SOLUTION RESPIRATORY (INHALATION) at 08:09

## 2024-09-01 RX ADMIN — LEVOTHYROXINE SODIUM 112 MCG: 0.11 TABLET ORAL at 06:09

## 2024-09-01 RX ADMIN — BUDESONIDE INHALATION 0.5 MG: 0.5 SUSPENSION RESPIRATORY (INHALATION) at 08:09

## 2024-09-02 PROCEDURE — 27000987 HC MATTRESS, MATRIX LOW PROFILE

## 2024-09-02 PROCEDURE — 11000004 HC SNF PRIVATE

## 2024-09-02 PROCEDURE — 27000958

## 2024-09-02 PROCEDURE — 97116 GAIT TRAINING THERAPY: CPT

## 2024-09-02 PROCEDURE — 94640 AIRWAY INHALATION TREATMENT: CPT

## 2024-09-02 PROCEDURE — 25000242 PHARM REV CODE 250 ALT 637 W/ HCPCS: Performed by: NURSE PRACTITIONER

## 2024-09-02 PROCEDURE — 97110 THERAPEUTIC EXERCISES: CPT

## 2024-09-02 PROCEDURE — 99900035 HC TECH TIME PER 15 MIN (STAT)

## 2024-09-02 PROCEDURE — 25000003 PHARM REV CODE 250: Performed by: NURSE PRACTITIONER

## 2024-09-02 PROCEDURE — 94761 N-INVAS EAR/PLS OXIMETRY MLT: CPT

## 2024-09-02 PROCEDURE — 25000003 PHARM REV CODE 250: Performed by: HOSPITALIST

## 2024-09-02 PROCEDURE — S4991 NICOTINE PATCH NONLEGEND: HCPCS | Performed by: NURSE PRACTITIONER

## 2024-09-02 RX ADMIN — ACETAMINOPHEN 650 MG: 325 TABLET ORAL at 08:09

## 2024-09-02 RX ADMIN — FERROUS SULFATE TAB 325 MG (65 MG ELEMENTAL FE) 1 EACH: 325 (65 FE) TAB at 05:09

## 2024-09-02 RX ADMIN — HYDROCODONE BITARTRATE AND ACETAMINOPHEN 1 TABLET: 5; 325 TABLET ORAL at 11:09

## 2024-09-02 RX ADMIN — Medication 6 MG: at 08:09

## 2024-09-02 RX ADMIN — FERROUS SULFATE TAB 325 MG (65 MG ELEMENTAL FE) 1 EACH: 325 (65 FE) TAB at 08:09

## 2024-09-02 RX ADMIN — BUDESONIDE INHALATION 0.5 MG: 0.5 SUSPENSION RESPIRATORY (INHALATION) at 09:09

## 2024-09-02 RX ADMIN — POTASSIUM CHLORIDE 10 MEQ: 750 CAPSULE, EXTENDED RELEASE ORAL at 08:09

## 2024-09-02 RX ADMIN — IPRATROPIUM BROMIDE AND ALBUTEROL SULFATE 3 ML: 2.5; .5 SOLUTION RESPIRATORY (INHALATION) at 07:09

## 2024-09-02 RX ADMIN — IPRATROPIUM BROMIDE AND ALBUTEROL SULFATE 3 ML: 2.5; .5 SOLUTION RESPIRATORY (INHALATION) at 08:09

## 2024-09-02 RX ADMIN — NICOTINE 1 PATCH: 21 PATCH, EXTENDED RELEASE TRANSDERMAL at 08:09

## 2024-09-02 RX ADMIN — IPRATROPIUM BROMIDE AND ALBUTEROL SULFATE 3 ML: 2.5; .5 SOLUTION RESPIRATORY (INHALATION) at 01:09

## 2024-09-02 RX ADMIN — QUETIAPINE FUMARATE 25 MG: 25 TABLET ORAL at 08:09

## 2024-09-02 RX ADMIN — DOCUSATE SODIUM 100 MG: 100 CAPSULE, LIQUID FILLED ORAL at 08:09

## 2024-09-02 RX ADMIN — PANTOPRAZOLE SODIUM 40 MG: 40 TABLET, DELAYED RELEASE ORAL at 08:09

## 2024-09-02 RX ADMIN — FERROUS SULFATE TAB 325 MG (65 MG ELEMENTAL FE) 1 EACH: 325 (65 FE) TAB at 11:09

## 2024-09-02 RX ADMIN — RIVAROXABAN 10 MG: 10 TABLET, FILM COATED ORAL at 08:09

## 2024-09-02 RX ADMIN — LEVOTHYROXINE SODIUM 112 MCG: 0.11 TABLET ORAL at 06:09

## 2024-09-02 RX ADMIN — HYDROCODONE BITARTRATE AND ACETAMINOPHEN 1 TABLET: 5; 325 TABLET ORAL at 08:09

## 2024-09-02 RX ADMIN — BUDESONIDE INHALATION 0.5 MG: 0.5 SUSPENSION RESPIRATORY (INHALATION) at 07:09

## 2024-09-02 NOTE — NURSING
Removed 17 staples from surgical incision left hip. No redness, heat or drainage noted. Edges well aproximated. Wiped with iodine stick, steristrips applied. Instructions given to daughters to leave incision open to air, cleanse daily and PRN and pat dry. Do Not tug strips as they will wear off.

## 2024-09-02 NOTE — PLAN OF CARE
Problem: Physical Therapy  Goal: Physical Therapy Goal  Description: STG - 2 weeks  1. Pt will transfer supine to/from sit with mod Ax1.-PROGRESSING  2. Pt will perform STS and bed transfer with mod Ax1.-MET  3. Pt will have LLE strength of 3-/5.-MET  4. Pt will amb with RW x 20 ft with mod Ax1.-PROGRESSING    LTG - 4 weeks  1. Pt will transfer supine to/from sit with min Ax1.  2. Pt will perform STS and bed transfer with min Ax1.  3. Pt will have LLE strength of 3/5.  4. Pt will amb with RW x 50 ft with min Ax1.  5. Pt's ROM LLE will be WFL.  Outcome: Progressing

## 2024-09-02 NOTE — PT/OT/SLP PROGRESS
Physical Therapy Treatment    Patient Name:  Yanet Viramontes   MRN:  [id number]    Recommendations:     Discharge Recommendations: Moderate Intensity Therapy  Discharge Equipment Recommendations: walker, rolling, bedside commode  Barriers to discharge: Inaccessible home and pain, functional loss    Assessment:     Yanet Viramontes is a 84 y.o. female admitted with a medical diagnosis of S/p left hip fracture.  She presents with the following impairments/functional limitations: weakness, impaired endurance, impaired functional mobility, gait instability, impaired balance, impaired cognition, decreased lower extremity function, decreased upper extremity function, pain, decreased ROM, orthopedic precautions .      Rehab Prognosis: Good; patient would benefit from acute skilled PT services to address these deficits and reach maximum level of function.    Recent Surgery: * No surgery found *      Plan:     During this hospitalization, patient to be seen 5 x/week to address the identified rehab impairments via gait training, therapeutic activities, therapeutic exercises, neuromuscular re-education and progress toward the following goals:    Plan of Care Expires:  09/20/24    Subjective     Chief Complaint: Pt agreeable to therapy and walking today.   Patient/Family Comments/goals: Pt to dc to dtr Karla's home, per dtr.  Pain/Comfort:  Pain Rating 1: 8/10  Location - Side 1: Left  Location - Orientation 1: lower  Location 1: hip  Pain Addressed 1: Pre-medicate for activity      Objective:     Communicated with pt, dtr  prior to session.  Patient found HOB elevated with peripheral IV upon PT entry to room.     General Precautions: Standard, fall  Orthopedic Precautions: LLE weight bearing as tolerated  Braces:    Respiratory Status: Room air     Functional Mobility:  Bed Mobility:     Scooting: maximal assistance and of 1 persons  Supine to Sit: MOD/MAX Ax1  Transfers:     Sit to Stand:  moderate assistance and of 1 persons with  "rolling walker  Gait: 8 ft fwd using RW/wc trail/ IV pole. Pt advancing limbs better.      AM-PAC 6 CLICK MOBILITY  Turning over in bed (including adjusting bedclothes, sheets and blankets)?: 3  Sitting down on and standing up from a chair with arms (e.g., wheelchair, bedside commode, etc.): 2  Moving from lying on back to sitting on the side of the bed?: 2  Moving to and from a bed to a chair (including a wheelchair)?: 2  Need to walk in hospital room?: 2  Climbing 3-5 steps with a railing?: 1  Basic Mobility Total Score: 12       Treatment & Education:  Bed ex's x 12 reps BLE: HS, AP, GS, hip abd/add, hip IR/ER, SLR x 5. Passive stretching LLE 1x30": calf. Pt transferred supine to sit to stand.     Patient left up in chair with all lines intact, call button in reach, and family and dtr present..    GOALS:   Multidisciplinary Problems       Physical Therapy Goals          Problem: Physical Therapy    Goal Priority Disciplines Outcome Goal Variances Interventions   Physical Therapy Goal     PT, PT/OT Progressing     Description: STG - 2 weeks  1. Pt will transfer supine to/from sit with mod Ax1.-PROGRESSING  2. Pt will perform STS and bed transfer with mod Ax1.-MET  3. Pt will have LLE strength of 3-/5.-MET  4. Pt will amb with RW x 20 ft with mod Ax1.-PROGRESSING    LTG - 4 weeks  1. Pt will transfer supine to/from sit with min Ax1.  2. Pt will perform STS and bed transfer with min Ax1.  3. Pt will have LLE strength of 3/5.  4. Pt will amb with RW x 50 ft with min Ax1.  5. Pt's ROM LLE will be WFL.                       Time Tracking:     PT Received On: 09/02/24  PT Start Time: 1300     PT Stop Time: 1323  PT Total Time (min): 23 min     Billable Minutes: Gait Training 8, Therapeutic Activity 3, Therapeutic Exercise 12, and Total Time 23 min    Treatment Type: Treatment  PT/PTA: PT     Number of PTA visits since last PT visit: 0     09/02/2024  "

## 2024-09-02 NOTE — NURSING
CNA reports no urine output since 6am. Bladder scan done no urine in bladder. Patient states she does not have to pee and that she doesn't drink much because then she would need to get up more to use the bsc. Daughter encourages her to drink more and tells her its good for her to get moving. Patient states understanding.

## 2024-09-03 PROCEDURE — 97530 THERAPEUTIC ACTIVITIES: CPT

## 2024-09-03 PROCEDURE — 94640 AIRWAY INHALATION TREATMENT: CPT

## 2024-09-03 PROCEDURE — 27000958

## 2024-09-03 PROCEDURE — 97110 THERAPEUTIC EXERCISES: CPT

## 2024-09-03 PROCEDURE — 25000242 PHARM REV CODE 250 ALT 637 W/ HCPCS: Performed by: NURSE PRACTITIONER

## 2024-09-03 PROCEDURE — 11000004 HC SNF PRIVATE

## 2024-09-03 PROCEDURE — 27000987 HC MATTRESS, MATRIX LOW PROFILE

## 2024-09-03 PROCEDURE — 25000003 PHARM REV CODE 250: Performed by: NURSE PRACTITIONER

## 2024-09-03 PROCEDURE — 25000003 PHARM REV CODE 250: Performed by: HOSPITALIST

## 2024-09-03 PROCEDURE — S4991 NICOTINE PATCH NONLEGEND: HCPCS | Performed by: NURSE PRACTITIONER

## 2024-09-03 PROCEDURE — 94761 N-INVAS EAR/PLS OXIMETRY MLT: CPT

## 2024-09-03 PROCEDURE — 97116 GAIT TRAINING THERAPY: CPT

## 2024-09-03 RX ADMIN — IPRATROPIUM BROMIDE AND ALBUTEROL SULFATE 3 ML: 2.5; .5 SOLUTION RESPIRATORY (INHALATION) at 09:09

## 2024-09-03 RX ADMIN — PANTOPRAZOLE SODIUM 40 MG: 40 TABLET, DELAYED RELEASE ORAL at 08:09

## 2024-09-03 RX ADMIN — BUDESONIDE INHALATION 0.5 MG: 0.5 SUSPENSION RESPIRATORY (INHALATION) at 09:09

## 2024-09-03 RX ADMIN — IPRATROPIUM BROMIDE AND ALBUTEROL SULFATE 3 ML: 2.5; .5 SOLUTION RESPIRATORY (INHALATION) at 01:09

## 2024-09-03 RX ADMIN — FERROUS SULFATE TAB 325 MG (65 MG ELEMENTAL FE) 1 EACH: 325 (65 FE) TAB at 10:09

## 2024-09-03 RX ADMIN — HYDROCODONE BITARTRATE AND ACETAMINOPHEN 1 TABLET: 5; 325 TABLET ORAL at 08:09

## 2024-09-03 RX ADMIN — Medication 6 MG: at 08:09

## 2024-09-03 RX ADMIN — BUDESONIDE INHALATION 0.5 MG: 0.5 SUSPENSION RESPIRATORY (INHALATION) at 07:09

## 2024-09-03 RX ADMIN — QUETIAPINE FUMARATE 25 MG: 25 TABLET ORAL at 08:09

## 2024-09-03 RX ADMIN — FERROUS SULFATE TAB 325 MG (65 MG ELEMENTAL FE) 1 EACH: 325 (65 FE) TAB at 08:09

## 2024-09-03 RX ADMIN — NICOTINE 1 PATCH: 21 PATCH, EXTENDED RELEASE TRANSDERMAL at 08:09

## 2024-09-03 RX ADMIN — IPRATROPIUM BROMIDE AND ALBUTEROL SULFATE 3 ML: 2.5; .5 SOLUTION RESPIRATORY (INHALATION) at 07:09

## 2024-09-03 RX ADMIN — RIVAROXABAN 10 MG: 10 TABLET, FILM COATED ORAL at 08:09

## 2024-09-03 RX ADMIN — LEVOTHYROXINE SODIUM 112 MCG: 0.11 TABLET ORAL at 06:09

## 2024-09-03 RX ADMIN — POTASSIUM CHLORIDE 10 MEQ: 750 CAPSULE, EXTENDED RELEASE ORAL at 08:09

## 2024-09-03 RX ADMIN — HYDROCODONE BITARTRATE AND ACETAMINOPHEN 1 TABLET: 5; 325 TABLET ORAL at 01:09

## 2024-09-03 RX ADMIN — FERROUS SULFATE TAB 325 MG (65 MG ELEMENTAL FE) 1 EACH: 325 (65 FE) TAB at 05:09

## 2024-09-03 RX ADMIN — ACETAMINOPHEN 650 MG: 325 TABLET ORAL at 08:09

## 2024-09-03 NOTE — PLAN OF CARE
Problem: Breathing Pattern Ineffective  Goal: Effective Breathing Pattern  9/2/2024 1942 by Kriss Chung, RRT  Outcome: Progressing  9/2/2024 1939 by Kriss Chung, RRT  Outcome: Progressing     Problem: Gas Exchange Impaired  Goal: Optimal Gas Exchange  9/2/2024 1942 by Kriss Chung, RRT  Outcome: Progressing  9/2/2024 1939 by Kriss Chung, RRT  Outcome: Progressing

## 2024-09-03 NOTE — PLAN OF CARE
Problem: Adult Inpatient Plan of Care  Goal: Plan of Care Review  Outcome: Progressing  Goal: Patient-Specific Goal (Individualized)  Outcome: Progressing  Goal: Absence of Hospital-Acquired Illness or Injury  Outcome: Progressing  Goal: Optimal Comfort and Wellbeing  Outcome: Progressing  Goal: Readiness for Transition of Care  Outcome: Progressing     Problem: Fall Injury Risk  Goal: Absence of Fall and Fall-Related Injury  Outcome: Progressing     Problem: Skin Injury Risk Increased  Goal: Skin Health and Integrity  Outcome: Progressing     Problem: Infection  Goal: Absence of Infection Signs and Symptoms  Outcome: Progressing

## 2024-09-03 NOTE — PT/OT/SLP PROGRESS
Physical Therapy Treatment    Patient Name:  Yanet Viramontes   MRN:  [id number]    Recommendations:     Discharge Recommendations: Moderate Intensity Therapy  Discharge Equipment Recommendations: walker, rolling (YOUTH sized walker with wheels d/t short stature)  Barriers to discharge: Inaccessible home and pain, functional loss    Assessment:     Yanet Viramontes is a 84 y.o. female admitted with a medical diagnosis of S/p left hip fracture.  She presents with the following impairments/functional limitations: weakness, impaired endurance, impaired functional mobility, gait instability, impaired balance, decreased lower extremity function, pain, decreased ROM, impaired skin, edema, orthopedic precautions .      Rehab Prognosis: Good; patient would benefit from acute skilled PT services to address these deficits and reach maximum level of function.    Recent Surgery: * No surgery found *      Plan:     During this hospitalization, patient to be seen 5 x/week to address the identified rehab impairments via gait training, therapeutic activities, therapeutic exercises, neuromuscular re-education and progress toward the following goals:    Plan of Care Expires:  09/20/24    Subjective     Chief Complaint: Pt agreeable to therapy, but c/o being tired from sitting up all morning.   Patient/Family Comments/goals: Pt to dc to dtr Karla's home, per dtr.  Pain/Comfort:  Pain Rating 1: 8/10  Location - Side 1: Left  Location - Orientation 1: lower  Location 1: hip  Pain Addressed 1: Pre-medicate for activity, Cessation of Activity      Objective:     Communicated with pt, son  prior to session.  Patient found up in chair with peripheral IV upon PT entry to room.     General Precautions: Standard, fall  Orthopedic Precautions: LLE weight bearing as tolerated  Braces:    Respiratory Status: Room air     Functional Mobility:  Bed Mobility:     Scooting: maximal assistance and of 1 persons  Supine to Sit: MOD/MAX Ax1  Transfers:     Sit to  Stand:  moderate assistance and of 1 persons with rolling walker  Gait: 15 ft fwd using RW/wc trail with min/mod Ax1. Pt advancing limbs better. She required mod VC to push through hands when standing on LLE and trying to advance RLE.       AM-PAC 6 CLICK MOBILITY          Treatment & Education:  Pt transferred bedside chair to [facility] [place] [place] mod Ax1 (STS with mod Ax1 from bedside chair and [facility] [place] [place]) and transported to the PT gym with son present throughout treatment session.  Pt had a little confusion in how to turn with the walker to sit in the [facility] [place] [place]. Pt instructed to push up from chair, scoot hips to end of chair/wc, reach back for armrests before sitting down.  Nu-step at Level 1.0 from wc level x 3 minutes using BUE/BLE. PT notified Israel Graham, Swing Bed Coord that pt will need a YOUTH sized RW.    Patient left  on C with NICK Ontiveros present  with call button in reach and   present..    GOALS:   Multidisciplinary Problems       Physical Therapy Goals          Problem: Physical Therapy    Goal Priority Disciplines Outcome Goal Variances Interventions   Physical Therapy Goal     PT, PT/OT Progressing     Description: STG - 2 weeks  1. Pt will transfer supine to/from sit with mod Ax1.-PROGRESSING  2. Pt will perform STS and bed transfer with mod Ax1.-MET  3. Pt will have LLE strength of 3-/5.-MET  4. Pt will amb with RW x 20 ft with mod Ax1.-PROGRESSING    LTG - 4 weeks  1. Pt will transfer supine to/from sit with min Ax1.  2. Pt will perform STS and bed transfer with min Ax1.  3. Pt will have LLE strength of 3/5.  4. Pt will amb with RW x 50 ft with min Ax1.  5. Pt's ROM LLE will be WFL.                       Time Tracking:     PT Received On: 09/03/24  PT Start Time: 1232     PT Stop Time: 1257  PT Total Time (min): 25 min     Billable Minutes: Gait Training 10, Therapeutic Activity 10, Therapeutic Exercise 5, and Total Time 25 min    Treatment Type: Treatment  PT/PTA: PT     Number of PTA visits since last PT visit: 0      09/03/2024

## 2024-09-03 NOTE — PLAN OF CARE
Problem: Occupational Therapy  Goal: Occupational Therapy Goal  Description: STG: within 2 weeks  Pt will perform grooming with min a at sinkside from seated MET  Pt will bathe with mod a MET  Pt will perform UE dressing with mod a MET  Pt will perform LE dressing with mod a ONGOING/PROGRESSING  Pt will sit EOB x 5 min with mod to min assistance MET  Pt will transfer bed/chair/bsc with mod a MET  Pt will perform standing task x 1 min with mod assistance x 1 MET  Pt will tolerate 15 minutes of tx without fatigue MET      LT.Restore to max I with self care and mobility. ONGOING/PROGRESSING    Outcome: Progressing

## 2024-09-03 NOTE — PT/OT/SLP PROGRESS
Occupational Therapy  Treatment    Yanet Viramontes   MRN: [id number]   Admitting Diagnosis: S/p left hip fracture    OT Date of Treatment: 09/03/24   OT Start Time: 1025  OT Stop Time: 1046  OT Total Time (min): 21 min    Billable Minutes:  Therapeutic Activity 7 and Therapeutic Exercise 14    OT/CRISELDA: OT          General Precautions: Standard, fall, aspiration  Orthopedic Precautions: LLE weight bearing as tolerated  Braces: N/A  Respiratory Status: Room air         Subjective:  Communicated with pt and her DIL prior to session.  Pt states she is feeling much better.       Objective:  Patient found with: peripheral IV     Functional Mobility:  Bed Mobility: na today with OT       Transfers: sit to stand with RW, needing cues for hand placement and feet placement, overall mod/min a to rise, but once up on her feet, requiring min a for one legged stance briefly to flex one leg at the time while supporting herself with RW.        Functional Ambulation: see PT    Activities of Daily Living:     Feeding adaptive equipment:  none required     UE adaptive equipment: none required     LE adaptive equipment: not required at this time                    Bathing adaptive equipment: TBD    Balance:   Static Sit: FAIR+: Able to take MINIMAL challenges from all directions  Dynamic Sit: FAIR+: Maintains balance through MINIMAL excursions of active trunk motion  Static Stand: POOR+: Needs MINIMAL assist to maintain  Dynamic stand: POOR+: Needs MIN (minimal ) assist during gait    Therapeutic Activities and Exercises:  OT engaged pt in UB exercises including:  Gripper level 2 resist x 10 reps x 3 sets each hand  Infinity band green resist in rows, pushups, tricep presses, horiz abduction x 10 reps each direction  1/2# dowel in sh flexion, ch press, and horiz abd/add x 10 reps x 2 sets    After standing activity as noted above, pt was fatigued and needing to rest.  Today, she was fully engaged and at herself mentally, such that she did  "not take rest breaks during exercises.  She was able to tolerate just over 20 min of this activity/exercise.      Additionally, her DIL discussed with OT when pt will be issued an assistive device of her own for ambulation, as the RW she has in room now is still too tall even at lowest setting.  OT relayed to pt DIL that usually, we do not like to order a device until we decide which one will be most beneficial to her in home setting.  Due to pt continues to progress and make gains, she might be able to use a rollator, but it is still too soon to tell.  Will cont progressing pt with borrowed assistive devices until we can make good decision on which she will be safest with at home.  Will let PT know DIL asked about this as well, so that she can discuss also with family as needed.      AM-PAC 6 CLICK ADL   How much help from another person does this patient currently need?   1 = Unable, Total/Dependent Assistance  2 = A lot, Maximum/Moderate Assistance  3 = A little, Minimum/Contact Guard/Supervision  4 = None, Modified [place]/Independent    Putting on and taking off regular lower body clothing? : 2  Bathing (including washing, rinsing, drying)?: 2  Toileting, which includes using toilet, bedpan, or urinal? : 2  Putting on and taking off regular upper body clothing?: 3  Taking care of personal grooming such as brushing teeth?: 3  Eating meals?: 3  Daily Activity Total Score: 15     AM-PAC Raw Score CMS "G-Code Modifier Level of Impairment Assistance   6 % Total / Unable   7 - 8 CM 80 - 100% Maximal Assist   9-13 CL 60 - 80% Moderate Assist   14 - 19 CK 40 - 60% Moderate Assist   20 - 22 CJ 20 - 40% Minimal Assist   23 CI 1-20% SBA / CGA   24 CH 0% Independent/ Mod I       Patient left up in chair with call button in reach and DIL present    ASSESSMENT:  Yanet Viramontes is a 84 y.o. female with a medical diagnosis of S/p left hip fracture and presents with improving mental level of alertness and activity " tolerance..    Rehab identified problem list/impairments:  weakness, impaired endurance, impaired self care skills, impaired functional mobility, gait instability, impaired balance, impaired cognition, decreased safety awareness, impaired skin, orthopedic precautions    Rehab potential is good.    Activity tolerance: Good    Discharge recommendations: Low Intensity Therapy   Barriers to discharge: Barriers to Discharge: None    Equipment recommendations:  (TBD)    GOALS:   Multidisciplinary Problems       Occupational Therapy Goals          Problem: Occupational Therapy    Goal Priority Disciplines Outcome Interventions   Occupational Therapy Goal     OT, PT/OT Progressing    Description: STG: within 2 weeks  Pt will perform grooming with min a at sinkside from seated MET  Pt will bathe with mod a MET  Pt will perform UE dressing with mod a MET  Pt will perform LE dressing with mod a ONGOING/PROGRESSING  Pt will sit EOB x 5 min with mod to min assistance MET  Pt will transfer bed/chair/bsc with mod a MET  Pt will perform standing task x 1 min with mod assistance x 1 MET  Pt will tolerate 15 minutes of tx without fatigue MET      LT.Restore to max I with self care and mobility. ONGOING/PROGRESSING                         Plan:  Patient to be seen 5 x/week to address the above listed problems via self-care/home management, community/work re-entry, therapeutic activities, therapeutic exercises, neuromuscular re-education, cognitive retraining, sensory integration, wheelchair management/training  Plan of Care expires: 24  Plan of Care reviewed with: patient, caregiver, daughter, family         2024

## 2024-09-04 PROCEDURE — 27000958

## 2024-09-04 PROCEDURE — 97112 NEUROMUSCULAR REEDUCATION: CPT

## 2024-09-04 PROCEDURE — 97530 THERAPEUTIC ACTIVITIES: CPT

## 2024-09-04 PROCEDURE — 11000004 HC SNF PRIVATE

## 2024-09-04 PROCEDURE — 97110 THERAPEUTIC EXERCISES: CPT

## 2024-09-04 PROCEDURE — 97116 GAIT TRAINING THERAPY: CPT

## 2024-09-04 PROCEDURE — 25000242 PHARM REV CODE 250 ALT 637 W/ HCPCS: Performed by: NURSE PRACTITIONER

## 2024-09-04 PROCEDURE — 27000987 HC MATTRESS, MATRIX LOW PROFILE

## 2024-09-04 PROCEDURE — 94761 N-INVAS EAR/PLS OXIMETRY MLT: CPT

## 2024-09-04 PROCEDURE — 25000003 PHARM REV CODE 250: Performed by: NURSE PRACTITIONER

## 2024-09-04 PROCEDURE — 25000003 PHARM REV CODE 250: Performed by: HOSPITALIST

## 2024-09-04 PROCEDURE — 99900035 HC TECH TIME PER 15 MIN (STAT)

## 2024-09-04 PROCEDURE — 94640 AIRWAY INHALATION TREATMENT: CPT

## 2024-09-04 PROCEDURE — S4991 NICOTINE PATCH NONLEGEND: HCPCS | Performed by: NURSE PRACTITIONER

## 2024-09-04 RX ADMIN — DOCUSATE SODIUM 100 MG: 100 CAPSULE, LIQUID FILLED ORAL at 08:09

## 2024-09-04 RX ADMIN — BUDESONIDE INHALATION 0.5 MG: 0.5 SUSPENSION RESPIRATORY (INHALATION) at 07:09

## 2024-09-04 RX ADMIN — FERROUS SULFATE TAB 325 MG (65 MG ELEMENTAL FE) 1 EACH: 325 (65 FE) TAB at 05:09

## 2024-09-04 RX ADMIN — NICOTINE 1 PATCH: 21 PATCH, EXTENDED RELEASE TRANSDERMAL at 08:09

## 2024-09-04 RX ADMIN — LEVOTHYROXINE SODIUM 112 MCG: 0.11 TABLET ORAL at 06:09

## 2024-09-04 RX ADMIN — IPRATROPIUM BROMIDE AND ALBUTEROL SULFATE 3 ML: 2.5; .5 SOLUTION RESPIRATORY (INHALATION) at 07:09

## 2024-09-04 RX ADMIN — RIVAROXABAN 10 MG: 10 TABLET, FILM COATED ORAL at 08:09

## 2024-09-04 RX ADMIN — HYDROCODONE BITARTRATE AND ACETAMINOPHEN 1 TABLET: 5; 325 TABLET ORAL at 06:09

## 2024-09-04 RX ADMIN — PANTOPRAZOLE SODIUM 40 MG: 40 TABLET, DELAYED RELEASE ORAL at 08:09

## 2024-09-04 RX ADMIN — Medication 6 MG: at 08:09

## 2024-09-04 RX ADMIN — HYDROCODONE BITARTRATE AND ACETAMINOPHEN 1 TABLET: 5; 325 TABLET ORAL at 11:09

## 2024-09-04 RX ADMIN — POTASSIUM CHLORIDE 10 MEQ: 750 CAPSULE, EXTENDED RELEASE ORAL at 08:09

## 2024-09-04 RX ADMIN — IPRATROPIUM BROMIDE AND ALBUTEROL SULFATE 3 ML: 2.5; .5 SOLUTION RESPIRATORY (INHALATION) at 01:09

## 2024-09-04 RX ADMIN — QUETIAPINE FUMARATE 25 MG: 25 TABLET ORAL at 08:09

## 2024-09-04 RX ADMIN — FERROUS SULFATE TAB 325 MG (65 MG ELEMENTAL FE) 1 EACH: 325 (65 FE) TAB at 11:09

## 2024-09-04 RX ADMIN — FERROUS SULFATE TAB 325 MG (65 MG ELEMENTAL FE) 1 EACH: 325 (65 FE) TAB at 08:09

## 2024-09-04 NOTE — PT/OT/SLP PROGRESS
Occupational Therapy   Treatment    Name: Yanet Viramontes  MRN: [id number]  Admitting Diagnosis:  S/p left hip fracture       Recommendations:     Discharge Recommendations: Low Intensity Therapy  Discharge Equipment Recommendations:   (TBD)  Barriers to discharge:  None    Assessment:     Yanet Viramontes is a 84 y.o. female with a medical diagnosis of S/p left hip fracture.  She presents with no new complaints, feeling much better. Performance deficits affecting function are weakness, impaired endurance, impaired self care skills, impaired functional mobility, gait instability, impaired balance, impaired cognition, decreased safety awareness, impaired skin, orthopedic precautions.     Rehab Prognosis:  Good; patient would benefit from acute skilled OT services to address these deficits and reach maximum level of function.       Plan:     Patient to be seen 5 x/week to address the above listed problems via self-care/home management, community/work re-entry, therapeutic activities, therapeutic exercises, neuromuscular re-education, cognitive retraining, sensory integration, wheelchair management/training  Plan of Care Expires: 09/27/24  Plan of Care Reviewed with: patient, caregiver, daughter, family    Subjective     Chief Complaint: stiff left hip  Patient/Family Comments/goals: home with her daughter for some time  Pain/Comfort:       Objective:     Communicated with: pt and her daughter, Karla, prior to session.  Patient found up in chair with peripheral IV upon OT entry to room.    General Precautions: Standard, fall, aspiration    Orthopedic Precautions:LLE weight bearing as tolerated  Braces: N/A  Respiratory Status: Room air     Occupational Performance:     Bed Mobility:    Na with OT    Functional Mobility/Transfers:  Patient completed Sit <> Stand Transfer with moderate assistance  with  hand-held assist, rolling walker, and grab bars(s)   Functional Mobility: pt is able to stand with min a and RW to get her robe  donned    Activities of Daily Living:  Pt able to tolerate a shower in shower room with her daughter and CNA assisting this morning.  She required mod assist from both to complete showering task effectively      [facility] 6 Click ADL:      Treatment & Education:  To improve UB endurance, strength, OT facilitated pt with:  RED PUTTY to simulate kitchen prep task and improve FM and     To improve reach, AROM, FM/ and trunk rotation with core forward leaning engagement:   Towel folding at tabletop with non weighted wrists from seated in w/c  Clothespins vertical board BUE weighted with no wristweights, taking pins off board, requiring postural positioning and tactile verbal cueing for optimal technique and effectiveness  seated Reciprocal pulleys x 5 min in sh flexion and abduction    To work on neuromuscular re-education:  OT provided tactile and verbal cueing to improve balance, coordination, kinesthetic sense, posture, and proprioception in upright seated while increasing endurance as well for all functional mobility and self care skills and to reduce fall risk with all ADL's as noted above.      Patient left up in chair with call button in reach and family present    GOALS:   Multidisciplinary Problems       Occupational Therapy Goals          Problem: Occupational Therapy    Goal Priority Disciplines Outcome Interventions   Occupational Therapy Goal     OT, PT/OT Progressing    Description: STG: within 2 weeks  Pt will perform grooming with min a at sinkside from seated MET  Pt will bathe with mod a MET  Pt will perform UE dressing with mod a MET  Pt will perform LE dressing with mod a ONGOING/PROGRESSING  Pt will sit EOB x 5 min with mod to min assistance MET  Pt will transfer bed/chair/bsc with mod a MET  Pt will perform standing task x 1 min with mod assistance x 1 MET  Pt will tolerate 15 minutes of tx without fatigue MET      LT.Restore to max I with self care and mobility. ONGOING/PROGRESSING                          Time Tracking:     OT Date of Treatment: 09/04/24  OT Start Time: 1015  OT Stop Time: 1100  OT Total Time (min): 45 min    Billable Minutes:Therapeutic Activity 22  Therapeutic Exercise 15  Neuromuscular Re-education 8    OT/CRISELDA: OT          9/4/2024

## 2024-09-04 NOTE — PT/OT/SLP PROGRESS
Physical Therapy Treatment    Patient Name:  Yanet Viramontes   MRN:  [id number]    Recommendations:     Discharge Recommendations: Moderate Intensity Therapy  Discharge Equipment Recommendations: walker, rolling (YOUTH sized walker with wheels d/t short stature)  Barriers to discharge: Inaccessible home, Decreased caregiver support, and falls risk    Assessment:     Yanet Viramontes is a 84 y.o. female admitted with a medical diagnosis of S/p left hip fracture.  She presents with the following impairments/functional limitations: weakness, impaired endurance, impaired functional mobility, gait instability, impaired balance, impaired cognition, decreased lower extremity function, pain, decreased ROM, orthopedic precautions .    Pt seen for split treatment due to pt expecting a friend to cut her hair.    Rehab Prognosis: Good; patient would benefit from acute skilled PT services to address these deficits and reach maximum level of function.    Recent Surgery: * No surgery found *      Plan:     During this hospitalization, patient to be seen 5 x/week to address the identified rehab impairments via gait training, therapeutic activities, therapeutic exercises, neuromuscular re-education and progress toward the following goals:    Plan of Care Expires:  09/20/24    Subjective     Chief Complaint: Pt c/o left hip pain.  Patient/Family Comments/goals: dc to dtr's home  Pain/Comfort:  Pain Addressed 1: Pre-medicate for activity      Objective:     Communicated with pt, dtr prior to session.  Patient found  up in chair in early pm, but in later pm was in bed  with peripheral IV upon PT entry to room.     General Precautions: Standard, fall  Orthopedic Precautions: LLE weight bearing as tolerated  Braces:    Respiratory Status: Room air     Functional Mobility:  Transfers:     Sit to Stand:  min/mod [place]1 [place] [facility] [place]  Gait: Pt amb'd 15 ft with min Ax1 using RW/wc trail. Pt was able to achieve right step-through  "once.      AM-PAC 6 CLICK MOBILITY          Treatment & Education:  Pt seen early in pm for gait training as stated above. Treatment stopped because pt was expecting a friend to come to her room to cut her hair. Pt seen later in the pm for bedside treatment LLE with A/AROM: HS 1.10, HIP ABD 2X10, ER/IR x 10 reps each w/knee ext'd and flex'd, GS x 10l, SAQ x 10. Passive stretching 1x30" left calf.    Patient left right sidelying with call button in reach and dtr present..    GOALS:   Multidisciplinary Problems       Physical Therapy Goals          Problem: Physical Therapy    Goal Priority Disciplines Outcome Goal Variances Interventions   Physical Therapy Goal     PT, PT/OT Progressing     Description: STG - 2 weeks  1. Pt will transfer supine to/from sit with mod Ax1.-PROGRESSING  2. Pt will perform STS and bed transfer with mod Ax1.-MET  3. Pt will have LLE strength of 3-/5.-MET  4. Pt will amb with RW x 20 ft with mod Ax1.-PROGRESSING    LTG - 4 weeks  1. Pt will transfer supine to/from sit with min Ax1.  2. Pt will perform STS and bed transfer with min Ax1.  3. Pt will have LLE strength of 3/5.  4. Pt will amb with RW x 50 ft with min Ax1.  5. Pt's ROM LLE will be WFL.                       Time Tracking:     PT Received On: 09/04/24  PT Start Time: 1247 (second start time: 1424)     PT Stop Time: 1255 (second stop time: 1439)  PT Total Time (min): 8 min /23 minutes total time    Billable Minutes: Gait Training 8, Therapeutic Exercise 15, and Total Time 23 minutes    Treatment Type: Treatment  PT/PTA: PT     Number of PTA visits since last PT visit: 0     09/04/2024  "

## 2024-09-04 NOTE — NURSING
"07:00 Patient hand off given by  . Patient received resting in bed chest rise even non labored no sign of pain or distress.Acquiring patient care for 07:00 -1900   0800: Patient tolerated morning med pass without complication swallowing each pill one at a time. Patient stats"I'm not hungry, I just want to go back to sleep" When asked if she wanted help eating breakfast.   0830 Family member at bedside encouraging patient to eat and get out of bed for hair appointment.   "

## 2024-09-04 NOTE — NURSING
Patient's daughter and I noticed some increased swelling in her left (surgical leg). The leg was warm but not hot. No temperature difference noted between the warmth of both legs. Patient is usually covered in multiple blankets do to being cold. The patient has not complained of increase pain or discomfort. Patient is currently receiving rivaroxaban 10 mg daily for DVT's. MD made aware of swelling . Plan of care ongoing.

## 2024-09-04 NOTE — PLAN OF CARE
Problem: Fall Injury Risk  Goal: Absence of Fall and Fall-Related Injury  Outcome: Progressing  Intervention: Identify and Manage Contributors  Flowsheets (Taken 9/4/2024 1807)  Self-Care Promotion: independence encouraged  Medication Review/Management: medications reviewed   Plan of care reviewed with patient. Patients status ongoing progressing.

## 2024-09-05 PROCEDURE — S4991 NICOTINE PATCH NONLEGEND: HCPCS | Performed by: NURSE PRACTITIONER

## 2024-09-05 PROCEDURE — 94761 N-INVAS EAR/PLS OXIMETRY MLT: CPT

## 2024-09-05 PROCEDURE — 27000958

## 2024-09-05 PROCEDURE — 97535 SELF CARE MNGMENT TRAINING: CPT

## 2024-09-05 PROCEDURE — 94640 AIRWAY INHALATION TREATMENT: CPT

## 2024-09-05 PROCEDURE — 25000242 PHARM REV CODE 250 ALT 637 W/ HCPCS: Performed by: NURSE PRACTITIONER

## 2024-09-05 PROCEDURE — 25000003 PHARM REV CODE 250: Performed by: NURSE PRACTITIONER

## 2024-09-05 PROCEDURE — 25000003 PHARM REV CODE 250: Performed by: HOSPITALIST

## 2024-09-05 PROCEDURE — 97110 THERAPEUTIC EXERCISES: CPT

## 2024-09-05 PROCEDURE — 11000004 HC SNF PRIVATE

## 2024-09-05 PROCEDURE — 27000987 HC MATTRESS, MATRIX LOW PROFILE

## 2024-09-05 PROCEDURE — 97116 GAIT TRAINING THERAPY: CPT

## 2024-09-05 PROCEDURE — 97530 THERAPEUTIC ACTIVITIES: CPT

## 2024-09-05 RX ORDER — LEVOTHYROXINE SODIUM 112 UG/1
112 TABLET ORAL
Status: DISCONTINUED | OUTPATIENT
Start: 2024-09-06 | End: 2024-10-05 | Stop reason: HOSPADM

## 2024-09-05 RX ADMIN — IPRATROPIUM BROMIDE AND ALBUTEROL SULFATE 3 ML: 2.5; .5 SOLUTION RESPIRATORY (INHALATION) at 02:09

## 2024-09-05 RX ADMIN — BUDESONIDE INHALATION 0.5 MG: 0.5 SUSPENSION RESPIRATORY (INHALATION) at 07:09

## 2024-09-05 RX ADMIN — POTASSIUM CHLORIDE 10 MEQ: 750 CAPSULE, EXTENDED RELEASE ORAL at 08:09

## 2024-09-05 RX ADMIN — IPRATROPIUM BROMIDE AND ALBUTEROL SULFATE 3 ML: 2.5; .5 SOLUTION RESPIRATORY (INHALATION) at 07:09

## 2024-09-05 RX ADMIN — FERROUS SULFATE TAB 325 MG (65 MG ELEMENTAL FE) 1 EACH: 325 (65 FE) TAB at 08:09

## 2024-09-05 RX ADMIN — QUETIAPINE FUMARATE 25 MG: 25 TABLET ORAL at 08:09

## 2024-09-05 RX ADMIN — FERROUS SULFATE TAB 325 MG (65 MG ELEMENTAL FE) 1 EACH: 325 (65 FE) TAB at 12:09

## 2024-09-05 RX ADMIN — MAGNESIUM HYDROXIDE 2400 MG: 2400 SUSPENSION ORAL at 06:09

## 2024-09-05 RX ADMIN — LEVOTHYROXINE SODIUM 112 MCG: 0.11 TABLET ORAL at 06:09

## 2024-09-05 RX ADMIN — HYDROCODONE BITARTRATE AND ACETAMINOPHEN 1 TABLET: 5; 325 TABLET ORAL at 08:09

## 2024-09-05 RX ADMIN — NICOTINE 1 PATCH: 21 PATCH, EXTENDED RELEASE TRANSDERMAL at 08:09

## 2024-09-05 RX ADMIN — DOCUSATE SODIUM 100 MG: 100 CAPSULE, LIQUID FILLED ORAL at 08:09

## 2024-09-05 RX ADMIN — RIVAROXABAN 10 MG: 10 TABLET, FILM COATED ORAL at 08:09

## 2024-09-05 RX ADMIN — ACETAMINOPHEN 650 MG: 325 TABLET ORAL at 10:09

## 2024-09-05 RX ADMIN — PANTOPRAZOLE SODIUM 40 MG: 40 TABLET, DELAYED RELEASE ORAL at 08:09

## 2024-09-05 RX ADMIN — FERROUS SULFATE TAB 325 MG (65 MG ELEMENTAL FE) 1 EACH: 325 (65 FE) TAB at 04:09

## 2024-09-05 RX ADMIN — Medication 6 MG: at 08:09

## 2024-09-05 NOTE — PT/OT/SLP PROGRESS
OT attempted to tx pt at 1230, and pt was sleeping soundly, so OT left to come back when pt alert and awake.  OT presented at 1330; pt was awake, still in bed right sidelying, but her daughter asked if OT could come back in about another hour.  OT will make another attempt this afternoon as schedule allows.    Cynthia Flores, OTR/L

## 2024-09-05 NOTE — PLAN OF CARE
Problem: Fall Injury Risk  Goal: Absence of Fall and Fall-Related Injury  Intervention: Promote Injury-Free Environment  Flowsheets (Taken 9/5/2024 1612)  Safety Promotion/Fall Prevention: assistive device/personal item within reach   Plan of care reviewed with patient. Patients status ongoing progressing.

## 2024-09-05 NOTE — NURSING
Ms Holt requested something pain at 2310.   Norco 5/325 administered.  Repositioned patient, elevated legs with another pillow. Medication was effective, patient is now sleeping soundly

## 2024-09-05 NOTE — PT/OT/SLP PROGRESS
Physical Therapy Treatment    Patient Name:  Yanet Viramontes   MRN:  [id number]    Recommendations:     Discharge Recommendations: Moderate Intensity Therapy  Discharge Equipment Recommendations: walker, rolling  Barriers to discharge: Decreased caregiver support    Assessment:     Yanet Viramontes is a 84 y.o. female admitted with a medical diagnosis of S/p left hip fracture.  She presents with the following impairments/functional limitations: weakness, impaired endurance, impaired self care skills, impaired functional mobility, gait instability, impaired balance, pain, orthopedic precautions Patient agreeable to PT, and with overall good effort, but fatigued quickly.    Rehab Prognosis: Fair; patient would benefit from acute skilled PT services to address these deficits and reach maximum level of function.    Recent Surgery: * No surgery found *      Plan:     During this hospitalization, patient to be seen 5 x/week to address the identified rehab impairments via gait training, therapeutic activities, therapeutic exercises, neuromuscular re-education and progress toward the following goals:    Plan of Care Expires:  09/20/24    Subjective     Chief Complaint: fatigue and left hip pain  Patient/Family Comments/goals: return home   Pain/Comfort:  Pain Rating 1: 8/10  Location - Side 1: Left  Location - Orientation 1: upper  Location 1: hip  Pain Addressed 1: Reposition, Cessation of Activity  Pain Rating Post-Intervention 1: 6/10      Objective:     Communicated with nurse prior to session.  Patient found supine with   upon PT entry to room.     General Precautions: Standard, fall  Orthopedic Precautions: LLE weight bearing as tolerated  Braces:    Respiratory Status: Room air     Functional Mobility:  Bed Mobility:     Supine to Sit: moderate assistance  Transfers:     Sit to Stand:  minimum assistance and moderate assistance with rolling walker  Gait: patient ambulated 8 feet x 2 with RW and min to CGA without LOB with  mild posterior lean and verbal cues for upright stance.       AM-PAC 6 CLICK MOBILITY  Turning over in bed (including adjusting bedclothes, sheets and blankets)?: 3  Sitting down on and standing up from a chair with arms (e.g., wheelchair, bedside commode, etc.): 2  Moving from lying on back to sitting on the side of the bed?: 2  Moving to and from a bed to a chair (including a wheelchair)?: 2  Need to walk in hospital room?: 2  Climbing 3-5 steps with a railing?: 1  Basic Mobility Total Score: 12       Treatment & Education:  Patient received transfer training as follows: Sit to stand training x 6 trials with min to mod assist and verbal cues for technique and safety; SPT training x 2 with mod assist  Static standing x 10 seconds with RW with verbal cues for neutral posture and upright stance  Therapeutic exercise as follows: AP, LAQ, heel slides, hip abd/add, and seated march x 30 reps each.     Patient left up in chair with all lines intact and call button in reach..    GOALS:   Multidisciplinary Problems       Physical Therapy Goals          Problem: Physical Therapy    Goal Priority Disciplines Outcome Goal Variances Interventions   Physical Therapy Goal     PT, PT/OT Progressing     Description: STG - 2 weeks  1. Pt will transfer supine to/from sit with mod Ax1.-PROGRESSING  2. Pt will perform STS and bed transfer with mod Ax1.-MET  3. Pt will have LLE strength of 3-/5.-MET  4. Pt will amb with RW x 20 ft with mod Ax1.-PROGRESSING    LTG - 4 weeks  1. Pt will transfer supine to/from sit with min Ax1.  2. Pt will perform STS and bed transfer with min Ax1.  3. Pt will have LLE strength of 3/5.  4. Pt will amb with RW x 50 ft with min Ax1.  5. Pt's ROM LLE will be WFL.                       Time Tracking:     PT Received On: 09/05/24  PT Start Time: 0950     PT Stop Time: 1030  PT Total Time (min): 40 min     Billable Minutes: Gait Training 10, Therapeutic Activity 10, and Therapeutic Exercise 20    Treatment  Type: Treatment  PT/PTA: PT     Number of PTA visits since last PT visit: 0     09/05/2024

## 2024-09-05 NOTE — PLAN OF CARE
[facility] - [facility] Surgical Unit - Swing Bed   Interdisciplinary Team Meeting    Patient: Yanet Viramontes   Today's Date: 9/5/2024   Estimated D/C Date: 9/12/2024        Physician: Daren Nicole DO Nurse Practitioner: Tanna Viramontes NP   Pharmacy: Brian Barriga, PharmD Unit Director: Temitope Chun RN   : Israel Graham RN Physical/Occupational Therapy: Cynthia Flores OT   Speech Therapy: ST Jasmina Activity Therapy: Geetha Carter   Nursing: Temitope Chun RN  Respiratory: Lia Watkins,  Dietary: Kacey Tinoco RD  Other: n//a     Nursing  New Symptoms/Problems: n/a  Last Bowel Movement: 09/01/24  Urine: continent  [place]: No  Bowel: continent   Constipated: No  Diarrhea: No   Isolation: No  Wound Care: Yes  Wound Location/Tx: b ack  Cognition: WNL  Aspiration Precautions: No  Comment(s): n/a    Respiratory:   O2 Device: Room Air  O2 Flow: n/a  SpO2: 98%  Neb Tx: No  Comment(s): n/a    Dietary    Nutrition: Regular  Comment(s): n/a    Speech Therapy  Speech/Swallowing: No current speech or swallowing issues  Comment(s): No    Physical Therapy  Gait/Assistive Device: 15 steps with RW and WC trail ELOS: Plan to DC 9/12/2024    Transfers: Moderate Assistance  Bed Mobility: Minimal Assistance Range of Motion/Restrictions: n/a  Comment(s): n/a     Occupational Therapy  Eating/Grooming: Supervision or Set-up Assistance Toileting: Moderate Assistance   Bathing: Moderate Assistance Dressing (Upper Body): Supervision or Set-up Assistance   Dressing (Lower Body): Moderate Assistance Comment(s): n/a     Activity Therapy  Level of participation: Active participation  Comment(s): n/a    Pharmacy   Medication Changes (see MD orders in chart): Yes  Labs Reviewed: Yes  New Lab Orders: No  Comment(s): n/a      Tx Plan/Recommendations reviewed with family and/or patient on (date) 9/5.  Additional family Conference/Training: n/a  D/C Plan/Recommendations: Home with HH and Home  with family  QUINN: 9/12/2024  Comment(s): n/a

## 2024-09-05 NOTE — CONSULTS
[facility] Surgical Unit  Adult Nutrition  Consult Note         Reason for Assessment  Reason For Assessment: RD follow-up assess/MST 3  Nutrition Risk Screen: no indicators present    Assessment and Plan  9/05/2024 RD Follow up: Patient with improvement in condition and is now ordered a regular diet with Boost Plus TID. Family present at RD visit and report patient prefers regular diet. Appetite improved to 25-50% and drinking some of Boost. Patient denies dietary preferences. Current weight 54.9 kg.     Continue diet and ONS as tolerated. RD Following.     8/29/2024 RD follow up: Patient upgraded to a MCS diet with chopped meats per SLP with high aspiration risk and to feed only when patient alert enough to swallow. Patient continues with poor intakes with decreased alertness and delirium. 0% meal intakes documented over the last several meals per flowsheets.     IVF at 50 ml/hr ordered. Noted patient with some improvement in confusion today. Continue diet as tolerated and feed as patient alert. Consider alterate means of nutrition as aligns with plan of care if po intakes don't improve with decrease in confusion. RD Following.     Consult received and appreciated. Patient admitted 8/23 with a dx of s/p L Hip fx and hx of cancer. Patient is ordered a full liquid diet with issues swallowing. RN reports patient having to be suctioned and having trouble clearing secretions. SLP evaluation pending.     Patient is 54 kg with a BMI of 24.04 which is WNL. Patient with significant weight loss over the last 6 months of 14% per chart review with weight at prior facility of 62.6 kg noted Feb 2024. Patient with poor po intakes endorsed on MST screen.    Patient meets ASPEN criteria for severe protein calorie malnutrition due to weight loss and poor intakes. See malnutrition assessment.     Recommend to add Boost Plus as tolerated. RD Following.           Learning Needs/Social Determinants of  Health  Learning Assessment       08/23/2024 1906 [facility] (8/23/2024 - Present)   Created by Addie Alegre, RN - RN (Nurse) Status: Complete                 PRIMARY LEARNER     Primary Learner Name:  Karla  - 08/23/2024 1906    Relationship:  Family  - 08/23/2024 1906    Does the primary learner have any barriers to learning?:  No Barriers  - 08/23/2024 1906    What is the preferred language of the primary learner?:  English  - 08/23/2024 1906    Is an  required?:  No  - 08/23/2024 1906    How does the primary learner prefer to learn new concepts?:  Listening  - 08/23/2024 1906    How often do you need to have someone help you read instructions, pamphlets, or written material from your doctor or pharmacy?:  Never  - 08/23/2024 1906        CO-LEARNER #1     No question answered        CO-LEARNER #2     No question answered        SPECIAL TOPICS     No question answered        ANSWERED BY:     No question answered        Comments         Edit History       Addie Alegre, RN - RN (Nurse)   08/23/2024 1906                           Social Determinants of Health     Tobacco Use: High Risk (8/23/2024)    Patient History     Smoking Tobacco Use: Every Day     Smokeless Tobacco Use: Never     Passive Exposure: Not on file   Alcohol Use: Not At Risk (8/26/2024)    AUDIT-C     Frequency of Alcohol Consumption: Never     Average Number of Drinks: Patient does not drink     Frequency of Binge Drinking: Never   Financial Resource Strain: Low Risk  (8/26/2024)    Overall Financial Resource Strain (CARDIA)     Difficulty of Paying Living Expenses: Not hard at all   Food Insecurity: No Food Insecurity (8/26/2024)    Hunger Vital Sign     Worried About Running Out of Food in the Last Year: Never true     Ran Out of Food in the Last Year: Never true   Transportation Needs: No Transportation Needs (8/26/2024)    TRANSPORTATION NEEDS     Transportation : No  "  Physical Activity: Insufficiently Active (8/26/2024)    Exercise Vital Sign     Days of Exercise per Week: 4 days     Minutes of Exercise per Session: 30 min   Stress: No Stress Concern Present (8/26/2024)    [employer] - Occupational Stress Questionnaire     Feeling of Stress : Only a little   Housing Stability: Low Risk  (8/26/2024)    Housing Stability Vital Sign     Unable to Pay for Housing in the Last Year: No     Homeless in the Last Year: No   Depression: Not on file   Utilities: Not At Risk (8/26/2024)    [facility] Utilities     Threatened with loss of utilities: No   Health Literacy: Inadequate Health Literacy (8/26/2024)     Health Literacy     Frequency of need for help with medical instructions: Sometimes   Social Isolation: Socially Integrated (8/26/2024)    Social Isolation     Social Isolation: 1            Malnutrition  Is Patient Malnourished: Yes Malnutrition Assessment  Malnutrition Context: chronic illness  Malnutrition Level: severe          Weight Loss (Malnutrition): greater than 10% in 6 months  Energy Intake (Malnutrition): less than 75% for greater than or equal to 1 month   [place] Region (Subcutaneous Fat Loss): severe depletion   [place] (Muscle Loss): severe depletion                 Nutrition Diagnosis  Malnutrition (Severe) related to Appetite loss, Chronic illness, and Dysphagia/ difficulty swallowing as evidenced by weight loss of greater than 10% in 6 months, and energy intakes less than 75% for greater than or equal to 1 month  Comments: SLP eval pending; patient having issues swallowing with decreased LOC    No results for input(s): "GLU", "POCGLU" in the last 72 hours.    Comments on Glucose: elevated     Nutrition Prescription / Recommendations  Recommendation/Intervention: Recommend to advance diet appropriate and tolerated; add Boost Plus all meals  Goals: po intakes 50% during admission  Nutrition Goal Status: progressing towards " "goal  Communication of RD Recs: discussed on rounds  Current Diet Order: dysphagia mechanical soft chopped meats  Oral Nutrition Supplement: add Boost Plus all meals  Chewing or Swallowing Difficulty?: Swallowing difficulty  Recommended Diet:  as tolerated  Recommended Oral Supplement: Boost Plus [360kcals, 14g Protein, 45g Carbs] 3 times a day  Is Nutrition Support Recommended: [facility] Nutrition Support: No  Is Nutrition Education Recommended: No    Monitor and Evaluation  % current Intake: P.O. intake of 25 - 50 %  % intake to meet estimated needs: 50 - 75 %  Food and Nutrient Intake: energy intake, food and beverage intake  Food and Nutrient Adminstration: diet order  Anthropometric Measurements: height/length, weight, weight change, body mass index  Biochemical Data, Medical Tests and Procedures: electrolyte and renal panel, gastrointestinal profile, glucose/endocrine profile, inflammatory profile, lipid profile  Nutrition-Focused Physical Findings: overall appearance, head and eyes  Energy Calories Required: not meeting needs  Protein Required: not meeting needs  Fluid Required: not meeting needs  Tolerance: tolerating    Current Medical Diagnosis and Past Medical History     Past Medical History:   Diagnosis Date    Anticoagulant long-term use     Cancer     Hypertension     Thyroid disease        Nutrition/Diet History  Spiritual, Cultural Beliefs, Jew Practices, Values that Affect Care: no  Food Allergies: NKFA  Factors Affecting Nutritional Intake: decreased appetite    Lab/Procedures/Meds  No results for input(s): "NA", "K", "BUN", "CREATININE", "CALCIUM", "ALBUMIN", "CL", "ALT", "AST", "PHOS" in the last 72 hours.    Last A1c: No results found for: "HGBA1C"  Lab Results   Component Value Date    RBC 3.18 (L) 08/31/2024    HGB 9.5 (L) 08/31/2024    HCT 30.0 (L) 08/31/2024    MCV 94.3 08/31/2024    MCH 29.9 08/31/2024    MCHC 31.7 (L) 08/31/2024     Pertinent Labs Reviewed: reviewed  Pertinent " "Medications Reviewed: reviewed  Scheduled Meds:   albuterol-ipratropium  3 mL Nebulization Q6H WAKE    budesonide  0.5 mg Nebulization Q12H    docusate sodium  100 mg Oral BID    ferrous sulfate  1 tablet Oral TID WM    [START ON 9/6/2024] levothyroxine  112 mcg Oral Before breakfast    nicotine  1 patch Transdermal Daily    pantoprazole  40 mg Oral Daily    potassium chloride  10 mEq Oral Daily    QUEtiapine  25 mg Oral QHS    rivaroxaban  10 mg Oral Daily     Continuous Infusions:      PRN Meds:.  Current Facility-Administered Medications:     0.9% NaCl, , Intravenous, PRN    acetaminophen, 650 mg, Oral, Q6H PRN    albuterol sulfate, 2.5 mg, Nebulization, Q4H PRN    bisacodyL, 10 mg, Rectal, Daily PRN    calcium carbonate, 500 mg, Oral, BID PRN    guaiFENesin 100 mg/5 ml, 200 mg, Oral, Q4H PRN    HYDROcodone-acetaminophen, 1 tablet, Oral, Q6H PRN    lorazepam, 0.5 mg, Intravenous, Q2H PRN    LORazepam, 0.5 mg, Oral, Q12H PRN    magnesium hydroxide 400 mg/5 ml, 30 mL, Oral, Daily PRN    melatonin, 6 mg, Oral, Nightly PRN    morphine, 2 mg, Intravenous, Q4H PRN    ondansetron, 4 mg, Oral, Q8H PRN    peg 400-hypromellose-glycerin, 2 drop, Ophthalmic, PRN    senna-docusate 8.6-50 mg, 1 tablet, Oral, BID PRN    Anthropometrics  Temp: 97.8 °F (36.6 °C)  Height Method: Stated  Height: 4' 11" (149.9 cm)  Height (inches): 59 in  Weight Method: Bed Scale  Weight: 54.9 kg (121 lb)  Weight (lb): 121 lb  Ideal Body Weight (IBW), Female: 95 lb  % Ideal Body Weight, Female (lb): 125.26 %  BMI (Calculated): 24.4       Estimated/Assessed Needs      Temp: 97.8 °F (36.6 °C)Oral  Weight Used For Calorie Calculations: 54 kg (119 lb 0.8 oz)   Energy Need Method: Kcal/kg Energy Calorie Requirements (kcal): 6693-1552  Weight Used For Protein Calculations: 54 kg (119 lb 0.8 oz)  Protein Requirements: 43-54  Estimated Fluid Requirement Method: RDA Method    RDA Method (mL): 1350       Nutrition by Nursing  Diet/Nutrition Received: " mechanical/dental soft  Intake (%): 25%  Diet/Feeding Assistance: tray set-up  Diet/Feeding Tolerance: poor  Last Bowel Movement: 09/01/24                Nutrition Follow-Up  RD Follow-up?: Yes      Nutrition Discharge Planning: new admit to [facility]; rd following for d/c needs            Available via Secure Chat

## 2024-09-05 NOTE — PT/OT/SLP PROGRESS
Occupational Therapy   Treatment    Name: Yanet Viramontes  MRN: [id number]  Admitting Diagnosis:  S/p left hip fracture       Recommendations:     Discharge Recommendations: Low Intensity Therapy  Discharge Equipment Recommendations:   (TBD)  Barriers to discharge:  None    Assessment:     Yanet Viramontes is a 84 y.o. female with a medical diagnosis of S/p left hip fracture.  She presents with readiness to get out of bed upon OT arrival, easily persuaded. Performance deficits affecting function are weakness, impaired endurance, impaired self care skills, impaired functional mobility, gait instability, impaired balance, impaired cognition, decreased safety awareness, impaired skin, orthopedic precautions.     Rehab Prognosis:  Good; patient would benefit from acute skilled OT services to address these deficits and reach maximum level of function.       Plan:     Patient to be seen 5 x/week to address the above listed problems via self-care/home management, community/work re-entry, therapeutic activities, therapeutic exercises, neuromuscular re-education, cognitive retraining, sensory integration, wheelchair management/training  Plan of Care Expires: 09/27/24  Plan of Care Reviewed with: patient, caregiver, daughter, family    Subjective     Chief Complaint: fatigue, left hip pain  Patient/Family Comments/goals: home with family  Pain/Comfort:       Objective:     Communicated with: pt and her Daughter prior to session.  Patient found right sidelying with peripheral IV upon OT entry to room.    General Precautions: Standard, fall, aspiration    Orthopedic Precautions:LLE weight bearing as tolerated  Braces: N/A  Respiratory Status: Room air     Occupational Performance:     Bed Mobility:    Patient completed Rolling/Turning to Right with moderate assistance  Patient completed Scooting/Bridging with moderate assistance  Patient completed Supine to Sit with moderate assistance     Functional Mobility/Transfers:  Patient  completed Sit <> Stand Transfer with moderate assistance  with  hand-held assist and grab bars(s)   Patient completed Toilet Transfer Step Transfer technique with minimum assistance with  hand-held assist and grab bars  Functional Mobility: transfer to toilet at bedside    Activities of Daily Living:  Grooming: independence after setup  Toileting: moderate assistance needed much assist to manage clothing but was able to pericare wipe with just SVN      AMPAC 6 Click ADL:      Treatment & Education:  ADL session as noted above.  Also pt completed UB exercises with OT nonweighted but shoulder flexion, ch press, horiz abd/add x 2 sets of 10.     Patient left up in chair with call button in reach and CNA present, pt with her book in hand to read this afternoon.  OT encouraged pt intake of water for hydration.    GOALS:   Multidisciplinary Problems       Occupational Therapy Goals          Problem: Occupational Therapy    Goal Priority Disciplines Outcome Interventions   Occupational Therapy Goal     OT, PT/OT Progressing    Description: STG: within 2 weeks  Pt will perform grooming with min a at sinkside from seated MET  Pt will bathe with mod a MET  Pt will perform UE dressing with mod a MET  Pt will perform LE dressing with mod a ONGOING/PROGRESSING  Pt will sit EOB x 5 min with mod to min assistance MET  Pt will transfer bed/chair/bsc with mod a MET  Pt will perform standing task x 1 min with mod assistance x 1 MET  Pt will tolerate 15 minutes of tx without fatigue MET      LT.Restore to max I with self care and mobility. ONGOING/PROGRESSING                         Time Tracking:     OT Date of Treatment: 24  OT Start Time: 1410  OT Stop Time: 1425  OT Total Time (min): 15 min    Billable Minutes:Self Care/Home Management 10  Therapeutic Exercise 5    OT/CRISELDA: OT          2024

## 2024-09-06 LAB — GLUCOSE SERPL-MCNC: 113 MG/DL (ref 70–105)

## 2024-09-06 PROCEDURE — 27000958

## 2024-09-06 PROCEDURE — 97530 THERAPEUTIC ACTIVITIES: CPT

## 2024-09-06 PROCEDURE — 25000003 PHARM REV CODE 250: Performed by: NURSE PRACTITIONER

## 2024-09-06 PROCEDURE — 25000242 PHARM REV CODE 250 ALT 637 W/ HCPCS: Performed by: NURSE PRACTITIONER

## 2024-09-06 PROCEDURE — S4991 NICOTINE PATCH NONLEGEND: HCPCS | Performed by: NURSE PRACTITIONER

## 2024-09-06 PROCEDURE — 97110 THERAPEUTIC EXERCISES: CPT

## 2024-09-06 PROCEDURE — 27000987 HC MATTRESS, MATRIX LOW PROFILE

## 2024-09-06 PROCEDURE — 11000004 HC SNF PRIVATE

## 2024-09-06 PROCEDURE — 94640 AIRWAY INHALATION TREATMENT: CPT

## 2024-09-06 PROCEDURE — 25000003 PHARM REV CODE 250: Performed by: HOSPITALIST

## 2024-09-06 PROCEDURE — 82962 GLUCOSE BLOOD TEST: CPT

## 2024-09-06 PROCEDURE — 99308 SBSQ NF CARE LOW MDM 20: CPT | Mod: ,,, | Performed by: HOSPITALIST

## 2024-09-06 PROCEDURE — 94761 N-INVAS EAR/PLS OXIMETRY MLT: CPT

## 2024-09-06 RX ADMIN — BUDESONIDE INHALATION 0.5 MG: 0.5 SUSPENSION RESPIRATORY (INHALATION) at 07:09

## 2024-09-06 RX ADMIN — LEVOTHYROXINE SODIUM 112 MCG: 0.11 TABLET ORAL at 05:09

## 2024-09-06 RX ADMIN — CALCIUM CARBONATE 500 MG: 500 TABLET, CHEWABLE ORAL at 05:09

## 2024-09-06 RX ADMIN — MAGNESIUM HYDROXIDE 2400 MG: 2400 SUSPENSION ORAL at 08:09

## 2024-09-06 RX ADMIN — NICOTINE 1 PATCH: 21 PATCH, EXTENDED RELEASE TRANSDERMAL at 08:09

## 2024-09-06 RX ADMIN — Medication 6 MG: at 08:09

## 2024-09-06 RX ADMIN — ACETAMINOPHEN 650 MG: 325 TABLET ORAL at 09:09

## 2024-09-06 RX ADMIN — DOCUSATE SODIUM 100 MG: 100 CAPSULE, LIQUID FILLED ORAL at 08:09

## 2024-09-06 RX ADMIN — FERROUS SULFATE TAB 325 MG (65 MG ELEMENTAL FE) 1 EACH: 325 (65 FE) TAB at 12:09

## 2024-09-06 RX ADMIN — IPRATROPIUM BROMIDE AND ALBUTEROL SULFATE 3 ML: 2.5; .5 SOLUTION RESPIRATORY (INHALATION) at 08:09

## 2024-09-06 RX ADMIN — HYDROCODONE BITARTRATE AND ACETAMINOPHEN 1 TABLET: 5; 325 TABLET ORAL at 08:09

## 2024-09-06 RX ADMIN — BUDESONIDE INHALATION 0.5 MG: 0.5 SUSPENSION RESPIRATORY (INHALATION) at 08:09

## 2024-09-06 RX ADMIN — POTASSIUM CHLORIDE 10 MEQ: 750 CAPSULE, EXTENDED RELEASE ORAL at 08:09

## 2024-09-06 RX ADMIN — QUETIAPINE FUMARATE 25 MG: 25 TABLET ORAL at 08:09

## 2024-09-06 RX ADMIN — PANTOPRAZOLE SODIUM 40 MG: 40 TABLET, DELAYED RELEASE ORAL at 08:09

## 2024-09-06 RX ADMIN — FERROUS SULFATE TAB 325 MG (65 MG ELEMENTAL FE) 1 EACH: 325 (65 FE) TAB at 05:09

## 2024-09-06 RX ADMIN — SENNOSIDES AND DOCUSATE SODIUM 1 TABLET: 8.6; 5 TABLET ORAL at 09:09

## 2024-09-06 RX ADMIN — IPRATROPIUM BROMIDE AND ALBUTEROL SULFATE 3 ML: 2.5; .5 SOLUTION RESPIRATORY (INHALATION) at 01:09

## 2024-09-06 RX ADMIN — FERROUS SULFATE TAB 325 MG (65 MG ELEMENTAL FE) 1 EACH: 325 (65 FE) TAB at 08:09

## 2024-09-06 RX ADMIN — IPRATROPIUM BROMIDE AND ALBUTEROL SULFATE 3 ML: 2.5; .5 SOLUTION RESPIRATORY (INHALATION) at 07:09

## 2024-09-06 RX ADMIN — RIVAROXABAN 10 MG: 10 TABLET, FILM COATED ORAL at 08:09

## 2024-09-06 NOTE — PT/OT/SLP PROGRESS
Occupational Therapy   Treatment    Name: Yanet Viramontes  MRN: [id number]  Admitting Diagnosis:  S/p left hip fracture       Recommendations:     Discharge Recommendations: Low Intensity Therapy  Discharge Equipment Recommendations:   (TBD)  Barriers to discharge:  None    Assessment:     Yanet Viramontes is a 84 y.o. female with a medical diagnosis of S/p left hip fracture.  She presents with no new complaints, though pt left leg is still with increased edema. Performance deficits affecting function are weakness, impaired endurance, impaired self care skills, impaired functional mobility, gait instability, impaired balance, impaired cognition, decreased safety awareness, impaired skin, orthopedic precautions.     Rehab Prognosis:  Good; patient would benefit from acute skilled OT services to address these deficits and reach maximum level of function.       Plan:     Patient to be seen 5 x/week to address the above listed problems via self-care/home management, community/work re-entry, therapeutic activities, therapeutic exercises, neuromuscular re-education, cognitive retraining, sensory integration, wheelchair management/training  Plan of Care Expires: 09/27/24  Plan of Care Reviewed with: patient, caregiver, daughter, family    Subjective     Chief Complaint: stiff left hip  Patient/Family Comments/goals: home with her daughterKarla  Pain/Comfort:       Objective:     Communicated with: pt and her daughter, Kassi, prior to session.  Patient found HOB elevated with peripheral IV upon OT entry to room.    General Precautions: Standard, fall, aspiration    Orthopedic Precautions:LLE weight bearing as tolerated  Braces: N/A  Respiratory Status: Room air     Occupational Performance:     Bed Mobility:    Patient completed Rolling/Turning to Right with minimum assistance  Patient completed Scooting/Bridging with minimum assistance  Patient completed Supine to Sit with minimum assistance     Functional  "Mobility/Transfers:  Patient completed Sit <> Stand Transfer with minimum assistance  with  hand-held assist and grab bars(s)   Patient completed Bed <> Chair Transfer using Step Transfer technique with minimum assistance with hand-held assist and grab bars(s)  Functional Mobility: sit to stand with RW x 2 with min a, needs cues for hand placement for safest and most effective sit to stand to sit    Activities of Daily Living:  Grooming: independence brushing hair after handed hairbrush      [facility] 6 Click ADL: 19    Treatment & Education:  To improve UB endurance, strength, OT facilitated pt with:  BUE exercises per OT cueing and instruction x 10 reps each 2 sets  Shoulder flexion, elbow flexion, horiz abd/add  HOME EXERCISE PROGRAM instructed for over the weekend    To improve reach, AROM, FM/ and trunk rotation with core forward leaning engagement:   Towel scrubbing at tabletop with non weighted towel in multi directional pushes/pulls x 5 min; also instructed pt to try BLE towel scrubs as activity over weekend  White board exercises written on board for family and pt with instructions and demonstrations as needed; pt and daughter relate good understanding    To increase functional mobility skills:  OT engaged pt in standing with RW, completing "hokey pokey" and stationary marches x 2 sets  Pt needed reminders to reach back for w/c to lower self safely and slowly    OT also instructed family to ensure pt is elevating and mobilizing LLE when she is back in the bed to improved flow of fluid in leg and reduce edema    Patient left up in chair with call button in reach and daughter present    GOALS:   Multidisciplinary Problems       Occupational Therapy Goals          Problem: Occupational Therapy    Goal Priority Disciplines Outcome Interventions   Occupational Therapy Goal     OT, PT/OT Progressing    Description: STG: within 2 weeks  Pt will perform grooming with min a at sinkside from seated MET  Pt will bathe " with mod a MET  Pt will perform UE dressing with mod a MET  Pt will perform LE dressing with mod a ONGOING/PROGRESSING  Pt will sit EOB x 5 min with mod to min assistance MET  Pt will transfer bed/chair/bsc with mod a MET  Pt will perform standing task x 1 min with mod assistance x 1 MET  Pt will tolerate 15 minutes of tx without fatigue MET      LT.Restore to max I with self care and mobility. ONGOING/PROGRESSING                         Time Tracking:     OT Date of Treatment: 24  OT Start Time: 1055  OT Stop Time: 1123  OT Total Time (min): 28 min    Billable Minutes:Therapeutic Activity 13  Therapeutic Exercise 15    OT/CRISELDA: OT          2024

## 2024-09-06 NOTE — NURSING
Ms Yanet reported symptoms of gas, but no BM thus far.  She also reports feelings of indigestion.  Tums given at 0515, now reports improvement .

## 2024-09-06 NOTE — PLAN OF CARE
Problem: Skin Injury Risk Increased  Goal: Skin Health and Integrity  Outcome: Progressing  Intervention: Promote and Optimize Oral Intake  Flowsheets (Taken 9/6/2024 5066)  Oral Nutrition Promotion:   adaptive equipment use encouraged   rest periods promoted   Plan of care reviewed with patient. Patients status ongoing progressing.

## 2024-09-06 NOTE — NURSING
ADMINISTERED MILK OF MAG YESTERDAY. NO BOWEL MOVEMENT OVER NIGHT. PLAN TO CONTINUE PO MEDICATIONS AND ADVANCE AS NEEDED. PLAN OF CARE ONGOING

## 2024-09-06 NOTE — SUBJECTIVE & OBJECTIVE
Interval History: doing better, leg some swelling but likely low Alb and IVFs from last few days     Review of Systems   Respiratory:  Negative for shortness of breath.    Cardiovascular:  Negative for chest pain.   Gastrointestinal:  Negative for abdominal pain, nausea and vomiting.   Neurological:  Positive for weakness.   Psychiatric/Behavioral:  Negative for agitation and confusion.    All other systems reviewed and are negative.    Objective:     Vital Signs (Most Recent):  Temp: 97.5 °F (36.4 °C) (09/06/24 0736)  Pulse: 76 (09/06/24 0853)  Resp: 18 (09/06/24 0853)  BP: 135/70 (09/06/24 0736)  SpO2: 100 % (09/06/24 0853) Vital Signs (24h Range):  Temp:  [97.5 °F (36.4 °C)-97.6 °F (36.4 °C)] 97.5 °F (36.4 °C)  Pulse:  [68-87] 76  Resp:  [18-20] 18  SpO2:  [96 %-100 %] 100 %  BP: (106-135)/(58-70) 135/70     Weight: 54.9 kg (121 lb)  Body mass index is 24.44 kg/m².    Intake/Output Summary (Last 24 hours) at 9/6/2024 1203  Last data filed at 9/6/2024 0853  Gross per 24 hour   Intake 840 ml   Output --   Net 840 ml         Physical Exam  Vitals reviewed.   Constitutional:       General: She is not in acute distress.     Appearance: Normal appearance.   HENT:      Head: Normocephalic and atraumatic.   Eyes:      General: No scleral icterus.     Extraocular Movements: Extraocular movements intact.      Conjunctiva/sclera: Conjunctivae normal.      Pupils: Pupils are equal, round, and reactive to light.   Cardiovascular:      Rate and Rhythm: Normal rate and regular rhythm.      Heart sounds: No murmur heard.     No friction rub. No gallop.   Pulmonary:      Effort: Pulmonary effort is normal. No respiratory distress.      Breath sounds: Normal breath sounds. No wheezing or rales.   Abdominal:      General: Abdomen is flat. Bowel sounds are normal. There is no distension.      Palpations: Abdomen is soft.      Tenderness: There is no abdominal tenderness. There is no guarding.   Genitourinary:     Comments: + sesay    Musculoskeletal:         General: Swelling present.      Right lower leg: No edema.      Left lower leg: Edema present.   Skin:     General: Skin is warm and dry.      Coloration: Skin is not jaundiced.      Findings: No rash.   Neurological:      General: No focal deficit present.      Mental Status: She is alert.      Sensory: No sensory deficit.      Motor: Weakness present.   Psychiatric:         Behavior: Behavior normal.             Significant Labs: All pertinent labs within the past 24 hours have been reviewed.  Recent Lab Results         09/06/24  0549        POC Glucose 113               Significant Imaging: I have reviewed all pertinent imaging results/findings within the past 24 hours.

## 2024-09-06 NOTE — PT/OT/SLP PROGRESS
Physical Therapy Treatment    Patient Name:  Yanet Viramontes   MRN:  [id number]    Recommendations:     Discharge Recommendations: Low Intensity Therapy  Discharge Equipment Recommendations: walker, rolling, bedside commode  Barriers to discharge: Decreased caregiver support    Assessment:     Yanet Viramontes is a 84 y.o. female admitted with a medical diagnosis of S/p left hip fracture.  She presents with the following impairments/functional limitations: weakness, impaired endurance, impaired self care skills, impaired functional mobility, gait instability, impaired balance, pain Patient quiet but agreed to PT.  She increased her ambulation distance but still fatigues easily.  She is solemn and requires frequent verbal motivation to participate.    Rehab Prognosis: Good; patient would benefit from acute skilled PT services to address these deficits and reach maximum level of function.    Recent Surgery: * No surgery found *      Plan:     During this hospitalization, patient to be seen 5 x/week to address the identified rehab impairments via gait training, therapeutic activities, therapeutic exercises, neuromuscular re-education and progress toward the following goals:    Plan of Care Expires:  09/20/24    Subjective     Chief Complaint: hip pain and weakness  Patient/Family Comments/goals: return home  Pain/Comfort:  Pain Rating 1: 0/10  Pain Rating Post-Intervention 1: 0/10      Objective:     Communicated with nurse prior to session.  Patient found supine with   upon PT entry to room.     General Precautions: Standard, fall  Orthopedic Precautions: LLE weight bearing as tolerated  Braces:    Respiratory Status: Room air     Functional Mobility:  Bed Mobility:     Supine to Sit: minimum assistance  Transfers:     Sit to Stand:  minimum assistance and moderate assistance with rolling walker  Gait: patient amb 20 feet with RW and Min to CGA without LOB today. Moderate to severe fatigue noted at end of gait.       AM-PAC 6  CLICK MOBILITY  Turning over in bed (including adjusting bedclothes, sheets and blankets)?: 3  Sitting down on and standing up from a chair with arms (e.g., wheelchair, bedside commode, etc.): 3  Moving from lying on back to sitting on the side of the bed?: 3  Moving to and from a bed to a chair (including a wheelchair)?: 3  Need to walk in hospital room?: 3  Climbing 3-5 steps with a railing?: 1  Basic Mobility Total Score: 16       Treatment & Education:  Nu step x 6 minutes; AP, LAQ, seated march all x 30 reps each   Transfer training for sit to stand and SPT x 3 each with min assist and verbal cues to avoid posterior lean.     Patient left supine with all lines intact, call button in reach, and daughter present..    GOALS:   Multidisciplinary Problems       Physical Therapy Goals          Problem: Physical Therapy    Goal Priority Disciplines Outcome Goal Variances Interventions   Physical Therapy Goal     PT, PT/OT Progressing     Description: STG - 2 weeks  1. Pt will transfer supine to/from sit with mod Ax1.-PROGRESSING  2. Pt will perform STS and bed transfer with mod Ax1.-MET  3. Pt will have LLE strength of 3-/5.-MET  4. Pt will amb with RW x 20 ft with mod Ax1.-PROGRESSING    LTG - 4 weeks  1. Pt will transfer supine to/from sit with min Ax1.  2. Pt will perform STS and bed transfer with min Ax1.  3. Pt will have LLE strength of 3/5.  4. Pt will amb with RW x 50 ft with min Ax1.  5. Pt's ROM LLE will be WFL.                       Time Tracking:     PT Received On: 09/06/24  PT Start Time: 0916     PT Stop Time: 0956  PT Total Time (min): 40 min     Billable Minutes: Gait Training 12 and Therapeutic Exercise 28    Treatment Type: Treatment  PT/PTA: PT     Number of PTA visits since last PT visit: 0     09/06/2024

## 2024-09-06 NOTE — PROGRESS NOTES
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  This was discussed with family who do not wish to have further testing at this time.  She has been at [facility] since 8/19 following a fall at her home which resulted in fracture of the left greater trochanter which required surg. She had an episode of chest pain on Wed with no acute findings.  Echo revealed EF of 60%.  She is being admitted to [facility] for OT/ PT and medical management.       Pt is DNR     Overview/Hospital Course:  8/27 Agitation yesterday, did sleep last night and was good this am, however, after PT she became agitated and aggressive.  Will monitor and redirect.  Try Seroquel this PM this time.      8/28 Maybe a little better today.  She did sleep last night.  Was sitting up this am and not as agitated.  Will continue plan of care for now.  Hopefully some better tomorrow.     8/29 better night and this am, recognizing people and nurses.  Not eating or drinking much though.      8/30 Didn't sleep good last night, but she is alert today just tired     9/2 repair of hip fracture 2 weeks ago, will DC staples    9/6 some increased swelling in surgical leg, on Xarelto ppx already.  Did get a lot of fluid over last few days.     Interval History: doing better, leg some swelling but likely low Alb and IVFs from last few days     Review of Systems   Respiratory:  Negative for shortness of breath.    Cardiovascular:  Negative for chest pain.    Gastrointestinal:  Negative for abdominal pain, nausea and vomiting.   Neurological:  Positive for weakness.   Psychiatric/Behavioral:  Negative for agitation and confusion.    All other systems reviewed and are negative.    Objective:     Vital Signs (Most Recent):  Temp: 97.5 °F (36.4 °C) (09/06/24 0736)  Pulse: 76 (09/06/24 0853)  Resp: 18 (09/06/24 0853)  BP: 135/70 (09/06/24 0736)  SpO2: 100 % (09/06/24 0853) Vital Signs (24h Range):  Temp:  [97.5 °F (36.4 °C)-97.6 °F (36.4 °C)] 97.5 °F (36.4 °C)  Pulse:  [68-87] 76  Resp:  [18-20] 18  SpO2:  [96 %-100 %] 100 %  BP: (106-135)/(58-70) 135/70     Weight: 54.9 kg (121 lb)  Body mass index is 24.44 kg/m².    Intake/Output Summary (Last 24 hours) at 9/6/2024 1203  Last data filed at 9/6/2024 0853  Gross per 24 hour   Intake 840 ml   Output --   Net 840 ml         Physical Exam  Vitals reviewed.   Constitutional:       General: She is not in acute distress.     Appearance: Normal appearance.   HENT:      Head: Normocephalic and atraumatic.   Eyes:      General: No scleral icterus.     Extraocular Movements: Extraocular movements intact.      Conjunctiva/sclera: Conjunctivae normal.      Pupils: Pupils are equal, round, and reactive to light.   Cardiovascular:      Rate and Rhythm: Normal rate and regular rhythm.      Heart sounds: No murmur heard.     No friction rub. No gallop.   Pulmonary:      Effort: Pulmonary effort is normal. No respiratory distress.      Breath sounds: Normal breath sounds. No wheezing or rales.   Abdominal:      General: Abdomen is flat. Bowel sounds are normal. There is no distension.      Palpations: Abdomen is soft.      Tenderness: There is no abdominal tenderness. There is no guarding.   Genitourinary:     Comments: + sesay   Musculoskeletal:         General: Swelling present.      Right lower leg: No edema.      Left lower leg: Edema present.   Skin:     General: Skin is warm and dry.      Coloration: Skin is not jaundiced.       Findings: No rash.   Neurological:      General: No focal deficit present.      Mental Status: She is alert.      Sensory: No sensory deficit.      Motor: Weakness present.   Psychiatric:         Behavior: Behavior normal.             Significant Labs: All pertinent labs within the past 24 hours have been reviewed.  Recent Lab Results         09/06/24  0549        POC Glucose 113               Significant Imaging: I have reviewed all pertinent imaging results/findings within the past 24 hours.    Assessment/Plan:      * S/p left hip fracture  PT/ OT  Fall precautions  Pain control    Delirium  Likely sundowning.  Has Seroquel prn.  Maybe a little better today.     Better last night       Severe protein-calorie malnutrition  Nutrition consulted. Most recent weight and BMI monitored-     Measurements:  Wt Readings from Last 1 Encounters:   08/23/24 54 kg (119 lb)   Body mass index is 24.04 kg/m².    Patient has been screened and assessed by RD.    Malnutrition Type:  Context: chronic illness  Level: severe    Malnutrition Characteristic Summary:  Weight Loss (Malnutrition): greater than 10% in 6 months  Energy Intake (Malnutrition): less than 75% for greater than or equal to 1 month    Interventions/Recommendations (treatment strategy):  Recommend to advance diet appropriate and tolerated; add Boost Plus all meals      Disease of thyroid gland  Continue home meds      GERD (gastroesophageal reflux disease)  Continue home meds      History of DVT (deep vein thrombosis)  Continue xeralto      Hypertension  Chronic, controlled. Latest blood pressure and vitals reviewed-     Temp:  [98.2 °F (36.8 °C)]   Pulse:  [68]   Resp:  [22]   BP: (117)/(66)   SpO2:  [96 %] .   Home meds for hypertension were reviewed and noted below.   Hypertension Medications               diltiaZEM HCl (TIAZAC) 120 mg 24 hr capsule Take 120 mg by mouth.    triamterene-hydrochlorothiazide 37.5-25 mg (DYAZIDE) 37.5-25 mg per capsule Take 1 capsule  by mouth every morning.            While in the hospital, will manage blood pressure as follows; BP has been low, will hold meds for now    Monitor BP, replace home medication as warrented.      VTE Risk Mitigation (From admission, onward)           Ordered     rivaroxaban tablet 10 mg  Daily         08/23/24 1802     IP VTE LOW RISK PATIENT  Once         08/23/24 1725                    Discharge Planning   QUINN: 9/12/2024     Code Status: DNR   Is the patient medically ready for discharge?:     Reason for patient still in hospital (select all that apply): Patient trending condition and PT / OT recommendations  Discharge Plan A: Other                  Daren Nicole DO  Department of Hospital Medicine   [facility]

## 2024-09-07 LAB
ANION GAP SERPL CALCULATED.3IONS-SCNC: 10 MMOL/L (ref 7–16)
BASOPHILS # BLD AUTO: 0.02 K/UL (ref 0–0.2)
BASOPHILS NFR BLD AUTO: 0.3 % (ref 0–1)
BUN SERPL-MCNC: 25 MG/DL (ref 7–18)
BUN/CREAT SERPL: 19 (ref 6–20)
CALCIUM SERPL-MCNC: 9.9 MG/DL (ref 8.5–10.1)
CHLORIDE SERPL-SCNC: 105 MMOL/L (ref 98–107)
CO2 SERPL-SCNC: 29 MMOL/L (ref 21–32)
CREAT SERPL-MCNC: 1.34 MG/DL (ref 0.55–1.02)
DIFFERENTIAL METHOD BLD: ABNORMAL
EGFR (NO RACE VARIABLE) (RUSH/TITUS): 39 ML/MIN/1.73M2
EOSINOPHIL # BLD AUTO: 0.13 K/UL (ref 0–0.5)
EOSINOPHIL NFR BLD AUTO: 1.9 % (ref 1–4)
ERYTHROCYTE [DISTWIDTH] IN BLOOD BY AUTOMATED COUNT: 16.4 % (ref 11.5–14.5)
GLUCOSE SERPL-MCNC: 109 MG/DL (ref 74–106)
HCT VFR BLD AUTO: 27.3 % (ref 38–47)
HGB BLD-MCNC: 8.3 G/DL (ref 12–16)
LYMPHOCYTES # BLD AUTO: 0.82 K/UL (ref 1–4.8)
LYMPHOCYTES NFR BLD AUTO: 12.3 % (ref 27–41)
MCH RBC QN AUTO: 30.3 PG (ref 27–31)
MCHC RBC AUTO-ENTMCNC: 30.4 G/DL (ref 32–36)
MCV RBC AUTO: 99.6 FL (ref 80–96)
MONOCYTES # BLD AUTO: 0.86 K/UL (ref 0–0.8)
MONOCYTES NFR BLD AUTO: 12.9 % (ref 2–6)
MPC BLD CALC-MCNC: 10 FL (ref 9.4–12.4)
NEUTROPHILS # BLD AUTO: 4.86 K/UL (ref 1.8–7.7)
NEUTROPHILS NFR BLD AUTO: 72.6 % (ref 53–65)
PLATELET # BLD AUTO: 521 K/UL (ref 150–400)
POTASSIUM SERPL-SCNC: 4.3 MMOL/L (ref 3.5–5.1)
RBC # BLD AUTO: 2.74 M/UL (ref 4.2–5.4)
SODIUM SERPL-SCNC: 140 MMOL/L (ref 136–145)
WBC # BLD AUTO: 6.69 K/UL (ref 4.5–11)

## 2024-09-07 PROCEDURE — 36415 COLL VENOUS BLD VENIPUNCTURE: CPT | Performed by: NURSE PRACTITIONER

## 2024-09-07 PROCEDURE — 25000242 PHARM REV CODE 250 ALT 637 W/ HCPCS: Performed by: NURSE PRACTITIONER

## 2024-09-07 PROCEDURE — 11000004 HC SNF PRIVATE

## 2024-09-07 PROCEDURE — 85025 COMPLETE CBC W/AUTO DIFF WBC: CPT | Performed by: NURSE PRACTITIONER

## 2024-09-07 PROCEDURE — 25000003 PHARM REV CODE 250: Performed by: NURSE PRACTITIONER

## 2024-09-07 PROCEDURE — 94640 AIRWAY INHALATION TREATMENT: CPT

## 2024-09-07 PROCEDURE — S4991 NICOTINE PATCH NONLEGEND: HCPCS | Performed by: NURSE PRACTITIONER

## 2024-09-07 PROCEDURE — 27000958

## 2024-09-07 PROCEDURE — 94761 N-INVAS EAR/PLS OXIMETRY MLT: CPT

## 2024-09-07 PROCEDURE — 80048 BASIC METABOLIC PNL TOTAL CA: CPT | Performed by: NURSE PRACTITIONER

## 2024-09-07 PROCEDURE — 27000987 HC MATTRESS, MATRIX LOW PROFILE

## 2024-09-07 RX ORDER — SODIUM CHLORIDE 0.9 % (FLUSH) 0.9 %
10 SYRINGE (ML) INJECTION
Status: DISCONTINUED | OUTPATIENT
Start: 2024-09-08 | End: 2024-10-05 | Stop reason: HOSPADM

## 2024-09-07 RX ADMIN — DOCUSATE SODIUM 100 MG: 100 CAPSULE, LIQUID FILLED ORAL at 09:09

## 2024-09-07 RX ADMIN — POTASSIUM CHLORIDE 10 MEQ: 750 CAPSULE, EXTENDED RELEASE ORAL at 09:09

## 2024-09-07 RX ADMIN — BUDESONIDE INHALATION 0.5 MG: 0.5 SUSPENSION RESPIRATORY (INHALATION) at 07:09

## 2024-09-07 RX ADMIN — PANTOPRAZOLE SODIUM 40 MG: 40 TABLET, DELAYED RELEASE ORAL at 09:09

## 2024-09-07 RX ADMIN — RIVAROXABAN 10 MG: 10 TABLET, FILM COATED ORAL at 09:09

## 2024-09-07 RX ADMIN — LEVOTHYROXINE SODIUM 112 MCG: 0.11 TABLET ORAL at 06:09

## 2024-09-07 RX ADMIN — QUETIAPINE FUMARATE 25 MG: 25 TABLET ORAL at 08:09

## 2024-09-07 RX ADMIN — ACETAMINOPHEN 650 MG: 325 TABLET ORAL at 04:09

## 2024-09-07 RX ADMIN — FERROUS SULFATE TAB 325 MG (65 MG ELEMENTAL FE) 1 EACH: 325 (65 FE) TAB at 04:09

## 2024-09-07 RX ADMIN — FERROUS SULFATE TAB 325 MG (65 MG ELEMENTAL FE) 1 EACH: 325 (65 FE) TAB at 08:09

## 2024-09-07 RX ADMIN — DOCUSATE SODIUM 100 MG: 100 CAPSULE, LIQUID FILLED ORAL at 08:09

## 2024-09-07 RX ADMIN — BUDESONIDE INHALATION 0.5 MG: 0.5 SUSPENSION RESPIRATORY (INHALATION) at 08:09

## 2024-09-07 RX ADMIN — IPRATROPIUM BROMIDE AND ALBUTEROL SULFATE 3 ML: 2.5; .5 SOLUTION RESPIRATORY (INHALATION) at 08:09

## 2024-09-07 RX ADMIN — IPRATROPIUM BROMIDE AND ALBUTEROL SULFATE 3 ML: 2.5; .5 SOLUTION RESPIRATORY (INHALATION) at 01:09

## 2024-09-07 RX ADMIN — FERROUS SULFATE TAB 325 MG (65 MG ELEMENTAL FE) 1 EACH: 325 (65 FE) TAB at 11:09

## 2024-09-07 RX ADMIN — NICOTINE 1 PATCH: 21 PATCH, EXTENDED RELEASE TRANSDERMAL at 09:09

## 2024-09-07 RX ADMIN — IPRATROPIUM BROMIDE AND ALBUTEROL SULFATE 3 ML: 2.5; .5 SOLUTION RESPIRATORY (INHALATION) at 07:09

## 2024-09-08 PROCEDURE — S4991 NICOTINE PATCH NONLEGEND: HCPCS | Performed by: NURSE PRACTITIONER

## 2024-09-08 PROCEDURE — 11000004 HC SNF PRIVATE

## 2024-09-08 PROCEDURE — 25000003 PHARM REV CODE 250: Performed by: NURSE PRACTITIONER

## 2024-09-08 PROCEDURE — 25000242 PHARM REV CODE 250 ALT 637 W/ HCPCS: Performed by: NURSE PRACTITIONER

## 2024-09-08 PROCEDURE — 94640 AIRWAY INHALATION TREATMENT: CPT

## 2024-09-08 PROCEDURE — 27000987 HC MATTRESS, MATRIX LOW PROFILE

## 2024-09-08 PROCEDURE — 27000958

## 2024-09-08 PROCEDURE — 94761 N-INVAS EAR/PLS OXIMETRY MLT: CPT

## 2024-09-08 RX ORDER — SODIUM CHLORIDE 0.9 % (FLUSH) 0.9 %
10 SYRINGE (ML) INJECTION
Status: CANCELLED | OUTPATIENT
Start: 2024-09-08

## 2024-09-08 RX ADMIN — FERROUS SULFATE TAB 325 MG (65 MG ELEMENTAL FE) 1 EACH: 325 (65 FE) TAB at 08:09

## 2024-09-08 RX ADMIN — DOCUSATE SODIUM 100 MG: 100 CAPSULE, LIQUID FILLED ORAL at 08:09

## 2024-09-08 RX ADMIN — NICOTINE 1 PATCH: 21 PATCH, EXTENDED RELEASE TRANSDERMAL at 08:09

## 2024-09-08 RX ADMIN — IPRATROPIUM BROMIDE AND ALBUTEROL SULFATE 3 ML: 2.5; .5 SOLUTION RESPIRATORY (INHALATION) at 07:09

## 2024-09-08 RX ADMIN — IPRATROPIUM BROMIDE AND ALBUTEROL SULFATE 3 ML: 2.5; .5 SOLUTION RESPIRATORY (INHALATION) at 01:09

## 2024-09-08 RX ADMIN — HYDROCODONE BITARTRATE AND ACETAMINOPHEN 1 TABLET: 5; 325 TABLET ORAL at 10:09

## 2024-09-08 RX ADMIN — BUDESONIDE INHALATION 0.5 MG: 0.5 SUSPENSION RESPIRATORY (INHALATION) at 07:09

## 2024-09-08 RX ADMIN — HYDROCODONE BITARTRATE AND ACETAMINOPHEN 1 TABLET: 5; 325 TABLET ORAL at 03:09

## 2024-09-08 RX ADMIN — POTASSIUM CHLORIDE 10 MEQ: 750 CAPSULE, EXTENDED RELEASE ORAL at 08:09

## 2024-09-08 RX ADMIN — PANTOPRAZOLE SODIUM 40 MG: 40 TABLET, DELAYED RELEASE ORAL at 08:09

## 2024-09-08 RX ADMIN — FERROUS SULFATE TAB 325 MG (65 MG ELEMENTAL FE) 1 EACH: 325 (65 FE) TAB at 11:09

## 2024-09-08 RX ADMIN — RIVAROXABAN 10 MG: 10 TABLET, FILM COATED ORAL at 08:09

## 2024-09-08 RX ADMIN — FERROUS SULFATE TAB 325 MG (65 MG ELEMENTAL FE) 1 EACH: 325 (65 FE) TAB at 05:09

## 2024-09-08 RX ADMIN — ACETAMINOPHEN 650 MG: 325 TABLET ORAL at 02:09

## 2024-09-08 RX ADMIN — LEVOTHYROXINE SODIUM 112 MCG: 0.11 TABLET ORAL at 05:09

## 2024-09-08 RX ADMIN — QUETIAPINE FUMARATE 25 MG: 25 TABLET ORAL at 08:09

## 2024-09-08 NOTE — PLAN OF CARE
Problem: Fall Injury Risk  Goal: Absence of Fall and Fall-Related Injury  Outcome: Progressing     Problem: Adult Inpatient Plan of Care  Goal: Plan of Care Review  Outcome: Progressing  Goal: Patient-Specific Goal (Individualized)  Outcome: Progressing  Goal: Absence of Hospital-Acquired Illness or Injury  Outcome: Progressing  Goal: Optimal Comfort and Wellbeing  Outcome: Progressing  Goal: Readiness for Transition of Care  Outcome: Progressing     Problem: Fall Injury Risk  Goal: Absence of Fall and Fall-Related Injury  Outcome: Progressing     Problem: Skin Injury Risk Increased  Goal: Skin Health and Integrity  Outcome: Progressing     Problem: Wound  Goal: Optimal Coping  Outcome: Progressing  Goal: Optimal Functional Ability  Outcome: Progressing  Goal: Absence of Infection Signs and Symptoms  Outcome: Progressing  Goal: Improved Oral Intake  Outcome: Progressing  Goal: Optimal Pain Control and Function  Outcome: Progressing  Goal: Skin Health and Integrity  Outcome: Progressing  Goal: Optimal Wound Healing  Outcome: Progressing

## 2024-09-09 ENCOUNTER — CLINICAL SUPPORT (OUTPATIENT)
Dept: WOUND CARE | Facility: HOSPITAL | Age: 84
End: 2024-09-09
Attending: HOSPITALIST
Payer: MEDICARE

## 2024-09-09 DIAGNOSIS — L89.123 PRESSURE ULCER OF LEFT UPPER BACK, STAGE 3: Primary | ICD-10-CM

## 2024-09-09 PROCEDURE — S4991 NICOTINE PATCH NONLEGEND: HCPCS | Performed by: NURSE PRACTITIONER

## 2024-09-09 PROCEDURE — 99212 OFFICE O/P EST SF 10 MIN: CPT | Mod: 25

## 2024-09-09 PROCEDURE — 27000987 HC MATTRESS, MATRIX LOW PROFILE

## 2024-09-09 PROCEDURE — 25000242 PHARM REV CODE 250 ALT 637 W/ HCPCS: Performed by: NURSE PRACTITIONER

## 2024-09-09 PROCEDURE — 94640 AIRWAY INHALATION TREATMENT: CPT

## 2024-09-09 PROCEDURE — 27000958

## 2024-09-09 PROCEDURE — 97530 THERAPEUTIC ACTIVITIES: CPT

## 2024-09-09 PROCEDURE — 97116 GAIT TRAINING THERAPY: CPT

## 2024-09-09 PROCEDURE — 97110 THERAPEUTIC EXERCISES: CPT

## 2024-09-09 PROCEDURE — 99308 SBSQ NF CARE LOW MDM 20: CPT | Mod: ,,, | Performed by: HOSPITALIST

## 2024-09-09 PROCEDURE — 25000003 PHARM REV CODE 250: Performed by: NURSE PRACTITIONER

## 2024-09-09 PROCEDURE — 11042 DBRDMT SUBQ TIS 1ST 20SQCM/<: CPT

## 2024-09-09 PROCEDURE — 94761 N-INVAS EAR/PLS OXIMETRY MLT: CPT

## 2024-09-09 PROCEDURE — 25000003 PHARM REV CODE 250: Performed by: HOSPITALIST

## 2024-09-09 PROCEDURE — 11000004 HC SNF PRIVATE

## 2024-09-09 RX ORDER — FUROSEMIDE 40 MG/1
40 TABLET ORAL ONCE
Status: COMPLETED | OUTPATIENT
Start: 2024-09-09 | End: 2024-09-09

## 2024-09-09 RX ADMIN — DOCUSATE SODIUM 100 MG: 100 CAPSULE, LIQUID FILLED ORAL at 08:09

## 2024-09-09 RX ADMIN — IPRATROPIUM BROMIDE AND ALBUTEROL SULFATE 3 ML: 2.5; .5 SOLUTION RESPIRATORY (INHALATION) at 02:09

## 2024-09-09 RX ADMIN — ACETAMINOPHEN 650 MG: 325 TABLET ORAL at 04:09

## 2024-09-09 RX ADMIN — BUDESONIDE INHALATION 0.5 MG: 0.5 SUSPENSION RESPIRATORY (INHALATION) at 07:09

## 2024-09-09 RX ADMIN — FERROUS SULFATE TAB 325 MG (65 MG ELEMENTAL FE) 1 EACH: 325 (65 FE) TAB at 01:09

## 2024-09-09 RX ADMIN — BUDESONIDE INHALATION 0.5 MG: 0.5 SUSPENSION RESPIRATORY (INHALATION) at 08:09

## 2024-09-09 RX ADMIN — NICOTINE 1 PATCH: 21 PATCH, EXTENDED RELEASE TRANSDERMAL at 09:09

## 2024-09-09 RX ADMIN — QUETIAPINE FUMARATE 25 MG: 25 TABLET ORAL at 08:09

## 2024-09-09 RX ADMIN — FUROSEMIDE 40 MG: 40 TABLET ORAL at 01:09

## 2024-09-09 RX ADMIN — RIVAROXABAN 10 MG: 10 TABLET, FILM COATED ORAL at 08:09

## 2024-09-09 RX ADMIN — HYDROCODONE BITARTRATE AND ACETAMINOPHEN 1 TABLET: 5; 325 TABLET ORAL at 08:09

## 2024-09-09 RX ADMIN — PANTOPRAZOLE SODIUM 40 MG: 40 TABLET, DELAYED RELEASE ORAL at 08:09

## 2024-09-09 RX ADMIN — LEVOTHYROXINE SODIUM 112 MCG: 0.11 TABLET ORAL at 06:09

## 2024-09-09 RX ADMIN — IPRATROPIUM BROMIDE AND ALBUTEROL SULFATE 3 ML: 2.5; .5 SOLUTION RESPIRATORY (INHALATION) at 07:09

## 2024-09-09 RX ADMIN — ACETAMINOPHEN 650 MG: 325 TABLET ORAL at 10:09

## 2024-09-09 RX ADMIN — FERROUS SULFATE TAB 325 MG (65 MG ELEMENTAL FE) 1 EACH: 325 (65 FE) TAB at 08:09

## 2024-09-09 RX ADMIN — FERROUS SULFATE TAB 325 MG (65 MG ELEMENTAL FE) 1 EACH: 325 (65 FE) TAB at 04:09

## 2024-09-09 RX ADMIN — IPRATROPIUM BROMIDE AND ALBUTEROL SULFATE 3 ML: 2.5; .5 SOLUTION RESPIRATORY (INHALATION) at 08:09

## 2024-09-09 RX ADMIN — POTASSIUM CHLORIDE 10 MEQ: 750 CAPSULE, EXTENDED RELEASE ORAL at 08:09

## 2024-09-09 NOTE — PLAN OF CARE
Problem: Occupational Therapy  Goal: Occupational Therapy Goal  Description: STG: within 2 weeks  Pt will perform grooming with min a at sinkside from seated MET  Pt will bathe with mod a MET  Pt will perform UE dressing with mod a MET  Pt will perform LE dressing with mod a MET  Pt will sit EOB x 5 min with mod to min assistance MET  Pt will transfer bed/chair/bsc with mod a MET  Pt will perform standing task x 1 min with mod assistance x 1 MET  Pt will tolerate 15 minutes of tx without fatigue MET    UPDATED STG's 2024:  Pt will perform grooming with SBA at sinkside from seated   Pt will bathe with min a   Pt will perform UE dressing with min a   Pt will perform LE dressing with min a   Pt will sit EOB x 5 min with min assistance   Pt will transfer bed/chair/bsc with CGA   Pt will perform standing task x 3 min with CGA  Pt will tolerate 45 minutes of tx without fatigue MET    LT.Restore to max I with self care and mobility. ONGOING/PROGRESSING    Outcome: Progressing

## 2024-09-09 NOTE — PT/OT/SLP PROGRESS
Occupational Therapy  Treatment    Yanet Viramontes   MRN: [id number]   Admitting Diagnosis: S/p left hip fracture    OT Date of Treatment: 09/09/24   OT Start Time: 0906  OT Stop Time: 0936  OT Total Time (min): 30 min    Billable Minutes:  Therapeutic Activity 15 and Therapeutic Exercise 15    OT/CRISELDA: OT          General Precautions: Standard, fall  Orthopedic Precautions: LLE weight bearing as tolerated  Braces: N/A  Respiratory Status: Room air         Subjective:  Communicated with pt and nursing prior to session.  No new complaints from pt today       Objective:  Patient found with: peripheral IV     Functional Mobility:  Bed Mobility: pt already up in w/c       Transfers: min a of 2 persons today, likely somewhat stiff from weekend        Functional Ambulation: see PT    Activities of Daily Living:     Feeding adaptive equipment:  none     UE adaptive equipment: none     LE adaptive equipment: none at this time, may introduce sock aid and reacher now that pt has gained a little strength and stamina                    Bathing adaptive equipment: long-handled sponge and to be introduced this week if pt agreeable    Balance:   Static Sit: FAIR+: Able to take MINIMAL challenges from all directions  Dynamic Sit: FAIR+: Maintains balance through MINIMAL excursions of active trunk motion  Static Stand: FAIR: Maintains without assist but unable to take challenges  Dynamic stand: POOR+: Needs MIN (minimal ) assist during gait    Therapeutic Activities and Exercises:  To improve UB endurance, strength, OT facilitated pt with:  UBE x 6 min with no RB's throughout, low level resist, F/R motions per OT cueing  GREEN PUTTY to simulate kitchen prep task and improve FM and     To improve reach, AROM, FM/ and trunk rotation with core forward leaning engagement:   seated Reciprocal pulleys x 5 min in sh flexion and abduction      AM-PAC 6 CLICK ADL   How much help from another person does this patient currently need?   1 =  "Unable, Total/Dependent Assistance  2 = A lot, Maximum/Moderate Assistance  3 = A little, Minimum/Contact Guard/Supervision  4 = None, Modified [place]/Independent    Putting on and taking off regular lower body clothing? : 3  Bathing (including washing, rinsing, drying)?: 3  Toileting, which includes using toilet, bedpan, or urinal? : 3  Putting on and taking off regular upper body clothing?: 3  Taking care of personal grooming such as brushing teeth?: 4  Eating meals?: 4  Daily Activity Total Score: 20     AM-PAC Raw Score CMS "G-Code Modifier Level of Impairment Assistance   6 % Total / Unable   7 - 8 CM 80 - 100% Maximal Assist   9-13 CL 60 - 80% Moderate Assist   14 - 19 CK 40 - 60% Moderate Assist   20 - 22 CJ 20 - 40% Minimal Assist   23 CI 1-20% SBA / CGA   24 CH 0% Independent/ Mod I       Patient left up in chair with  wound care nurse who is taking pt back to her room to look at skin breakdown on pt back    ASSESSMENT:  Yanet Viramontes is a 84 y.o. female with a medical diagnosis of S/p left hip fracture and presents with no new complaints today.    Rehab identified problem list/impairments:  weakness, impaired endurance, impaired self care skills, impaired functional mobility, gait instability, impaired balance, impaired cognition, decreased safety awareness, impaired skin, orthopedic precautions    Rehab potential is good.    Activity tolerance: Good    Discharge recommendations: Low Intensity Therapy   Barriers to discharge: Barriers to Discharge: None    Equipment recommendations:  (TBD)    GOALS:   Multidisciplinary Problems       Occupational Therapy Goals          Problem: Occupational Therapy    Goal Priority Disciplines Outcome Interventions   Occupational Therapy Goal     OT, PT/OT Progressing    Description: STG: within 2 weeks  Pt will perform grooming with min a at sinkside from seated MET  Pt will bathe with mod a MET  Pt will perform UE dressing with mod a MET  Pt will perform LE " dressing with mod a MET  Pt will sit EOB x 5 min with mod to min assistance MET  Pt will transfer bed/chair/bsc with mod a MET  Pt will perform standing task x 1 min with mod assistance x 1 MET  Pt will tolerate 15 minutes of tx without fatigue MET    UPDATED STG's 2024:  Pt will perform grooming with SBA at sinkside from seated   Pt will bathe with min a   Pt will perform UE dressing with min a   Pt will perform LE dressing with min a   Pt will sit EOB x 5 min with min assistance   Pt will transfer bed/chair/bsc with CGA   Pt will perform standing task x 3 min with CGA  Pt will tolerate 45 minutes of tx without fatigue MET    LT.Restore to max I with self care and mobility. ONGOING/PROGRESSING                         Plan:  Patient to be seen 5 x/week to address the above listed problems via self-care/home management, community/work re-entry, therapeutic activities, therapeutic exercises, neuromuscular re-education, cognitive retraining, sensory integration, wheelchair management/training  Plan of Care expires: 24  Plan of Care reviewed with: patient, caregiver, daughter, family         2024

## 2024-09-09 NOTE — PT/OT/SLP PROGRESS
Physical Therapy Treatment    Patient Name:  Yanet Viramontes   MRN:  [id number]    Recommendations:     Discharge Recommendations: Moderate Intensity Therapy  Discharge Equipment Recommendations: walker, rolling, bedside commode  Barriers to discharge: functional loss, high falls risk    Assessment:     Yanet Viramontes is a 84 y.o. female admitted with a medical diagnosis of S/p left hip fracture.  She presents with the following impairments/functional limitations: weakness, impaired endurance, pain, gait instability, impaired balance, impaired functional mobility, decreased lower extremity function, decreased ROM, impaired coordination, orthopedic precautions     Gait distance improved today. Pt gets SOB easily because she was found to be holding her breath without realizing it.    Rehab Prognosis: Good; patient would benefit from acute skilled PT services to address these deficits and reach maximum level of function.    Recent Surgery: * No surgery found *      Plan:     During this hospitalization, patient to be seen 5 x/week to address the identified rehab impairments via gait training, therapeutic activities, therapeutic exercises and progress toward the following goals:    Plan of Care Expires:  09/20/24    Subjective     Chief Complaint: Pt states she doesn't realize she holds her breath when walking.  Patient/Family Comments/goals: dc to dtr's home  Pain/Comfort:  Pain Rating 1: 6/10  Location - Side 1: Left  Location - Orientation 1: lower  Location 1: hip  Pain Addressed 1: Pre-medicate for activity      Objective:     Communicated with ANDER/pt prior to session.  Patient found supine with peripheral IV upon PT entry to room.     General Precautions: Standard, fall  Orthopedic Precautions: LLE weight bearing as tolerated  Braces:    Respiratory Status: Room air     Functional Mobility:  Bed Mobility:     Supine to Sit: minimum assistance and with LLE only  Transfers:     Sit to Stand:  min/mod Ax1 with rolling  walker  Bed to Chair: minimum assistance and of 1 persons with  rolling walker  using  Stand Pivot  Gait: patient amb 36 feet with RW and Min without LOB today. Pt became SOB every 10 ft requiring a standing rest break x 3.      AM-PAC 6 CLICK MOBILITY          Treatment & Education:  Seated ther ex outdoors BLE: LAQ x 20 (2# RLE/1# LLE), AP x 20, res'd hip add x 20 using yellow ball, res'd HS curls x 20 (red tband RLE/yellow tband LLE), marching x 10 w/assist LLE.    Patient left supine with all lines intact, call button in reach, and daughter present..    GOALS:   Multidisciplinary Problems       Physical Therapy Goals          Problem: Physical Therapy    Goal Priority Disciplines Outcome Goal Variances Interventions   Physical Therapy Goal     PT, PT/OT Progressing     Description: STG - 2 weeks  1. Pt will transfer supine to/from sit with mod Ax1.-PROGRESSING  2. Pt will perform STS and bed transfer with mod Ax1.-MET  3. Pt will have LLE strength of 3-/5.-MET  4. Pt will amb with RW x 20 ft with mod Ax1.-PROGRESSING    LTG - 4 weeks  1. Pt will transfer supine to/from sit with min Ax1.  2. Pt will perform STS and bed transfer with min Ax1.  3. Pt will have LLE strength of 3/5.  4. Pt will amb with RW x 50 ft with min Ax1.  5. Pt's ROM LLE will be WFL.                       Time Tracking:     PT Received On: 09/09/24  PT Start Time: 1440     PT Stop Time: 1514  PT Total Time (min): 34 min     Billable Minutes: Gait Training 8, Therapeutic Activity 6, Therapeutic Exercise 20, and Total Time 34 MINUTES    Treatment Type: Treatment  PT/PTA: PT     Number of PTA visits since last PT visit: 0     09/09/2024

## 2024-09-09 NOTE — SUBJECTIVE & OBJECTIVE
Interval History: still some swelling, will try dose of lasix     Review of Systems   Respiratory:  Negative for shortness of breath.    Cardiovascular:  Negative for chest pain.   Gastrointestinal:  Negative for abdominal pain, nausea and vomiting.   Musculoskeletal:  Positive for arthralgias.   Neurological:  Positive for weakness.   Psychiatric/Behavioral:  Negative for agitation and confusion.    All other systems reviewed and are negative.    Objective:     Vital Signs (Most Recent):  Temp: 97.5 °F (36.4 °C) (09/09/24 0749)  Pulse: 76 (09/09/24 0847)  Resp: 18 (09/09/24 0847)  BP: 131/74 (09/09/24 0749)  SpO2: 100 % (09/09/24 0847) Vital Signs (24h Range):  Temp:  [97.5 °F (36.4 °C)-98 °F (36.7 °C)] 97.5 °F (36.4 °C)  Pulse:  [72-80] 76  Resp:  [18-20] 18  SpO2:  [95 %-100 %] 100 %  BP: (119-131)/(54-74) 131/74     Weight: 54.9 kg (121 lb)  Body mass index is 24.44 kg/m².    Intake/Output Summary (Last 24 hours) at 9/9/2024 1201  Last data filed at 9/8/2024 1804  Gross per 24 hour   Intake 240 ml   Output --   Net 240 ml         Physical Exam  Vitals reviewed.   Constitutional:       General: She is not in acute distress.     Appearance: Normal appearance.   HENT:      Head: Normocephalic and atraumatic.   Eyes:      General: No scleral icterus.     Extraocular Movements: Extraocular movements intact.      Conjunctiva/sclera: Conjunctivae normal.      Pupils: Pupils are equal, round, and reactive to light.   Cardiovascular:      Rate and Rhythm: Normal rate and regular rhythm.      Heart sounds: No murmur heard.     No friction rub. No gallop.   Pulmonary:      Effort: Pulmonary effort is normal. No respiratory distress.      Breath sounds: Normal breath sounds. No wheezing or rales.   Abdominal:      General: Abdomen is flat. Bowel sounds are normal. There is no distension.      Palpations: Abdomen is soft.      Tenderness: There is no abdominal tenderness. There is no guarding.   Musculoskeletal:          General: Swelling present.      Right lower leg: No edema.      Left lower leg: Edema present.   Skin:     General: Skin is warm and dry.      Coloration: Skin is not jaundiced.      Findings: No rash.   Neurological:      General: No focal deficit present.      Mental Status: She is alert.      Sensory: No sensory deficit.      Motor: Weakness present.   Psychiatric:         Behavior: Behavior normal.             Significant Labs: All pertinent labs within the past 24 hours have been reviewed.  Recent Lab Results       None            Significant Imaging: I have reviewed all pertinent imaging results/findings within the past 24 hours.

## 2024-09-09 NOTE — PROGRESS NOTES
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  This was discussed with family who do not wish to have further testing at this time.  She has been at [facility] since 8/19 following a fall at her home which resulted in fracture of the left greater trochanter which required surg. She had an episode of chest pain on Wed with no acute findings.  Echo revealed EF of 60%.  She is being admitted to [facility] for OT/ PT and medical management.       Pt is DNR     Overview/Hospital Course:  8/27 Agitation yesterday, did sleep last night and was good this am, however, after PT she became agitated and aggressive.  Will monitor and redirect.  Try Seroquel this PM this time.      8/28 Maybe a little better today.  She did sleep last night.  Was sitting up this am and not as agitated.  Will continue plan of care for now.  Hopefully some better tomorrow.     8/29 better night and this am, recognizing people and nurses.  Not eating or drinking much though.      8/30 Didn't sleep good last night, but she is alert today just tired     9/2 repair of hip fracture 2 weeks ago, will DC staples    9/6 some increased swelling in surgical leg, on Xarelto ppx already.  Did get a lot of fluid over last few days.     9/9 still some swelling, will dose lasix x 1 today and see how she responds.     Interval History: still some swelling, will try dose of lasix     Review of Systems   Respiratory:  Negative for shortness of breath.     Cardiovascular:  Negative for chest pain.   Gastrointestinal:  Negative for abdominal pain, nausea and vomiting.   Musculoskeletal:  Positive for arthralgias.   Neurological:  Positive for weakness.   Psychiatric/Behavioral:  Negative for agitation and confusion.    All other systems reviewed and are negative.    Objective:     Vital Signs (Most Recent):  Temp: 97.5 °F (36.4 °C) (09/09/24 0749)  Pulse: 76 (09/09/24 0847)  Resp: 18 (09/09/24 0847)  BP: 131/74 (09/09/24 0749)  SpO2: 100 % (09/09/24 0847) Vital Signs (24h Range):  Temp:  [97.5 °F (36.4 °C)-98 °F (36.7 °C)] 97.5 °F (36.4 °C)  Pulse:  [72-80] 76  Resp:  [18-20] 18  SpO2:  [95 %-100 %] 100 %  BP: (119-131)/(54-74) 131/74     Weight: 54.9 kg (121 lb)  Body mass index is 24.44 kg/m².    Intake/Output Summary (Last 24 hours) at 9/9/2024 1201  Last data filed at 9/8/2024 1804  Gross per 24 hour   Intake 240 ml   Output --   Net 240 ml         Physical Exam  Vitals reviewed.   Constitutional:       General: She is not in acute distress.     Appearance: Normal appearance.   HENT:      Head: Normocephalic and atraumatic.   Eyes:      General: No scleral icterus.     Extraocular Movements: Extraocular movements intact.      Conjunctiva/sclera: Conjunctivae normal.      Pupils: Pupils are equal, round, and reactive to light.   Cardiovascular:      Rate and Rhythm: Normal rate and regular rhythm.      Heart sounds: No murmur heard.     No friction rub. No gallop.   Pulmonary:      Effort: Pulmonary effort is normal. No respiratory distress.      Breath sounds: Normal breath sounds. No wheezing or rales.   Abdominal:      General: Abdomen is flat. Bowel sounds are normal. There is no distension.      Palpations: Abdomen is soft.      Tenderness: There is no abdominal tenderness. There is no guarding.   Musculoskeletal:         General: Swelling present.      Right lower leg: No edema.      Left lower leg: Edema present.   Skin:     General: Skin is warm and dry.       Coloration: Skin is not jaundiced.      Findings: No rash.   Neurological:      General: No focal deficit present.      Mental Status: She is alert.      Sensory: No sensory deficit.      Motor: Weakness present.   Psychiatric:         Behavior: Behavior normal.             Significant Labs: All pertinent labs within the past 24 hours have been reviewed.  Recent Lab Results       None            Significant Imaging: I have reviewed all pertinent imaging results/findings within the past 24 hours.    Assessment/Plan:      * S/p left hip fracture  PT/ OT  Fall precautions  Pain control    Delirium  Likely sundowning.  Has Seroquel prn.  Maybe a little better today.     Better last night       Severe protein-calorie malnutrition  Nutrition consulted. Most recent weight and BMI monitored-     Measurements:  Wt Readings from Last 1 Encounters:   08/23/24 54 kg (119 lb)   Body mass index is 24.04 kg/m².    Patient has been screened and assessed by RD.    Malnutrition Type:  Context: chronic illness  Level: severe    Malnutrition Characteristic Summary:  Weight Loss (Malnutrition): greater than 10% in 6 months  Energy Intake (Malnutrition): less than 75% for greater than or equal to 1 month    Interventions/Recommendations (treatment strategy):  Recommend to advance diet appropriate and tolerated; add Boost Plus all meals      Disease of thyroid gland  Continue home meds      GERD (gastroesophageal reflux disease)  Continue home meds      History of DVT (deep vein thrombosis)  Continue xeralto      Hypertension  Chronic, controlled. Latest blood pressure and vitals reviewed-     Temp:  [98.2 °F (36.8 °C)]   Pulse:  [68]   Resp:  [22]   BP: (117)/(66)   SpO2:  [96 %] .   Home meds for hypertension were reviewed and noted below.   Hypertension Medications               diltiaZEM HCl (TIAZAC) 120 mg 24 hr capsule Take 120 mg by mouth.    triamterene-hydrochlorothiazide 37.5-25 mg (DYAZIDE) 37.5-25 mg per capsule Take 1  capsule by mouth every morning.            While in the hospital, will manage blood pressure as follows; BP has been low, will hold meds for now    Monitor BP, replace home medication as warrented.      VTE Risk Mitigation (From admission, onward)           Ordered     rivaroxaban tablet 10 mg  Daily         08/23/24 1802     IP VTE LOW RISK PATIENT  Once         08/23/24 1725                    Discharge Planning   QUINN: 9/12/2024     Code Status: DNR   Is the patient medically ready for discharge?:     Reason for patient still in hospital (select all that apply): Patient trending condition and PT / OT recommendations  Discharge Plan A: Other                  Daren Nicole DO  Department of Hospital Medicine   [facility]

## 2024-09-09 NOTE — NURSING
"Pt BP low. Informed pt daughter of low BP and holding Lasix. Daughter stated, "She needs it. I am about to bathe her and get her up and moving so it should go up." Daughter requested pt to receive 1x dose of Lasix 40mg.   "

## 2024-09-09 NOTE — PROGRESS NOTES
Subjective:      Patient ID: Yanet Viramontes is a 84 y.o. female.    Chief Complaint: No chief complaint on file.    Ms. Viramontes is an elderly white female that lives at home alone. She fell in August 19, 2024. She stayed in [facility] until 8/23/2024. She is in the swing bed in the [facility] She was admitted with a pressure ulcer on the upper left back. She states that her back hurts around the ulcer, but not necessarily in the ulcer. Her daughter (Sulema Brewer) was with her when I looked at the wound and discussed the plan of care.     Hospital documentation from Hospitalist:    Principal Problem:S/p left hip fracture     Chief Complaint: No chief complaint on file.        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  This was discussed with family who do not wish to have further testing at this time.  She has been at [facility] since 8/19 following a fall at her home which resulted in fracture of the left greater trochanter which required surg. She had an episode of chest pain on Wed with no acute findings.  Echo revealed EF of 60%.  She is being admitted to swingbed for OT/ PT and medical management.        Pt is DNR         Review of Systems   Constitutional:         Elderly white female, chronically ill, no acute distress   Respiratory: Negative.     Cardiovascular: Negative.    Musculoskeletal:  Positive for gait problem.        Weakness   Skin:  Positive for wound.        Left medial back stage 3 pressure ulcer   Neurological:  Positive for weakness.   Psychiatric/Behavioral: Negative.        Objective:     Physical Exam  Vitals and nursing note reviewed.   Constitutional:       Comments: Elderly white female, chronically ill, no acute distress     HENT:      Ears:      Comments: Poor hearing  Cardiovascular:      Rate and Rhythm:  Normal rate.   Pulmonary:      Effort: Pulmonary effort is normal.   Skin:     Capillary Refill: Capillary refill takes less than 2 seconds.      Comments: General Notes    no lower extremity wounds    Wound Assessment(s)  Wound #1 Back - Mid is an acute Stage 3 Pressure Injury Pressure Ulcer acquired on 08/19/2024 and has received a status of Not Healed. Initial wound encounter measurements are 3.2cm length x 1.5cm width x 0.1 cm depth, with an area of 4.8 sq cm and a volume of 0.48 cubic cm. Adipose is exposed. No tunneling has been noted. No sinus tract has been noted. No undermining has been noted. There is a Moderate amount of serosanguineous drainage noted which has no odor. The patient reports a wound pain of level 0/10. The wound margin is attached Wound bed has No, granulation, Yes slough, No eschar, No epithelialization.    The periwound skin texture is normal. The periwound skin moisture is normal. The periwound skin color is normal. The temperature of the periwound skin is WNL. Periwound skin does not exhibit signs or symptoms of infection.       Neurological:      General: No focal deficit present.      Mental Status: She is alert.   Psychiatric:         Mood and Affect: Mood normal.         Behavior: Behavior normal.         Thought Content: Thought content normal.         Judgment: Judgment normal.      Procedure:  Wound #1 (Pressure Ulcer) is located on the back - mid. A skin/subcutaneous tissue level excisional/surgical debridement with a total area debrided of 4.8 sq cm. was performed by Savannah Craig NP. Subcutaneous was removed along with devitalized tissue: slough. The following instrument(s) were used: curette. Pain control was achieved using LIDOCAINE VISC 2%. A time out was conducted prior to the start of the procedure. A minimal amount of bleeding was controlled with pressure. The procedure was tolerated well with a pain level of 0 throughout and a pain level of 0 following the procedure.  Post Debridement Measurements: 3.2cm length x 1.5cm width x 0.2cm depth; with an area of 4.8 sq cm and a volume of 0.96 cubic cm.    General Notes    5 mm curette  no post pic taken    Additional Information  Bone removed and sent to pathology?: No      Assessment:     1. Pressure ulcer of left upper back, stage 3           Plan:            Wound #1 Back - Mid  Anesthetic  2% Viscous Lidocaine to wound bed prior to procedure. - clinic use only  Hand Hygiene/Smoking Cessation  Wash hands before and after wound care. Call the Wound Center at [phone] if you have signs or symptoms of infection, increased drainage, increasing odor, or unusual redness. After Wound Care hours, please notify your PCP or go to the ER.  Cleanser  Cleanse Wound with Normal Saline  Dressings  Apply dressing - apply Santyl to wound bed. cover with mepilex border foam. change daily and prn.  Off-Loading  Keep weight off: - wound  Turn every 2 hours. Avoid position directing pressure to Wound site. Limit side lying to 30 degree tilt. Limit HOB elevation to 30 degrees in bed.  Follow-Up Appointments  Return Appointment 1 week - for wound care

## 2024-09-10 PROCEDURE — 25000242 PHARM REV CODE 250 ALT 637 W/ HCPCS: Performed by: NURSE PRACTITIONER

## 2024-09-10 PROCEDURE — 27000958

## 2024-09-10 PROCEDURE — S4991 NICOTINE PATCH NONLEGEND: HCPCS | Performed by: NURSE PRACTITIONER

## 2024-09-10 PROCEDURE — 27000987 HC MATTRESS, MATRIX LOW PROFILE

## 2024-09-10 PROCEDURE — 97530 THERAPEUTIC ACTIVITIES: CPT | Mod: CQ

## 2024-09-10 PROCEDURE — 97535 SELF CARE MNGMENT TRAINING: CPT

## 2024-09-10 PROCEDURE — 94640 AIRWAY INHALATION TREATMENT: CPT

## 2024-09-10 PROCEDURE — 25000003 PHARM REV CODE 250: Performed by: NURSE PRACTITIONER

## 2024-09-10 PROCEDURE — 11000004 HC SNF PRIVATE

## 2024-09-10 PROCEDURE — 97110 THERAPEUTIC EXERCISES: CPT

## 2024-09-10 PROCEDURE — 97116 GAIT TRAINING THERAPY: CPT | Mod: CQ

## 2024-09-10 PROCEDURE — 94761 N-INVAS EAR/PLS OXIMETRY MLT: CPT

## 2024-09-10 RX ADMIN — ACETAMINOPHEN 650 MG: 325 TABLET ORAL at 09:09

## 2024-09-10 RX ADMIN — IPRATROPIUM BROMIDE AND ALBUTEROL SULFATE 3 ML: 2.5; .5 SOLUTION RESPIRATORY (INHALATION) at 08:09

## 2024-09-10 RX ADMIN — ACETAMINOPHEN 650 MG: 325 TABLET ORAL at 05:09

## 2024-09-10 RX ADMIN — BUDESONIDE INHALATION 0.5 MG: 0.5 SUSPENSION RESPIRATORY (INHALATION) at 08:09

## 2024-09-10 RX ADMIN — FERROUS SULFATE TAB 325 MG (65 MG ELEMENTAL FE) 1 EACH: 325 (65 FE) TAB at 08:09

## 2024-09-10 RX ADMIN — PANTOPRAZOLE SODIUM 40 MG: 40 TABLET, DELAYED RELEASE ORAL at 08:09

## 2024-09-10 RX ADMIN — IPRATROPIUM BROMIDE AND ALBUTEROL SULFATE 3 ML: 2.5; .5 SOLUTION RESPIRATORY (INHALATION) at 02:09

## 2024-09-10 RX ADMIN — RIVAROXABAN 10 MG: 10 TABLET, FILM COATED ORAL at 08:09

## 2024-09-10 RX ADMIN — NICOTINE 1 PATCH: 21 PATCH, EXTENDED RELEASE TRANSDERMAL at 08:09

## 2024-09-10 RX ADMIN — FERROUS SULFATE TAB 325 MG (65 MG ELEMENTAL FE) 1 EACH: 325 (65 FE) TAB at 01:09

## 2024-09-10 RX ADMIN — DOCUSATE SODIUM 100 MG: 100 CAPSULE, LIQUID FILLED ORAL at 08:09

## 2024-09-10 RX ADMIN — POTASSIUM CHLORIDE 10 MEQ: 750 CAPSULE, EXTENDED RELEASE ORAL at 08:09

## 2024-09-10 RX ADMIN — HYDROCODONE BITARTRATE AND ACETAMINOPHEN 1 TABLET: 5; 325 TABLET ORAL at 02:09

## 2024-09-10 RX ADMIN — QUETIAPINE FUMARATE 25 MG: 25 TABLET ORAL at 08:09

## 2024-09-10 RX ADMIN — LEVOTHYROXINE SODIUM 112 MCG: 0.11 TABLET ORAL at 05:09

## 2024-09-10 RX ADMIN — FERROUS SULFATE TAB 325 MG (65 MG ELEMENTAL FE) 1 EACH: 325 (65 FE) TAB at 05:09

## 2024-09-10 NOTE — PLAN OF CARE
Problem: Physical Therapy  Goal: Physical Therapy Goal  Description: STG - 2 weeks  1. Pt will transfer supine to/from sit with mod Ax1.-MET  2. Pt will perform STS and bed transfer with mod Ax1.-MET  3. Pt will have LLE strength of 3-/5.-MET  4. Pt will amb with RW x 20 ft with mod Ax1.-MET    LTG - 4 weeks  1. Pt will transfer supine to/from sit with min Ax1.  2. Pt will perform STS and bed transfer with min Ax1.-PROGRESSING  3. Pt will have LLE strength of 3/5.-MET  4. Pt will amb with RW x 50 ft with min Ax1.-PROGRESSING  5. Pt's ROM LLE will be WFL.-MET  Outcome: Progressing

## 2024-09-10 NOTE — PLAN OF CARE
Problem: Adult Inpatient Plan of Care  Goal: Readiness for Transition of Care  Outcome: Progressing     Problem: Fall Injury Risk  Goal: Absence of Fall and Fall-Related Injury  Outcome: Progressing     Problem: Skin Injury Risk Increased  Goal: Skin Health and Integrity  Outcome: Progressing

## 2024-09-10 NOTE — PLAN OF CARE
Problem: Adult Inpatient Plan of Care  Goal: Patient-Specific Goal (Individualized)  Outcome: Progressing  Goal: Absence of Hospital-Acquired Illness or Injury  Outcome: Progressing  Goal: Optimal Comfort and Wellbeing  Outcome: Progressing     Problem: Fall Injury Risk  Goal: Absence of Fall and Fall-Related Injury  Outcome: Progressing     Problem: Wound  Goal: Optimal Coping  Outcome: Progressing  Goal: Optimal Functional Ability  Outcome: Progressing

## 2024-09-10 NOTE — PT/OT/SLP PROGRESS
Physical Therapy Treatment    Patient Name:  Yanet Viramontes   MRN:  [id number]    Recommendations:     Discharge Recommendations: Moderate Intensity Therapy  Discharge Equipment Recommendations: walker, rolling, bedside commode  Barriers to discharge: None    Assessment:     Yanet Viramontes is a 84 y.o. female admitted with a medical diagnosis of S/p left hip fracture.  She presents with the following impairments/functional limitations: weakness, orthopedic precautions, impaired endurance, gait instability, impaired functional mobility .    Pt increased gait distance today; worked on bed mobility, transfer to [facility] and back, WC to bed    Rehab Prognosis: good to Fair; patient would benefit from acute skilled PT services to address these deficits and reach maximum level of function.    Recent Surgery: * No surgery found *      Plan:     During this hospitalization, patient to be seen 5 x/week to address the identified rehab impairments via gait training, therapeutic activities, therapeutic exercises and progress toward the following goals:    Plan of Care Expires:  09/20/24    Received Plan of Care per Melissa Zavaleta PT     Subjective     Chief Complaint: none on arrival  Patient/Family Comments/goals: agrees to PT Tx, needs to use the bathroom  Pain/Comfort:  Pain Rating 1: 5/10  Location - Side 1: Left  Location 1: hip  Pain Addressed 1: Reposition, Distraction, Cessation of Activity  Pain Rating Post-Intervention 1: 0/10      Objective:     Communicated with patient, PT  prior to session.  Patient found HOB elevated with peripheral IV upon PT entry to room.     General Precautions: Standard, fall  Orthopedic Precautions: LLE weight bearing as tolerated  Braces:    Respiratory Status: Room air     Functional Mobility:  Bed Mobility:     Scooting: modified independence and stand by assistance  Bridging: modified independence  Supine to Sit: minimum assistance  Sit to Supine: minimum assistance  Transfers:     Sit to Stand:   minimum assistance with rolling walker  Bed to Chair: minimum assistance and moderate assistance with  rolling walker  using  Step Transfer  Toilet Transfer: minimum assistance and moderate assistance with  rolling walker  using  Step Transfer  Gait: 45ft with rolling walker, CGA and vc's for proper and safe technique, focusing on step through pattern      AM-PAC 6 CLICK MOBILITY  Turning over in bed (including adjusting bedclothes, sheets and blankets)?: 3  Sitting down on and standing up from a chair with arms (e.g., wheelchair, bedside commode, etc.): 3  Moving from lying on back to sitting on the side of the bed?: 3  Moving to and from a bed to a chair (including a wheelchair)?: 3  Need to walk in hospital room?: 3  Climbing 3-5 steps with a railing?: 2 (not assessed today)  Basic Mobility Total Score: 17       Treatment & Education:  Sitting BLE exercises x 20 repetitions each with vc's, demo: marching (to neutral LLE), long arc quads, hip abduction, ankle rocking    Patient left HOB elevated with call button in reach..    GOALS:   Multidisciplinary Problems       Physical Therapy Goals          Problem: Physical Therapy    Goal Priority Disciplines Outcome Goal Variances Interventions   Physical Therapy Goal     PT, PT/OT Progressing     Description: STG - 2 weeks  1. Pt will transfer supine to/from sit with mod Ax1.-PROGRESSING  2. Pt will perform STS and bed transfer with mod Ax1.-MET  3. Pt will have LLE strength of 3-/5.-MET  4. Pt will amb with RW x 20 ft with mod Ax1.-PROGRESSING    LTG - 4 weeks  1. Pt will transfer supine to/from sit with min Ax1.  2. Pt will perform STS and bed transfer with min Ax1.  3. Pt will have LLE strength of 3/5.  4. Pt will amb with RW x 50 ft with min Ax1.  5. Pt's ROM LLE will be WFL.                       Time Tracking:     PT Received On: 09/10/24  PT Start Time: 1358     PT Stop Time: 1422  PT Total Time (min): 24 min     Billable Minutes: Gait Training 14 and  Therapeutic Activity 10    Treatment Type: Treatment  PT/PTA: PTA     Number of PTA visits since last PT visit: 1     09/10/2024

## 2024-09-10 NOTE — PLAN OF CARE
Problem: Adult Inpatient Plan of Care  Goal: Plan of Care Review  Outcome: Met  Goal: Readiness for Transition of Care  Outcome: Progressing     Problem: Fall Injury Risk  Goal: Absence of Fall and Fall-Related Injury  Outcome: Progressing

## 2024-09-10 NOTE — PT/OT/SLP PROGRESS
Occupational Therapy   Treatment    Name: Yanet Viramontes  MRN: [id number]  Admitting Diagnosis:  S/p left hip fracture       Recommendations:     Discharge Recommendations: Low Intensity Therapy  Discharge Equipment Recommendations:   (TBD)  Barriers to discharge:  None    Assessment:     Yanet Viramontes is a 84 y.o. female with a medical diagnosis of S/p left hip fracture.  She presents with OT initially attempting to get her up to complete OT session around 930, but pt refused stating she had just got comfortable.  OT tried again at 11, and pt agreeable at that time.. Performance deficits affecting function are weakness, impaired endurance, impaired self care skills, impaired functional mobility, gait instability, impaired balance, impaired cognition, decreased safety awareness, impaired skin, orthopedic precautions.     Rehab Prognosis:  Fair; patient would benefit from acute skilled OT services to address these deficits and reach maximum level of function.       Plan:     Patient to be seen 5 x/week to address the above listed problems via self-care/home management, community/work re-entry, therapeutic activities, therapeutic exercises, neuromuscular re-education, cognitive retraining, sensory integration, wheelchair management/training  Plan of Care Expires: 09/27/24  Plan of Care Reviewed with: patient, caregiver, daughter, family    Subjective     Chief Complaint: tired, neck hurting  Patient/Family Comments/goals: home with daughter and AE as needed with home health services  Pain/Comfort:       Objective:     Communicated with: pt and sons present in room prior to session.  Patient found left sidelying with peripheral IV upon OT entry to room.    General Precautions: Standard, fall    Orthopedic Precautions:LLE weight bearing as tolerated  Braces: N/A  Respiratory Status: Room air     Occupational Performance:     Bed Mobility:    Patient completed Rolling/Turning to Right with minimum assistance and moderate  assistance  Patient completed Scooting/Bridging with minimum assistance and moderate assistance  Patient completed Supine to Sit with minimum assistance and moderate assistance     Functional Mobility/Transfers:  Patient completed Sit <> Stand Transfer with minimum assistance  with  hand-held assist and grab bars(s)   Patient completed Bed <> Chair Transfer using Step Transfer technique with minimum assistance with hand-held assist and grab bars(s)  Functional Mobility: see PT    Activities of Daily Living:  Feeding:  independence after setup  Grooming: independence after setup      [facility] 6 Click ADL:      Treatment & Education:  To improve UB endurance, strength, OT facilitated pt with:  UE exercises as OT had written on white board to encourage family to complete with pt throughout the days.  OT initiated her with them, including shoulder shrugs and scap retracts to reduce neck pain; she stated it helped.     Then pt lunch presented, so OT setup pt to determine how well she is doing with self feeding tasks; she is setup and independence/SVN.          Patient left up in chair with call button in reach and sons present; preparing for d/c this week, discussing AE needs.    GOALS:   Multidisciplinary Problems       Occupational Therapy Goals          Problem: Occupational Therapy    Goal Priority Disciplines Outcome Interventions   Occupational Therapy Goal     OT, PT/OT Progressing    Description: STG: within 2 weeks  Pt will perform grooming with min a at sinkside from seated MET  Pt will bathe with mod a MET  Pt will perform UE dressing with mod a MET  Pt will perform LE dressing with mod a MET  Pt will sit EOB x 5 min with mod to min assistance MET  Pt will transfer bed/chair/bsc with mod a MET  Pt will perform standing task x 1 min with mod assistance x 1 MET  Pt will tolerate 15 minutes of tx without fatigue MET    UPDATED STG's 9/9/2024:  Pt will perform grooming with SBA at sinkside from seated   Pt will bathe  with min a   Pt will perform UE dressing with min a   Pt will perform LE dressing with min a   Pt will sit EOB x 5 min with min assistance   Pt will transfer bed/chair/bsc with CGA   Pt will perform standing task x 3 min with CGA  Pt will tolerate 45 minutes of tx without fatigue MET    LT.Restore to max I with self care and mobility. ONGOING/PROGRESSING                         Time Tracking:     OT Date of Treatment: 09/10/24  OT Start Time: 1103  OT Stop Time: 1130  OT Total Time (min): 27 min    Billable Minutes:Self Care/Home Management 15  Therapeutic Exercise 8, theract 5 min    OT/CRISELDA: OT          9/10/2024

## 2024-09-11 PROCEDURE — 27000987 HC MATTRESS, MATRIX LOW PROFILE

## 2024-09-11 PROCEDURE — 97530 THERAPEUTIC ACTIVITIES: CPT

## 2024-09-11 PROCEDURE — 25000003 PHARM REV CODE 250: Performed by: NURSE PRACTITIONER

## 2024-09-11 PROCEDURE — 97110 THERAPEUTIC EXERCISES: CPT

## 2024-09-11 PROCEDURE — 97116 GAIT TRAINING THERAPY: CPT

## 2024-09-11 PROCEDURE — 27000958

## 2024-09-11 PROCEDURE — 94640 AIRWAY INHALATION TREATMENT: CPT

## 2024-09-11 PROCEDURE — 25000003 PHARM REV CODE 250: Performed by: HOSPITALIST

## 2024-09-11 PROCEDURE — 25000242 PHARM REV CODE 250 ALT 637 W/ HCPCS: Performed by: NURSE PRACTITIONER

## 2024-09-11 PROCEDURE — S4991 NICOTINE PATCH NONLEGEND: HCPCS | Performed by: NURSE PRACTITIONER

## 2024-09-11 PROCEDURE — 11000004 HC SNF PRIVATE

## 2024-09-11 RX ADMIN — NICOTINE 1 PATCH: 21 PATCH, EXTENDED RELEASE TRANSDERMAL at 08:09

## 2024-09-11 RX ADMIN — DOCUSATE SODIUM 100 MG: 100 CAPSULE, LIQUID FILLED ORAL at 08:09

## 2024-09-11 RX ADMIN — FERROUS SULFATE TAB 325 MG (65 MG ELEMENTAL FE) 1 EACH: 325 (65 FE) TAB at 08:09

## 2024-09-11 RX ADMIN — BUDESONIDE INHALATION 0.5 MG: 0.5 SUSPENSION RESPIRATORY (INHALATION) at 08:09

## 2024-09-11 RX ADMIN — Medication 6 MG: at 08:09

## 2024-09-11 RX ADMIN — QUETIAPINE FUMARATE 25 MG: 25 TABLET ORAL at 08:09

## 2024-09-11 RX ADMIN — POTASSIUM CHLORIDE 10 MEQ: 750 CAPSULE, EXTENDED RELEASE ORAL at 08:09

## 2024-09-11 RX ADMIN — FERROUS SULFATE TAB 325 MG (65 MG ELEMENTAL FE) 1 EACH: 325 (65 FE) TAB at 12:09

## 2024-09-11 RX ADMIN — ACETAMINOPHEN 650 MG: 325 TABLET ORAL at 10:09

## 2024-09-11 RX ADMIN — LEVOTHYROXINE SODIUM 112 MCG: 0.11 TABLET ORAL at 05:09

## 2024-09-11 RX ADMIN — IPRATROPIUM BROMIDE AND ALBUTEROL SULFATE 3 ML: 2.5; .5 SOLUTION RESPIRATORY (INHALATION) at 08:09

## 2024-09-11 RX ADMIN — FERROUS SULFATE TAB 325 MG (65 MG ELEMENTAL FE) 1 EACH: 325 (65 FE) TAB at 05:09

## 2024-09-11 RX ADMIN — PANTOPRAZOLE SODIUM 40 MG: 40 TABLET, DELAYED RELEASE ORAL at 08:09

## 2024-09-11 RX ADMIN — RIVAROXABAN 10 MG: 10 TABLET, FILM COATED ORAL at 08:09

## 2024-09-11 RX ADMIN — HYDROCODONE BITARTRATE AND ACETAMINOPHEN 1 TABLET: 5; 325 TABLET ORAL at 01:09

## 2024-09-11 NOTE — PLAN OF CARE
I have spoken with patient's daughter and clarified that patient will not be discharging this week. I only ordered equipment so it would not hinder discharge when the time came.

## 2024-09-11 NOTE — PT/OT/SLP PROGRESS
Occupational Therapy   Treatment    Name: Yanet Viramontes  MRN: [id number]  Admitting Diagnosis:  S/p left hip fracture       Recommendations:     Discharge Recommendations: Low Intensity Therapy  Discharge Equipment Recommendations:   (TBD)  Barriers to discharge:  None    Assessment:     Yanet Viramontes is a 84 y.o. female with a medical diagnosis of S/p left hip fracture.  She presents with no new complaints. Performance deficits affecting function are weakness, impaired endurance, impaired self care skills, impaired functional mobility, gait instability, impaired balance, impaired cognition, decreased safety awareness, impaired skin, orthopedic precautions.     Rehab Prognosis:  Good; patient would benefit from acute skilled OT services to address these deficits and reach maximum level of function.       Plan:     Patient to be seen 5 x/week to address the above listed problems via self-care/home management, community/work re-entry, therapeutic activities, therapeutic exercises, neuromuscular re-education, cognitive retraining, sensory integration, wheelchair management/training  Plan of Care Expires: 09/27/24  Plan of Care Reviewed with: patient, caregiver, daughter, family    Subjective     Chief Complaint: no new complaints  Patient/Family Comments/goals: home with daughter  Pain/Comfort:       Objective:     Communicated with: pt and her daughter prior to session.  Patient found left sidelying with no lines or drains upon OT entry to room.    General Precautions: Standard, fall    Orthopedic Precautions:LLE weight bearing as tolerated  Braces: N/A  Respiratory Status: Room air     Occupational Performance:     Bed Mobility:    Patient completed Rolling/Turning to Right with minimum assistance and moderate assistance  Patient completed Scooting/Bridging with minimum assistance and moderate assistance  Patient completed Supine to Sit with minimum assistance and moderate assistance     Functional  "Mobility/Transfers:  Patient completed Sit <> Stand Transfer with minimum assistance  with  hand-held assist and grab bars(s)   Patient completed Bed <> Chair Transfer using Step Transfer technique with minimum assistance with hand-held assist and grab bars(s)  Functional Mobility: see PT, did well with ambulation     Activities of Daily Living:  Pt refused washing her hands at end of session, stating, "I don't need to do that."      [facility] 6 Click ADL:      Treatment & Education:  To improve UB endurance, strength, OT facilitated pt with:  UBE x 8 min with no RB's throughout, low level resist    To increase functional mobility skills:  EOM scooting x 1 full round on EOM and on two sides of mat with SBA assist toward right hip but min a toward left hip and min a transfer from w/c to mat to w/c again    Worked on sit to stand, using RW to get to bedside from w/c, then scooting up EOB so that her head was in position upon sit to supine, all with min a    Patient left HOB elevated with call button in reach and family present    GOALS:   Multidisciplinary Problems       Occupational Therapy Goals          Problem: Occupational Therapy    Goal Priority Disciplines Outcome Interventions   Occupational Therapy Goal     OT, PT/OT Progressing    Description: STG: within 2 weeks  Pt will perform grooming with min a at sinkside from seated MET  Pt will bathe with mod a MET  Pt will perform UE dressing with mod a MET  Pt will perform LE dressing with mod a MET  Pt will sit EOB x 5 min with mod to min assistance MET  Pt will transfer bed/chair/bsc with mod a MET  Pt will perform standing task x 1 min with mod assistance x 1 MET  Pt will tolerate 15 minutes of tx without fatigue MET    UPDATED STG's 9/9/2024:  Pt will perform grooming with SBA at sinkside from seated   Pt will bathe with min a   Pt will perform UE dressing with min a   Pt will perform LE dressing with min a   Pt will sit EOB x 5 min with min assistance   Pt will " transfer bed/chair/bsc with CGA   Pt will perform standing task x 3 min with CGA  Pt will tolerate 45 minutes of tx without fatigue MET    LT.Restore to max I with self care and mobility. ONGOING/PROGRESSING                         Time Tracking:     OT Date of Treatment: 24  OT Start Time: 1228  OT Stop Time: 1300  OT Total Time (min): 32 min    Billable Minutes:Therapeutic Activity 22  Therapeutic Exercise 10    OT/CRISELDA: OT          2024

## 2024-09-11 NOTE — PT/OT/SLP PROGRESS
Physical Therapy Treatment    Patient Name:  Yanet Viramontes   MRN:  [id number]    Recommendations:     Discharge Recommendations: Low Intensity Therapy  Discharge Equipment Recommendations: walker, rolling, bedside commode  Barriers to discharge: Inaccessible home    Assessment:     Yanet Viramontes is a 84 y.o. female admitted with a medical diagnosis of S/p left hip fracture.  She presents with the following impairments/functional limitations: weakness, impaired endurance, impaired self care skills, impaired functional mobility, gait instability, impaired balance, pain, orthopedic precautions Patient agreeable to PT and reports she is feeling better. She increased her gait distance and needed less assistance with transfers and gait today.    Rehab Prognosis: Good; patient would benefit from acute skilled PT services to address these deficits and reach maximum level of function.    Recent Surgery: * No surgery found *      Plan:     During this hospitalization, patient to be seen 5 x/week to address the identified rehab impairments via gait training, therapeutic activities, therapeutic exercises, neuromuscular re-education and progress toward the following goals:    Plan of Care Expires:  09/20/24    Subjective     Chief Complaint: left hip pain  Patient/Family Comments/goals: return home with daughter  Pain/Comfort:  Pain Rating 1: 0/10  Pain Rating Post-Intervention 1: 0/10      Objective:     Communicated with nurse prior to session.  Patient found left sidelying with   upon PT entry to room.     General Precautions: Standard, fall  Orthopedic Precautions: LLE weight bearing as tolerated  Braces:    Respiratory Status: Room air     Functional Mobility:  Bed Mobility:     Supine to Sit: minimum assistance  Transfers:     Sit to Stand:  minimum assistance with rolling walker  Gait: patient amb 30 feet x 3 and an additional 20 feet with 2 min of rest between each bout of gait. Patient needed CGA for gait today with RW and  verbal cues for upright stance and walker direction  Overall much improved gait today      AM-PAC 6 CLICK MOBILITY  Turning over in bed (including adjusting bedclothes, sheets and blankets)?: 3  Sitting down on and standing up from a chair with arms (e.g., wheelchair, bedside commode, etc.): 3  Moving from lying on back to sitting on the side of the bed?: 3  Moving to and from a bed to a chair (including a wheelchair)?: 3  Need to walk in hospital room?: 3  Climbing 3-5 steps with a railing?: 2  Basic Mobility Total Score: 17       Treatment & Education:  AP, LAQ, seated march, hip abd/add, add squeezes all x 30 reps each.   Patient also performed WC to toilet transfer with CGA and verbal cues for walker use and safety.      Patient left supine with call button in reach and daughter present..    GOALS:   Multidisciplinary Problems       Physical Therapy Goals          Problem: Physical Therapy    Goal Priority Disciplines Outcome Goal Variances Interventions   Physical Therapy Goal     PT, PT/OT Progressing     Description: STG - 2 weeks  1. Pt will transfer supine to/from sit with mod Ax1.-PROGRESSING  2. Pt will perform STS and bed transfer with mod Ax1.-MET  3. Pt will have LLE strength of 3-/5.-MET  4. Pt will amb with RW x 20 ft with mod Ax1.-MET    LTG - 4 weeks  1. Pt will transfer supine to/from sit with min Ax1.  2. Pt will perform STS and bed transfer with min Ax1.-PROGRESSING  3. Pt will have LLE strength of 3/5.-MET  4. Pt will amb with RW x 50 ft with min Ax1.-PROGRESSING  5. Pt's ROM LLE will be WFL.-MET                       Time Tracking:     PT Received On: 09/11/24  PT Start Time: 0935     PT Stop Time: 1009  PT Total Time (min): 34 min     Billable Minutes: Gait Training 14 and Therapeutic Exercise 16    Treatment Type: Treatment  PT/PTA: PT     Number of PTA visits since last PT visit: 0     09/11/2024

## 2024-09-12 PROCEDURE — 11000004 HC SNF PRIVATE

## 2024-09-12 PROCEDURE — 25000003 PHARM REV CODE 250: Performed by: HOSPITALIST

## 2024-09-12 PROCEDURE — 27000987 HC MATTRESS, MATRIX LOW PROFILE

## 2024-09-12 PROCEDURE — 27000958

## 2024-09-12 PROCEDURE — 97110 THERAPEUTIC EXERCISES: CPT

## 2024-09-12 PROCEDURE — 94761 N-INVAS EAR/PLS OXIMETRY MLT: CPT

## 2024-09-12 PROCEDURE — 25000003 PHARM REV CODE 250: Performed by: NURSE PRACTITIONER

## 2024-09-12 PROCEDURE — 97530 THERAPEUTIC ACTIVITIES: CPT

## 2024-09-12 PROCEDURE — S4991 NICOTINE PATCH NONLEGEND: HCPCS | Performed by: NURSE PRACTITIONER

## 2024-09-12 PROCEDURE — 97116 GAIT TRAINING THERAPY: CPT

## 2024-09-12 PROCEDURE — 94640 AIRWAY INHALATION TREATMENT: CPT

## 2024-09-12 PROCEDURE — 25000242 PHARM REV CODE 250 ALT 637 W/ HCPCS: Performed by: NURSE PRACTITIONER

## 2024-09-12 PROCEDURE — 99308 SBSQ NF CARE LOW MDM 20: CPT | Mod: ,,, | Performed by: HOSPITALIST

## 2024-09-12 RX ORDER — FUROSEMIDE 40 MG/1
40 TABLET ORAL ONCE
Status: COMPLETED | OUTPATIENT
Start: 2024-09-12 | End: 2024-09-12

## 2024-09-12 RX ADMIN — NICOTINE 1 PATCH: 21 PATCH, EXTENDED RELEASE TRANSDERMAL at 08:09

## 2024-09-12 RX ADMIN — FERROUS SULFATE TAB 325 MG (65 MG ELEMENTAL FE) 1 EACH: 325 (65 FE) TAB at 08:09

## 2024-09-12 RX ADMIN — HYDROCODONE BITARTRATE AND ACETAMINOPHEN 1 TABLET: 5; 325 TABLET ORAL at 10:09

## 2024-09-12 RX ADMIN — BUDESONIDE INHALATION 0.5 MG: 0.5 SUSPENSION RESPIRATORY (INHALATION) at 08:09

## 2024-09-12 RX ADMIN — DOCUSATE SODIUM 100 MG: 100 CAPSULE, LIQUID FILLED ORAL at 08:09

## 2024-09-12 RX ADMIN — POTASSIUM CHLORIDE 10 MEQ: 750 CAPSULE, EXTENDED RELEASE ORAL at 08:09

## 2024-09-12 RX ADMIN — FERROUS SULFATE TAB 325 MG (65 MG ELEMENTAL FE) 1 EACH: 325 (65 FE) TAB at 03:09

## 2024-09-12 RX ADMIN — RIVAROXABAN 10 MG: 10 TABLET, FILM COATED ORAL at 08:09

## 2024-09-12 RX ADMIN — FUROSEMIDE 40 MG: 40 TABLET ORAL at 12:09

## 2024-09-12 RX ADMIN — IPRATROPIUM BROMIDE AND ALBUTEROL SULFATE 3 ML: 2.5; .5 SOLUTION RESPIRATORY (INHALATION) at 08:09

## 2024-09-12 RX ADMIN — BUDESONIDE INHALATION 0.5 MG: 0.5 SUSPENSION RESPIRATORY (INHALATION) at 07:09

## 2024-09-12 RX ADMIN — HYDROCODONE BITARTRATE AND ACETAMINOPHEN 1 TABLET: 5; 325 TABLET ORAL at 08:09

## 2024-09-12 RX ADMIN — Medication 6 MG: at 08:09

## 2024-09-12 RX ADMIN — FERROUS SULFATE TAB 325 MG (65 MG ELEMENTAL FE) 1 EACH: 325 (65 FE) TAB at 10:09

## 2024-09-12 RX ADMIN — LEVOTHYROXINE SODIUM 112 MCG: 0.11 TABLET ORAL at 05:09

## 2024-09-12 RX ADMIN — PANTOPRAZOLE SODIUM 40 MG: 40 TABLET, DELAYED RELEASE ORAL at 08:09

## 2024-09-12 RX ADMIN — QUETIAPINE FUMARATE 25 MG: 25 TABLET ORAL at 08:09

## 2024-09-12 RX ADMIN — IPRATROPIUM BROMIDE AND ALBUTEROL SULFATE 3 ML: 2.5; .5 SOLUTION RESPIRATORY (INHALATION) at 07:09

## 2024-09-12 RX ADMIN — IPRATROPIUM BROMIDE AND ALBUTEROL SULFATE 3 ML: 2.5; .5 SOLUTION RESPIRATORY (INHALATION) at 02:09

## 2024-09-12 RX ADMIN — MAGNESIUM HYDROXIDE 2400 MG: 2400 SUSPENSION ORAL at 08:09

## 2024-09-12 NOTE — SUBJECTIVE & OBJECTIVE
Interval History: continues to do well. She is participating.  Swelling is better, repeat dose of lasix today     Review of Systems   Respiratory:  Negative for shortness of breath.    Cardiovascular:  Negative for chest pain.   Gastrointestinal:  Negative for abdominal pain, nausea and vomiting.   Musculoskeletal:  Positive for arthralgias.   Neurological:  Positive for weakness.   Psychiatric/Behavioral:  Negative for agitation and confusion.    All other systems reviewed and are negative.    Objective:     Vital Signs (Most Recent):  Temp: 97.8 °F (36.6 °C) (09/12/24 0733)  Pulse: 71 (09/12/24 0856)  Resp: 18 (09/12/24 1053)  BP: (!) 124/56 (09/12/24 0733)  SpO2: 100 % (09/12/24 0856) Vital Signs (24h Range):  Temp:  [97.4 °F (36.3 °C)-97.8 °F (36.6 °C)] 97.8 °F (36.6 °C)  Pulse:  [64-71] 71  Resp:  [18-20] 18  SpO2:  [94 %-100 %] 100 %  BP: (107-124)/(56-63) 124/56     Weight: 55.1 kg (121 lb 7.6 oz)  Body mass index is 24.53 kg/m².    Intake/Output Summary (Last 24 hours) at 9/12/2024 1130  Last data filed at 9/12/2024 0815  Gross per 24 hour   Intake 1080 ml   Output --   Net 1080 ml         Physical Exam  Vitals reviewed.   Constitutional:       General: She is not in acute distress.     Appearance: Normal appearance.   HENT:      Head: Normocephalic and atraumatic.   Eyes:      General: No scleral icterus.     Extraocular Movements: Extraocular movements intact.      Conjunctiva/sclera: Conjunctivae normal.      Pupils: Pupils are equal, round, and reactive to light.   Cardiovascular:      Rate and Rhythm: Normal rate and regular rhythm.      Heart sounds: No murmur heard.     No friction rub. No gallop.   Pulmonary:      Effort: Pulmonary effort is normal. No respiratory distress.      Breath sounds: Normal breath sounds. No wheezing or rales.   Abdominal:      General: Abdomen is flat. Bowel sounds are normal. There is no distension.      Palpations: Abdomen is soft.      Tenderness: There is no abdominal  tenderness. There is no guarding.   Musculoskeletal:         General: Swelling present.      Right lower leg: No edema.      Left lower leg: Edema present.      Comments: Swelling better    Skin:     General: Skin is warm and dry.      Coloration: Skin is not jaundiced.      Findings: No rash.   Neurological:      General: No focal deficit present.      Mental Status: She is alert.      Sensory: No sensory deficit.      Motor: Weakness present.   Psychiatric:         Behavior: Behavior normal.             Significant Labs: All pertinent labs within the past 24 hours have been reviewed.  Recent Lab Results       None            Significant Imaging: I have reviewed all pertinent imaging results/findings within the past 24 hours.

## 2024-09-12 NOTE — PT/OT/SLP PROGRESS
Physical Therapy Treatment    Patient Name:  Yanet Viramontes   MRN:  [id number]    Recommendations:     Discharge Recommendations: Low Intensity Therapy  Discharge Equipment Recommendations: walker, rolling, bedside commode  Barriers to discharge: None    Assessment:     Yanet Viramontes is a 84 y.o. female admitted with a medical diagnosis of S/p left hip fracture.  She presents with the following impairments/functional limitations: weakness, impaired endurance, impaired self care skills, impaired functional mobility, gait instability, impaired balance, pain, orthopedic precautions Patient agreeable to PT, but requiring verbal encouragement throughout session.  Patient increased her overall gait distance but still with moderate to severe fatigue noted.    Rehab Prognosis: Good; patient would benefit from acute skilled PT services to address these deficits and reach maximum level of function.    Recent Surgery: * No surgery found *      Plan:     During this hospitalization, patient to be seen 5 x/week to address the identified rehab impairments via gait training, therapeutic activities, therapeutic exercises and progress toward the following goals:    Plan of Care Expires:  09/20/24    Subjective     Chief Complaint: left hip pain  Patient/Family Comments/goals: return home with family  Pain/Comfort:  Pain Rating 1: 0/10  Pain Rating Post-Intervention 1: 0/10      Objective:     Communicated with nurse prior to session.  Patient found supine with   upon PT entry to room.     General Precautions: Standard, fall  Orthopedic Precautions: LLE weight bearing as tolerated  Braces:    Respiratory Status: Room air     Functional Mobility:  Bed Mobility:     Sit to Supine: moderate assistance  Transfers:     Sit to Stand:  minimum assistance with rolling walker  Gait: patient ambulated a total of 70 feet with rest breaks at 18', 18', 30', and 20'.  Overall improving gait with less assistance needed.  She ambulated with CGA with only  1-2 incidences of min assist for walker management, and verbal cues to stand upright and look ahead when walking.       AM-PAC 6 CLICK MOBILITY  Turning over in bed (including adjusting bedclothes, sheets and blankets)?: 3  Sitting down on and standing up from a chair with arms (e.g., wheelchair, bedside commode, etc.): 3  Moving from lying on back to sitting on the side of the bed?: 3  Moving to and from a bed to a chair (including a wheelchair)?: 3  Need to walk in hospital room?: 3  Climbing 3-5 steps with a railing?: 2  Basic Mobility Total Score: 17       Treatment & Education:  Sit to stand x 6 trials and toilet transfer training x 2 trials with min assist overall.   Therapeutic exercise as follows: AP, LAQ, seated march with min assist for left LE, hip abd/add and add ball squeezes all x 30 reps each.     Patient left supine with all lines intact, call button in reach, and daughter present..    GOALS:   Multidisciplinary Problems       Physical Therapy Goals          Problem: Physical Therapy    Goal Priority Disciplines Outcome Goal Variances Interventions   Physical Therapy Goal     PT, PT/OT Progressing     Description: STG - 2 weeks  1. Pt will transfer supine to/from sit with mod Ax1.-PROGRESSING  2. Pt will perform STS and bed transfer with mod Ax1.-MET  3. Pt will have LLE strength of 3-/5.-MET  4. Pt will amb with RW x 20 ft with mod Ax1.-MET    LTG - 4 weeks  1. Pt will transfer supine to/from sit with min Ax1.  2. Pt will perform STS and bed transfer with min Ax1.-PROGRESSING  3. Pt will have LLE strength of 3/5.-MET  4. Pt will amb with RW x 50 ft with min Ax1.-PROGRESSING  5. Pt's ROM LLE will be WFL.-MET                       Time Tracking:     PT Received On: 09/12/24  PT Start Time: 1011     PT Stop Time: 1055  PT Total Time (min): 44 min     Billable Minutes: Gait Training 20, Therapeutic Activity 10, and Therapeutic Exercise 14    Treatment Type: Treatment  PT/PTA: PT     Number of PTA visits  since last PT visit: 0     09/12/2024

## 2024-09-12 NOTE — PLAN OF CARE
Problem: Adult Inpatient Plan of Care  Goal: Patient-Specific Goal (Individualized)  Outcome: Progressing  Goal: Absence of Hospital-Acquired Illness or Injury  Outcome: Progressing  Goal: Optimal Comfort and Wellbeing  Outcome: Progressing  Goal: Readiness for Transition of Care  Outcome: Progressing  Goal: Plan of Care Review  Outcome: Progressing     Problem: Fall Injury Risk  Goal: Absence of Fall and Fall-Related Injury  Outcome: Progressing     Problem: Skin Injury Risk Increased  Goal: Skin Health and Integrity  Outcome: Progressing     Problem: Infection  Goal: Absence of Infection Signs and Symptoms  Outcome: Progressing

## 2024-09-12 NOTE — CONSULTS
[facility] Surgical Unit  Adult Nutrition  Consult Note         Reason for Assessment  Reason For Assessment: RD follow-up assess/MST 3  Nutrition Risk Screen: no indicators present    Assessment and Plan  9/12/2024 RD Follow up: Patient continues on a regular diet with Boost Plus all meals. Po intakes continue to improve with 50% consistently documented per flowsheets. Current weight 55.1 kg with desired weight increase since admission. Last BM noted 9/11.    Diet order and intakes adequate to meet estimated energy needs. Recommend to continue diet and supplements as tolerated. RD Following.     9/05/2024 RD Follow up: Patient with improvement in condition and is now ordered a regular diet with Boost Plus TID. Family present at RD visit and report patient prefers regular diet. Appetite improved to 25-50% and drinking some of Boost. Patient denies dietary preferences. Current weight 54.9 kg.     Continue diet and ONS as tolerated. RD Following.     8/29/2024 RD follow up: Patient upgraded to a MCS diet with chopped meats per SLP with high aspiration risk and to feed only when patient alert enough to swallow. Patient continues with poor intakes with decreased alertness and delirium. 0% meal intakes documented over the last several meals per flowsheets.     IVF at 50 ml/hr ordered. Noted patient with some improvement in confusion today. Continue diet as tolerated and feed as patient alert. Consider alterate means of nutrition as aligns with plan of care if po intakes don't improve with decrease in confusion. RD Following.     Consult received and appreciated. Patient admitted 8/23 with a dx of s/p L Hip fx and hx of cancer. Patient is ordered a full liquid diet with issues swallowing. RN reports patient having to be suctioned and having trouble clearing secretions. SLP evaluation pending.     Patient is 54 kg with a BMI of 24.04 which is WNL. Patient with significant weight loss over the last 6  months of 14% per chart review with weight at prior facility of 62.6 kg noted Feb 2024. Patient with poor po intakes endorsed on MST screen.    Patient meets ASPEN criteria for severe protein calorie malnutrition due to weight loss and poor intakes. See malnutrition assessment.     Recommend to add Boost Plus as tolerated. RD Following.           Learning Needs/Social Determinants of Health  Learning Assessment       08/23/2024 1906 [facility] (8/23/2024 - Present)   Created by Addie Alegre, RN - RN (Nurse) Status: Complete                 PRIMARY LEARNER     Primary Learner Name:  Karla [facility] 08/23/2024 1906    Relationship:  Family [facility] 08/23/2024 1906    Does the primary learner have any barriers to learning?:  No Barriers [facility] 08/23/2024 1906    What is the preferred language of the primary learner?:  English [facility] 08/23/2024 1906    Is an  required?:  No [facility] 08/23/2024 1906    How does the primary learner prefer to learn new concepts?:  Listening [facility] 08/23/2024 1906    How often do you need to have someone help you read instructions, pamphlets, or written material from your doctor or pharmacy?:  Never [facility] 08/23/2024 1906        CO-LEARNER #1     No question answered        CO-LEARNER #2     No question answered        SPECIAL TOPICS     No question answered        ANSWERED BY:     No question answered        Comments         Edit History       Addie Alegre, RN - RN (Nurse)   08/23/2024 1906                           Social Determinants of Health     Tobacco Use: High Risk (9/9/2024)    Patient History     Smoking Tobacco Use: Every Day     Smokeless Tobacco Use: Never     Passive Exposure: Not on file   Alcohol Use: Not At Risk (8/26/2024)    AUDIT-C     Frequency of Alcohol Consumption: Never     Average Number of Drinks: Patient does not drink     Frequency of Binge Drinking: Never   Financial Resource Strain: Low Risk  (8/26/2024)    Overall  Financial Resource Strain (CARDIA)     Difficulty of Paying Living Expenses: Not hard at all   Food Insecurity: No Food Insecurity (8/26/2024)    Hunger Vital Sign     Worried About Running Out of Food in the Last Year: Never true     Ran Out of Food in the Last Year: Never true   Transportation Needs: No Transportation Needs (8/26/2024)    TRANSPORTATION NEEDS     Transportation : No   Physical Activity: Insufficiently Active (8/26/2024)    Exercise Vital Sign     Days of Exercise per Week: 4 days     Minutes of Exercise per Session: 30 min   Stress: No Stress Concern Present (8/26/2024)    [employer] [place] [employer] - Occupational Stress Questionnaire     Feeling of Stress : Only a little   Housing Stability: Low Risk  (8/26/2024)    Housing Stability Vital Sign     Unable to Pay for Housing in the Last Year: No     Homeless in the Last Year: No   Depression: Not on file   Utilities: Not At Risk (8/26/2024)    [facility] Utilities     Threatened with loss of utilities: No   Health Literacy: Inadequate Health Literacy (8/26/2024)     Health Literacy     Frequency of need for help with medical instructions: Sometimes   Social Isolation: Socially Integrated (8/26/2024)    Social Isolation     Social Isolation: 1            Malnutrition  Is Patient Malnourished: Yes Malnutrition Assessment  Malnutrition Context: chronic illness  Malnutrition Level: severe          Weight Loss (Malnutrition): greater than 10% in 6 months  Energy Intake (Malnutrition): less than 75% for greater than or equal to 1 month   [place] Region (Subcutaneous Fat Loss): severe depletion   [place] (Muscle Loss): severe depletion                 Nutrition Diagnosis  Malnutrition (Severe) related to Appetite loss, Chronic illness, and Dysphagia/ difficulty swallowing as evidenced by weight loss of greater than 10% in 6 months, and energy intakes less than 75% for greater than or equal to 1 month  Comments: SLP eval pending; patient  "having issues swallowing with decreased LOC    No results for input(s): "GLU", "POCGLU" in the last 72 hours.    Comments on Glucose: elevated     Nutrition Prescription / Recommendations  Recommendation/Intervention: Recommend to advance diet appropriate and tolerated; add Boost Plus all meals  Goals: po intakes 50% during admission  Nutrition Goal Status: goal met  Communication of RD Recs: discussed on rounds  Current Diet Order: Regular  Nutrition Order Comments: 50% intakes per flowsheets  Oral Nutrition Supplement: Boost Plus all meals  Chewing or Swallowing Difficulty?: Swallowing difficulty  Recommended Diet:  as tolerated  Recommended Oral Supplement: Boost Plus [360kcals, 14g Protein, 45g Carbs] 3 times a day  Is Nutrition Support Recommended: [facility] Nutrition Support: No  Is Nutrition Education Recommended: No    Monitor and Evaluation  % current Intake: P.O. intake of 50 - 75 %  % intake to meet estimated needs: 50 - 75 %  Food and Nutrient Intake: energy intake, food and beverage intake  Food and Nutrient Adminstration: diet order  Anthropometric Measurements: height/length, weight, weight change, body mass index  Biochemical Data, Medical Tests and Procedures: electrolyte and renal panel, gastrointestinal profile, glucose/endocrine profile, inflammatory profile, lipid profile  Nutrition-Focused Physical Findings: overall appearance, head and eyes  Energy Calories Required: meeting needs  Protein Required: meeting needs  Fluid Required: meeting needs  Tolerance: tolerating    Current Medical Diagnosis and Past Medical History     Past Medical History:   Diagnosis Date    Anticoagulant long-term use     Cancer     Hypertension     Thyroid disease        Nutrition/Diet History  Spiritual, Cultural Beliefs, Zoroastrian Practices, Values that Affect Care: no  Food Allergies: NKFA  Factors Affecting Nutritional Intake: decreased appetite    Lab/Procedures/Meds  No results for input(s): "NA", "K", "BUN", " ""CREATININE", "CALCIUM", "ALBUMIN", "CL", "ALT", "AST", "PHOS" in the last 72 hours.    Last A1c: No results found for: "HGBA1C"  Lab Results   Component Value Date    RBC 2.74 (L) 09/07/2024    HGB 8.3 (L) 09/07/2024    HCT 27.3 (L) 09/07/2024    MCV 99.6 (H) 09/07/2024    MCH 30.3 09/07/2024    MCHC 30.4 (L) 09/07/2024     Pertinent Labs Reviewed: reviewed  Pertinent Medications Reviewed: reviewed  Scheduled Meds:   albuterol-ipratropium  3 mL Nebulization Q6H WAKE    budesonide  0.5 mg Nebulization Q12H    docusate sodium  100 mg Oral BID    ferrous sulfate  1 tablet Oral TID WM    furosemide  40 mg Oral Once    levothyroxine  112 mcg Oral Before breakfast    nicotine  1 patch Transdermal Daily    pantoprazole  40 mg Oral Daily    potassium chloride  10 mEq Oral Daily    QUEtiapine  25 mg Oral QHS    rivaroxaban  10 mg Oral Daily     Continuous Infusions:      PRN Meds:.  Current Facility-Administered Medications:     0.9% NaCl, , Intravenous, PRN    acetaminophen, 650 mg, Oral, Q6H PRN    albuterol sulfate, 2.5 mg, Nebulization, Q4H PRN    bisacodyL, 10 mg, Rectal, Daily PRN    calcium carbonate, 500 mg, Oral, BID PRN    guaiFENesin 100 mg/5 ml, 200 mg, Oral, Q4H PRN    HYDROcodone-acetaminophen, 1 tablet, Oral, Q6H PRN    lorazepam, 0.5 mg, Intravenous, Q2H PRN    LORazepam, 0.5 mg, Oral, Q12H PRN    magnesium hydroxide 400 mg/5 ml, 30 mL, Oral, Daily PRN    melatonin, 6 mg, Oral, Nightly PRN    morphine, 2 mg, Intravenous, Q4H PRN    ondansetron, 4 mg, Oral, Q8H PRN    peg 400-hypromellose-glycerin, 2 drop, Ophthalmic, PRN    senna-docusate 8.6-50 mg, 1 tablet, Oral, BID PRN    sodium chloride 0.9%, 10 mL, Intravenous, PRN    Anthropometrics  Temp: 97.8 °F (36.6 °C)  Height Method: Stated  Height: 4' 11" (149.9 cm)  Height (inches): 59 in  Weight Method: Bed Scale  Weight: 55.1 kg (121 lb 7.6 oz)  Weight (lb): 121.47 lb  Ideal Body Weight (IBW), Female: 95 lb  % Ideal Body Weight, Female (lb): 125.26 %  BMI " (Calculated): 24.5       Estimated/Assessed Needs      Temp: 97.8 °F (36.6 °C)Oral  Weight Used For Calorie Calculations: 54 kg (119 lb 0.8 oz)   Energy Need Method: Kcal/kg Energy Calorie Requirements (kcal): 1355-2592  Weight Used For Protein Calculations: 54 kg (119 lb 0.8 oz)  Protein Requirements: 43-54  Estimated Fluid Requirement Method: RDA Method    RDA Method (mL): 1350       Nutrition by Nursing  Diet/Nutrition Received: regular  Intake (%): 50%  Diet/Feeding Assistance: none  Diet/Feeding Tolerance: fair  Last Bowel Movement: 09/11/24                Nutrition Follow-Up  RD Follow-up?: Yes      Nutrition Discharge Planning: recommend regular diet as tolerated with Boost/Ensure ONS on d/c            Available via Secure Chat

## 2024-09-12 NOTE — PLAN OF CARE
Problem: Adult Inpatient Plan of Care  Goal: Readiness for Transition of Care  Outcome: Progressing  Goal: Plan of Care Review  Outcome: Progressing

## 2024-09-12 NOTE — PLAN OF CARE
Swing Bed Recertification    Patient Name: Yanet Viramontes                                                            MRN: [id number]   : 1940   Diagnosis: Intertrochanteric fracture of left femur [S72.142A]     I certify that continued skilled inpatient care is necessary for the following reasons: Patient requires continued skilled PT and OT at least five days per week due to the patient's functional deficits. Patient requires continued daily skilled nursing care to meet the patient's medical needs, promote recovery, and ensure medical safety.     Estimated discharge date: 24

## 2024-09-12 NOTE — PT/OT/SLP PROGRESS
Occupational Therapy   Treatment    Name: Yanet Viramontes  MRN: [id number]  Admitting Diagnosis:  S/p left hip fracture       Recommendations:     Discharge Recommendations: Low Intensity Therapy  Discharge Equipment Recommendations:   (TBD)  Barriers to discharge:  None    Assessment:     Yanet Viramontes is a 84 y.o. female with a medical diagnosis of S/p left hip fracture.  She presents with up in chair finishing up respiratory tx. Performance deficits affecting function are weakness, impaired endurance, impaired self care skills, impaired functional mobility, gait instability, impaired balance, impaired cognition, decreased safety awareness, impaired skin, orthopedic precautions.     Rehab Prognosis:  Good; patient would benefit from acute skilled OT services to address these deficits and reach maximum level of function.       Plan:     Patient to be seen 5 x/week to address the above listed problems via self-care/home management, community/work re-entry, therapeutic activities, therapeutic exercises, neuromuscular re-education, cognitive retraining, sensory integration, wheelchair management/training  Plan of Care Expires: 09/27/24  Plan of Care Reviewed with: patient, caregiver, daughter, family    Subjective     Chief Complaint: weakness, stiffness in general, low endurance  Patient/Family Comments/goals: home with daughter  Pain/Comfort:       Objective:     Communicated with: pt and RT prior to session.  Patient found  up in regular chair  with no lines or drains upon OT entry to room.    General Precautions: Standard, fall    Orthopedic Precautions:LLE weight bearing as tolerated  Braces: N/A  Respiratory Status: Room air     Occupational Performance:       Functional Mobility/Transfers:  Patient completed Sit <> Stand Transfer with minimum assistance  with  rolling walker   Patient completed Chair <> Mat Step Transfer technique with contact guard assistance with rolling walker  Functional Mobility: OT and pt  daughter assisted pt in standing with RW (min a) to don her brief before going to OT outside of her room    Activities of Daily Living:  Lower Body Dressing: maximal assistance daughter and OT assisting with brief while pt supported herself with RW      [facility] 6 Click ADL:      Treatment & Education:  To improve UB endurance, strength, OT facilitated pt with:  UBE x 10 min with 1 to 2 RB's throughout, low level resist, F.R motion  0.5 kg ball lifting in ch press, sh flexion, and horiz abd/add 10 reps x 2 sets  GREEN PUTTY to simulate kitchen prep task and improve FM and       Patient left up in chair with call button in reach and CNA present    GOALS:   Multidisciplinary Problems       Occupational Therapy Goals          Problem: Occupational Therapy    Goal Priority Disciplines Outcome Interventions   Occupational Therapy Goal     OT, PT/OT Progressing    Description: STG: within 2 weeks  Pt will perform grooming with min a at sinkside from seated MET  Pt will bathe with mod a MET  Pt will perform UE dressing with mod a MET  Pt will perform LE dressing with mod a MET  Pt will sit EOB x 5 min with mod to min assistance MET  Pt will transfer bed/chair/bsc with mod a MET  Pt will perform standing task x 1 min with mod assistance x 1 MET  Pt will tolerate 15 minutes of tx without fatigue MET    UPDATED STG's 2024:  Pt will perform grooming with SBA at sinkside from seated   Pt will bathe with min a   Pt will perform UE dressing with min a   Pt will perform LE dressing with min a   Pt will sit EOB x 5 min with min assistance   Pt will transfer bed/chair/bsc with CGA   Pt will perform standing task x 3 min with CGA  Pt will tolerate 45 minutes of tx without fatigue MET    LT.Restore to max I with self care and mobility. ONGOING/PROGRESSING                         Time Tracking:     OT Date of Treatment: 24  OT Start Time: 910  OT Stop Time: 948  OT Total Time (min): 38 min    Billable  Minutes:Therapeutic Activity 15  Therapeutic Exercise 23    OT/CRISELDA: OT          9/12/2024

## 2024-09-12 NOTE — PROGRESS NOTES
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  This was discussed with family who do not wish to have further testing at this time.  She has been at [facility] since 8/19 following a fall at her home which resulted in fracture of the left greater trochanter which required surg. She had an episode of chest pain on Wed with no acute findings.  Echo revealed EF of 60%.  She is being admitted to [facility] for OT/ PT and medical management.       Pt is DNR     Overview/Hospital Course:  8/27 Agitation yesterday, did sleep last night and was good this am, however, after PT she became agitated and aggressive.  Will monitor and redirect.  Try Seroquel this PM this time.      8/28 Maybe a little better today.  She did sleep last night.  Was sitting up this am and not as agitated.  Will continue plan of care for now.  Hopefully some better tomorrow.     8/29 better night and this am, recognizing people and nurses.  Not eating or drinking much though.      8/30 Didn't sleep good last night, but she is alert today just tired     9/2 repair of hip fracture 2 weeks ago, will DC staples    9/6 some increased swelling in surgical leg, on Xarelto ppx already.  Did get a lot of fluid over last few days.     9/9 still some swelling, will dose lasix x 1 today and see how she responds.     9/12 Lasix did help with single dose, will dose again today.     Interval History: continues to do well. She is participating.  Swelling is  better, repeat dose of lasix today     Review of Systems   Respiratory:  Negative for shortness of breath.    Cardiovascular:  Negative for chest pain.   Gastrointestinal:  Negative for abdominal pain, nausea and vomiting.   Musculoskeletal:  Positive for arthralgias.   Neurological:  Positive for weakness.   Psychiatric/Behavioral:  Negative for agitation and confusion.    All other systems reviewed and are negative.    Objective:     Vital Signs (Most Recent):  Temp: 97.8 °F (36.6 °C) (09/12/24 0733)  Pulse: 71 (09/12/24 0856)  Resp: 18 (09/12/24 1053)  BP: (!) 124/56 (09/12/24 0733)  SpO2: 100 % (09/12/24 0856) Vital Signs (24h Range):  Temp:  [97.4 °F (36.3 °C)-97.8 °F (36.6 °C)] 97.8 °F (36.6 °C)  Pulse:  [64-71] 71  Resp:  [18-20] 18  SpO2:  [94 %-100 %] 100 %  BP: (107-124)/(56-63) 124/56     Weight: 55.1 kg (121 lb 7.6 oz)  Body mass index is 24.53 kg/m².    Intake/Output Summary (Last 24 hours) at 9/12/2024 1130  Last data filed at 9/12/2024 0815  Gross per 24 hour   Intake 1080 ml   Output --   Net 1080 ml         Physical Exam  Vitals reviewed.   Constitutional:       General: She is not in acute distress.     Appearance: Normal appearance.   HENT:      Head: Normocephalic and atraumatic.   Eyes:      General: No scleral icterus.     Extraocular Movements: Extraocular movements intact.      Conjunctiva/sclera: Conjunctivae normal.      Pupils: Pupils are equal, round, and reactive to light.   Cardiovascular:      Rate and Rhythm: Normal rate and regular rhythm.      Heart sounds: No murmur heard.     No friction rub. No gallop.   Pulmonary:      Effort: Pulmonary effort is normal. No respiratory distress.      Breath sounds: Normal breath sounds. No wheezing or rales.   Abdominal:      General: Abdomen is flat. Bowel sounds are normal. There is no distension.      Palpations: Abdomen is soft.      Tenderness: There is no abdominal tenderness. There is no guarding.   Musculoskeletal:         General:  Swelling present.      Right lower leg: No edema.      Left lower leg: Edema present.      Comments: Swelling better    Skin:     General: Skin is warm and dry.      Coloration: Skin is not jaundiced.      Findings: No rash.   Neurological:      General: No focal deficit present.      Mental Status: She is alert.      Sensory: No sensory deficit.      Motor: Weakness present.   Psychiatric:         Behavior: Behavior normal.             Significant Labs: All pertinent labs within the past 24 hours have been reviewed.  Recent Lab Results       None            Significant Imaging: I have reviewed all pertinent imaging results/findings within the past 24 hours.    Assessment/Plan:      * S/p left hip fracture  PT/ OT  Fall precautions  Pain control    Delirium  Seems to have resolved.  Will monitor closely.       Severe protein-calorie malnutrition  Nutrition consulted. Most recent weight and BMI monitored-     Measurements:  Wt Readings from Last 1 Encounters:   08/23/24 54 kg (119 lb)   Body mass index is 24.04 kg/m².    Patient has been screened and assessed by RD.    Malnutrition Type:  Context: chronic illness  Level: severe    Malnutrition Characteristic Summary:  Weight Loss (Malnutrition): greater than 10% in 6 months  Energy Intake (Malnutrition): less than 75% for greater than or equal to 1 month    Interventions/Recommendations (treatment strategy):  Recommend to advance diet appropriate and tolerated; add Boost Plus all meals      Disease of thyroid gland  Continue home meds      GERD (gastroesophageal reflux disease)  Continue home meds      History of DVT (deep vein thrombosis)  Continue xeralto      Hypertension  Chronic, controlled. Latest blood pressure and vitals reviewed-     Temp:  [98.2 °F (36.8 °C)]   Pulse:  [68]   Resp:  [22]   BP: (117)/(66)   SpO2:  [96 %] .   Home meds for hypertension were reviewed and noted below.   Hypertension Medications               diltiaZEM HCl (TIAZAC) 120 mg 24 hr  capsule Take 120 mg by mouth.    triamterene-hydrochlorothiazide 37.5-25 mg (DYAZIDE) 37.5-25 mg per capsule Take 1 capsule by mouth every morning.            While in the hospital, will manage blood pressure as follows; BP has been low, will hold meds for now    Monitor BP, replace home medication as warrented.      VTE Risk Mitigation (From admission, onward)           Ordered     rivaroxaban tablet 10 mg  Daily         08/23/24 1802     IP VTE LOW RISK PATIENT  Once         08/23/24 1725                    Discharge Planning   QUINN: 9/12/2024     Code Status: DNR   Is the patient medically ready for discharge?:     Reason for patient still in hospital (select all that apply): Patient trending condition and PT / OT recommendations  Discharge Plan A: Other                  Daren Nicole DO  Department of Hospital Medicine   [facility]

## 2024-09-12 NOTE — PLAN OF CARE
[facility] - Swing Bed   Interdisciplinary Team Meeting    Patient: Yanet Viramontes   Today's Date: 9/12/2024   Estimated D/C Date: 9/27/2024        Physician: Daren Nicole DO Nurse Practitioner: Mir Chavez NP   Pharmacy: Brian Barriga PharmD Unit Director: Temitope Chun RN   : Israel Graham RN Physical/Occupational Therapy: Cynthia Flores OT   Speech Therapy: ST Jasmina Activity Therapy: Geetha Carter   Nursing: Temitope Chun RN  Respiratory: Lia Watkins,  Dietary: Kacey Tinoco RD  Other: n/a     Nursing  New Symptoms/Problems: n/a  Last Bowel Movement: 09/11/24  Urine: continent  [place]: No  Bowel: continent   Constipated: No  Diarrhea: No   Isolation: No  Wound Care: No  Wound Location/Tx: n/a  Cognition: WNL  Aspiration Precautions: No  Comment(s): n/a    Respiratory:   O2 Device: Room Air  O2 Flow: n/a  SpO2: 94%  Neb Tx: No  Comment(s): n/a    Dietary    Nutrition: Regular  Comment(s): boost plus nutritional drink    Speech Therapy  Speech/Swallowing: No current speech or swallowing issues  Comment(s): No    Physical Therapy  Gait/Assistive Device: ambulatory with RW  ELOS: Plan to DC 9/12/2024    Transfers: Moderate Assistance  Bed Mobility: min assitance Range of Motion/Restrictions: n/a  Comment(s): n/a     Occupational Therapy  Eating/Grooming: Supervision or Set-up Assistance Toileting: Minimal Assistance   Bathing: Standby Assistance Dressing (Upper Body): Standby Assistance   Dressing (Lower Body): Minimal Assistance Comment(s): n/a     Activity Therapy  Level of participation: Active participation  Comment(s): n/a    Pharmacy   Medication Changes (see MD orders in chart): No  Labs Reviewed: Yes  New Lab Orders: No  Comment(s): n/a      Tx Plan/Recommendations reviewed with family and/or patient on (date) 9/11/24.  Additional family Conference/Training: n/a  D/C Plan/Recommendations: Home with HH and Home with  family  QUINN: 9/12/2024  Comment(s): hospital bed, BSC, sarai walker and shower chair from [facility].

## 2024-09-13 LAB
ANION GAP SERPL CALCULATED.3IONS-SCNC: 8 MMOL/L (ref 7–16)
BASOPHILS # BLD AUTO: 0.03 K/UL (ref 0–0.2)
BASOPHILS NFR BLD AUTO: 0.7 % (ref 0–1)
BUN SERPL-MCNC: 26 MG/DL (ref 7–18)
BUN/CREAT SERPL: 17 (ref 6–20)
CALCIUM SERPL-MCNC: 9 MG/DL (ref 8.5–10.1)
CHLORIDE SERPL-SCNC: 103 MMOL/L (ref 98–107)
CO2 SERPL-SCNC: 32 MMOL/L (ref 21–32)
CREAT SERPL-MCNC: 1.52 MG/DL (ref 0.55–1.02)
DIFFERENTIAL METHOD BLD: ABNORMAL
EGFR (NO RACE VARIABLE) (RUSH/TITUS): 33 ML/MIN/1.73M2
EOSINOPHIL # BLD AUTO: 0.23 K/UL (ref 0–0.5)
EOSINOPHIL NFR BLD AUTO: 5.1 % (ref 1–4)
ERYTHROCYTE [DISTWIDTH] IN BLOOD BY AUTOMATED COUNT: 16.4 % (ref 11.5–14.5)
GLUCOSE SERPL-MCNC: 93 MG/DL (ref 74–106)
HCT VFR BLD AUTO: 26.1 % (ref 38–47)
HGB BLD-MCNC: 7.9 G/DL (ref 12–16)
LYMPHOCYTES # BLD AUTO: 1.11 K/UL (ref 1–4.8)
LYMPHOCYTES NFR BLD AUTO: 24.6 % (ref 27–41)
MCH RBC QN AUTO: 30.2 PG (ref 27–31)
MCHC RBC AUTO-ENTMCNC: 30.3 G/DL (ref 32–36)
MCV RBC AUTO: 99.6 FL (ref 80–96)
MONOCYTES # BLD AUTO: 0.74 K/UL (ref 0–0.8)
MONOCYTES NFR BLD AUTO: 16.4 % (ref 2–6)
MPC BLD CALC-MCNC: 9.5 FL (ref 9.4–12.4)
NEUTROPHILS # BLD AUTO: 2.41 K/UL (ref 1.8–7.7)
NEUTROPHILS NFR BLD AUTO: 53.2 % (ref 53–65)
PLATELET # BLD AUTO: 373 K/UL (ref 150–400)
POTASSIUM SERPL-SCNC: 4.4 MMOL/L (ref 3.5–5.1)
RBC # BLD AUTO: 2.62 M/UL (ref 4.2–5.4)
SODIUM SERPL-SCNC: 139 MMOL/L (ref 136–145)
WBC # BLD AUTO: 4.52 K/UL (ref 4.5–11)

## 2024-09-13 PROCEDURE — 97116 GAIT TRAINING THERAPY: CPT

## 2024-09-13 PROCEDURE — 97535 SELF CARE MNGMENT TRAINING: CPT

## 2024-09-13 PROCEDURE — 36415 COLL VENOUS BLD VENIPUNCTURE: CPT | Performed by: NURSE PRACTITIONER

## 2024-09-13 PROCEDURE — 97110 THERAPEUTIC EXERCISES: CPT

## 2024-09-13 PROCEDURE — 25000003 PHARM REV CODE 250: Performed by: NURSE PRACTITIONER

## 2024-09-13 PROCEDURE — 94640 AIRWAY INHALATION TREATMENT: CPT

## 2024-09-13 PROCEDURE — 80048 BASIC METABOLIC PNL TOTAL CA: CPT | Performed by: NURSE PRACTITIONER

## 2024-09-13 PROCEDURE — 27000987 HC MATTRESS, MATRIX LOW PROFILE

## 2024-09-13 PROCEDURE — S4991 NICOTINE PATCH NONLEGEND: HCPCS | Performed by: NURSE PRACTITIONER

## 2024-09-13 PROCEDURE — 27000958

## 2024-09-13 PROCEDURE — 99308 SBSQ NF CARE LOW MDM 20: CPT | Mod: ,,, | Performed by: HOSPITALIST

## 2024-09-13 PROCEDURE — 94761 N-INVAS EAR/PLS OXIMETRY MLT: CPT

## 2024-09-13 PROCEDURE — 25000003 PHARM REV CODE 250: Performed by: HOSPITALIST

## 2024-09-13 PROCEDURE — 11000004 HC SNF PRIVATE

## 2024-09-13 PROCEDURE — 99900035 HC TECH TIME PER 15 MIN (STAT)

## 2024-09-13 PROCEDURE — 25000242 PHARM REV CODE 250 ALT 637 W/ HCPCS: Performed by: NURSE PRACTITIONER

## 2024-09-13 PROCEDURE — 85025 COMPLETE CBC W/AUTO DIFF WBC: CPT | Performed by: NURSE PRACTITIONER

## 2024-09-13 RX ADMIN — DOCUSATE SODIUM 100 MG: 100 CAPSULE, LIQUID FILLED ORAL at 08:09

## 2024-09-13 RX ADMIN — RIVAROXABAN 10 MG: 10 TABLET, FILM COATED ORAL at 08:09

## 2024-09-13 RX ADMIN — SENNOSIDES AND DOCUSATE SODIUM 1 TABLET: 8.6; 5 TABLET ORAL at 08:09

## 2024-09-13 RX ADMIN — IPRATROPIUM BROMIDE AND ALBUTEROL SULFATE 3 ML: 2.5; .5 SOLUTION RESPIRATORY (INHALATION) at 07:09

## 2024-09-13 RX ADMIN — BUDESONIDE INHALATION 0.5 MG: 0.5 SUSPENSION RESPIRATORY (INHALATION) at 07:09

## 2024-09-13 RX ADMIN — Medication 6 MG: at 09:09

## 2024-09-13 RX ADMIN — PANTOPRAZOLE SODIUM 40 MG: 40 TABLET, DELAYED RELEASE ORAL at 08:09

## 2024-09-13 RX ADMIN — FERROUS SULFATE TAB 325 MG (65 MG ELEMENTAL FE) 1 EACH: 325 (65 FE) TAB at 10:09

## 2024-09-13 RX ADMIN — HYDROCODONE BITARTRATE AND ACETAMINOPHEN 1 TABLET: 5; 325 TABLET ORAL at 07:09

## 2024-09-13 RX ADMIN — LEVOTHYROXINE SODIUM 112 MCG: 0.11 TABLET ORAL at 05:09

## 2024-09-13 RX ADMIN — HYDROCODONE BITARTRATE AND ACETAMINOPHEN 1 TABLET: 5; 325 TABLET ORAL at 08:09

## 2024-09-13 RX ADMIN — FERROUS SULFATE TAB 325 MG (65 MG ELEMENTAL FE) 1 EACH: 325 (65 FE) TAB at 08:09

## 2024-09-13 RX ADMIN — IPRATROPIUM BROMIDE AND ALBUTEROL SULFATE 3 ML: 2.5; .5 SOLUTION RESPIRATORY (INHALATION) at 01:09

## 2024-09-13 RX ADMIN — NICOTINE 1 PATCH: 21 PATCH, EXTENDED RELEASE TRANSDERMAL at 08:09

## 2024-09-13 RX ADMIN — POTASSIUM CHLORIDE 10 MEQ: 750 CAPSULE, EXTENDED RELEASE ORAL at 08:09

## 2024-09-13 RX ADMIN — FERROUS SULFATE TAB 325 MG (65 MG ELEMENTAL FE) 1 EACH: 325 (65 FE) TAB at 04:09

## 2024-09-13 RX ADMIN — DOCUSATE SODIUM 100 MG: 100 CAPSULE, LIQUID FILLED ORAL at 09:09

## 2024-09-13 RX ADMIN — QUETIAPINE FUMARATE 25 MG: 25 TABLET ORAL at 09:09

## 2024-09-13 NOTE — ASSESSMENT & PLAN NOTE
PT/ OT  Fall precautions  Pain control    Patient will need:   FELISHA ROLLING WALKER  SHOWER BENCH  BEDSIDE COMMODE  HOSPITAL BED  At home for high fall risk, transfers and repositioning.

## 2024-09-13 NOTE — PROGRESS NOTES
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  This was discussed with family who do not wish to have further testing at this time.  She has been at [facility] since 8/19 following a fall at her home which resulted in fracture of the left greater trochanter which required surg. She had an episode of chest pain on Wed with no acute findings.  Echo revealed EF of 60%.  She is being admitted to [facility] for OT/ PT and medical management.       Pt is DNR     Overview/Hospital Course:  8/27 Agitation yesterday, did sleep last night and was good this am, however, after PT she became agitated and aggressive.  Will monitor and redirect.  Try Seroquel this PM this time.      8/28 Maybe a little better today.  She did sleep last night.  Was sitting up this am and not as agitated.  Will continue plan of care for now.  Hopefully some better tomorrow.     8/29 better night and this am, recognizing people and nurses.  Not eating or drinking much though.      8/30 Didn't sleep good last night, but she is alert today just tired     9/2 repair of hip fracture 2 weeks ago, will DC staples    9/6 some increased swelling in surgical leg, on Xarelto ppx already.  Did get a lot of fluid over last few days.     9/9 still some swelling, will dose lasix x 1 today and see how she responds.     9/12 Lasix did help with single dose, will dose again today.     9/13 Swelling better, sitting up eating lunch.  Doing well.     Interval  History: doing good today, swelling better, monitor progress     Review of Systems   Respiratory:  Negative for shortness of breath.    Cardiovascular:  Negative for chest pain.   Gastrointestinal:  Negative for abdominal pain, nausea and vomiting.   Musculoskeletal:  Positive for arthralgias.   Neurological:  Positive for weakness.   Psychiatric/Behavioral:  Negative for agitation and confusion.    All other systems reviewed and are negative.    Objective:     Vital Signs (Most Recent):  Temp: 97.4 °F (36.3 °C) (09/13/24 0716)  Pulse: 68 (09/13/24 0758)  Resp: 17 (09/13/24 0816)  BP: (!) 129/58 (09/13/24 0716)  SpO2: 100 % (09/13/24 0758) Vital Signs (24h Range):  Temp:  [97.4 °F (36.3 °C)-98.1 °F (36.7 °C)] 97.4 °F (36.3 °C)  Pulse:  [64-82] 68  Resp:  [16-20] 17  SpO2:  [95 %-100 %] 100 %  BP: ()/(53-58) 129/58     Weight: 55.1 kg (121 lb 7.6 oz)  Body mass index is 24.53 kg/m².    Intake/Output Summary (Last 24 hours) at 9/13/2024 1136  Last data filed at 9/13/2024 0816  Gross per 24 hour   Intake 740 ml   Output --   Net 740 ml         Physical Exam  Vitals reviewed.   Constitutional:       General: She is not in acute distress.     Appearance: Normal appearance.   HENT:      Head: Normocephalic and atraumatic.   Eyes:      General: No scleral icterus.     Extraocular Movements: Extraocular movements intact.      Conjunctiva/sclera: Conjunctivae normal.      Pupils: Pupils are equal, round, and reactive to light.   Cardiovascular:      Rate and Rhythm: Normal rate and regular rhythm.      Heart sounds: No murmur heard.     No friction rub. No gallop.   Pulmonary:      Effort: Pulmonary effort is normal. No respiratory distress.      Breath sounds: Normal breath sounds. No wheezing or rales.   Abdominal:      General: Abdomen is flat. Bowel sounds are normal. There is no distension.      Palpations: Abdomen is soft.      Tenderness: There is no abdominal tenderness. There is no guarding.   Musculoskeletal:          General: Swelling present.      Right lower leg: No edema.      Left lower leg: Edema present.      Comments: Swelling better again today    Skin:     General: Skin is warm and dry.      Coloration: Skin is not jaundiced.      Findings: No rash.   Neurological:      General: No focal deficit present.      Mental Status: She is alert.      Sensory: No sensory deficit.      Motor: Weakness present.   Psychiatric:         Behavior: Behavior normal.             Significant Labs: All pertinent labs within the past 24 hours have been reviewed.  Recent Lab Results         09/13/24  0558   09/13/24  0549        Anion Gap 8         Baso #   0.03       Basophil %   0.7       BUN 26         BUN/CREAT RATIO 17         Calcium 9.0         Chloride 103         CO2 32         Creatinine 1.52         Differential Method   Scan Smear       eGFR 33         Eos #   0.23       Eos %   5.1       Glucose 93         Hematocrit   26.1       Hemoglobin   7.9       Lymph #   1.11       Lymph %   24.6       MCH   30.2       MCHC   30.3       MCV   99.6       Mono #   0.74       Mono %   16.4       MPV   9.5       Neutrophils, Abs   2.41       Neutrophils Relative   53.2       Platelet Count   373       Potassium 4.4         RBC   2.62       RDW   16.4       Sodium 139         WBC   4.52               Significant Imaging: I have reviewed all pertinent imaging results/findings within the past 24 hours.    Assessment/Plan:      * S/p left hip fracture  PT/ OT  Fall precautions  Pain control    Patient will need:   FELISHA ROLLING WALKER  SHOWER BENCH  BEDSIDE COMMODE  HOSPITAL BED  At home for high fall risk, transfers and repositioning.        Delirium  Seems to have resolved.  Will monitor closely.       Severe protein-calorie malnutrition  Nutrition consulted. Most recent weight and BMI monitored-     Measurements:  Wt Readings from Last 1 Encounters:   08/23/24 54 kg (119 lb)   Body mass index is 24.04 kg/m².    Patient has been  screened and assessed by RD.    Malnutrition Type:  Context: chronic illness  Level: severe    Malnutrition Characteristic Summary:  Weight Loss (Malnutrition): greater than 10% in 6 months  Energy Intake (Malnutrition): less than 75% for greater than or equal to 1 month    Interventions/Recommendations (treatment strategy):  Recommend to advance diet appropriate and tolerated; add Boost Plus all meals      Disease of thyroid gland  Continue home meds      GERD (gastroesophageal reflux disease)  Continue home meds      History of DVT (deep vein thrombosis)  Continue xeralto      Hypertension  Chronic, controlled. Latest blood pressure and vitals reviewed-     Temp:  [98.2 °F (36.8 °C)]   Pulse:  [68]   Resp:  [22]   BP: (117)/(66)   SpO2:  [96 %] .   Home meds for hypertension were reviewed and noted below.   Hypertension Medications               diltiaZEM HCl (TIAZAC) 120 mg 24 hr capsule Take 120 mg by mouth.    triamterene-hydrochlorothiazide 37.5-25 mg (DYAZIDE) 37.5-25 mg per capsule Take 1 capsule by mouth every morning.            While in the hospital, will manage blood pressure as follows; BP has been low, will hold meds for now    Monitor BP, replace home medication as warrented.      VTE Risk Mitigation (From admission, onward)           Ordered     rivaroxaban tablet 10 mg  Daily         08/23/24 1802     IP VTE LOW RISK PATIENT  Once         08/23/24 1725                    Discharge Planning   QUINN: 9/12/2024     Code Status: DNR   Is the patient medically ready for discharge?:     Reason for patient still in hospital (select all that apply): PT / OT recommendations  Discharge Plan A: Other                  Daren Nicole DO  Department of Hospital Medicine   [facility]

## 2024-09-13 NOTE — SUBJECTIVE & OBJECTIVE
Interval History: doing good today, swelling better, monitor progress     Review of Systems   Respiratory:  Negative for shortness of breath.    Cardiovascular:  Negative for chest pain.   Gastrointestinal:  Negative for abdominal pain, nausea and vomiting.   Musculoskeletal:  Positive for arthralgias.   Neurological:  Positive for weakness.   Psychiatric/Behavioral:  Negative for agitation and confusion.    All other systems reviewed and are negative.    Objective:     Vital Signs (Most Recent):  Temp: 97.4 °F (36.3 °C) (09/13/24 0716)  Pulse: 68 (09/13/24 0758)  Resp: 17 (09/13/24 0816)  BP: (!) 129/58 (09/13/24 0716)  SpO2: 100 % (09/13/24 0758) Vital Signs (24h Range):  Temp:  [97.4 °F (36.3 °C)-98.1 °F (36.7 °C)] 97.4 °F (36.3 °C)  Pulse:  [64-82] 68  Resp:  [16-20] 17  SpO2:  [95 %-100 %] 100 %  BP: ()/(53-58) 129/58     Weight: 55.1 kg (121 lb 7.6 oz)  Body mass index is 24.53 kg/m².    Intake/Output Summary (Last 24 hours) at 9/13/2024 1136  Last data filed at 9/13/2024 0816  Gross per 24 hour   Intake 740 ml   Output --   Net 740 ml         Physical Exam  Vitals reviewed.   Constitutional:       General: She is not in acute distress.     Appearance: Normal appearance.   HENT:      Head: Normocephalic and atraumatic.   Eyes:      General: No scleral icterus.     Extraocular Movements: Extraocular movements intact.      Conjunctiva/sclera: Conjunctivae normal.      Pupils: Pupils are equal, round, and reactive to light.   Cardiovascular:      Rate and Rhythm: Normal rate and regular rhythm.      Heart sounds: No murmur heard.     No friction rub. No gallop.   Pulmonary:      Effort: Pulmonary effort is normal. No respiratory distress.      Breath sounds: Normal breath sounds. No wheezing or rales.   Abdominal:      General: Abdomen is flat. Bowel sounds are normal. There is no distension.      Palpations: Abdomen is soft.      Tenderness: There is no abdominal tenderness. There is no guarding.    Musculoskeletal:         General: Swelling present.      Right lower leg: No edema.      Left lower leg: Edema present.      Comments: Swelling better again today    Skin:     General: Skin is warm and dry.      Coloration: Skin is not jaundiced.      Findings: No rash.   Neurological:      General: No focal deficit present.      Mental Status: She is alert.      Sensory: No sensory deficit.      Motor: Weakness present.   Psychiatric:         Behavior: Behavior normal.             Significant Labs: All pertinent labs within the past 24 hours have been reviewed.  Recent Lab Results         09/13/24  0558   09/13/24  0549        Anion Gap 8         Baso #   0.03       Basophil %   0.7       BUN 26         BUN/CREAT RATIO 17         Calcium 9.0         Chloride 103         CO2 32         Creatinine 1.52         Differential Method   Scan Smear       eGFR 33         Eos #   0.23       Eos %   5.1       Glucose 93         Hematocrit   26.1       Hemoglobin   7.9       Lymph #   1.11       Lymph %   24.6       MCH   30.2       MCHC   30.3       MCV   99.6       Mono #   0.74       Mono %   16.4       MPV   9.5       Neutrophils, Abs   2.41       Neutrophils Relative   53.2       Platelet Count   373       Potassium 4.4         RBC   2.62       RDW   16.4       Sodium 139         WBC   4.52               Significant Imaging: I have reviewed all pertinent imaging results/findings within the past 24 hours.

## 2024-09-13 NOTE — PT/OT/SLP PROGRESS
Occupational Therapy   Treatment    Name: Yanet Viramontes  MRN: [id number]  Admitting Diagnosis:  S/p left hip fracture       Recommendations:     Discharge Recommendations: Low Intensity Therapy  Discharge Equipment Recommendations:   (TBD)  Barriers to discharge:  None    Assessment:     Yanet Viramontes is a 84 y.o. female with a medical diagnosis of S/p left hip fracture.  She presents with no new complaints. Performance deficits affecting function are weakness, impaired endurance, impaired self care skills, impaired functional mobility, gait instability, impaired balance, impaired cognition, decreased safety awareness, impaired skin, orthopedic precautions.     Rehab Prognosis:  Fair; patient would benefit from acute skilled OT services to address these deficits and reach maximum level of function.       Plan:     Patient to be seen 5 x/week to address the above listed problems via self-care/home management, community/work re-entry, therapeutic activities, therapeutic exercises, neuromuscular re-education, cognitive retraining, sensory integration, wheelchair management/training  Plan of Care Expires: 09/27/24  Plan of Care Reviewed with: patient, caregiver, daughter, family    Subjective     Chief Complaint: stiffness LLE hip  Patient/Family Comments/goals: home with daughter  Pain/Comfort:       Objective:     Communicated with: pt prior to session.  Patient found up in chair with no lines or drains upon OT entry to room.    General Precautions: Standard, fall    Orthopedic Precautions:LLE weight bearing as tolerated  Braces: N/A  Respiratory Status: Room air     Occupational Performance:     Functional Mobility/Transfers:  Patient completed Sit <> Stand Transfer with minimum assistance  with  rolling walker   Functional Mobility: walked from her chair to the sink to wash hands, walked to the trash can from the sink, then back to her chair to sit down with min a and OT providing assistance to keep walker in proper  position and pt body in proper position within the RW.  Upon sitting in her chair, OT had pt to setup her own lunch, and she did well, even opening her condiment containers.    Activities of Daily Living:  Feeding:  independence as noted above  Grooming: contact guard assistance at sinkside to wash hands and comb hair.      [facility] 6 Click ADL:      Treatment & Education:  ADL as noted above.    Patient left up in chair with call button in reach and nursing notified of pt status as well as OT new goal for pt to use BSC over toilet frame during the day.  Nursing relates good understanding of this as well as pt agreeable.    GOALS:   Multidisciplinary Problems       Occupational Therapy Goals          Problem: Occupational Therapy    Goal Priority Disciplines Outcome Interventions   Occupational Therapy Goal     OT, PT/OT Progressing    Description: STG: within 2 weeks  Pt will perform grooming with min a at sinkside from seated MET  Pt will bathe with mod a MET  Pt will perform UE dressing with mod a MET  Pt will perform LE dressing with mod a MET  Pt will sit EOB x 5 min with mod to min assistance MET  Pt will transfer bed/chair/bsc with mod a MET  Pt will perform standing task x 1 min with mod assistance x 1 MET  Pt will tolerate 15 minutes of tx without fatigue MET    UPDATED STG's 2024:  Pt will perform grooming with SBA at sinkside from seated   Pt will bathe with min a   Pt will perform UE dressing with min a   Pt will perform LE dressing with min a   Pt will sit EOB x 5 min with min assistance   Pt will transfer bed/chair/bsc with CGA   Pt will perform standing task x 3 min with CGA  Pt will tolerate 45 minutes of tx without fatigue MET    LT.Restore to max I with self care and mobility. ONGOING/PROGRESSING                         Time Tracking:     OT Date of Treatment: 24  OT Start Time:   OT Stop Time: 1123  OT Total Time (min): 15 min    Billable Minutes:Self Care/Home Management  15    OT/CRISELDA: OT          9/13/2024

## 2024-09-13 NOTE — PT/OT/SLP PROGRESS
Physical Therapy Treatment    Patient Name:  Yanet Viramontes   MRN:  [id number]    Recommendations:     Discharge Recommendations: Low Intensity Therapy  Discharge Equipment Recommendations: walker, rolling, bedside commode  Barriers to discharge:  difficulty with mobility    Assessment:     Yanet Viramontes is a 84 y.o. female admitted with a medical diagnosis of S/p left hip fracture.  She presents with the following impairments/functional limitations: weakness, impaired endurance, impaired self care skills, impaired functional mobility, gait instability, pain, impaired balance patient with fatigue during session and still with lower mobility.    Rehab Prognosis: Fair; patient would benefit from acute skilled PT services to address these deficits and reach maximum level of function.    Recent Surgery: * No surgery found *      Plan:     During this hospitalization, patient to be seen 5 x/week to address the identified rehab impairments via gait training, therapeutic activities, therapeutic exercises and progress toward the following goals:    Plan of Care Expires:  09/20/24    Subjective     Chief Complaint: weakness  Patient/Family Comments/goals: return home with family  Pain/Comfort:  Pain Rating 1: 0/10  Pain Rating Post-Intervention 1: 0/10      Objective:     Communicated with nurse prior to session.  Patient found supine with   upon PT entry to room.     General Precautions: Standard, fall  Orthopedic Precautions: LLE weight bearing as tolerated  Braces:    Respiratory Status: Room air     Functional Mobility:  Bed Mobility:     Supine to Sit: minimum assistance  Transfers:     Sit to Stand:  minimum assistance with rolling walker  Gait: patient ambulated 26 feet with RW with CGA and verbal cues followed by a 2 minute rest break then ambulated 35 feet with RW with CGA with no LOB and improved mp.       AM-PAC 6 CLICK MOBILITY  Turning over in bed (including adjusting bedclothes, sheets and blankets)?:  3  Sitting down on and standing up from a chair with arms (e.g., wheelchair, bedside commode, etc.): 3  Moving from lying on back to sitting on the side of the bed?: 3  Moving to and from a bed to a chair (including a wheelchair)?: 3  Need to walk in hospital room?: 3  Climbing 3-5 steps with a railing?: 1  Basic Mobility Total Score: 16       Treatment & Education:  AP, LAQ, seated march, hip abd/add, add squeezes all x 20 each with tactile cues and occasional min assist for left LE.     Patient left up in chair with all lines intact and call button in reach..    GOALS:   Multidisciplinary Problems       Physical Therapy Goals          Problem: Physical Therapy    Goal Priority Disciplines Outcome Goal Variances Interventions   Physical Therapy Goal     PT, PT/OT Progressing     Description: STG - 2 weeks  1. Pt will transfer supine to/from sit with mod Ax1.-PROGRESSING  2. Pt will perform STS and bed transfer with mod Ax1.-MET  3. Pt will have LLE strength of 3-/5.-MET  4. Pt will amb with RW x 20 ft with mod Ax1.-MET    LTG - 4 weeks  1. Pt will transfer supine to/from sit with min Ax1.  2. Pt will perform STS and bed transfer with min Ax1.-PROGRESSING  3. Pt will have LLE strength of 3/5.-MET  4. Pt will amb with RW x 50 ft with min Ax1.-PROGRESSING  5. Pt's ROM LLE will be WFL.-MET                       Time Tracking:     PT Received On: 09/13/24  PT Start Time: 1007     PT Stop Time: 1035  PT Total Time (min): 28 min     Billable Minutes: Gait Training 16 and Therapeutic Exercise 12    Treatment Type: Treatment  PT/PTA: PT     Number of PTA visits since last PT visit: 0     09/13/2024

## 2024-09-13 NOTE — PLAN OF CARE
Problem: Breathing Pattern Ineffective  Goal: Effective Breathing Pattern  Outcome: Progressing     Problem: Gas Exchange Impaired  Goal: Optimal Gas Exchange  Outcome: Progressing      Unna Boot Text: An Unna boot was placed to help immobilize the limb and facilitate more rapid healing.

## 2024-09-13 NOTE — PLAN OF CARE
Problem: Adult Inpatient Plan of Care  Goal: Patient-Specific Goal (Individualized)  Outcome: Progressing  Goal: Absence of Hospital-Acquired Illness or Injury  Outcome: Progressing  Goal: Optimal Comfort and Wellbeing  Outcome: Progressing  Goal: Readiness for Transition of Care  Outcome: Progressing  Goal: Plan of Care Review  Outcome: Progressing  Flowsheets (Taken 9/13/2024 6123)  Plan of Care Reviewed With:   patient   child     Problem: Fall Injury Risk  Goal: Absence of Fall and Fall-Related Injury  Outcome: Progressing     Problem: Skin Injury Risk Increased  Goal: Skin Health and Integrity  Outcome: Progressing     Problem: Wound  Goal: Optimal Coping  Outcome: Progressing  Goal: Optimal Functional Ability  Outcome: Progressing  Goal: Absence of Infection Signs and Symptoms  Outcome: Progressing  Goal: Improved Oral Intake  Outcome: Progressing  Goal: Optimal Pain Control and Function  Outcome: Progressing  Goal: Skin Health and Integrity  Outcome: Progressing  Goal: Optimal Wound Healing  Outcome: Progressing     Problem: Infection  Goal: Absence of Infection Signs and Symptoms  Outcome: Progressing

## 2024-09-14 PROCEDURE — 25000003 PHARM REV CODE 250: Performed by: NURSE PRACTITIONER

## 2024-09-14 PROCEDURE — S4991 NICOTINE PATCH NONLEGEND: HCPCS | Performed by: NURSE PRACTITIONER

## 2024-09-14 PROCEDURE — 94761 N-INVAS EAR/PLS OXIMETRY MLT: CPT

## 2024-09-14 PROCEDURE — 94640 AIRWAY INHALATION TREATMENT: CPT

## 2024-09-14 PROCEDURE — 11000004 HC SNF PRIVATE

## 2024-09-14 PROCEDURE — 25000242 PHARM REV CODE 250 ALT 637 W/ HCPCS: Performed by: NURSE PRACTITIONER

## 2024-09-14 PROCEDURE — 27000987 HC MATTRESS, MATRIX LOW PROFILE

## 2024-09-14 PROCEDURE — 27000958

## 2024-09-14 PROCEDURE — 25000003 PHARM REV CODE 250: Performed by: HOSPITALIST

## 2024-09-14 RX ADMIN — HYDROCODONE BITARTRATE AND ACETAMINOPHEN 1 TABLET: 5; 325 TABLET ORAL at 05:09

## 2024-09-14 RX ADMIN — Medication 6 MG: at 08:09

## 2024-09-14 RX ADMIN — DOCUSATE SODIUM 100 MG: 100 CAPSULE, LIQUID FILLED ORAL at 08:09

## 2024-09-14 RX ADMIN — RIVAROXABAN 10 MG: 10 TABLET, FILM COATED ORAL at 08:09

## 2024-09-14 RX ADMIN — HYDROCODONE BITARTRATE AND ACETAMINOPHEN 1 TABLET: 5; 325 TABLET ORAL at 02:09

## 2024-09-14 RX ADMIN — POTASSIUM CHLORIDE 10 MEQ: 750 CAPSULE, EXTENDED RELEASE ORAL at 08:09

## 2024-09-14 RX ADMIN — HYDROCODONE BITARTRATE AND ACETAMINOPHEN 1 TABLET: 5; 325 TABLET ORAL at 07:09

## 2024-09-14 RX ADMIN — LEVOTHYROXINE SODIUM 112 MCG: 0.11 TABLET ORAL at 05:09

## 2024-09-14 RX ADMIN — NICOTINE 1 PATCH: 21 PATCH, EXTENDED RELEASE TRANSDERMAL at 08:09

## 2024-09-14 RX ADMIN — BUDESONIDE INHALATION 0.5 MG: 0.5 SUSPENSION RESPIRATORY (INHALATION) at 07:09

## 2024-09-14 RX ADMIN — IPRATROPIUM BROMIDE AND ALBUTEROL SULFATE 3 ML: 2.5; .5 SOLUTION RESPIRATORY (INHALATION) at 02:09

## 2024-09-14 RX ADMIN — FERROUS SULFATE TAB 325 MG (65 MG ELEMENTAL FE) 1 EACH: 325 (65 FE) TAB at 11:09

## 2024-09-14 RX ADMIN — QUETIAPINE FUMARATE 25 MG: 25 TABLET ORAL at 08:09

## 2024-09-14 RX ADMIN — FERROUS SULFATE TAB 325 MG (65 MG ELEMENTAL FE) 1 EACH: 325 (65 FE) TAB at 05:09

## 2024-09-14 RX ADMIN — PANTOPRAZOLE SODIUM 40 MG: 40 TABLET, DELAYED RELEASE ORAL at 08:09

## 2024-09-14 RX ADMIN — BUDESONIDE INHALATION 0.5 MG: 0.5 SUSPENSION RESPIRATORY (INHALATION) at 08:09

## 2024-09-14 RX ADMIN — IPRATROPIUM BROMIDE AND ALBUTEROL SULFATE 3 ML: 2.5; .5 SOLUTION RESPIRATORY (INHALATION) at 08:09

## 2024-09-14 RX ADMIN — FERROUS SULFATE TAB 325 MG (65 MG ELEMENTAL FE) 1 EACH: 325 (65 FE) TAB at 08:09

## 2024-09-14 RX ADMIN — IPRATROPIUM BROMIDE AND ALBUTEROL SULFATE 3 ML: 2.5; .5 SOLUTION RESPIRATORY (INHALATION) at 07:09

## 2024-09-14 NOTE — PLAN OF CARE
Problem: Breathing Pattern Ineffective  Goal: Effective Breathing Pattern  9/14/2024 1417 by Anahi Mims, RRT  Outcome: Progressing  9/14/2024 0835 by Anahi Mims, RRT  Outcome: Progressing  9/14/2024 0835 by Anahi Mims, RRT  Outcome: Progressing     Problem: Gas Exchange Impaired  Goal: Optimal Gas Exchange  9/14/2024 1417 by Anahi Mims, RRT  Outcome: Progressing  9/14/2024 0835 by Anahi Mims, RRT  Outcome: Progressing

## 2024-09-14 NOTE — PLAN OF CARE
Problem: Adult Inpatient Plan of Care  Goal: Patient-Specific Goal (Individualized)  Outcome: Progressing  Goal: Absence of Hospital-Acquired Illness or Injury  Outcome: Progressing  Goal: Optimal Comfort and Wellbeing  Outcome: Progressing  Goal: Readiness for Transition of Care  Outcome: Progressing  Goal: Plan of Care Review  Outcome: Progressing  Flowsheets (Taken 9/14/2024 3245)  Plan of Care Reviewed With:   patient   child   family     Problem: Fall Injury Risk  Goal: Absence of Fall and Fall-Related Injury  Outcome: Progressing     Problem: Skin Injury Risk Increased  Goal: Skin Health and Integrity  Outcome: Progressing     Problem: Wound  Goal: Optimal Coping  Outcome: Progressing  Goal: Optimal Functional Ability  Outcome: Progressing  Goal: Absence of Infection Signs and Symptoms  Outcome: Progressing  Goal: Improved Oral Intake  Outcome: Progressing  Goal: Optimal Pain Control and Function  Outcome: Progressing  Goal: Skin Health and Integrity  Outcome: Progressing  Goal: Optimal Wound Healing  Outcome: Progressing     Problem: Infection  Goal: Absence of Infection Signs and Symptoms  Outcome: Progressing     Problem: Pain Acute  Goal: Optimal Pain Control and Function  Outcome: Progressing

## 2024-09-14 NOTE — PLAN OF CARE
Problem: Breathing Pattern Ineffective  Goal: Effective Breathing Pattern  9/14/2024 0835 by Anahi Mims, RRT  Outcome: Progressing  9/14/2024 0835 by Anahi Mims, RRT  Outcome: Progressing     Problem: Gas Exchange Impaired  Goal: Optimal Gas Exchange  Outcome: Progressing

## 2024-09-15 PROCEDURE — 11000004 HC SNF PRIVATE

## 2024-09-15 PROCEDURE — 27000958

## 2024-09-15 PROCEDURE — 25000003 PHARM REV CODE 250: Performed by: NURSE PRACTITIONER

## 2024-09-15 PROCEDURE — S4991 NICOTINE PATCH NONLEGEND: HCPCS | Performed by: NURSE PRACTITIONER

## 2024-09-15 PROCEDURE — 25000003 PHARM REV CODE 250: Performed by: HOSPITALIST

## 2024-09-15 PROCEDURE — 27000987 HC MATTRESS, MATRIX LOW PROFILE

## 2024-09-15 PROCEDURE — 94640 AIRWAY INHALATION TREATMENT: CPT

## 2024-09-15 PROCEDURE — 25000242 PHARM REV CODE 250 ALT 637 W/ HCPCS: Performed by: NURSE PRACTITIONER

## 2024-09-15 PROCEDURE — 94761 N-INVAS EAR/PLS OXIMETRY MLT: CPT

## 2024-09-15 RX ADMIN — ACETAMINOPHEN 650 MG: 325 TABLET ORAL at 12:09

## 2024-09-15 RX ADMIN — Medication 6 MG: at 08:09

## 2024-09-15 RX ADMIN — IPRATROPIUM BROMIDE AND ALBUTEROL SULFATE 3 ML: 2.5; .5 SOLUTION RESPIRATORY (INHALATION) at 01:09

## 2024-09-15 RX ADMIN — DOCUSATE SODIUM 100 MG: 100 CAPSULE, LIQUID FILLED ORAL at 08:09

## 2024-09-15 RX ADMIN — IPRATROPIUM BROMIDE AND ALBUTEROL SULFATE 3 ML: 2.5; .5 SOLUTION RESPIRATORY (INHALATION) at 08:09

## 2024-09-15 RX ADMIN — RIVAROXABAN 10 MG: 10 TABLET, FILM COATED ORAL at 08:09

## 2024-09-15 RX ADMIN — PANTOPRAZOLE SODIUM 40 MG: 40 TABLET, DELAYED RELEASE ORAL at 08:09

## 2024-09-15 RX ADMIN — BUDESONIDE INHALATION 0.5 MG: 0.5 SUSPENSION RESPIRATORY (INHALATION) at 08:09

## 2024-09-15 RX ADMIN — FERROUS SULFATE TAB 325 MG (65 MG ELEMENTAL FE) 1 EACH: 325 (65 FE) TAB at 11:09

## 2024-09-15 RX ADMIN — IPRATROPIUM BROMIDE AND ALBUTEROL SULFATE 3 ML: 2.5; .5 SOLUTION RESPIRATORY (INHALATION) at 07:09

## 2024-09-15 RX ADMIN — SENNOSIDES AND DOCUSATE SODIUM 1 TABLET: 8.6; 5 TABLET ORAL at 08:09

## 2024-09-15 RX ADMIN — QUETIAPINE FUMARATE 25 MG: 25 TABLET ORAL at 08:09

## 2024-09-15 RX ADMIN — NICOTINE 1 PATCH: 21 PATCH, EXTENDED RELEASE TRANSDERMAL at 08:09

## 2024-09-15 RX ADMIN — BUDESONIDE INHALATION 0.5 MG: 0.5 SUSPENSION RESPIRATORY (INHALATION) at 07:09

## 2024-09-15 RX ADMIN — FERROUS SULFATE TAB 325 MG (65 MG ELEMENTAL FE) 1 EACH: 325 (65 FE) TAB at 08:09

## 2024-09-15 RX ADMIN — HYDROCODONE BITARTRATE AND ACETAMINOPHEN 1 TABLET: 5; 325 TABLET ORAL at 06:09

## 2024-09-15 RX ADMIN — HYDROCODONE BITARTRATE AND ACETAMINOPHEN 1 TABLET: 5; 325 TABLET ORAL at 09:09

## 2024-09-15 RX ADMIN — FERROUS SULFATE TAB 325 MG (65 MG ELEMENTAL FE) 1 EACH: 325 (65 FE) TAB at 04:09

## 2024-09-15 RX ADMIN — LEVOTHYROXINE SODIUM 112 MCG: 0.11 TABLET ORAL at 05:09

## 2024-09-15 RX ADMIN — POTASSIUM CHLORIDE 10 MEQ: 750 CAPSULE, EXTENDED RELEASE ORAL at 08:09

## 2024-09-15 NOTE — PLAN OF CARE
Problem: Adult Inpatient Plan of Care  Goal: Patient-Specific Goal (Individualized)  Outcome: Progressing  Goal: Absence of Hospital-Acquired Illness or Injury  Outcome: Progressing  Goal: Optimal Comfort and Wellbeing  Outcome: Progressing  Goal: Readiness for Transition of Care  Outcome: Progressing  Goal: Plan of Care Review  Outcome: Progressing     Problem: Fall Injury Risk  Goal: Absence of Fall and Fall-Related Injury  Outcome: Progressing     Problem: Skin Injury Risk Increased  Goal: Skin Health and Integrity  Outcome: Progressing     Problem: Wound  Goal: Optimal Coping  Outcome: Progressing  Goal: Optimal Functional Ability  Outcome: Progressing  Goal: Absence of Infection Signs and Symptoms  Outcome: Progressing  Goal: Improved Oral Intake  Outcome: Progressing  Goal: Optimal Pain Control and Function  Outcome: Progressing  Goal: Skin Health and Integrity  Outcome: Progressing  Goal: Optimal Wound Healing  Outcome: Progressing     Problem: Infection  Goal: Absence of Infection Signs and Symptoms  Outcome: Progressing     Problem: Pain Acute  Goal: Optimal Pain Control and Function  Outcome: Progressing

## 2024-09-16 ENCOUNTER — CLINICAL SUPPORT (OUTPATIENT)
Dept: WOUND CARE | Facility: HOSPITAL | Age: 84
End: 2024-09-16
Attending: NURSE PRACTITIONER
Payer: MEDICARE

## 2024-09-16 DIAGNOSIS — L89.893 PRESSURE ULCER OF OTHER SITE, STAGE 3: Primary | ICD-10-CM

## 2024-09-16 PROCEDURE — 25000003 PHARM REV CODE 250: Performed by: NURSE PRACTITIONER

## 2024-09-16 PROCEDURE — S4991 NICOTINE PATCH NONLEGEND: HCPCS | Performed by: NURSE PRACTITIONER

## 2024-09-16 PROCEDURE — 97530 THERAPEUTIC ACTIVITIES: CPT

## 2024-09-16 PROCEDURE — 27000987 HC MATTRESS, MATRIX LOW PROFILE

## 2024-09-16 PROCEDURE — 11000004 HC SNF PRIVATE

## 2024-09-16 PROCEDURE — 99307 SBSQ NF CARE SF MDM 10: CPT | Mod: ,,, | Performed by: HOSPITALIST

## 2024-09-16 PROCEDURE — 94761 N-INVAS EAR/PLS OXIMETRY MLT: CPT

## 2024-09-16 PROCEDURE — 97116 GAIT TRAINING THERAPY: CPT

## 2024-09-16 PROCEDURE — 25000242 PHARM REV CODE 250 ALT 637 W/ HCPCS: Performed by: NURSE PRACTITIONER

## 2024-09-16 PROCEDURE — 97110 THERAPEUTIC EXERCISES: CPT

## 2024-09-16 PROCEDURE — 11042 DBRDMT SUBQ TIS 1ST 20SQCM/<: CPT

## 2024-09-16 PROCEDURE — 27000958

## 2024-09-16 PROCEDURE — 94640 AIRWAY INHALATION TREATMENT: CPT

## 2024-09-16 RX ORDER — IPRATROPIUM BROMIDE AND ALBUTEROL SULFATE 2.5; .5 MG/3ML; MG/3ML
3 SOLUTION RESPIRATORY (INHALATION) EVERY 4 HOURS PRN
Status: CANCELLED | OUTPATIENT
Start: 2024-09-16

## 2024-09-16 RX ADMIN — DOCUSATE SODIUM 100 MG: 100 CAPSULE, LIQUID FILLED ORAL at 09:09

## 2024-09-16 RX ADMIN — DOCUSATE SODIUM 100 MG: 100 CAPSULE, LIQUID FILLED ORAL at 08:09

## 2024-09-16 RX ADMIN — PANTOPRAZOLE SODIUM 40 MG: 40 TABLET, DELAYED RELEASE ORAL at 09:09

## 2024-09-16 RX ADMIN — RIVAROXABAN 10 MG: 10 TABLET, FILM COATED ORAL at 09:09

## 2024-09-16 RX ADMIN — IPRATROPIUM BROMIDE AND ALBUTEROL SULFATE 3 ML: 2.5; .5 SOLUTION RESPIRATORY (INHALATION) at 07:09

## 2024-09-16 RX ADMIN — ACETAMINOPHEN 650 MG: 325 TABLET ORAL at 09:09

## 2024-09-16 RX ADMIN — NICOTINE 1 PATCH: 21 PATCH, EXTENDED RELEASE TRANSDERMAL at 09:09

## 2024-09-16 RX ADMIN — QUETIAPINE FUMARATE 25 MG: 25 TABLET ORAL at 08:09

## 2024-09-16 RX ADMIN — FERROUS SULFATE TAB 325 MG (65 MG ELEMENTAL FE) 1 EACH: 325 (65 FE) TAB at 09:09

## 2024-09-16 RX ADMIN — FERROUS SULFATE TAB 325 MG (65 MG ELEMENTAL FE) 1 EACH: 325 (65 FE) TAB at 06:09

## 2024-09-16 RX ADMIN — IPRATROPIUM BROMIDE AND ALBUTEROL SULFATE 3 ML: 2.5; .5 SOLUTION RESPIRATORY (INHALATION) at 01:09

## 2024-09-16 RX ADMIN — LEVOTHYROXINE SODIUM 112 MCG: 0.11 TABLET ORAL at 05:09

## 2024-09-16 RX ADMIN — POTASSIUM CHLORIDE 10 MEQ: 750 CAPSULE, EXTENDED RELEASE ORAL at 09:09

## 2024-09-16 RX ADMIN — BUDESONIDE INHALATION 0.5 MG: 0.5 SUSPENSION RESPIRATORY (INHALATION) at 07:09

## 2024-09-16 RX ADMIN — HYDROCODONE BITARTRATE AND ACETAMINOPHEN 1 TABLET: 5; 325 TABLET ORAL at 08:09

## 2024-09-16 RX ADMIN — FERROUS SULFATE TAB 325 MG (65 MG ELEMENTAL FE) 1 EACH: 325 (65 FE) TAB at 01:09

## 2024-09-16 NOTE — SUBJECTIVE & OBJECTIVE
Interval History: working with therapy     Review of Systems   Respiratory:  Negative for shortness of breath.    Cardiovascular:  Negative for chest pain.   Gastrointestinal:  Negative for abdominal pain, nausea and vomiting.   Musculoskeletal:  Positive for arthralgias.   Neurological:  Positive for weakness.   Psychiatric/Behavioral:  Negative for agitation and confusion.    All other systems reviewed and are negative.    Objective:     Vital Signs (Most Recent):  Temp: 97.4 °F (36.3 °C) (09/16/24 0745)  Pulse: 78 (09/16/24 0745)  Resp: 20 (09/16/24 0745)  BP: (!) 146/74 (09/16/24 0745)  SpO2: 95 % (09/16/24 0745) Vital Signs (24h Range):  Temp:  [97.4 °F (36.3 °C)-97.9 °F (36.6 °C)] 97.4 °F (36.3 °C)  Pulse:  [68-78] 78  Resp:  [18-20] 20  SpO2:  [95 %-100 %] 95 %  BP: (112-146)/(65-74) 146/74     Weight: 55.1 kg (121 lb 7.6 oz)  Body mass index is 24.53 kg/m².    Intake/Output Summary (Last 24 hours) at 9/16/2024 1111  Last data filed at 9/15/2024 1837  Gross per 24 hour   Intake 940 ml   Output --   Net 940 ml         Physical Exam  Vitals reviewed.   Constitutional:       General: She is not in acute distress.     Appearance: Normal appearance.   HENT:      Head: Normocephalic and atraumatic.   Eyes:      General: No scleral icterus.     Extraocular Movements: Extraocular movements intact.      Conjunctiva/sclera: Conjunctivae normal.      Pupils: Pupils are equal, round, and reactive to light.   Cardiovascular:      Rate and Rhythm: Normal rate and regular rhythm.      Heart sounds: No murmur heard.     No friction rub. No gallop.   Pulmonary:      Effort: Pulmonary effort is normal. No respiratory distress.      Breath sounds: Normal breath sounds. No wheezing or rales.   Abdominal:      General: Abdomen is flat. Bowel sounds are normal. There is no distension.      Palpations: Abdomen is soft.      Tenderness: There is no abdominal tenderness. There is no guarding.   Musculoskeletal:         General: No  swelling.      Right lower leg: No edema.      Left lower leg: Edema present.      Comments: Swelling better again today    Skin:     General: Skin is warm and dry.      Coloration: Skin is not jaundiced.      Findings: No rash.   Neurological:      General: No focal deficit present.      Mental Status: She is alert.      Sensory: No sensory deficit.      Motor: Weakness present.   Psychiatric:         Behavior: Behavior normal.             Significant Labs: All pertinent labs within the past 24 hours have been reviewed.  Recent Lab Results       None            Significant Imaging: I have reviewed all pertinent imaging results/findings within the past 24 hours.

## 2024-09-16 NOTE — PLAN OF CARE
Problem: Occupational Therapy  Goal: Occupational Therapy Goal  Description: STG: within 2 weeks  Pt will perform grooming with min a at sinkside from seated MET  Pt will bathe with mod a MET  Pt will perform UE dressing with mod a MET  Pt will perform LE dressing with mod a MET  Pt will sit EOB x 5 min with mod to min assistance MET  Pt will transfer bed/chair/bsc with mod a MET  Pt will perform standing task x 1 min with mod assistance x 1 MET  Pt will tolerate 15 minutes of tx without fatigue MET    UPDATED STG's 2024:  Pt will perform grooming with SBA at sinkside from seated MET  Pt will bathe with min a MET though family is providing more support for this task than patient requires at this time (discussed this with them and how to promote greater independence with safety support measures)  Pt will perform UE dressing with min a MET  Pt will perform LE dressing with min a ONGOING/PROGRESSING  Pt will sit EOB x 5 min with min assistance  MET  Pt will transfer bed/chair/bsc with CGA  MET inconsistently  Pt will perform standing task x 3 min with CGA MET  Pt will tolerate 45 minutes of tx without fatigue MET    LT.Restore to max I with self care and mobility. ONGOING/PROGRESSING    Outcome: Progressing

## 2024-09-16 NOTE — PT/OT/SLP PROGRESS
"Occupational Therapy  Treatment    Yanet Viramontes   MRN: [id number]   Admitting Diagnosis: S/p left hip fracture    OT Date of Treatment: 09/16/24   OT Start Time: 0820  OT Stop Time: 0900  OT Total Time (min): 40 min    Billable Minutes:  Therapeutic Activity 30 and Therapeutic Exercise 10    OT/CRISELDA: OT          General Precautions: Standard, fall  Orthopedic Precautions: LLE weight bearing as tolerated  Braces: N/A  Respiratory Status: Room air         Subjective:  Communicated with pt daughter and DIL prior to session, discussing pt current participation in ADL and normalized mobility in her room.  They are concerned she is not progressing like they hoped and that she needs "to stay here another 3 weeks or more to get back to like she was doing before."  OT relayed to them that the therapy process happens in phases and stages, such that when pt gains progress to a certain level of ability that family can assist her at home, the transition to home health can be made, then outpatient therapy could be consulted if pt gains to level of ability it is appropriate.      OT expressed concerns that pt may experience learned helplessness as well as decline in mental health if not transitioned to home at appropriate point.  OT educated pt family that transitioning to home often engages the patient mind in such a way they are more motivated to progress to normal activity with family support and home health therapy.    Family relate understanding but state in response, "we think she needs to stay here another 3 weeks or more to get back to where she was before this happened."    OT relayed this information to PT team member as well as swingbed coordinator.      Objective:  Patient found with:  (no lines or drains)     Functional Mobility:  Bed Mobility: mod to min a, pt grimacing throughout, using bedrails as much as OT could motivate her       Transfers: min a to CGA with RW but pt needing much cueing to get safely transitioned " "to w/c across the room        Functional Ambulation: see PT, family states they do not want PT and OT to see pt in same half of day.    Activities of Daily Living:     Feeding adaptive equipment:  none needed     UE adaptive equipment:none needed     LE adaptive equipment: not appropriate at this time                    Bathing adaptive equipment: will need sjpwer cjaor    Balance:   Static Sit: FAIR+: Able to take MINIMAL challenges from all directions  Dynamic Sit: FAIR+: Maintains balance through MINIMAL excursions of active trunk motion  Static Stand: POOR+: Needs MINIMAL assist to maintain  Dynamic stand: POOR+: Needs MIN (minimal ) assist during gait    Therapeutic Activities and Exercises:  To improve UB endurance, strength, OT facilitated pt with:  UBE x 8 min with no RB's throughout, low level resist    To improve reach, AROM, FM/ and trunk rotation with core forward leaning engagement:   OT initiated pt with vertical card board matching activity, pt willing to perform and participate in this activity today from seated for portion; when she could not reach the top level of cards, OT facilitated her to standing with CGA and pt completed top level of cards reaching activity with OT at [facility] and verbal cueing      AM-PAC 6 CLICK ADL   How much help from another person does this patient currently need?   1 = Unable, Total/Dependent Assistance  2 = A lot, Maximum/Moderate Assistance  3 = A little, Minimum/Contact Guard/Supervision  4 = None, Modified [place]/Independent    Putting on and taking off regular lower body clothing? : 3  Bathing (including washing, rinsing, drying)?: 3  Toileting, which includes using toilet, bedpan, or urinal? : 3  Putting on and taking off regular upper body clothing?: 3  Taking care of personal grooming such as brushing teeth?: 4  Eating meals?: 4  Daily Activity Total Score: 20     AM-PAC Raw Score CMS "G-Code Modifier Level of Impairment Assistance   6 % Total / " Unable   7 - 8 CM 80 - 100% Maximal Assist   9-13 CL 60 - 80% Moderate Assist   14 - 19 CK 40 - 60% Moderate Assist   20 - 22 CJ 20 - 40% Minimal Assist   23 CI 1-20% SBA / CGA   24 CH 0% Independent/ Mod I       Patient left up in chair with call button in reach and family present; OT encouraged family to discourage pt from returning to bed until after lunch to promote maintaining current level of OOB tolerance and stamina for OOB activity.  Family agreeable.    ASSESSMENT:  Yanet Viramontes is a 84 y.o. female with a medical diagnosis of S/p left hip fracture and presents with no new complaints.    Rehab identified problem list/impairments:  weakness, impaired endurance, impaired self care skills, impaired functional mobility, gait instability, impaired balance, impaired cognition, decreased safety awareness, impaired skin, orthopedic precautions    Rehab potential is fair.    Activity tolerance: Fair    Discharge recommendations: Low Intensity Therapy   Barriers to discharge: Barriers to Discharge: None    Equipment recommendations:  (TBD)    GOALS:   Multidisciplinary Problems       Occupational Therapy Goals          Problem: Occupational Therapy    Goal Priority Disciplines Outcome Interventions   Occupational Therapy Goal     OT, PT/OT Progressing    Description: STG: within 2 weeks  Pt will perform grooming with min a at sinkside from seated MET  Pt will bathe with mod a MET  Pt will perform UE dressing with mod a MET  Pt will perform LE dressing with mod a MET  Pt will sit EOB x 5 min with mod to min assistance MET  Pt will transfer bed/chair/bsc with mod a MET  Pt will perform standing task x 1 min with mod assistance x 1 MET  Pt will tolerate 15 minutes of tx without fatigue MET    UPDATED STG's 9/9/2024:  Pt will perform grooming with SBA at sinkside from seated MET  Pt will bathe with min a MET though family is providing more support for this task than patient requires at this time (discussed this with them  and how to promote greater independence with safety support measures)  Pt will perform UE dressing with min a MET  Pt will perform LE dressing with min a ONGOING/PROGRESSING  Pt will sit EOB x 5 min with min assistance  MET  Pt will transfer bed/chair/bsc with CGA  MET inconsistently  Pt will perform standing task x 3 min with CGA MET  Pt will tolerate 45 minutes of tx without fatigue MET    LT.Restore to max I with self care and mobility. ONGOING/PROGRESSING                         Plan:  Patient to be seen 5 x/week to address the above listed problems via self-care/home management, community/work re-entry, therapeutic activities, therapeutic exercises, neuromuscular re-education, cognitive retraining, sensory integration, wheelchair management/training  Plan of Care expires: 24  Plan of Care reviewed with: patient, caregiver, daughter, family         2024

## 2024-09-16 NOTE — NURSING
Charge nurse notified DON that we did not have any Santyl for the patients wound. JOSE contacted MARCOS Craig NP with wound care and received verbal order to use Medi[employer] instead of Santyl.

## 2024-09-16 NOTE — PT/OT/SLP PROGRESS
Physical Therapy Treatment    Patient Name:  Yanet Viramontes   MRN:  [id number]    Recommendations:     Discharge Recommendations: Low Intensity Therapy  Discharge Equipment Recommendations: walker, rolling (youth size)  Barriers to discharge: Inaccessible home and high falls risk, loss of normal functional status    Assessment:     Yanet Viramontes is a 84 y.o. female admitted with a medical diagnosis of S/p left hip fracture.  She presents with the following impairments/functional limitations: weakness, impaired endurance, impaired functional mobility, gait instability, impaired balance, impaired cognition, decreased lower extremity function, pain, decreased ROM, edema, orthopedic precautions .    Family has requested to not have pt have OT and PT sessions back to back due to fatigue. Pt cont's to have significant weakness in left hip flexors and glutes which is adversely effecting bed mobility and sit to stand transfers. Pt tried crossing ankles to use strength of RLE to lift LLE, but she still did not have enough strength and may benefit in teaching her how to use a SC to move LLE on/off bed surface.    Rehab Prognosis: Fair; patient would benefit from acute skilled PT services to address these deficits and reach maximum level of function.    Recent Surgery: * No surgery found *      Plan:     During this hospitalization, patient to be seen 5 x/week to address the identified rehab impairments via gait training, therapeutic activities, therapeutic exercises, neuromuscular re-education and progress toward the following goals:    Plan of Care Expires:  09/20/24    Subjective     Chief Complaint: Pt c/o left hip pain, but stated it was better after doing ex's.  Patient/Family Comments/goals: Pt to dc to dtr's home.  Pain/Comfort:  Pain Rating 1: 6/10  Location - Side 1: Left  Location - Orientation 1: lower  Location 1: hip  Pain Addressed 1: Pre-medicate for activity (ther ex)  Pain Rating Post-Intervention 1:  5/10      Objective:     Communicated with JASON Ricardo after PT session. Pt will not be discharging this Fri as family wants pt to stay as long as possible. Patient found up in chair with peripheral IV upon PT entry to room.     General Precautions: Standard, fall  Orthopedic Precautions: LLE weight bearing as tolerated  Braces:    Respiratory Status: Room air     Functional Mobility:  Bed Mobility:     Sit to Supine: moderate assistance, of 1  persons, and with BLE. Pt shown how she can cross ankles to assist picking up LLE, but she still did not have enough strength to lift  LLE.  Transfers:     Sit to Stand:  pt progressed from min A to CGA/min Ax1 with rolling walker  Bed to Chair: CGA/min Ax1 with  rolling walker  using  Stand Pivot  Gait: 40 ft using RW/wc trail with short right step length d/t decreased stance time. Pt required 2 standing rest breaks, but no longer appears to be holding her breath.      AM-PAC 6 CLICK MOBILITY          Treatment & Education:  Transfer training for sit to stand focusing on increasing trunk flexion to better align COG over AUGUSTO and maintaining the fwd lean throughout the entire motion to improve biomechanics and stability.    Seated ther ex RLE: res'd hip add using yellow ball x 30, GS x 20, res'd L hip abd 2x10 using red tband, res'd L hip ext 2x10 using red tband, res'd L HS curls 2x10 using red tband, LAQ X 20 (2# RLE/1.5# LLE), marching x 5. Standing ther ex x 10: mini squats, L hip abd.    Pt amb'd as stated above. Pt returned to supine to rest.    Patient left HOB elevated with call button in reach and 2 dtrs and 1 son present..    GOALS:   Multidisciplinary Problems       Physical Therapy Goals          Problem: Physical Therapy    Goal Priority Disciplines Outcome Goal Variances Interventions   Physical Therapy Goal     PT, PT/OT Progressing     Description: STG - 2 weeks  1. Pt will transfer supine to/from sit with mod Ax1.-PROGRESSING  2. Pt will perform STS  and bed transfer with mod Ax1.-MET  3. Pt will have LLE strength of 3-/5.-MET  4. Pt will amb with RW x 20 ft with mod Ax1.-MET    LTG - 4 weeks  1. Pt will transfer supine to/from sit with min Ax1.  2. Pt will perform STS and bed transfer with min Ax1.-PROGRESSING  3. Pt will have LLE strength of 3/5.-MET  4. Pt will amb with RW x 50 ft with min Ax1.-PROGRESSING  5. Pt's ROM LLE will be WFL.-MET                       Time Tracking:     PT Received On: 09/16/24  PT Start Time: 1221     PT Stop Time: 1259  PT Total Time (min): 38 min     Billable Minutes: Gait Training 8, Therapeutic Activity 15, Therapeutic Exercise 15, and Total Time 38 minutes    Treatment Type: Treatment  PT/PTA: PT     Number of PTA visits since last PT visit: 0     09/16/2024

## 2024-09-16 NOTE — PLAN OF CARE
Problem: Adult Inpatient Plan of Care  Goal: Patient-Specific Goal (Individualized)  Outcome: Progressing  Goal: Absence of Hospital-Acquired Illness or Injury  Outcome: Progressing  Goal: Optimal Comfort and Wellbeing  Outcome: Progressing  Goal: Readiness for Transition of Care  Outcome: Progressing  Goal: Plan of Care Review  Outcome: Progressing     Problem: Fall Injury Risk  Goal: Absence of Fall and Fall-Related Injury  Outcome: Progressing

## 2024-09-16 NOTE — PROGRESS NOTES
Subjective:      Patient ID: Yanet Viramontes is a 84 y.o. female.    Chief Complaint: Wound Check    Ms. Viramontes is an elderly white female that lives at home alone. She fell in August 19, 2024. She stayed in [facility] until 8/23/2024. She is in the swing bed in the [facility]. She was admitted with a pressure ulcer on the upper left back. She states that her back hurts around the ulcer, but not necessarily in the ulcer. Her daughter (Sulema Brewer) was with her when I looked at the wound and discussed the plan of care. Medihoney has been used for the dressing. Santyl has been ordered. If the hospital hasn't gotten it by the time she is discharged, I will call it in to her pharmacy.    Hospital documentation from Hospitalist:    Principal Problem:S/p left hip fracture     Chief Complaint: No chief complaint on file.        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  This was discussed with family who do not wish to have further testing at this time.  She has been at [facility] since 8/19 following a fall at her home which resulted in fracture of the left greater trochanter which required surg. She had an episode of chest pain on Wed with no acute findings.  Echo revealed EF of 60%.  She is being admitted to swingbed for OT/ PT and medical management.        Pt is DNR         Review of Systems   Constitutional:         Elderly white female, chronically ill, altered gait, history of falls, lives alone   Respiratory: Negative.     Cardiovascular: Negative.    Musculoskeletal:  Positive for gait problem and joint swelling.   Skin:         Stage 3 medial mid-back pressure ulcer   Neurological:  Positive for weakness.   Psychiatric/Behavioral: Negative.        Objective:     Physical Exam  Vitals and nursing note reviewed.   Cardiovascular:       Rate and Rhythm: Normal rate.      Pulses: Normal pulses.   Pulmonary:      Effort: Pulmonary effort is normal.   Skin:     Capillary Refill: Capillary refill takes less than 2 seconds.      Comments: Wound Assessment(s)  Wound #1 Back - Mid is an acute Stage 3 Pressure Injury Pressure Ulcer acquired on 08/19/2024 and has received a status of Not Healed. Initial wound encounter measurements are 3.1cm length x 1.4cm width x 0.1 cm depth, with an area of 4.34 sq cm and a volume of 0.434 cubic cm. Adipose is exposed. No tunneling has been noted. No sinus tract has been noted. No undermining has been noted. There is a Moderate amount of serosanguineous drainage noted which has no odor. The patient reports a wound pain of level 0/10. The wound margin is attached Wound bed has Yes, bright red, firm, granulation, Yes slough, No eschar, No epithelialization.  The periwound skin texture is normal. The periwound skin moisture is normal. The periwound skin color is normal. The temperature of the periwound skin is WNL. Periwound skin does not exhibit signs or symptoms of infection.         Neurological:      General: No focal deficit present.      Mental Status: She is alert and oriented to person, place, and time.   Psychiatric:         Mood and Affect: Mood normal.         Behavior: Behavior normal.         Thought Content: Thought content normal.         Judgment: Judgment normal.      Procedure:    Procedures  Wound #3  Wound #3 (Atypical) is located on the right elbow. A skin/subcutaneous tissue level excisional/surgical debridement with a total area debrided of 0.275 sq cm. was performed by Savannah Craig NP. Subcutaneous was removed along with devitalized tissue: slough. The following instrument(s) were used: curette. Pain control was achieved using LIDOCAINE VISC 2%. A time out was conducted prior to the start of the procedure. A minimal amount of bleeding was controlled with pressure. The procedure was tolerated well  with a pain level of 0 throughout and a pain level of 0 following the procedure. Post Debridement Measurements: 1.1cm length x 0.5cm width x 0.2cm depth; with an area of 0.55 sq cm and a volume of 0.11 cubic cm.  General Notes  #15 blade  Additional Information  Bone removed and sent to pathology?: No     Assessment:     1. Pressure ulcer of other site, stage 3           Plan:            Wound #3 Right Elbow  Physician Review:  Plan of Care discussed with patient.  Anesthetic  Other order: - 2% topical lidocaine to wound bed prior to procedure: at wound clinic only.  Hand Hygiene/Smoking Cessation  Wash hands before and after wound care. Call the Wound Center at [phone] if you have signs or symptoms of infection, increased drainage, increasing odor, or unusual redness. After Wound Care hours, please notify your PCP or go to the ER.  Smoking cessation was encouraged.  Cleanser  Cleanse Wound with Normal Saline  Dressings  Apply dressing - Apply Iodoflex to wound bed, cover with border foam (with silicone adhesive). Change every other day and as needed for soiled or dislodged dressing.  Dietary  Increased protein intake  Supplement with daily multivitamin.  Follow-Up Appointments  Return Appointment 1 week - for wound care

## 2024-09-16 NOTE — PROGRESS NOTES
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  This was discussed with family who do not wish to have further testing at this time.  She has been at [facility] since 8/19 following a fall at her home which resulted in fracture of the left greater trochanter which required surg. She had an episode of chest pain on Wed with no acute findings.  Echo revealed EF of 60%.  She is being admitted to [facility] for OT/ PT and medical management.       Pt is DNR     Overview/Hospital Course:  8/27 Agitation yesterday, did sleep last night and was good this am, however, after PT she became agitated and aggressive.  Will monitor and redirect.  Try Seroquel this PM this time.      8/28 Maybe a little better today.  She did sleep last night.  Was sitting up this am and not as agitated.  Will continue plan of care for now.  Hopefully some better tomorrow.     8/29 better night and this am, recognizing people and nurses.  Not eating or drinking much though.      8/30 Didn't sleep good last night, but she is alert today just tired     9/2 repair of hip fracture 2 weeks ago, will DC staples    9/6 some increased swelling in surgical leg, on Xarelto ppx already.  Did get a lot of fluid over last few days.     9/9 still some swelling, will dose lasix x 1 today and see how she responds.     9/12 Lasix did help with single dose, will dose again today.     9/13 Swelling better, sitting up eating lunch.  Doing well.     9/16 working  with therapy, continues to do well     Interval History: working with therapy     Review of Systems   Respiratory:  Negative for shortness of breath.    Cardiovascular:  Negative for chest pain.   Gastrointestinal:  Negative for abdominal pain, nausea and vomiting.   Musculoskeletal:  Positive for arthralgias.   Neurological:  Positive for weakness.   Psychiatric/Behavioral:  Negative for agitation and confusion.    All other systems reviewed and are negative.    Objective:     Vital Signs (Most Recent):  Temp: 97.4 °F (36.3 °C) (09/16/24 0745)  Pulse: 78 (09/16/24 0745)  Resp: 20 (09/16/24 0745)  BP: (!) 146/74 (09/16/24 0745)  SpO2: 95 % (09/16/24 0745) Vital Signs (24h Range):  Temp:  [97.4 °F (36.3 °C)-97.9 °F (36.6 °C)] 97.4 °F (36.3 °C)  Pulse:  [68-78] 78  Resp:  [18-20] 20  SpO2:  [95 %-100 %] 95 %  BP: (112-146)/(65-74) 146/74     Weight: 55.1 kg (121 lb 7.6 oz)  Body mass index is 24.53 kg/m².    Intake/Output Summary (Last 24 hours) at 9/16/2024 1111  Last data filed at 9/15/2024 1837  Gross per 24 hour   Intake 940 ml   Output --   Net 940 ml         Physical Exam  Vitals reviewed.   Constitutional:       General: She is not in acute distress.     Appearance: Normal appearance.   HENT:      Head: Normocephalic and atraumatic.   Eyes:      General: No scleral icterus.     Extraocular Movements: Extraocular movements intact.      Conjunctiva/sclera: Conjunctivae normal.      Pupils: Pupils are equal, round, and reactive to light.   Cardiovascular:      Rate and Rhythm: Normal rate and regular rhythm.      Heart sounds: No murmur heard.     No friction rub. No gallop.   Pulmonary:      Effort: Pulmonary effort is normal. No respiratory distress.      Breath sounds: Normal breath sounds. No wheezing or rales.   Abdominal:      General: Abdomen is flat. Bowel sounds are normal. There is no distension.      Palpations: Abdomen is soft.      Tenderness: There is no abdominal tenderness. There is no guarding.    Musculoskeletal:         General: No swelling.      Right lower leg: No edema.      Left lower leg: Edema present.      Comments: Swelling better again today    Skin:     General: Skin is warm and dry.      Coloration: Skin is not jaundiced.      Findings: No rash.   Neurological:      General: No focal deficit present.      Mental Status: She is alert.      Sensory: No sensory deficit.      Motor: Weakness present.   Psychiatric:         Behavior: Behavior normal.             Significant Labs: All pertinent labs within the past 24 hours have been reviewed.  Recent Lab Results       None            Significant Imaging: I have reviewed all pertinent imaging results/findings within the past 24 hours.    Assessment/Plan:      * S/p left hip fracture  PT/ OT  Fall precautions  Pain control    Patient will need:   FELISHA ROLLING WALKER  SHOWER BENCH  BEDSIDE COMMODE  HOSPITAL BED  At home for high fall risk, transfers and repositioning.        Delirium  Seems to have resolved.  Will monitor closely.       Severe protein-calorie malnutrition  Nutrition consulted. Most recent weight and BMI monitored-     Measurements:  Wt Readings from Last 1 Encounters:   08/23/24 54 kg (119 lb)   Body mass index is 24.04 kg/m².    Patient has been screened and assessed by RD.    Malnutrition Type:  Context: chronic illness  Level: severe    Malnutrition Characteristic Summary:  Weight Loss (Malnutrition): greater than 10% in 6 months  Energy Intake (Malnutrition): less than 75% for greater than or equal to 1 month    Interventions/Recommendations (treatment strategy):  Recommend to advance diet appropriate and tolerated; add Boost Plus all meals      Disease of thyroid gland  Continue home meds      GERD (gastroesophageal reflux disease)  Continue home meds      History of DVT (deep vein thrombosis)  Continue xeralto      Hypertension  Chronic, controlled. Latest blood pressure and vitals reviewed-     Temp:  [98.2 °F (36.8 °C)]    Pulse:  [68]   Resp:  [22]   BP: (117)/(66)   SpO2:  [96 %] .   Home meds for hypertension were reviewed and noted below.   Hypertension Medications               diltiaZEM HCl (TIAZAC) 120 mg 24 hr capsule Take 120 mg by mouth.    triamterene-hydrochlorothiazide 37.5-25 mg (DYAZIDE) 37.5-25 mg per capsule Take 1 capsule by mouth every morning.            While in the hospital, will manage blood pressure as follows; BP has been low, will hold meds for now    Monitor BP, replace home medication as warrented.      VTE Risk Mitigation (From admission, onward)           Ordered     rivaroxaban tablet 10 mg  Daily         08/23/24 1802     IP VTE LOW RISK PATIENT  Once         08/23/24 1725                    Discharge Planning   QUINN: 9/12/2024     Code Status: DNR   Is the patient medically ready for discharge?:     Reason for patient still in hospital (select all that apply): Patient trending condition, PT / OT recommendations, and Pending disposition  Discharge Plan A: Other                  Daren Nicole DO  Department of Hospital Medicine   [facility]

## 2024-09-16 NOTE — NURSING
0900 entered an order for santyl ointment for JOHNNA Viramontes from wound care note suggestion. Notes are entered but the order for santyl was not entered in epic by wound care.

## 2024-09-16 NOTE — PROGRESS NOTES
Subjective:      Patient ID: Yanet Viramontes is a 84 y.o. female.    Chief Complaint: Wound Check    Subjective:     Patient ID: Yanet Viramontes is a 84 y.o. female.     Chief Complaint: Wound Check     Ms. Viramontes is an elderly white female that lives at home alone. She fell in August 19, 2024. She stayed in [facility] until 8/23/2024. She is in the swing bed in the [facility]. She was admitted with a pressure ulcer on the upper left back. She states that her back hurts around the ulcer, but not necessarily in the ulcer. Her daughter (Sulema Brewer) was with her when I looked at the wound and discussed the plan of care. Medihoney has been used for the dressing. Santyl has been ordered. If the hospital hasn't gotten it by the time she is discharged, I will call it in to her pharmacy.     Hospital documentation from Hospitalist:     Principal Problem:S/p left hip fracture     Chief Complaint: No chief complaint on file.        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  This was discussed with family who do not wish to have further testing at this time.  She has been at [facility] since 8/19 following a fall at her home which resulted in fracture of the left greater trochanter which required surg. She had an episode of chest pain on Wed with no acute findings.  Echo revealed EF of 60%.  She is being admitted to [facility] for OT/ PT and medical management.        Pt is DNR          Review of Systems   Constitutional:         Elderly white female, chronically ill, lives alone, generalized weakness, no acute distress   Respiratory:  Positive for shortness of breath.    Cardiovascular: Negative.    Musculoskeletal:  Positive for gait problem.   Skin:         Midback stage 3 pressure ulcer   Neurological:  Positive for weakness.    Psychiatric/Behavioral: Negative.        Objective:     Physical Exam  Vitals and nursing note reviewed.   HENT:      Head: Normocephalic.   Cardiovascular:      Rate and Rhythm: Normal rate.      Pulses: Normal pulses.   Pulmonary:      Effort: Pulmonary effort is normal.   Skin:     Capillary Refill: Capillary refill takes 2 to 3 seconds.      Comments: General Notes  no lower extremity wounds  Wound Assessment(s)  Wound #1 Back - Mid is an acute Stage 3 Pressure Injury Pressure Ulcer acquired on 08/19/2024 and has received a status of Not Healed. Initial wound encounter measurements are 3.1cm length x 1.4cm width x 0.1 cm depth, with an area of 4.34 sq cm and a volume of 0.434 cubic cm. Adipose is exposed. No tunneling has been noted. No sinus tract has been noted. No undermining has been noted. There is a Moderate amount of serosanguineous drainage noted which has no odor. The patient reports a wound pain of level 0/10. The wound margin is attached Wound bed has Yes, bright red, firm, granulation, Yes slough, No eschar, No epithelialization.  The periwound skin texture is normal. The periwound skin moisture is normal. The periwound skin color is normal. The temperature of the periwound skin is WNL. Periwound skin does not exhibit signs or symptoms of infection.         Neurological:      General: No focal deficit present.      Mental Status: She is alert and oriented to person, place, and time.   Psychiatric:         Mood and Affect: Mood normal.         Behavior: Behavior normal.         Thought Content: Thought content normal.         Judgment: Judgment normal.      Procedure:  Wound #1  Wound #1 (Pressure Ulcer) is located on the back - mid. A skin/subcutaneous tissue level excisional/surgical debridement with a total area debrided of 3.255 sq cm. was performed by Savannah Craig NP. Subcutaneous was removed along with devitalized tissue: slough. The following instrument(s) were used: curette. Pain control  was achieved using LIDOCAINE VISC 2%. A time out was conducted prior to the start of the procedure. A minimal amount of bleeding was controlled with pressure. The procedure was tolerated well with a pain level of 0 throughout and a pain level of 0 following the procedure. Post Debridement Measurements: 3.1cm length x 1.4cm width x 0.2cm depth; with an area of 4.34 sq cm and a volume of 0.868 cubic cm.  General Notes  5 mm curette  Assessment:     1. Pressure ulcer of other site, stage 3           Plan:            Wound #1 Back - Mid  Anesthetic  2% Viscous Lidocaine to wound bed prior to procedure. - clinic use only  Hand Hygiene/Smoking Cessation  Wash hands before and after wound care. Call the Wound Center at [phone] if you have signs or symptoms of infection, increased drainage, increasing odor, or unusual redness. After Wound Care hours, please notify your PCP or go to the ER.  Cleanser  Cleanse Wound with Normal Saline  Dressings  Apply dressing - apply Santyl to wound bed. cover with mepilex border foam. change daily and prn.  Off-Loading  Keep weight off: - wound  Turn every 2 hours. Avoid position directing pressure to Wound site. Limit side lying to 30 degree tilt. Limit HOB elevation to 30 degrees in bed.  Follow-Up Appointments  Return Appointment 1 week - for wound care

## 2024-09-17 PROCEDURE — 97110 THERAPEUTIC EXERCISES: CPT

## 2024-09-17 PROCEDURE — 97116 GAIT TRAINING THERAPY: CPT | Mod: CQ

## 2024-09-17 PROCEDURE — 11000004 HC SNF PRIVATE

## 2024-09-17 PROCEDURE — 27000958

## 2024-09-17 PROCEDURE — 25000003 PHARM REV CODE 250: Performed by: NURSE PRACTITIONER

## 2024-09-17 PROCEDURE — 25000242 PHARM REV CODE 250 ALT 637 W/ HCPCS: Performed by: NURSE PRACTITIONER

## 2024-09-17 PROCEDURE — 94761 N-INVAS EAR/PLS OXIMETRY MLT: CPT

## 2024-09-17 PROCEDURE — 97535 SELF CARE MNGMENT TRAINING: CPT

## 2024-09-17 PROCEDURE — 94640 AIRWAY INHALATION TREATMENT: CPT

## 2024-09-17 PROCEDURE — 27000987 HC MATTRESS, MATRIX LOW PROFILE

## 2024-09-17 PROCEDURE — 25000003 PHARM REV CODE 250: Performed by: HOSPITALIST

## 2024-09-17 PROCEDURE — S4991 NICOTINE PATCH NONLEGEND: HCPCS | Performed by: NURSE PRACTITIONER

## 2024-09-17 PROCEDURE — 25000242 PHARM REV CODE 250 ALT 637 W/ HCPCS: Performed by: HOSPITALIST

## 2024-09-17 PROCEDURE — 97110 THERAPEUTIC EXERCISES: CPT | Mod: CQ

## 2024-09-17 RX ORDER — IPRATROPIUM BROMIDE AND ALBUTEROL SULFATE 2.5; .5 MG/3ML; MG/3ML
3 SOLUTION RESPIRATORY (INHALATION) EVERY 6 HOURS PRN
Status: DISCONTINUED | OUTPATIENT
Start: 2024-09-17 | End: 2024-10-05 | Stop reason: HOSPADM

## 2024-09-17 RX ADMIN — IPRATROPIUM BROMIDE AND ALBUTEROL SULFATE 3 ML: 2.5; .5 SOLUTION RESPIRATORY (INHALATION) at 08:09

## 2024-09-17 RX ADMIN — DOCUSATE SODIUM 100 MG: 100 CAPSULE, LIQUID FILLED ORAL at 08:09

## 2024-09-17 RX ADMIN — PANTOPRAZOLE SODIUM 40 MG: 40 TABLET, DELAYED RELEASE ORAL at 08:09

## 2024-09-17 RX ADMIN — LEVOTHYROXINE SODIUM 112 MCG: 0.11 TABLET ORAL at 05:09

## 2024-09-17 RX ADMIN — FERROUS SULFATE TAB 325 MG (65 MG ELEMENTAL FE) 1 EACH: 325 (65 FE) TAB at 05:09

## 2024-09-17 RX ADMIN — POTASSIUM CHLORIDE 10 MEQ: 750 CAPSULE, EXTENDED RELEASE ORAL at 08:09

## 2024-09-17 RX ADMIN — Medication 6 MG: at 08:09

## 2024-09-17 RX ADMIN — NICOTINE 1 PATCH: 21 PATCH, EXTENDED RELEASE TRANSDERMAL at 08:09

## 2024-09-17 RX ADMIN — FERROUS SULFATE TAB 325 MG (65 MG ELEMENTAL FE) 1 EACH: 325 (65 FE) TAB at 08:09

## 2024-09-17 RX ADMIN — QUETIAPINE FUMARATE 25 MG: 25 TABLET ORAL at 08:09

## 2024-09-17 RX ADMIN — HYDROCODONE BITARTRATE AND ACETAMINOPHEN 1 TABLET: 5; 325 TABLET ORAL at 12:09

## 2024-09-17 RX ADMIN — FERROUS SULFATE TAB 325 MG (65 MG ELEMENTAL FE) 1 EACH: 325 (65 FE) TAB at 12:09

## 2024-09-17 RX ADMIN — COLLAGENASE SANTYL 1 G: 250 OINTMENT TOPICAL at 09:09

## 2024-09-17 RX ADMIN — RIVAROXABAN 10 MG: 10 TABLET, FILM COATED ORAL at 08:09

## 2024-09-17 RX ADMIN — HYDROCODONE BITARTRATE AND ACETAMINOPHEN 1 TABLET: 5; 325 TABLET ORAL at 08:09

## 2024-09-17 RX ADMIN — IPRATROPIUM BROMIDE AND ALBUTEROL SULFATE 3 ML: .5; 3 SOLUTION RESPIRATORY (INHALATION) at 08:09

## 2024-09-17 RX ADMIN — BUDESONIDE INHALATION 0.5 MG: 0.5 SUSPENSION RESPIRATORY (INHALATION) at 08:09

## 2024-09-17 NOTE — NURSING
Upon assessment of the sacral wound, no redness, bruising, or open areas. Updated picture on chart. Healed wound on avatar and placed a sacrum mepilex for preventative measures on sacrum.

## 2024-09-17 NOTE — PLAN OF CARE
Problem: Adult Inpatient Plan of Care  Goal: Patient-Specific Goal (Individualized)  Outcome: Progressing  Goal: Absence of Hospital-Acquired Illness or Injury  Outcome: Progressing  Goal: Optimal Comfort and Wellbeing  Outcome: Progressing     Problem: Fall Injury Risk  Goal: Absence of Fall and Fall-Related Injury  Outcome: Progressing

## 2024-09-17 NOTE — PT/OT/SLP PROGRESS
Occupational Therapy   Treatment    Name: Yanet Viramontes  MRN: [id number]  Admitting Diagnosis:  S/p left hip fracture       Recommendations:     Discharge Recommendations: Low Intensity Therapy  Discharge Equipment Recommendations:   (TBD)  Barriers to discharge:  None    Assessment:     Yanet Viramontes is a 84 y.o. female with a medical diagnosis of S/p left hip fracture.  She presents with sleeping initially at OT first attempt at 1321; son requested for her to be left sleeping until later in the afternoon. Second attempt, pt awake but in bed, agreeable to get up with OT.  Performance deficits affecting function are weakness, impaired endurance, impaired self care skills, impaired functional mobility, gait instability, impaired balance, impaired cognition, decreased safety awareness, impaired skin, orthopedic precautions.     Rehab Prognosis:  Fair; patient would benefit from acute skilled OT services to address these deficits and reach maximum level of function.       Plan:     Patient to be seen 5 x/week to address the above listed problems via self-care/home management, community/work re-entry, therapeutic activities, therapeutic exercises, neuromuscular re-education, cognitive retraining, sensory integration, wheelchair management/training  Plan of Care Expires: 09/27/24  Plan of Care Reviewed with: patient, caregiver, daughter, family    Subjective     Chief Complaint: weakness, stiffness and poor mobilization of left hip  Patient/Family Comments/goals: home with family  Pain/Comfort:       Objective:     Communicated with: pt and her son prior to session.  Patient found HOB elevated with  (no lines or drains) upon OT entry to room.    General Precautions: Standard, fall    Orthopedic Precautions:LLE weight bearing as tolerated  Braces: N/A  Respiratory Status: Room air     Occupational Performance:     Bed Mobility:    Patient completed Rolling/Turning to Right with minimum assistance  Patient completed  "Scooting/Bridging with minimum assistance  Patient completed Supine to Sit with minimum assistance     Functional Mobility/Transfers:  Patient completed Sit <> Stand Transfer with minimum assistance  with  rolling walker   Patient completed Bed <> Chair Transfer using Step Transfer technique with minimum assistance with rolling walker and requiring tactile cueing for safety and effective completion to get to w/c  Functional Mobility: walking around her room and getting back and forth to different seating requires min a; pt did try to prematurely sit today before getting to the w/c, and OT had to give her mod a to ensure safely lowering to the w/c seat; she was just a little too far away from edge of w/c when she tried to sit.      Activities of Daily Living:  Grooming: stand by assistance at sinkside with washing hands from standing position with OT at her side and use of RW  Lower Body Dressing: OT provided reacher, sock aid, and long handled sponge today to pt OT also demonstrated use of these items and gave pt an opportunity to return demonstrate.  With multiple attempts, she was successful with her RIGHT foot sock at a SBA level from seated, but she states, "THAT's an ORDEAL.  I'm gonna have to think about that.  I don't think I want to use that."Additionally, pt was not following directions precisely with items, but rather using them incorrectly despite verbal cueing and repeat instruction.      [facility] 6 Click ADL:      Treatment & Education:  Pt also performed 1.5# weighted dowel in seated with sh flexion, ch press, and horiz abd/add x 10 reps x 2 sets each direction.    Patient left up in chair with call button in reach and family present    GOALS:   Multidisciplinary Problems       Occupational Therapy Goals          Problem: Occupational Therapy    Goal Priority Disciplines Outcome Interventions   Occupational Therapy Goal     OT, PT/OT Progressing    Description: STG: within 2 weeks  Pt will perform " grooming with min a at sinkside from seated MET  Pt will bathe with mod a MET  Pt will perform UE dressing with mod a MET  Pt will perform LE dressing with mod a MET  Pt will sit EOB x 5 min with mod to min assistance MET  Pt will transfer bed/chair/bsc with mod a MET  Pt will perform standing task x 1 min with mod assistance x 1 MET  Pt will tolerate 15 minutes of tx without fatigue MET    UPDATED STG's 2024:  Pt will perform grooming with SBA at sinkside from seated MET  Pt will bathe with min a MET though family is providing more support for this task than patient requires at this time (discussed this with them and how to promote greater independence with safety support measures)  Pt will perform UE dressing with min a MET  Pt will perform LE dressing with min a ONGOING/PROGRESSING  Pt will sit EOB x 5 min with min assistance  MET  Pt will transfer bed/chair/bsc with CGA  MET inconsistently  Pt will perform standing task x 3 min with CGA MET  Pt will tolerate 45 minutes of tx without fatigue MET    LT.Restore to max I with self care and mobility. ONGOING/PROGRESSING                         Time Tracking:     OT Date of Treatment: 24  OT Start Time:   OT Stop Time: 1435  OT Total Time (min): 30 min    Billable Minutes:Self Care/Home Management 20  Therapeutic Exercise 10    OT/CRISELDA: OT          2024

## 2024-09-17 NOTE — PT/OT/SLP PROGRESS
Physical Therapy Treatment    Patient Name:  Yanet Viramontes   MRN:  [id number]    Recommendations:     Discharge Recommendations: Low Intensity Therapy  Discharge Equipment Recommendations: walker, rolling (youth size)  Barriers to discharge: None    Assessment:     Yanet Viramontes is a 84 y.o. female admitted with a medical diagnosis of S/p left hip fracture.  She presents with the following impairments/functional limitations: weakness .  Pt improved with gait distance today; added standing exercises at railing in hallway    Rehab Prognosis: Good; patient would benefit from acute skilled PT services to address these deficits and reach maximum level of function.    Recent Surgery: * No surgery found *      Plan:     During this hospitalization, patient to be seen 5 x/week to address the identified rehab impairments via gait training, therapeutic activities, therapeutic exercises, neuromuscular re-education and progress toward the following goals:    Plan of Care Expires:  09/20/24    Continue per Plan of Care per Melissa Zavaleta PT     Subjective     Chief Complaint: none at rest  Patient/Family Comments/goals: son present; pt agrees to PT Tx   Pain/Comfort:  Pain Rating 1: 0/10 (no c/o at rest)      Objective:     Communicated with patient, PT  prior to session.  Patient found up in chair with peripheral IV upon PT entry to room.     General Precautions: Standard, fall  Orthopedic Precautions: LLE weight bearing as tolerated  Braces:    Respiratory Status: Room air     Functional Mobility:  Bed Mobility:     Scooting: modified independence  Bridging: modified independence  Sit to Supine: minimum assistance  Transfers:     Sit to Stand:  contact guard assistance and minimum assistance with rolling walker  Bed to Chair: contact guard assistance and minimum assistance with  rolling walker  using  Step Transfer  Gait: 50ft with rolling walker, CGA, vc's for proper and safe technique; pt fatigues quickly      AM-PAC 6 CLICK  MOBILITY  Turning over in bed (including adjusting bedclothes, sheets and blankets)?: 3  Sitting down on and standing up from a chair with arms (e.g., wheelchair, bedside commode, etc.): 3  Moving from lying on back to sitting on the side of the bed?: 3  Moving to and from a bed to a chair (including a wheelchair)?: 3  Need to walk in hospital room?: 3  Climbing 3-5 steps with a railing?: 2 (though not assessed today)  Basic Mobility Total Score: 17       Treatment & Education:  Standing BLE exercises x 10 repetitions each at railing in hallway: marching, mini squats, heel raises, hip abduction, hip extension   Gait and transfers as noted above; bed mobility at end of session per pt request; LLE propped with floating heel    Patient left HOB elevated with call button in reach and son present..    GOALS:   Multidisciplinary Problems       Physical Therapy Goals          Problem: Physical Therapy    Goal Priority Disciplines Outcome Goal Variances Interventions   Physical Therapy Goal     PT, PT/OT Progressing     Description: STG - 2 weeks  1. Pt will transfer supine to/from sit with mod Ax1.-PROGRESSING  2. Pt will perform STS and bed transfer with mod Ax1.-MET  3. Pt will have LLE strength of 3-/5.-MET  4. Pt will amb with RW x 20 ft with mod Ax1.-MET    LTG - 4 weeks  1. Pt will transfer supine to/from sit with min Ax1.  2. Pt will perform STS and bed transfer with min Ax1.-PROGRESSING  3. Pt will have LLE strength of 3/5.-MET  4. Pt will amb with RW x 50 ft with min Ax1.-PROGRESSING  5. Pt's ROM LLE will be WFL.-MET                       Time Tracking:     PT Received On: 09/17/24  PT Start Time: 1505     PT Stop Time: 1535  PT Total Time (min): 30 min     Billable Minutes: Gait Training 15 and Therapeutic Exercise 15    Treatment Type: Treatment  PT/PTA: PTA     Number of PTA visits since last PT visit: 1 09/17/2024

## 2024-09-18 PROCEDURE — 27000958

## 2024-09-18 PROCEDURE — 11000004 HC SNF PRIVATE

## 2024-09-18 PROCEDURE — 97530 THERAPEUTIC ACTIVITIES: CPT

## 2024-09-18 PROCEDURE — 27000987 HC MATTRESS, MATRIX LOW PROFILE

## 2024-09-18 PROCEDURE — S4991 NICOTINE PATCH NONLEGEND: HCPCS | Performed by: NURSE PRACTITIONER

## 2024-09-18 PROCEDURE — 97110 THERAPEUTIC EXERCISES: CPT

## 2024-09-18 PROCEDURE — 25000003 PHARM REV CODE 250: Performed by: NURSE PRACTITIONER

## 2024-09-18 PROCEDURE — 97116 GAIT TRAINING THERAPY: CPT

## 2024-09-18 PROCEDURE — 94640 AIRWAY INHALATION TREATMENT: CPT

## 2024-09-18 PROCEDURE — 97535 SELF CARE MNGMENT TRAINING: CPT

## 2024-09-18 PROCEDURE — 25000242 PHARM REV CODE 250 ALT 637 W/ HCPCS: Performed by: NURSE PRACTITIONER

## 2024-09-18 PROCEDURE — 99900035 HC TECH TIME PER 15 MIN (STAT)

## 2024-09-18 PROCEDURE — 25000003 PHARM REV CODE 250: Performed by: HOSPITALIST

## 2024-09-18 RX ORDER — POLYETHYLENE GLYCOL 3350 17 G/17G
17 POWDER, FOR SOLUTION ORAL DAILY
Status: DISCONTINUED | OUTPATIENT
Start: 2024-09-18 | End: 2024-09-25

## 2024-09-18 RX ADMIN — ACETAMINOPHEN 650 MG: 325 TABLET ORAL at 12:09

## 2024-09-18 RX ADMIN — COLLAGENASE SANTYL 1 G: 250 OINTMENT TOPICAL at 09:09

## 2024-09-18 RX ADMIN — BUDESONIDE INHALATION 0.5 MG: 0.5 SUSPENSION RESPIRATORY (INHALATION) at 07:09

## 2024-09-18 RX ADMIN — POTASSIUM CHLORIDE 10 MEQ: 750 CAPSULE, EXTENDED RELEASE ORAL at 09:09

## 2024-09-18 RX ADMIN — FERROUS SULFATE TAB 325 MG (65 MG ELEMENTAL FE) 1 EACH: 325 (65 FE) TAB at 05:09

## 2024-09-18 RX ADMIN — HYDROCODONE BITARTRATE AND ACETAMINOPHEN 1 TABLET: 5; 325 TABLET ORAL at 08:09

## 2024-09-18 RX ADMIN — DOCUSATE SODIUM 100 MG: 100 CAPSULE, LIQUID FILLED ORAL at 08:09

## 2024-09-18 RX ADMIN — Medication 6 MG: at 08:09

## 2024-09-18 RX ADMIN — FERROUS SULFATE TAB 325 MG (65 MG ELEMENTAL FE) 1 EACH: 325 (65 FE) TAB at 12:09

## 2024-09-18 RX ADMIN — FERROUS SULFATE TAB 325 MG (65 MG ELEMENTAL FE) 1 EACH: 325 (65 FE) TAB at 09:09

## 2024-09-18 RX ADMIN — LEVOTHYROXINE SODIUM 112 MCG: 0.11 TABLET ORAL at 05:09

## 2024-09-18 RX ADMIN — DOCUSATE SODIUM 100 MG: 100 CAPSULE, LIQUID FILLED ORAL at 09:09

## 2024-09-18 RX ADMIN — POLYETHYLENE GLYCOL 3350 17 G: 17 POWDER, FOR SOLUTION ORAL at 10:09

## 2024-09-18 RX ADMIN — QUETIAPINE FUMARATE 25 MG: 25 TABLET ORAL at 08:09

## 2024-09-18 RX ADMIN — RIVAROXABAN 10 MG: 10 TABLET, FILM COATED ORAL at 09:09

## 2024-09-18 RX ADMIN — NICOTINE 1 PATCH: 21 PATCH, EXTENDED RELEASE TRANSDERMAL at 09:09

## 2024-09-18 RX ADMIN — PANTOPRAZOLE SODIUM 40 MG: 40 TABLET, DELAYED RELEASE ORAL at 09:09

## 2024-09-18 NOTE — PLAN OF CARE
Problem: Fall Injury Risk  Goal: Absence of Fall and Fall-Related Injury  Outcome: Progressing  Intervention: Promote Injury-Free Environment  Flowsheets (Taken 9/18/2024 8407)  Safety Promotion/Fall Prevention: assistive device/personal item within reach   Plan of care reviewed with patient. Patients status ongoing progressing.

## 2024-09-18 NOTE — PT/OT/SLP PROGRESS
"Occupational Therapy   Treatment    Name: Yanet Viramontes  MRN: [id number]  Admitting Diagnosis:  S/p left hip fracture       Recommendations:     Discharge Recommendations: Low Intensity Therapy  Discharge Equipment Recommendations:   (TBD)  Barriers to discharge:  None    Assessment:     Yanet Viramontes is a 84 y.o. female with a medical diagnosis of S/p left hip fracture.  She presents with in bed and daughter in room with her. Performance deficits affecting function are weakness, impaired endurance, impaired self care skills, impaired functional mobility, gait instability, impaired balance, impaired cognition, decreased safety awareness, impaired skin, orthopedic precautions.     Rehab Prognosis:  Fair; patient would benefit from acute skilled OT services to address these deficits and reach maximum level of function.       Plan:     Patient to be seen 5 x/week to address the above listed problems via self-care/home management, community/work re-entry, therapeutic activities, therapeutic exercises, neuromuscular re-education, cognitive retraining, sensory integration, wheelchair management/training  Plan of Care Expires: 09/27/24  Plan of Care Reviewed with: patient, caregiver, daughter, family    Subjective     Chief Complaint: left hip stiffness and pain  Patient/Family Comments/goals: home with family  Pain/Comfort:       Objective:     Communicated with: pt prior to session.  Pt reports, "I'm scared to go home."   Patient found HOB elevated with  (no lines or drains) upon OT entry to room.    General Precautions: Standard, fall    Orthopedic Precautions:LLE weight bearing as tolerated  Braces: N/A  Respiratory Status: Room air     Occupational Performance:     Bed Mobility:    Patient completed Rolling/Turning to Right with contact guard assistance  Patient completed Scooting/Bridging with contact guard assistance  Patient completed Supine to Sit with contact guard assistance     Functional " Mobility/Transfers:  Patient completed Sit <> Stand Transfer with minimum assistance  with  hand-held assist and grab bars(s)   Patient completed Bed <> Chair Transfer using Step Transfer technique with minimum assistance with hand-held assist and grab bars(s)  Functional Mobility: pt walking around room with family now with CGA and use of RW to get to the bathroom and to the sink or bedside chair.    Activities of Daily Living:  Lower Body Dressing: minimum assistance use of AE for LB; OT instructed and demonstrated use of dressing stick, reacher, and sock aid.  Pt daughter present and learned instructions of use as well to assist pt in the mornings during dressing tasks.  OT recommended having pt work on use of these equipment each morning with family to progress toward higher level independence.  Pt and family relate agreement and demonstrate good knowledge of use of equipment.  Pt able to complete donning socks with sock aid with min a overall and verbal cueing after instruction and demonstration once again today.  Pt able to doff socks with dressing stick and reacher with verbal cueing for sequencing.        [facility] 6 Click ADL:      Treatment & Education:  To improve UB endurance, strength, OT facilitated pt with:  0.5 kg ball lifting in ch press, sh flexion, and horiz abd/add 10 reps each direction  GREEN PUTTY to simulate kitchen prep task and improve FM and       Patient left up in chair with call button in reach and her daughter present    GOALS:   Multidisciplinary Problems       Occupational Therapy Goals          Problem: Occupational Therapy    Goal Priority Disciplines Outcome Interventions   Occupational Therapy Goal     OT, PT/OT Progressing    Description: STG: within 2 weeks  Pt will perform grooming with min a at sinkside from seated MET  Pt will bathe with mod a MET  Pt will perform UE dressing with mod a MET  Pt will perform LE dressing with mod a MET  Pt will sit EOB x 5 min with mod to min  assistance MET  Pt will transfer bed/chair/bsc with mod a MET  Pt will perform standing task x 1 min with mod assistance x 1 MET  Pt will tolerate 15 minutes of tx without fatigue MET    UPDATED STG's 2024:  Pt will perform grooming with SBA at sinkside from seated MET  Pt will bathe with min a MET though family is providing more support for this task than patient requires at this time (discussed this with them and how to promote greater independence with safety support measures)  Pt will perform UE dressing with min a MET  Pt will perform LE dressing with min a ONGOING/PROGRESSING  Pt will sit EOB x 5 min with min assistance  MET  Pt will transfer bed/chair/bsc with CGA  MET inconsistently  Pt will perform standing task x 3 min with CGA MET  Pt will tolerate 45 minutes of tx without fatigue MET    LT.Restore to max I with self care and mobility. ONGOING/PROGRESSING                         Time Tracking:     OT Date of Treatment: 24  OT Start Time: 845  OT Stop Time: 915  OT Total Time (min): 30 min    Billable Minutes:Self Care/Home Management 15  Therapeutic Activity 10 , therex 5 min    OT/CRISELDA: OT          2024

## 2024-09-18 NOTE — PLAN OF CARE
[facility] - Medical Surgical Unit - Swing Bed   Interdisciplinary Team Meeting    Patient: Yanet Viramontes   Today's Date: 9/18/2024   Estimated D/C Date: 10/7/2024        Physician: Daren Nicole DO Nurse Practitioner: Laurita Zavala NP   Pharmacy: Brian Barriga, PharmD Unit Director: Temitope Chun RN   : Israel Graham RN Physical/Occupational Therapy: Cynthia Flores OT   Speech Therapy: ST Jasmina Activity Therapy: Geetha Carter   Nursing: Temitope Chun RN  Respiratory: Lia Watkins,  Dietary: Kacey Tinoco RD  Other: n/a     Nursing  New Symptoms/Problems: n/a  Last Bowel Movement: 09/18/24  Urine: continent  [place]: No  Bowel: continent   Constipated: No  Diarrhea: No   Isolation: No  Wound Care: Yes  Wound Location/Tx: sacrum/back  Cognition: WNL  Aspiration Precautions: No  Comment(s): n/a    Respiratory:   O2 Device: Room Air  O2 Flow: n/a  SpO2: 96%  Neb Tx: No  Comment(s): n/a    Dietary    Nutrition: Regular  Comment(s): n/a    Speech Therapy  Speech/Swallowing: No current speech or swallowing issues  Comment(s): No    Physical Therapy  Gait/Assistive Device: ambulatory with RW ELOS: Plan to DC 10/7/2024    Transfers: Moderate Assistance  Bed Mobility: Moderate Assistance Range of Motion/Restrictions: n/a  Comment(s): n/a     Occupational Therapy  Eating/Grooming: Supervision or Set-up Assistance Toileting: Minimal Assistance   Bathing: Moderate Assistance Dressing (Upper Body): Minimal Assistance   Dressing (Lower Body): Minimal Assistance Comment(s): n/a     Activity Therapy  Level of participation: Active participation  Comment(s): n/a    Pharmacy   Medication Changes (see MD orders in chart): No  Labs Reviewed: Yes  New Lab Orders: No  Comment(s): n/a      Tx Plan/Recommendations reviewed with family and/or patient on (date) 9/19.  Additional family Conference/Training: n/1  D/C Plan/Recommendations: Home with HH  QUINN:  10/7/2024  Comment(s): n/a

## 2024-09-19 PROCEDURE — 97110 THERAPEUTIC EXERCISES: CPT

## 2024-09-19 PROCEDURE — 25000242 PHARM REV CODE 250 ALT 637 W/ HCPCS: Performed by: NURSE PRACTITIONER

## 2024-09-19 PROCEDURE — 25000003 PHARM REV CODE 250: Performed by: NURSE PRACTITIONER

## 2024-09-19 PROCEDURE — 27000987 HC MATTRESS, MATRIX LOW PROFILE

## 2024-09-19 PROCEDURE — 99900035 HC TECH TIME PER 15 MIN (STAT)

## 2024-09-19 PROCEDURE — 27000958

## 2024-09-19 PROCEDURE — 25000003 PHARM REV CODE 250: Performed by: HOSPITALIST

## 2024-09-19 PROCEDURE — 11000004 HC SNF PRIVATE

## 2024-09-19 PROCEDURE — 94640 AIRWAY INHALATION TREATMENT: CPT

## 2024-09-19 PROCEDURE — S4991 NICOTINE PATCH NONLEGEND: HCPCS | Performed by: NURSE PRACTITIONER

## 2024-09-19 PROCEDURE — 97116 GAIT TRAINING THERAPY: CPT

## 2024-09-19 RX ADMIN — FERROUS SULFATE TAB 325 MG (65 MG ELEMENTAL FE) 1 EACH: 325 (65 FE) TAB at 11:09

## 2024-09-19 RX ADMIN — BUDESONIDE INHALATION 0.5 MG: 0.5 SUSPENSION RESPIRATORY (INHALATION) at 07:09

## 2024-09-19 RX ADMIN — POTASSIUM CHLORIDE 10 MEQ: 750 CAPSULE, EXTENDED RELEASE ORAL at 08:09

## 2024-09-19 RX ADMIN — COLLAGENASE SANTYL 1 G: 250 OINTMENT TOPICAL at 08:09

## 2024-09-19 RX ADMIN — DOCUSATE SODIUM 100 MG: 100 CAPSULE, LIQUID FILLED ORAL at 08:09

## 2024-09-19 RX ADMIN — Medication 6 MG: at 08:09

## 2024-09-19 RX ADMIN — RIVAROXABAN 10 MG: 10 TABLET, FILM COATED ORAL at 08:09

## 2024-09-19 RX ADMIN — ACETAMINOPHEN 650 MG: 325 TABLET ORAL at 08:09

## 2024-09-19 RX ADMIN — POLYETHYLENE GLYCOL 3350 17 G: 17 POWDER, FOR SOLUTION ORAL at 08:09

## 2024-09-19 RX ADMIN — QUETIAPINE FUMARATE 25 MG: 25 TABLET ORAL at 08:09

## 2024-09-19 RX ADMIN — LEVOTHYROXINE SODIUM 112 MCG: 0.11 TABLET ORAL at 05:09

## 2024-09-19 RX ADMIN — FERROUS SULFATE TAB 325 MG (65 MG ELEMENTAL FE) 1 EACH: 325 (65 FE) TAB at 08:09

## 2024-09-19 RX ADMIN — HYDROCODONE BITARTRATE AND ACETAMINOPHEN 1 TABLET: 5; 325 TABLET ORAL at 08:09

## 2024-09-19 RX ADMIN — NICOTINE 1 PATCH: 21 PATCH, EXTENDED RELEASE TRANSDERMAL at 08:09

## 2024-09-19 RX ADMIN — PANTOPRAZOLE SODIUM 40 MG: 40 TABLET, DELAYED RELEASE ORAL at 08:09

## 2024-09-19 RX ADMIN — FERROUS SULFATE TAB 325 MG (65 MG ELEMENTAL FE) 1 EACH: 325 (65 FE) TAB at 05:09

## 2024-09-19 NOTE — PT/OT/SLP PROGRESS
Physical Therapy Treatment    Patient Name:  Yanet Viramontes   MRN:  [id number]    Recommendations:     Discharge Recommendations: Low Intensity Therapy  Discharge Equipment Recommendations: walker, rolling (youth size)  Barriers to discharge: None    Assessment:     Yanet Viramontes is a 84 y.o. female admitted with a medical diagnosis of S/p left hip fracture.  She presents with the following impairments/functional limitations: weakness, impaired endurance, impaired self care skills, impaired functional mobility, gait instability, impaired balance, pain, orthopedic precautions Patient with much improved gait distance and endurance.  Overall progressing very well and voices that she wishes to return home soon.    Rehab Prognosis: Good; patient would benefit from acute skilled PT services to address these deficits and reach maximum level of function.    Recent Surgery: * No surgery found *      Plan:     During this hospitalization, patient to be seen 5 x/week to address the identified rehab impairments via gait training, therapeutic activities, therapeutic exercises and progress toward the following goals:    Plan of Care Expires:  09/20/24    Subjective     Chief Complaint: left hip pain  Patient/Family Comments/goals: return home with family  Pain/Comfort:  Pain Rating 1: 4/10  Location - Side 1: Left  Location - Orientation 1: lower  Location 1: hip  Pain Addressed 1: Reposition  Pain Rating Post-Intervention 1: 4/10      Objective:     Communicated with nurse prior to session.  Patient found supine with   upon PT entry to room.     General Precautions: Standard, fall  Orthopedic Precautions: LLE weight bearing as tolerated  Braces:    Respiratory Status: Room air     Functional Mobility:  Bed Mobility:     Supine to Sit: minimum assistance  Transfers:     Sit to Stand:  contact guard assistance with rolling walker  Gait: patient ambulated 80 feet with RW with CGA and occasional verbal cues to look ahead, rested x 2 min,  then ambulated 90 feet with RW with CGA and no LOB.       AM-PAC 6 CLICK MOBILITY  Turning over in bed (including adjusting bedclothes, sheets and blankets)?: 3  Sitting down on and standing up from a chair with arms (e.g., wheelchair, bedside commode, etc.): 3  Moving from lying on back to sitting on the side of the bed?: 3  Moving to and from a bed to a chair (including a wheelchair)?: 3  Need to walk in hospital room?: 3  Climbing 3-5 steps with a railing?: 2  Basic Mobility Total Score: 17       Treatment & Education:  AP, LAQ, seated march, hip abd/add, and add squeezes x 30 each LE.     Patient left up in chair with call button in reach..    GOALS:   Multidisciplinary Problems       Physical Therapy Goals          Problem: Physical Therapy    Goal Priority Disciplines Outcome Goal Variances Interventions   Physical Therapy Goal     PT, PT/OT Progressing     Description: STG - 2 weeks  1. Pt will transfer supine to/from sit with mod Ax1.-PROGRESSING  2. Pt will perform STS and bed transfer with mod Ax1.-MET  3. Pt will have LLE strength of 3-/5.-MET  4. Pt will amb with RW x 20 ft with mod Ax1.-MET    LTG - 4 weeks  1. Pt will transfer supine to/from sit with min Ax1.  2. Pt will perform STS and bed transfer with min Ax1.-PROGRESSING  3. Pt will have LLE strength of 3/5.-MET  4. Pt will amb with RW x 50 ft with min Ax1.-PROGRESSING  5. Pt's ROM LLE will be WFL.-MET                       Time Tracking:     PT Received On: 09/19/24  PT Start Time: 0948     PT Stop Time: 1015  PT Total Time (min): 27 min     Billable Minutes: Gait Training 16 and Therapeutic Exercise 11    Treatment Type: Treatment  PT/PTA: PT     Number of PTA visits since last PT visit: 0     09/19/2024

## 2024-09-19 NOTE — PT/OT/SLP PROGRESS
Physical Therapy Treatment    Patient Name:  Yanet Viramontes   MRN:  [id number]    Recommendations:     Discharge Recommendations: Low Intensity Therapy  Discharge Equipment Recommendations: walker, rolling (youth size)  Barriers to discharge:  difficulty with mobility    Assessment:     Yanet Viramontes is a 84 y.o. female admitted with a medical diagnosis of S/p left hip fracture.  She presents with the following impairments/functional limitations: weakness, impaired endurance, impaired self care skills, impaired functional mobility, gait instability, impaired balance, orthopedic precautions Patient with improved gait distance and endurance noted today.    Rehab Prognosis: Good; patient would benefit from acute skilled PT services to address these deficits and reach maximum level of function.    Recent Surgery: * No surgery found *      Plan:     During this hospitalization, patient to be seen 5 x/week to address the identified rehab impairments via gait training, therapeutic activities, therapeutic exercises, neuromuscular re-education and progress toward the following goals:    Plan of Care Expires:  09/20/24    Subjective     Chief Complaint: weakness  Patient/Family Comments/goals: return home with family  Pain/Comfort:  Pain Rating 1: 0/10  Pain Rating Post-Intervention 1: 0/10      Objective:     Communicated with nurse prior to session.  Patient found supine with   upon PT entry to room.     General Precautions: Standard, fall  Orthopedic Precautions: LLE weight bearing as tolerated  Braces:    Respiratory Status: Room air     Functional Mobility:  Bed Mobility:     Supine to Sit: minimum assistance  Transfers:     Sit to Stand:  minimum assistance with rolling walker  Gait: patient ambulated 52 feet with RW and CGA with verbal cues for upright stance, rested x 2 min then ambulated 76 feet with RW and CGA without LOB.        AM-PAC 6 CLICK MOBILITY  Turning over in bed (including adjusting bedclothes, sheets and  blankets)?: 3  Sitting down on and standing up from a chair with arms (e.g., wheelchair, bedside commode, etc.): 3  Moving from lying on back to sitting on the side of the bed?: 3  Moving to and from a bed to a chair (including a wheelchair)?: 3  Need to walk in hospital room?: 3  Climbing 3-5 steps with a railing?: 2  Basic Mobility Total Score: 17       Treatment & Education:  AP, seated march, LAQ, Abd/Add, and add squeezes x 30 each LE.     Patient left up in chair with call button in reach and daughter present..    GOALS:   Multidisciplinary Problems       Physical Therapy Goals          Problem: Physical Therapy    Goal Priority Disciplines Outcome Goal Variances Interventions   Physical Therapy Goal     PT, PT/OT Progressing     Description: STG - 2 weeks  1. Pt will transfer supine to/from sit with mod Ax1.-PROGRESSING  2. Pt will perform STS and bed transfer with mod Ax1.-MET  3. Pt will have LLE strength of 3-/5.-MET  4. Pt will amb with RW x 20 ft with mod Ax1.-MET    LTG - 4 weeks  1. Pt will transfer supine to/from sit with min Ax1.  2. Pt will perform STS and bed transfer with min Ax1.-PROGRESSING  3. Pt will have LLE strength of 3/5.-MET  4. Pt will amb with RW x 50 ft with min Ax1.-PROGRESSING  5. Pt's ROM LLE will be WFL.-MET                       Time Tracking:     PT Received On: 09/18/24  PT Start Time: 1323     PT Stop Time: 1359  PT Total Time (min): 36 min     Billable Minutes: Gait Training 16 and Therapeutic Exercise 17    Treatment Type: Treatment  PT/PTA: PT     Number of PTA visits since last PT visit: 0     09/18/2024

## 2024-09-19 NOTE — CONSULTS
[facility]  Adult Nutrition  Consult Note         Reason for Assessment  Reason For Assessment: RD follow-up assess/MST 3  Nutrition Risk Screen: reduced oral intake over the last month    Assessment and Plan  9/19/2024 RD Follow up: Patient continues on a regular diet with Boost Plus all meals. Po intakes continue to improve with 50% intakes on average. No new weight noted since last follow up. Recommend to continue diet as tolerated. Encourage continued po intakes. RD Following.    9/12/2024 RD Follow up: Patient continues on a regular diet with Boost Plus all meals. Po intakes continue to improve with 50% consistently documented per flowsheets. Current weight 55.1 kg with desired weight increase since admission. Last BM noted 9/11.    Diet order and intakes adequate to meet estimated energy needs. Recommend to continue diet and supplements as tolerated. RD Following.     9/05/2024 RD Follow up: Patient with improvement in condition and is now ordered a regular diet with Boost Plus TID. Family present at RD visit and report patient prefers regular diet. Appetite improved to 25-50% and drinking some of Boost. Patient denies dietary preferences. Current weight 54.9 kg.     Continue diet and ONS as tolerated. RD Following.     8/29/2024 RD follow up: Patient upgraded to a MCS diet with chopped meats per SLP with high aspiration risk and to feed only when patient alert enough to swallow. Patient continues with poor intakes with decreased alertness and delirium. 0% meal intakes documented over the last several meals per flowsheets.     IVF at 50 ml/hr ordered. Noted patient with some improvement in confusion today. Continue diet as tolerated and feed as patient alert. Consider alterate means of nutrition as aligns with plan of care if po intakes don't improve with decrease in confusion. RD Following.     Consult received and appreciated. Patient admitted 8/23 with a dx of s/p L Hip  fx and hx of cancer. Patient is ordered a full liquid diet with issues swallowing. RN reports patient having to be suctioned and having trouble clearing secretions. SLP evaluation pending.     Patient is 54 kg with a BMI of 24.04 which is WNL. Patient with significant weight loss over the last 6 months of 14% per chart review with weight at prior facility of 62.6 kg noted Feb 2024. Patient with poor po intakes endorsed on MST screen.    Patient meets ASPEN criteria for severe protein calorie malnutrition due to weight loss and poor intakes. See malnutrition assessment.     Recommend to add Boost Plus as tolerated. RD Following.           Learning Needs/Social Determinants of Health  Learning Assessment       08/23/2024 1906 [facility] (8/23/2024 - Present)   Created by Addie Alegre RN - RN (Nurse) Status: Complete                 PRIMARY LEARNER     Primary Learner Name:  [place] [facility] 08/23/2024 1906    Relationship:  [place] [facility] 08/23/2024 1906    Does the primary learner have any barriers to learning?:  No Barriers [facility] 08/23/2024 1906    What is the preferred language of the primary learner?:  English [facility] 08/23/2024 1906    Is an  required?:  No [facility] 08/23/2024 1906    How does the primary learner prefer to learn new concepts?:  Listening [facility] 08/23/2024 1906    How often do you need to have someone help you read instructions, pamphlets, or written material from your doctor or pharmacy?:  Never [facility] 08/23/2024 1906        CO-LEARNER #1     No question answered        CO-LEARNER #2     No question answered        SPECIAL TOPICS     No question answered        ANSWERED BY:     No question answered        Comments         Edit History       Addie Alegre, RN - RN (Nurse)   08/23/2024 1906                           Social Determinants of Health     Tobacco Use: High Risk (9/9/2024)    Patient History     Smoking Tobacco Use: Every Day     Smokeless Tobacco Use:  Never     Passive Exposure: Not on file   Alcohol Use: Not At Risk (8/26/2024)    AUDIT-C     Frequency of Alcohol Consumption: Never     Average Number of Drinks: Patient does not drink     Frequency of Binge Drinking: Never   Financial Resource Strain: Low Risk  (8/26/2024)    Overall Financial Resource Strain (CARDIA)     Difficulty of Paying Living Expenses: Not hard at all   Food Insecurity: No Food Insecurity (8/26/2024)    Hunger Vital Sign     Worried About Running Out of Food in the Last Year: Never true     Ran Out of Food in the Last Year: Never true   Transportation Needs: No Transportation Needs (8/26/2024)    TRANSPORTATION NEEDS     Transportation : No   Physical Activity: Insufficiently Active (8/26/2024)    Exercise Vital Sign     Days of Exercise per Week: 4 days     Minutes of Exercise per Session: 30 min   Stress: No Stress Concern Present (8/26/2024)    [place] [facility] [employer] - Occupational Stress Questionnaire     Feeling of Stress : Only a little   Housing Stability: Low Risk  (8/26/2024)    Housing Stability Vital Sign     Unable to Pay for Housing in the Last Year: No     Homeless in the Last Year: No   Depression: Not on file   Utilities: Not At Risk (8/26/2024)    [facility] Utilities     Threatened with loss of utilities: No   Health Literacy: Inadequate Health Literacy (8/26/2024)     Health Literacy     Frequency of need for help with medical instructions: Sometimes   Social Isolation: Socially Integrated (8/26/2024)    Social Isolation     Social Isolation: 1            Malnutrition  Is Patient Malnourished: Yes Malnutrition Assessment  Malnutrition Context: chronic illness  Malnutrition Level: severe          Weight Loss (Malnutrition): greater than 10% in 6 months  Energy Intake (Malnutrition): less than 75% for greater than or equal to 1 month   [place] Region (Subcutaneous Fat Loss): severe depletion   [employer] [place] (Muscle Loss): severe depletion              "    Nutrition Diagnosis  Malnutrition (Severe) related to Appetite loss, Chronic illness, and Dysphagia/ difficulty swallowing as evidenced by weight loss of greater than 10% in 6 months, and energy intakes less than 75% for greater than or equal to 1 month  Comments: SLP jaiosn pending; patient having issues swallowing with decreased LOC    No results for input(s): "GLU", "POCGLU" in the last 72 hours.    Comments on Glucose: elevated     Nutrition Prescription / Recommendations  Recommendation/Intervention: Recommend to advance diet appropriate and tolerated; add Boost Plus all meals  Goals: po intakes 50% during admission  Nutrition Goal Status: goal met  Communication of RD Recs: discussed on rounds  Current Diet Order: Regular  Nutrition Order Comments: 50% intakes per flowsheets  Oral Nutrition Supplement: Boost Plus all meals  Chewing or Swallowing Difficulty?: Swallowing difficulty  Recommended Diet:  as tolerated  Recommended Oral Supplement: Boost Plus [360kcals, 14g Protein, 45g Carbs] 3 times a day  Is Nutrition Support Recommended: [facility] Nutrition Support: No  Is Nutrition Education Recommended: No    Monitor and Evaluation  % current Intake: P.O. intake of 50 - 75 %  % intake to meet estimated needs: 50 - 75 %  Food and Nutrient Intake: energy intake, food and beverage intake  Food and Nutrient Adminstration: diet order  Anthropometric Measurements: height/length, weight, weight change, body mass index  Biochemical Data, Medical Tests and Procedures: electrolyte and renal panel, gastrointestinal profile, glucose/endocrine profile, inflammatory profile, lipid profile  Nutrition-Focused Physical Findings: overall appearance, head and eyes  Energy Calories Required: meeting needs  Protein Required: meeting needs  Fluid Required: meeting needs  Tolerance: tolerating    Current Medical Diagnosis and Past Medical History     Past Medical History:   Diagnosis Date    Anticoagulant long-term use     " "Cancer     Hypertension     Thyroid disease        Nutrition/Diet History  Spiritual, Cultural Beliefs, Rastafarian Practices, Values that Affect Care: no  Food Allergies: NKFA  Factors Affecting Nutritional Intake: decreased appetite    Lab/Procedures/Meds  No results for input(s): "NA", "K", "BUN", "CREATININE", "CALCIUM", "ALBUMIN", "CL", "ALT", "AST", "PHOS" in the last 72 hours.    Last A1c: No results found for: "HGBA1C"  Lab Results   Component Value Date    RBC 2.62 (L) 09/13/2024    HGB 7.9 (L) 09/13/2024    HCT 26.1 (L) 09/13/2024    MCV 99.6 (H) 09/13/2024    MCH 30.2 09/13/2024    MCHC 30.3 (L) 09/13/2024     Pertinent Labs Reviewed: reviewed  Pertinent Medications Reviewed: reviewed  Scheduled Meds:   budesonide  0.5 mg Nebulization Q12H    collagenase  1 g Topical (Top) Daily    docusate sodium  100 mg Oral BID    ferrous sulfate  1 tablet Oral TID WM    levothyroxine  112 mcg Oral Before breakfast    nicotine  1 patch Transdermal Daily    pantoprazole  40 mg Oral Daily    polyethylene glycol  17 g Oral Daily    potassium chloride  10 mEq Oral Daily    QUEtiapine  25 mg Oral QHS    rivaroxaban  10 mg Oral Daily     Continuous Infusions:      PRN Meds:.  Current Facility-Administered Medications:     0.9% NaCl, , Intravenous, PRN    acetaminophen, 650 mg, Oral, Q6H PRN    albuterol sulfate, 2.5 mg, Nebulization, Q4H PRN    albuterol-ipratropium, 3 mL, Nebulization, Q6H PRN    bisacodyL, 10 mg, Rectal, Daily PRN    calcium carbonate, 500 mg, Oral, BID PRN    guaiFENesin 100 mg/5 ml, 200 mg, Oral, Q4H PRN    HYDROcodone-acetaminophen, 1 tablet, Oral, Q6H PRN    linaCLOtide, 145 mcg, Oral, Daily PRN    lorazepam, 0.5 mg, Intravenous, Q2H PRN    LORazepam, 0.5 mg, Oral, Q12H PRN    magnesium hydroxide 400 mg/5 ml, 30 mL, Oral, Daily PRN    melatonin, 6 mg, Oral, Nightly PRN    morphine, 2 mg, Intravenous, Q4H PRN    ondansetron, 4 mg, Oral, Q8H PRN    peg 400-hypromellose-glycerin, 2 drop, Ophthalmic, PRN    " "senna-docusate 8.6-50 mg, 1 tablet, Oral, BID PRN    sodium chloride 0.9%, 10 mL, Intravenous, PRN    Anthropometrics  Temp: 97.8 °F (36.6 °C)  Height Method: Stated  Height: 4' 11" (149.9 cm)  Height (inches): 59 in  Weight Method: Bed Scale  Weight: 55.1 kg (121 lb 7.6 oz)  Weight (lb): 121.47 lb  Ideal Body Weight (IBW), Female: 95 lb  % Ideal Body Weight, Female (lb): 125.26 %  BMI (Calculated): 24.5       Estimated/Assessed Needs      Temp: 97.8 °F (36.6 °C)Oral  Weight Used For Calorie Calculations: 54 kg (119 lb 0.8 oz)   Energy Need Method: Kcal/kg Energy Calorie Requirements (kcal): 6290-1847  Weight Used For Protein Calculations: 54 kg (119 lb 0.8 oz)  Protein Requirements: 43-54  Estimated Fluid Requirement Method: RDA Method    RDA Method (mL): 1350       Nutrition by Nursing  Diet/Nutrition Received: regular  Intake (%): 25%  Diet/Feeding Assistance: tray set-up  Diet/Feeding Tolerance: fair  Last Bowel Movement: 09/19/24                Nutrition Follow-Up  RD Follow-up?: Yes      Nutrition Discharge Planning: recommend regular diet as tolerated with Boost/Ensure ONS on d/c            Available via Secure Chat  "

## 2024-09-19 NOTE — PLAN OF CARE
Problem: Adult Inpatient Plan of Care  Goal: Patient-Specific Goal (Individualized)  Outcome: Progressing  Goal: Absence of Hospital-Acquired Illness or Injury  Outcome: Progressing  Goal: Optimal Comfort and Wellbeing  Outcome: Progressing  Goal: Readiness for Transition of Care  Outcome: Progressing  Goal: Plan of Care Review  Outcome: Progressing

## 2024-09-19 NOTE — PT/OT/SLP PROGRESS
"Occupational Therapy      Patient Name:  Yanet Viramontes   MRN:  [id number]    Patient not seen today secondary to Patient fatigue, Therapist assessment (pt refused first attempt at 1240 being asleep; second attempt, pt stating she is fatigued at 1600 due to "i have been so busy all day and I did alot of therapy earlier, too."). Will follow-up tomorrow.    9/19/2024  "

## 2024-09-20 LAB
ANION GAP SERPL CALCULATED.3IONS-SCNC: 8 MMOL/L (ref 7–16)
BASOPHILS # BLD AUTO: 0.03 K/UL (ref 0–0.2)
BASOPHILS NFR BLD AUTO: 0.6 % (ref 0–1)
BUN SERPL-MCNC: 32 MG/DL (ref 7–18)
BUN/CREAT SERPL: 22 (ref 6–20)
CALCIUM SERPL-MCNC: 9.7 MG/DL (ref 8.5–10.1)
CHLORIDE SERPL-SCNC: 104 MMOL/L (ref 98–107)
CO2 SERPL-SCNC: 30 MMOL/L (ref 21–32)
CREAT SERPL-MCNC: 1.46 MG/DL (ref 0.55–1.02)
DIFFERENTIAL METHOD BLD: ABNORMAL
EGFR (NO RACE VARIABLE) (RUSH/TITUS): 35 ML/MIN/1.73M2
EOSINOPHIL # BLD AUTO: 0.19 K/UL (ref 0–0.5)
EOSINOPHIL NFR BLD AUTO: 3.7 % (ref 1–4)
ERYTHROCYTE [DISTWIDTH] IN BLOOD BY AUTOMATED COUNT: 15.9 % (ref 11.5–14.5)
GLUCOSE SERPL-MCNC: 92 MG/DL (ref 74–106)
HCT VFR BLD AUTO: 30.3 % (ref 38–47)
HGB BLD-MCNC: 9.3 G/DL (ref 12–16)
LYMPHOCYTES # BLD AUTO: 1.14 K/UL (ref 1–4.8)
LYMPHOCYTES NFR BLD AUTO: 22.2 % (ref 27–41)
MCH RBC QN AUTO: 30.2 PG (ref 27–31)
MCHC RBC AUTO-ENTMCNC: 30.7 G/DL (ref 32–36)
MCV RBC AUTO: 98.4 FL (ref 80–96)
MONOCYTES # BLD AUTO: 0.74 K/UL (ref 0–0.8)
MONOCYTES NFR BLD AUTO: 14.4 % (ref 2–6)
MPC BLD CALC-MCNC: 10.2 FL (ref 9.4–12.4)
NEUTROPHILS # BLD AUTO: 3.03 K/UL (ref 1.8–7.7)
NEUTROPHILS NFR BLD AUTO: 59.1 % (ref 53–65)
PLATELET # BLD AUTO: 295 K/UL (ref 150–400)
POTASSIUM SERPL-SCNC: 4.1 MMOL/L (ref 3.5–5.1)
RBC # BLD AUTO: 3.08 M/UL (ref 4.2–5.4)
SODIUM SERPL-SCNC: 138 MMOL/L (ref 136–145)
WBC # BLD AUTO: 5.13 K/UL (ref 4.5–11)

## 2024-09-20 PROCEDURE — 85025 COMPLETE CBC W/AUTO DIFF WBC: CPT | Performed by: NURSE PRACTITIONER

## 2024-09-20 PROCEDURE — 97110 THERAPEUTIC EXERCISES: CPT

## 2024-09-20 PROCEDURE — 97535 SELF CARE MNGMENT TRAINING: CPT

## 2024-09-20 PROCEDURE — 27000958

## 2024-09-20 PROCEDURE — 11000004 HC SNF PRIVATE

## 2024-09-20 PROCEDURE — S4991 NICOTINE PATCH NONLEGEND: HCPCS | Performed by: NURSE PRACTITIONER

## 2024-09-20 PROCEDURE — 97116 GAIT TRAINING THERAPY: CPT

## 2024-09-20 PROCEDURE — 80048 BASIC METABOLIC PNL TOTAL CA: CPT | Performed by: NURSE PRACTITIONER

## 2024-09-20 PROCEDURE — 25000003 PHARM REV CODE 250: Performed by: NURSE PRACTITIONER

## 2024-09-20 PROCEDURE — 36415 COLL VENOUS BLD VENIPUNCTURE: CPT | Performed by: NURSE PRACTITIONER

## 2024-09-20 PROCEDURE — 25000242 PHARM REV CODE 250 ALT 637 W/ HCPCS: Performed by: NURSE PRACTITIONER

## 2024-09-20 PROCEDURE — 27000987 HC MATTRESS, MATRIX LOW PROFILE

## 2024-09-20 PROCEDURE — 25000003 PHARM REV CODE 250: Performed by: HOSPITALIST

## 2024-09-20 PROCEDURE — 94640 AIRWAY INHALATION TREATMENT: CPT

## 2024-09-20 RX ADMIN — POLYETHYLENE GLYCOL 3350 17 G: 17 POWDER, FOR SOLUTION ORAL at 08:09

## 2024-09-20 RX ADMIN — LEVOTHYROXINE SODIUM 112 MCG: 0.11 TABLET ORAL at 05:09

## 2024-09-20 RX ADMIN — BUDESONIDE INHALATION 0.5 MG: 0.5 SUSPENSION RESPIRATORY (INHALATION) at 07:09

## 2024-09-20 RX ADMIN — DOCUSATE SODIUM 100 MG: 100 CAPSULE, LIQUID FILLED ORAL at 08:09

## 2024-09-20 RX ADMIN — Medication 6 MG: at 08:09

## 2024-09-20 RX ADMIN — QUETIAPINE FUMARATE 25 MG: 25 TABLET ORAL at 08:09

## 2024-09-20 RX ADMIN — GLYCERIN 2 DROP: .002; .002; .01 SOLUTION/ DROPS OPHTHALMIC at 08:09

## 2024-09-20 RX ADMIN — ACETAMINOPHEN 650 MG: 325 TABLET ORAL at 08:09

## 2024-09-20 RX ADMIN — FERROUS SULFATE TAB 325 MG (65 MG ELEMENTAL FE) 1 EACH: 325 (65 FE) TAB at 05:09

## 2024-09-20 RX ADMIN — FERROUS SULFATE TAB 325 MG (65 MG ELEMENTAL FE) 1 EACH: 325 (65 FE) TAB at 11:09

## 2024-09-20 RX ADMIN — FERROUS SULFATE TAB 325 MG (65 MG ELEMENTAL FE) 1 EACH: 325 (65 FE) TAB at 07:09

## 2024-09-20 RX ADMIN — HYDROCODONE BITARTRATE AND ACETAMINOPHEN 1 TABLET: 5; 325 TABLET ORAL at 02:09

## 2024-09-20 RX ADMIN — POTASSIUM CHLORIDE 10 MEQ: 750 CAPSULE, EXTENDED RELEASE ORAL at 08:09

## 2024-09-20 RX ADMIN — RIVAROXABAN 10 MG: 10 TABLET, FILM COATED ORAL at 08:09

## 2024-09-20 RX ADMIN — NICOTINE 1 PATCH: 21 PATCH, EXTENDED RELEASE TRANSDERMAL at 08:09

## 2024-09-20 RX ADMIN — PANTOPRAZOLE SODIUM 40 MG: 40 TABLET, DELAYED RELEASE ORAL at 08:09

## 2024-09-20 RX ADMIN — COLLAGENASE SANTYL 1 G: 250 OINTMENT TOPICAL at 08:09

## 2024-09-20 NOTE — PT/OT/SLP PROGRESS
Occupational Therapy   Treatment    Name: Yanet Viramontes  MRN: [id number]  Admitting Diagnosis:  S/p left hip fracture       Recommendations:     Discharge Recommendations: Low Intensity Therapy  Discharge Equipment Recommendations:   (TBD)  Barriers to discharge:  None    Assessment:     Yanet Viramontes is a 84 y.o. female with a medical diagnosis of S/p left hip fracture.  She presents with in bed laying on her left side with daughter at bedside. Performance deficits affecting function are weakness, impaired endurance, impaired self care skills, impaired functional mobility, gait instability, impaired balance, impaired cognition, decreased safety awareness, impaired skin, orthopedic precautions.     Rehab Prognosis:  Good; patient would benefit from acute skilled OT services to address these deficits and reach maximum level of function.       Plan:     Patient to be seen 5 x/week to address the above listed problems via self-care/home management, community/work re-entry, therapeutic activities, therapeutic exercises, neuromuscular re-education, cognitive retraining, sensory integration, wheelchair management/training  Plan of Care Expires: 09/27/24  Plan of Care Reviewed with: patient, caregiver, daughter, family    Subjective     Chief Complaint: left hip stiffness overall  Patient/Family Comments/goals: home with family  Pain/Comfort:       Objective:     Communicated with: pt and her daughter once again(split treatment from this morning) prior to session.  Patient found left sidelying with  (no lines or drains) upon OT entry to room.    General Precautions: Standard, fall    Orthopedic Precautions:LLE weight bearing as tolerated  Braces: N/A  Respiratory Status: Room air     Occupational Performance:     Bed Mobility:    Patient completed Rolling/Turning to Right with minimum assistance  Patient completed Scooting/Bridging with minimum assistance  Patient completed Supine to Sit with minimum assistance     Functional  Mobility/Transfers:  Patient completed Sit <> Stand Transfer with minimum assistance  with  rolling walker   Patient completed Toilet Transfer Step Transfer technique with minimum assistance with  rolling walker  Functional Mobility: RW in room with min a to manage RW safely, as she lets it get out in front of her too far    Activities of Daily Living:  Toileting: moderate assistance needing OT to assist with managing her LE clothing and also finishing up with posterior pericare, as pt was unable to get herself fully clean      [facility] 6 Click ADL:      Treatment & Education:  After finishing ADL session for toileting, OT engaged pt in sitting upright in bedside chair to complete UB exercises nonweighted, abduction with deep breathing, 'swimming' stroke, and fist open and close as well as rolling forearms 'boxing'.  Pt did well with all.    Patient left up in chair with call button in reach and family member present    GOALS:   Multidisciplinary Problems       Occupational Therapy Goals          Problem: Occupational Therapy    Goal Priority Disciplines Outcome Interventions   Occupational Therapy Goal     OT, PT/OT Progressing    Description: STG: within 2 weeks  Pt will perform grooming with min a at sinkside from seated MET  Pt will bathe with mod a MET  Pt will perform UE dressing with mod a MET  Pt will perform LE dressing with mod a MET  Pt will sit EOB x 5 min with mod to min assistance MET  Pt will transfer bed/chair/bsc with mod a MET  Pt will perform standing task x 1 min with mod assistance x 1 MET  Pt will tolerate 15 minutes of tx without fatigue MET    UPDATED STG's 9/9/2024:  Pt will perform grooming with SBA at sinkside from seated MET  Pt will bathe with min a MET though family is providing more support for this task than patient requires at this time (discussed this with them and how to promote greater independence with safety support measures)  Pt will perform UE dressing with min a MET  Pt will  perform LE dressing with min a ONGOING/PROGRESSING  Pt will sit EOB x 5 min with min assistance  MET  Pt will transfer bed/chair/bsc with CGA  MET inconsistently  Pt will perform standing task x 3 min with CGA MET  Pt will tolerate 45 minutes of tx without fatigue MET    LT.Restore to max I with self care and mobility. ONGOING/PROGRESSING                         Time Tracking:     OT Date of Treatment: 24  OT Start Time: 1335  OT Stop Time: 1355  OT Total Time (min): 20 min    Billable Minutes:Self Care/Home Management 15, 5 min therex    OT/CRISELDA: OT          2024

## 2024-09-20 NOTE — PT/OT/SLP PROGRESS
Occupational Therapy   Treatment    Name: Yanet Viramontes  MRN: [id number]  Admitting Diagnosis:  S/p left hip fracture       Recommendations:     Discharge Recommendations: Low Intensity Therapy  Discharge Equipment Recommendations:   (TBD)  Barriers to discharge:  None    Assessment:     Yanet Viramontes is a 84 y.o. female with a medical diagnosis of S/p left hip fracture.  She presents with sitting up in chair with her daughter at her side. Performance deficits affecting function are weakness, impaired endurance, impaired self care skills, impaired functional mobility, gait instability, impaired balance, impaired cognition, decreased safety awareness, impaired skin, orthopedic precautions.     Rehab Prognosis:  Good; patient would benefit from acute skilled OT services to address these deficits and reach maximum level of function.       Plan:     Patient to be seen 5 x/week to address the above listed problems via self-care/home management, community/work re-entry, therapeutic activities, therapeutic exercises, neuromuscular re-education, cognitive retraining, sensory integration, wheelchair management/training  Plan of Care Expires: 09/27/24  Plan of Care Reviewed with: patient, caregiver, daughter, family    Subjective     Chief Complaint: L hip stiffness and pain  Patient/Family Comments/goals: home with family  Pain/Comfort:       Objective:     Communicated with: pt and her daughter prior to session.  Patient found up in chair with  (no lines or drains) upon OT entry to room.    General Precautions: Standard, fall    Orthopedic Precautions:LLE weight bearing as tolerated  Braces: N/A  Respiratory Status: Room air     Occupational Performance:     Activities of Daily Living:  Lower Body Dressing: FAMILY EDUCATION with pt daughter receiving instruction on use of sock aid, reacher, and dressing stick ;she relates good understanding and is agreeable to working toward helping her mom retain the information and learn the  skill of use of AE with repetition; pt having hard time remembering how to use the equipment, needing more practice and repetition to accomplish.  Family understands this and understands this is a way to improve pt independence..      [facility] 6 Click ADL:      Treatment & Education:  ADL as noted above.  Planning to do split treatment today, as PT also with pt at this time working with her on RW. And ambulation skills.     Patient left up in chair call bell within reach and with  daughter present    GOALS:   Multidisciplinary Problems       Occupational Therapy Goals          Problem: Occupational Therapy    Goal Priority Disciplines Outcome Interventions   Occupational Therapy Goal     OT, PT/OT Progressing    Description: STG: within 2 weeks  Pt will perform grooming with min a at sinkside from seated MET  Pt will bathe with mod a MET  Pt will perform UE dressing with mod a MET  Pt will perform LE dressing with mod a MET  Pt will sit EOB x 5 min with mod to min assistance MET  Pt will transfer bed/chair/bsc with mod a MET  Pt will perform standing task x 1 min with mod assistance x 1 MET  Pt will tolerate 15 minutes of tx without fatigue MET    UPDATED STG's 2024:  Pt will perform grooming with SBA at sinkside from seated MET  Pt will bathe with min a MET though family is providing more support for this task than patient requires at this time (discussed this with them and how to promote greater independence with safety support measures)  Pt will perform UE dressing with min a MET  Pt will perform LE dressing with min a ONGOING/PROGRESSING  Pt will sit EOB x 5 min with min assistance  MET  Pt will transfer bed/chair/bsc with CGA  MET inconsistently  Pt will perform standing task x 3 min with CGA MET  Pt will tolerate 45 minutes of tx without fatigue MET    LT.Restore to max I with self care and mobility. ONGOING/PROGRESSING                         Time Tracking:     OT Date of Treatment: 24  OT Start  Time: 0900  OT Stop Time: 0915  OT Total Time (min): 15 min    Billable Minutes:Self Care/Home Management 15 (cotreat time with PT/OT)    OT/RCISELDA: OT          9/20/2024

## 2024-09-20 NOTE — PT/OT/SLP PROGRESS
Physical Therapy Treatment    Patient Name:  Yanet Viramontes   MRN:  [id number]    Recommendations:     Discharge Recommendations: Low Intensity Therapy  Discharge Equipment Recommendations: walker, rolling (youth size)  Barriers to discharge:  difficulty with mobility    Assessment:     Yanet Viramontes is a 84 y.o. female admitted with a medical diagnosis of S/p left hip fracture.  She presents with the following impairments/functional limitations: weakness, impaired endurance, impaired self care skills, impaired functional mobility, gait instability, pain Patient agreeable to PT, but stating she did not sleep well and she is tired today. .    Rehab Prognosis: Good; patient would benefit from acute skilled PT services to address these deficits and reach maximum level of function.    Recent Surgery: * No surgery found *      Plan:     During this hospitalization, patient to be seen 5 x/week to address the identified rehab impairments via gait training, therapeutic activities, therapeutic exercises and progress toward the following goals:    Plan of Care Expires:  10/04/24    Subjective     Chief Complaint: weakness and left hip pain  Patient/Family Comments/goals: return home with family  Pain/Comfort:  Pain Rating 1: 0/10  Pain Rating Post-Intervention 1: 0/10      Objective:     Communicated with nurse prior to session.  Patient found up in chair with   upon PT entry to room.     General Precautions: Standard, fall  Orthopedic Precautions: LLE weight bearing as tolerated  Braces:    Respiratory Status: Room air     Functional Mobility:  Transfers:     Sit to Stand:  contact guard assistance with rolling walker  Gait: patient ambulated 50', 50', and 60' with 2 minutes of rest between each bout of gait with improving mp and reciprocal gait pattern      AM-PAC 6 CLICK MOBILITY  Turning over in bed (including adjusting bedclothes, sheets and blankets)?: 3  Sitting down on and standing up from a chair with arms (e.g.,  wheelchair, bedside commode, etc.): 3  Moving from lying on back to sitting on the side of the bed?: 3  Moving to and from a bed to a chair (including a wheelchair)?: 3  Need to walk in hospital room?: 3  Climbing 3-5 steps with a railing?: 2  Basic Mobility Total Score: 17       Treatment & Education:  AP LAQ, and add squeezes x 30 each.   Sit to stand training x 5 with improving anterior weight shift for transfers noted    Patient left up in chair with call button in reach and daughter present..    GOALS:   Multidisciplinary Problems       Physical Therapy Goals          Problem: Physical Therapy    Goal Priority Disciplines Outcome Goal Variances Interventions   Physical Therapy Goal     PT, PT/OT Progressing     Description: STG - 2 weeks  1. Pt will transfer supine to/from sit with mod Ax1.-PROGRESSING  2. Pt will perform STS and bed transfer with mod Ax1.-MET  3. Pt will have LLE strength of 3-/5.-MET  4. Pt will amb with RW x 20 ft with mod Ax1.-MET    LTG - 4 weeks  1. Pt will transfer supine to/from sit with min Ax1.  2. Pt will perform STS and bed transfer with min Ax1.-PROGRESSING  3. Pt will have LLE strength of 3/5.-MET  4. Pt will amb with RW x 50 ft with min Ax1.-PROGRESSING  5. Pt's ROM LLE will be WFL.-MET                       Time Tracking:     PT Received On: 09/20/24  PT Start Time: 0907     PT Stop Time: 0937  PT Total Time (min): 30 min     Billable Minutes: Gait Training 18 and Therapeutic Exercise 12    Treatment Type: Treatment  PT/PTA: PT     Number of PTA visits since last PT visit: 0     09/20/2024

## 2024-09-20 NOTE — PLAN OF CARE
Problem: Adult Inpatient Plan of Care  Goal: Optimal Comfort and Wellbeing  Outcome: Progressing     Problem: Fall Injury Risk  Goal: Absence of Fall and Fall-Related Injury  Outcome: Progressing  Intervention: Identify and Manage Contributors  Flowsheets (Taken 9/20/2024 4074)  Self-Care Promotion:   independence encouraged   meal set-up provided  Medication Review/Management: medications reviewed

## 2024-09-20 NOTE — PLAN OF CARE
Problem: Adult Inpatient Plan of Care  Goal: Optimal Comfort and Wellbeing  Outcome: Progressing  Goal: Plan of Care Review  Outcome: Progressing

## 2024-09-21 PROCEDURE — 25000242 PHARM REV CODE 250 ALT 637 W/ HCPCS: Performed by: NURSE PRACTITIONER

## 2024-09-21 PROCEDURE — 94640 AIRWAY INHALATION TREATMENT: CPT

## 2024-09-21 PROCEDURE — 27000987 HC MATTRESS, MATRIX LOW PROFILE

## 2024-09-21 PROCEDURE — 11000004 HC SNF PRIVATE

## 2024-09-21 PROCEDURE — 25000003 PHARM REV CODE 250: Performed by: NURSE PRACTITIONER

## 2024-09-21 PROCEDURE — S4991 NICOTINE PATCH NONLEGEND: HCPCS | Performed by: NURSE PRACTITIONER

## 2024-09-21 PROCEDURE — 27000958

## 2024-09-21 RX ADMIN — DOCUSATE SODIUM 100 MG: 100 CAPSULE, LIQUID FILLED ORAL at 08:09

## 2024-09-21 RX ADMIN — DOCUSATE SODIUM 100 MG: 100 CAPSULE, LIQUID FILLED ORAL at 09:09

## 2024-09-21 RX ADMIN — FERROUS SULFATE TAB 325 MG (65 MG ELEMENTAL FE) 1 EACH: 325 (65 FE) TAB at 09:09

## 2024-09-21 RX ADMIN — LEVOTHYROXINE SODIUM 112 MCG: 0.11 TABLET ORAL at 06:09

## 2024-09-21 RX ADMIN — POTASSIUM CHLORIDE 10 MEQ: 750 CAPSULE, EXTENDED RELEASE ORAL at 09:09

## 2024-09-21 RX ADMIN — QUETIAPINE FUMARATE 25 MG: 25 TABLET ORAL at 08:09

## 2024-09-21 RX ADMIN — NICOTINE 1 PATCH: 21 PATCH, EXTENDED RELEASE TRANSDERMAL at 09:09

## 2024-09-21 RX ADMIN — FERROUS SULFATE TAB 325 MG (65 MG ELEMENTAL FE) 1 EACH: 325 (65 FE) TAB at 01:09

## 2024-09-21 RX ADMIN — BUDESONIDE INHALATION 0.5 MG: 0.5 SUSPENSION RESPIRATORY (INHALATION) at 07:09

## 2024-09-21 RX ADMIN — RIVAROXABAN 10 MG: 10 TABLET, FILM COATED ORAL at 09:09

## 2024-09-21 RX ADMIN — FERROUS SULFATE TAB 325 MG (65 MG ELEMENTAL FE) 1 EACH: 325 (65 FE) TAB at 05:09

## 2024-09-21 RX ADMIN — BUDESONIDE INHALATION 0.5 MG: 0.5 SUSPENSION RESPIRATORY (INHALATION) at 08:09

## 2024-09-21 RX ADMIN — COLLAGENASE SANTYL 1 G: 250 OINTMENT TOPICAL at 10:09

## 2024-09-21 RX ADMIN — POLYETHYLENE GLYCOL 3350 17 G: 17 POWDER, FOR SOLUTION ORAL at 09:09

## 2024-09-21 RX ADMIN — PANTOPRAZOLE SODIUM 40 MG: 40 TABLET, DELAYED RELEASE ORAL at 09:09

## 2024-09-21 NOTE — PLAN OF CARE
Problem: Adult Inpatient Plan of Care  Goal: Patient-Specific Goal (Individualized)  Outcome: Progressing  Goal: Absence of Hospital-Acquired Illness or Injury  Outcome: Progressing  Goal: Optimal Comfort and Wellbeing  Outcome: Progressing  Goal: Readiness for Transition of Care  Outcome: Progressing     Problem: Fall Injury Risk  Goal: Absence of Fall and Fall-Related Injury  Outcome: Progressing     Problem: Skin Injury Risk Increased  Goal: Skin Health and Integrity  Outcome: Progressing

## 2024-09-22 PROCEDURE — S4991 NICOTINE PATCH NONLEGEND: HCPCS | Performed by: NURSE PRACTITIONER

## 2024-09-22 PROCEDURE — 25000003 PHARM REV CODE 250: Performed by: HOSPITALIST

## 2024-09-22 PROCEDURE — 27000987 HC MATTRESS, MATRIX LOW PROFILE

## 2024-09-22 PROCEDURE — 94640 AIRWAY INHALATION TREATMENT: CPT

## 2024-09-22 PROCEDURE — 27000958

## 2024-09-22 PROCEDURE — 25000242 PHARM REV CODE 250 ALT 637 W/ HCPCS: Performed by: NURSE PRACTITIONER

## 2024-09-22 PROCEDURE — 11000004 HC SNF PRIVATE

## 2024-09-22 PROCEDURE — 25000003 PHARM REV CODE 250: Performed by: NURSE PRACTITIONER

## 2024-09-22 RX ADMIN — COLLAGENASE SANTYL 1 G: 250 OINTMENT TOPICAL at 08:09

## 2024-09-22 RX ADMIN — QUETIAPINE FUMARATE 25 MG: 25 TABLET ORAL at 08:09

## 2024-09-22 RX ADMIN — ACETAMINOPHEN 650 MG: 325 TABLET ORAL at 12:09

## 2024-09-22 RX ADMIN — BUDESONIDE INHALATION 0.5 MG: 0.5 SUSPENSION RESPIRATORY (INHALATION) at 07:09

## 2024-09-22 RX ADMIN — POLYETHYLENE GLYCOL 3350 17 G: 17 POWDER, FOR SOLUTION ORAL at 08:09

## 2024-09-22 RX ADMIN — Medication 6 MG: at 08:09

## 2024-09-22 RX ADMIN — NICOTINE 1 PATCH: 21 PATCH, EXTENDED RELEASE TRANSDERMAL at 08:09

## 2024-09-22 RX ADMIN — PANTOPRAZOLE SODIUM 40 MG: 40 TABLET, DELAYED RELEASE ORAL at 08:09

## 2024-09-22 RX ADMIN — HYDROCODONE BITARTRATE AND ACETAMINOPHEN 1 TABLET: 5; 325 TABLET ORAL at 08:09

## 2024-09-22 RX ADMIN — RIVAROXABAN 10 MG: 10 TABLET, FILM COATED ORAL at 08:09

## 2024-09-22 RX ADMIN — LEVOTHYROXINE SODIUM 112 MCG: 0.11 TABLET ORAL at 06:09

## 2024-09-22 RX ADMIN — BUDESONIDE INHALATION 0.5 MG: 0.5 SUSPENSION RESPIRATORY (INHALATION) at 08:09

## 2024-09-22 RX ADMIN — DOCUSATE SODIUM 100 MG: 100 CAPSULE, LIQUID FILLED ORAL at 08:09

## 2024-09-22 RX ADMIN — FERROUS SULFATE TAB 325 MG (65 MG ELEMENTAL FE) 1 EACH: 325 (65 FE) TAB at 05:09

## 2024-09-22 RX ADMIN — FERROUS SULFATE TAB 325 MG (65 MG ELEMENTAL FE) 1 EACH: 325 (65 FE) TAB at 12:09

## 2024-09-22 RX ADMIN — POTASSIUM CHLORIDE 10 MEQ: 750 CAPSULE, EXTENDED RELEASE ORAL at 08:09

## 2024-09-22 RX ADMIN — FERROUS SULFATE TAB 325 MG (65 MG ELEMENTAL FE) 1 EACH: 325 (65 FE) TAB at 08:09

## 2024-09-22 NOTE — PLAN OF CARE
Problem: Adult Inpatient Plan of Care  Goal: Patient-Specific Goal (Individualized)  Outcome: Progressing  Goal: Absence of Hospital-Acquired Illness or Injury  Outcome: Progressing  Goal: Optimal Comfort and Wellbeing  Outcome: Progressing  Goal: Readiness for Transition of Care  Outcome: Progressing     Problem: Skin Injury Risk Increased  Goal: Skin Health and Integrity  Outcome: Progressing     Problem: Wound  Goal: Optimal Coping  Outcome: Progressing

## 2024-09-23 ENCOUNTER — CLINICAL SUPPORT (OUTPATIENT)
Dept: WOUND CARE | Facility: HOSPITAL | Age: 84
End: 2024-09-23
Attending: NURSE PRACTITIONER
Payer: MEDICARE

## 2024-09-23 DIAGNOSIS — L89.893 PRESSURE ULCER OF OTHER SITE, STAGE 3: Primary | ICD-10-CM

## 2024-09-23 PROCEDURE — 27000958

## 2024-09-23 PROCEDURE — 99900035 HC TECH TIME PER 15 MIN (STAT)

## 2024-09-23 PROCEDURE — 25000003 PHARM REV CODE 250: Performed by: NURSE PRACTITIONER

## 2024-09-23 PROCEDURE — 97530 THERAPEUTIC ACTIVITIES: CPT | Mod: CQ

## 2024-09-23 PROCEDURE — 11000004 HC SNF PRIVATE

## 2024-09-23 PROCEDURE — 27000987 HC MATTRESS, MATRIX LOW PROFILE

## 2024-09-23 PROCEDURE — 97116 GAIT TRAINING THERAPY: CPT | Mod: CQ

## 2024-09-23 PROCEDURE — 25000242 PHARM REV CODE 250 ALT 637 W/ HCPCS: Performed by: NURSE PRACTITIONER

## 2024-09-23 PROCEDURE — 25000003 PHARM REV CODE 250: Performed by: HOSPITALIST

## 2024-09-23 PROCEDURE — 99308 SBSQ NF CARE LOW MDM 20: CPT | Mod: ,,, | Performed by: HOSPITALIST

## 2024-09-23 PROCEDURE — 11042 DBRDMT SUBQ TIS 1ST 20SQCM/<: CPT

## 2024-09-23 PROCEDURE — 94640 AIRWAY INHALATION TREATMENT: CPT

## 2024-09-23 PROCEDURE — S4991 NICOTINE PATCH NONLEGEND: HCPCS | Performed by: NURSE PRACTITIONER

## 2024-09-23 RX ADMIN — DOCUSATE SODIUM 100 MG: 100 CAPSULE, LIQUID FILLED ORAL at 08:09

## 2024-09-23 RX ADMIN — NICOTINE 1 PATCH: 21 PATCH, EXTENDED RELEASE TRANSDERMAL at 08:09

## 2024-09-23 RX ADMIN — FERROUS SULFATE TAB 325 MG (65 MG ELEMENTAL FE) 1 EACH: 325 (65 FE) TAB at 08:09

## 2024-09-23 RX ADMIN — HYDROCODONE BITARTRATE AND ACETAMINOPHEN 1 TABLET: 5; 325 TABLET ORAL at 08:09

## 2024-09-23 RX ADMIN — QUETIAPINE FUMARATE 25 MG: 25 TABLET ORAL at 08:09

## 2024-09-23 RX ADMIN — RIVAROXABAN 10 MG: 10 TABLET, FILM COATED ORAL at 08:09

## 2024-09-23 RX ADMIN — POTASSIUM CHLORIDE 10 MEQ: 750 CAPSULE, EXTENDED RELEASE ORAL at 08:09

## 2024-09-23 RX ADMIN — POLYETHYLENE GLYCOL 3350 17 G: 17 POWDER, FOR SOLUTION ORAL at 08:09

## 2024-09-23 RX ADMIN — LEVOTHYROXINE SODIUM 112 MCG: 0.11 TABLET ORAL at 06:09

## 2024-09-23 RX ADMIN — ACETAMINOPHEN 650 MG: 325 TABLET ORAL at 08:09

## 2024-09-23 RX ADMIN — FERROUS SULFATE TAB 325 MG (65 MG ELEMENTAL FE) 1 EACH: 325 (65 FE) TAB at 12:09

## 2024-09-23 RX ADMIN — COLLAGENASE SANTYL 1 G: 250 OINTMENT TOPICAL at 08:09

## 2024-09-23 RX ADMIN — PANTOPRAZOLE SODIUM 40 MG: 40 TABLET, DELAYED RELEASE ORAL at 08:09

## 2024-09-23 RX ADMIN — BUDESONIDE INHALATION 0.5 MG: 0.5 SUSPENSION RESPIRATORY (INHALATION) at 08:09

## 2024-09-23 RX ADMIN — BUDESONIDE INHALATION 0.5 MG: 0.5 SUSPENSION RESPIRATORY (INHALATION) at 07:09

## 2024-09-23 RX ADMIN — Medication 6 MG: at 08:09

## 2024-09-23 RX ADMIN — FERROUS SULFATE TAB 325 MG (65 MG ELEMENTAL FE) 1 EACH: 325 (65 FE) TAB at 05:09

## 2024-09-23 NOTE — PROGRESS NOTES
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  This was discussed with family who do not wish to have further testing at this time.  She has been at [facility] since 8/19 following a fall at her home which resulted in fracture of the left greater trochanter which required surg. She had an episode of chest pain on Wed with no acute findings.  Echo revealed EF of 60%.  She is being admitted to [facility] for OT/ PT and medical management.       Pt is DNR     Overview/Hospital Course:  8/27 Agitation yesterday, did sleep last night and was good this am, however, after PT she became agitated and aggressive.  Will monitor and redirect.  Try Seroquel this PM this time.      8/28 Maybe a little better today.  She did sleep last night.  Was sitting up this am and not as agitated.  Will continue plan of care for now.  Hopefully some better tomorrow.     8/29 better night and this am, recognizing people and nurses.  Not eating or drinking much though.      8/30 Didn't sleep good last night, but she is alert today just tired     9/2 repair of hip fracture 2 weeks ago, will DC staples    9/6 some increased swelling in surgical leg, on Xarelto ppx already.  Did get a lot of fluid over last few days.     9/9 still some swelling, will dose lasix x 1 today and see how she responds.     9/12 Lasix did help with single dose, will dose again today.     9/13 Swelling better, sitting up eating lunch.  Doing well.     9/16 working  with therapy, continues to do well     9/23 She is much better, showing progress.     Interval History: doing good with therapy and improving     Review of Systems   Respiratory:  Negative for shortness of breath.    Cardiovascular:  Negative for chest pain.   Gastrointestinal:  Negative for abdominal pain, nausea and vomiting.   Musculoskeletal:  Positive for arthralgias.   Neurological:  Positive for weakness.   Psychiatric/Behavioral:  Negative for agitation and confusion.    All other systems reviewed and are negative.    Objective:     Vital Signs (Most Recent):  Temp: 97.9 °F (36.6 °C) (09/22/24 1928)  Pulse: 74 (09/23/24 0829)  Resp: 18 (09/23/24 0829)  BP: 138/67 (09/22/24 1928)  SpO2: 98 % (09/23/24 0829) Vital Signs (24h Range):  Temp:  [97.9 °F (36.6 °C)] 97.9 °F (36.6 °C)  Pulse:  [69-78] 74  Resp:  [18-20] 18  SpO2:  [95 %-98 %] 98 %  BP: (138)/(67) 138/67     Weight: 55.1 kg (121 lb 7.6 oz)  Body mass index is 24.53 kg/m².    Intake/Output Summary (Last 24 hours) at 9/23/2024 1315  Last data filed at 9/23/2024 0830  Gross per 24 hour   Intake 1118 ml   Output --   Net 1118 ml         Physical Exam  Vitals reviewed.   Constitutional:       General: She is not in acute distress.     Appearance: Normal appearance.   HENT:      Head: Normocephalic and atraumatic.   Eyes:      General: No scleral icterus.     Extraocular Movements: Extraocular movements intact.      Conjunctiva/sclera: Conjunctivae normal.      Pupils: Pupils are equal, round, and reactive to light.   Cardiovascular:      Rate and Rhythm: Normal rate and regular rhythm.      Heart sounds: No murmur heard.     No friction rub. No gallop.   Pulmonary:      Effort: Pulmonary effort is normal. No respiratory distress.      Breath sounds: Normal breath sounds. No wheezing or rales.   Abdominal:      General: Abdomen is flat. Bowel sounds are normal. There is no distension.      Palpations: Abdomen is soft.      Tenderness: There is no abdominal  tenderness. There is no guarding.   Musculoskeletal:         General: No swelling.      Right lower leg: No edema.      Left lower leg: No edema.   Skin:     General: Skin is warm and dry.      Coloration: Skin is not jaundiced.      Findings: No rash.   Neurological:      General: No focal deficit present.      Mental Status: She is alert.      Sensory: No sensory deficit.      Motor: Weakness present.      Comments: Getting stronger    Psychiatric:         Behavior: Behavior normal.             Significant Labs: All pertinent labs within the past 24 hours have been reviewed.  Recent Lab Results       None            Significant Imaging: I have reviewed all pertinent imaging results/findings within the past 24 hours.    Assessment/Plan:      * S/p left hip fracture  PT/ OT  Fall precautions  Pain control    Patient will need:   FELISHA ROLLING WALKER  SHOWER BENCH  BEDSIDE COMMODE  HOSPITAL BED  At home for high fall risk, transfers and repositioning.        Delirium  Seems to have resolved.  Will monitor closely.       Severe protein-calorie malnutrition  Nutrition consulted. Most recent weight and BMI monitored-     Measurements:  Wt Readings from Last 1 Encounters:   08/23/24 54 kg (119 lb)   Body mass index is 24.04 kg/m².    Patient has been screened and assessed by RD.    Malnutrition Type:  Context: chronic illness  Level: severe    Malnutrition Characteristic Summary:  Weight Loss (Malnutrition): greater than 10% in 6 months  Energy Intake (Malnutrition): less than 75% for greater than or equal to 1 month    Interventions/Recommendations (treatment strategy):  Recommend to advance diet appropriate and tolerated; add Boost Plus all meals      Disease of thyroid gland  Continue home meds      GERD (gastroesophageal reflux disease)  Continue home meds      History of DVT (deep vein thrombosis)  Continue xeralto      Hypertension  Chronic, controlled. Latest blood pressure and vitals reviewed-     Temp:  [98.2  °F (36.8 °C)]   Pulse:  [68]   Resp:  [22]   BP: (117)/(66)   SpO2:  [96 %] .   Home meds for hypertension were reviewed and noted below.   Hypertension Medications               diltiaZEM HCl (TIAZAC) 120 mg 24 hr capsule Take 120 mg by mouth.    triamterene-hydrochlorothiazide 37.5-25 mg (DYAZIDE) 37.5-25 mg per capsule Take 1 capsule by mouth every morning.            While in the hospital, will manage blood pressure as follows; BP has been low, will hold meds for now    Monitor BP, replace home medication as warrented.      VTE Risk Mitigation (From admission, onward)           Ordered     rivaroxaban tablet 10 mg  Daily         08/23/24 1802     IP VTE LOW RISK PATIENT  Once         08/23/24 1725                    Discharge Planning   QUINN: 10/7/2024     Code Status: DNR   Is the patient medically ready for discharge?:     Reason for patient still in hospital (select all that apply): Patient trending condition and PT / OT recommendations  Discharge Plan A: Other                  Daren Nicole DO  Department of Hospital Medicine   [facility]

## 2024-09-23 NOTE — SUBJECTIVE & OBJECTIVE
Interval History: doing good with therapy and improving     Review of Systems   Respiratory:  Negative for shortness of breath.    Cardiovascular:  Negative for chest pain.   Gastrointestinal:  Negative for abdominal pain, nausea and vomiting.   Musculoskeletal:  Positive for arthralgias.   Neurological:  Positive for weakness.   Psychiatric/Behavioral:  Negative for agitation and confusion.    All other systems reviewed and are negative.    Objective:     Vital Signs (Most Recent):  Temp: 97.9 °F (36.6 °C) (09/22/24 1928)  Pulse: 74 (09/23/24 0829)  Resp: 18 (09/23/24 0829)  BP: 138/67 (09/22/24 1928)  SpO2: 98 % (09/23/24 0829) Vital Signs (24h Range):  Temp:  [97.9 °F (36.6 °C)] 97.9 °F (36.6 °C)  Pulse:  [69-78] 74  Resp:  [18-20] 18  SpO2:  [95 %-98 %] 98 %  BP: (138)/(67) 138/67     Weight: 55.1 kg (121 lb 7.6 oz)  Body mass index is 24.53 kg/m².    Intake/Output Summary (Last 24 hours) at 9/23/2024 1315  Last data filed at 9/23/2024 0830  Gross per 24 hour   Intake 1118 ml   Output --   Net 1118 ml         Physical Exam  Vitals reviewed.   Constitutional:       General: She is not in acute distress.     Appearance: Normal appearance.   HENT:      Head: Normocephalic and atraumatic.   Eyes:      General: No scleral icterus.     Extraocular Movements: Extraocular movements intact.      Conjunctiva/sclera: Conjunctivae normal.      Pupils: Pupils are equal, round, and reactive to light.   Cardiovascular:      Rate and Rhythm: Normal rate and regular rhythm.      Heart sounds: No murmur heard.     No friction rub. No gallop.   Pulmonary:      Effort: Pulmonary effort is normal. No respiratory distress.      Breath sounds: Normal breath sounds. No wheezing or rales.   Abdominal:      General: Abdomen is flat. Bowel sounds are normal. There is no distension.      Palpations: Abdomen is soft.      Tenderness: There is no abdominal tenderness. There is no guarding.   Musculoskeletal:         General: No swelling.       Right lower leg: No edema.      Left lower leg: No edema.   Skin:     General: Skin is warm and dry.      Coloration: Skin is not jaundiced.      Findings: No rash.   Neurological:      General: No focal deficit present.      Mental Status: She is alert.      Sensory: No sensory deficit.      Motor: Weakness present.      Comments: Getting stronger    Psychiatric:         Behavior: Behavior normal.             Significant Labs: All pertinent labs within the past 24 hours have been reviewed.  Recent Lab Results       None            Significant Imaging: I have reviewed all pertinent imaging results/findings within the past 24 hours.

## 2024-09-23 NOTE — PLAN OF CARE
Problem: Adult Inpatient Plan of Care  Goal: Patient-Specific Goal (Individualized)  Outcome: Progressing  Goal: Absence of Hospital-Acquired Illness or Injury  Outcome: Progressing  Goal: Optimal Comfort and Wellbeing  Outcome: Progressing  Goal: Readiness for Transition of Care  Outcome: Progressing     Problem: Fall Injury Risk  Goal: Absence of Fall and Fall-Related Injury  Outcome: Progressing

## 2024-09-23 NOTE — PT/OT/SLP PROGRESS
Physical Therapy Treatment    Patient Name:  Yanet Viramontes   MRN:  [id number]    Recommendations:     Discharge Recommendations: Low Intensity Therapy  Discharge Equipment Recommendations: walker, rolling (youth size)  Barriers to discharge: None    Assessment:     Yanet Viramontes is a 84 y.o. female admitted with a medical diagnosis of S/p left hip fracture.  She presents with the following impairments/functional limitations: weakness, gait instability, impaired functional mobility, impaired endurance .  Pt participated well today with bed mobility, bathroom transfer, sitting and standing exercises, sit to stand practice; limited by fatigue    Rehab Prognosis: Good and Fair; patient would benefit from acute skilled PT services to address these deficits and reach maximum level of function.    Recent Surgery: * No surgery found *      Plan:     During this hospitalization, patient to be seen 5 x/week to address the identified rehab impairments via gait training, therapeutic activities, therapeutic exercises and progress toward the following goals:    Plan of Care Expires:  10/04/24    Continue per Plan of Care per Leonarda Priest PT     Subjective     Chief Complaint: fatigue after treatment  Patient/Family Comments/goals: agrees to PT Tx   Pain/Comfort:  Pain Rating 1: 0/10      Objective:     Communicated with patient, PT prior to session.  Patient found HOB elevated with peripheral IV upon PT entry to room.     General Precautions: Standard, fall  Orthopedic Precautions: LLE weight bearing as tolerated  Braces:    Respiratory Status: Room air     Functional Mobility:  Bed Mobility:     Scooting: modified independence  Supine to Sit: minimum assistance  Transfers:     Sit to Stand:  contact guard assistance with rolling walker  Bed to Chair: contact guard assistance and minimum assistance with  rolling walker  using  Step Transfer  Toilet Transfer: contact guard assistance and minimum assistance with  rolling walker   using  Step Transfer  Gait: 80ft x 2 with rolling walker and CGA/Ward; good carryover with proper use of walker and staying inside of walker  Balance: good overall with walker      AM-PAC 6 CLICK MOBILITY  Turning over in bed (including adjusting bedclothes, sheets and blankets)?: 3  Sitting down on and standing up from a chair with arms (e.g., wheelchair, bedside commode, etc.): 3  Moving from lying on back to sitting on the side of the bed?: 3  Moving to and from a bed to a chair (including a wheelchair)?: 3  Need to walk in hospital room?: 3  Climbing 3-5 steps with a railing?: 2 (though not assessed today)  Basic Mobility Total Score: 17       Treatment & Education:  Bed mobility and transfer to bathroom as noted above  Side stepping at railing in hallway, 10ft each way right and left   Standing BLE exercises x 10 repetitions each: marching, mini squats, heel/toe raises, hip abduction   Sitting BLE exercises x 20 repetitions each: marching (to neutral), long arc quads, hip abduction, ankle rocking    Patient left up in chair with all lines intact and call button in reach..    GOALS:   Multidisciplinary Problems       Physical Therapy Goals          Problem: Physical Therapy    Goal Priority Disciplines Outcome Goal Variances Interventions   Physical Therapy Goal     PT, PT/OT Progressing     Description: STG - 2 weeks  1. Pt will transfer supine to/from sit with mod Ax1.-MET  2. Pt will perform STS and bed transfer with mod Ax1.-MET  3. Pt will have LLE strength of 3-/5.-MET  4. Pt will amb with RW x 20 ft with mod Ax1.-MET    LTG - 4 weeks  1. Pt will transfer supine to/from sit with min Ax1.-MET  2. Pt will perform STS and bed transfer with min Ax1.-MET  3. Pt will have LLE strength of 3/5.-MET  4. Pt will amb with RW x 50 ft with min Ax1.-MET  5. Pt's ROM LLE will be WFL.-MET  6. Pt will transfer supine to/from sit with SBA.  2. Pt will perform STS and bed transfer with SBA.  3. Pt will have LLE strength of  3/5.-MET  4. Pt will amb with RW x 100 ft with SBA.                       Time Tracking:     PT Received On: 09/23/24  PT Start Time: 1106     PT Stop Time: 1130  PT Total Time (min): 24 min     Billable Minutes: Gait Training 10 and Therapeutic Activity 14    Treatment Type: Treatment  PT/PTA: PTA     Number of PTA visits since last PT visit: 1 09/23/2024

## 2024-09-23 NOTE — PLAN OF CARE
Problem: Physical Therapy  Goal: Physical Therapy Goal  Description: STG - 2 weeks  1. Pt will transfer supine to/from sit with mod Ax1.-MET  2. Pt will perform STS and bed transfer with mod Ax1.-MET  3. Pt will have LLE strength of 3-/5.-MET  4. Pt will amb with RW x 20 ft with mod Ax1.-MET    LTG - 4 weeks  1. Pt will transfer supine to/from sit with min Ax1.-MET  2. Pt will perform STS and bed transfer with min Ax1.-MET  3. Pt will have LLE strength of 3/5.-MET  4. Pt will amb with RW x 50 ft with min Ax1.-MET  5. Pt's ROM LLE will be WFL.-MET  6. Pt will transfer supine to/from sit with SBA.  2. Pt will perform STS and bed transfer with SBA.  3. Pt will have LLE strength of 3/5.-MET  4. Pt will amb with RW x 100 ft with SBA.  Outcome: Progressing

## 2024-09-24 PROCEDURE — 97110 THERAPEUTIC EXERCISES: CPT

## 2024-09-24 PROCEDURE — 25000242 PHARM REV CODE 250 ALT 637 W/ HCPCS: Performed by: NURSE PRACTITIONER

## 2024-09-24 PROCEDURE — 97530 THERAPEUTIC ACTIVITIES: CPT

## 2024-09-24 PROCEDURE — 27000958

## 2024-09-24 PROCEDURE — 11000004 HC SNF PRIVATE

## 2024-09-24 PROCEDURE — 97116 GAIT TRAINING THERAPY: CPT | Mod: CQ

## 2024-09-24 PROCEDURE — 25000003 PHARM REV CODE 250: Performed by: NURSE PRACTITIONER

## 2024-09-24 PROCEDURE — 97535 SELF CARE MNGMENT TRAINING: CPT

## 2024-09-24 PROCEDURE — 27000987 HC MATTRESS, MATRIX LOW PROFILE

## 2024-09-24 PROCEDURE — 25000003 PHARM REV CODE 250: Performed by: HOSPITALIST

## 2024-09-24 PROCEDURE — 94640 AIRWAY INHALATION TREATMENT: CPT

## 2024-09-24 PROCEDURE — 97110 THERAPEUTIC EXERCISES: CPT | Mod: CQ

## 2024-09-24 PROCEDURE — S4991 NICOTINE PATCH NONLEGEND: HCPCS | Performed by: NURSE PRACTITIONER

## 2024-09-24 RX ADMIN — DOCUSATE SODIUM 100 MG: 100 CAPSULE, LIQUID FILLED ORAL at 09:09

## 2024-09-24 RX ADMIN — BUDESONIDE INHALATION 0.5 MG: 0.5 SUSPENSION RESPIRATORY (INHALATION) at 08:09

## 2024-09-24 RX ADMIN — DOCUSATE SODIUM 100 MG: 100 CAPSULE, LIQUID FILLED ORAL at 08:09

## 2024-09-24 RX ADMIN — ACETAMINOPHEN 650 MG: 325 TABLET ORAL at 11:09

## 2024-09-24 RX ADMIN — QUETIAPINE FUMARATE 25 MG: 25 TABLET ORAL at 08:09

## 2024-09-24 RX ADMIN — BUDESONIDE INHALATION 0.5 MG: 0.5 SUSPENSION RESPIRATORY (INHALATION) at 07:09

## 2024-09-24 RX ADMIN — COLLAGENASE SANTYL 1 G: 250 OINTMENT TOPICAL at 09:09

## 2024-09-24 RX ADMIN — FERROUS SULFATE TAB 325 MG (65 MG ELEMENTAL FE) 1 EACH: 325 (65 FE) TAB at 09:09

## 2024-09-24 RX ADMIN — PANTOPRAZOLE SODIUM 40 MG: 40 TABLET, DELAYED RELEASE ORAL at 09:09

## 2024-09-24 RX ADMIN — FERROUS SULFATE TAB 325 MG (65 MG ELEMENTAL FE) 1 EACH: 325 (65 FE) TAB at 11:09

## 2024-09-24 RX ADMIN — LEVOTHYROXINE SODIUM 112 MCG: 0.11 TABLET ORAL at 05:09

## 2024-09-24 RX ADMIN — NICOTINE 1 PATCH: 21 PATCH, EXTENDED RELEASE TRANSDERMAL at 09:09

## 2024-09-24 RX ADMIN — FERROUS SULFATE TAB 325 MG (65 MG ELEMENTAL FE) 1 EACH: 325 (65 FE) TAB at 05:09

## 2024-09-24 RX ADMIN — Medication 6 MG: at 08:09

## 2024-09-24 RX ADMIN — HYDROCODONE BITARTRATE AND ACETAMINOPHEN 1 TABLET: 5; 325 TABLET ORAL at 08:09

## 2024-09-24 RX ADMIN — POLYETHYLENE GLYCOL 3350 17 G: 17 POWDER, FOR SOLUTION ORAL at 09:09

## 2024-09-24 RX ADMIN — POTASSIUM CHLORIDE 10 MEQ: 750 CAPSULE, EXTENDED RELEASE ORAL at 09:09

## 2024-09-24 RX ADMIN — RIVAROXABAN 10 MG: 10 TABLET, FILM COATED ORAL at 09:09

## 2024-09-24 NOTE — PT/OT/SLP PROGRESS
Physical Therapy Treatment    Patient Name:  Yanet Viramontes   MRN:  [id number]    Recommendations:     Discharge Recommendations: Moderate Intensity Therapy  Discharge Equipment Recommendations: walker, rolling (youth size)  Barriers to discharge: None    Assessment:     Yanet Viramontes is a 84 y.o. female admitted with a medical diagnosis of S/p left hip fracture.  She presents with the following impairments/functional limitations: weakness, impaired endurance, gait instability, impaired balance, pain, decreased lower extremity function, decreased ROM, impaired skin, edema, orthopedic precautions .      Rehab Prognosis: Good and Fair; patient would benefit from acute skilled PT services to address these deficits and reach maximum level of function.    Recent Surgery: * No surgery found *      Plan:     During this hospitalization, patient to be seen 5 x/week to address the identified rehab impairments via gait training, therapeutic exercises and progress toward the following goals:    Plan of Care Expires:  10/04/24    Received per Plan of Care per Leonarda Priest PT     Subjective     Chief Complaint: sleepy but willing to get out of bed and participate; mild left hip pain/soreness but tolerable    Patient/Family Comments/goals: agrees to PT Tx   Pain/Comfort:  Pain Rating 1: 4/10  Location - Side 1: Left  Location - Orientation 1: lower  Location 1: hip  Pain Addressed 1: Reposition, Distraction      Objective:     Communicated with patient, daughter,PT prior to session.  Patient found HOB elevated with   upon PT entry to room.     General Precautions: Standard, fall  Orthopedic Precautions: LLE weight bearing as tolerated  Braces:    Respiratory Status: Room air     Functional Mobility:  Bed Mobility:     Scooting: modified independence  Supine to Sit: CGA/Ward  Transfers:     Bed to W/C: CGA using RW   Sit to Stand:  contact guard assistance with rolling walker  Gait: 100ft  total  with rolling walker and CGA/Ward;  good carryover with proper use of walker and staying inside of walker  Balance: good overall with walker      AM-PAC 6 CLICK MOBILITY  Turning over in bed (including adjusting bedclothes, sheets and blankets)?: 3  Sitting down on and standing up from a chair with arms (e.g., wheelchair, bedside commode, etc.): 3  Moving from lying on back to sitting on the side of the bed?: 3  Moving to and from a bed to a chair (including a wheelchair)?: 3  Need to walk in hospital room?: 3  Climbing 3-5 steps with a railing?: 2  Basic Mobility Total Score: 17       Treatment & Education:  Bed mobs and transfers listed above  Sit to stands x 3 to sosa/doff brief using RW for BUE support Juan with assistance from daughter  Seated Bilateral Lower Extremities exercises: Ankle pumps, Hip add with blue ball, bilateral Long Arc Quad, bilateral marches to 90 deg, hip abduction red tband, and bilateral Hamstring curls red tband all x 30    Patient left up in chair with all lines intact and call button in reach and daughter present.     GOALS:   Multidisciplinary Problems       Physical Therapy Goals          Problem: Physical Therapy    Goal Priority Disciplines Outcome Goal Variances Interventions   Physical Therapy Goal     PT, PT/OT Progressing     Description: STG - 2 weeks  1. Pt will transfer supine to/from sit with mod Ax1.-MET  2. Pt will perform STS and bed transfer with mod Ax1.-MET  3. Pt will have LLE strength of 3-/5.-MET  4. Pt will amb with RW x 20 ft with mod Ax1.-MET    LTG - 4 weeks  1. Pt will transfer supine to/from sit with min Ax1.-MET  2. Pt will perform STS and bed transfer with min Ax1.-MET  3. Pt will have LLE strength of 3/5.-MET  4. Pt will amb with RW x 50 ft with min Ax1.-MET  5. Pt's ROM LLE will be WFL.-MET  6. Pt will transfer supine to/from sit with SBA.  2. Pt will perform STS and bed transfer with SBA.  3. Pt will have LLE strength of 3/5.-MET  4. Pt will amb with RW x 100 ft with SBA.                        Time Tracking:     PT Received On: 09/24/24  PT Start Time: 1330     PT Stop Time: 1401  PT Total Time (min): 31 min     Billable Minutes: Gait Training 12 and Therapeutic Exercise 19    Treatment Type: Treatment  PT/PTA: PTA     Number of PTA visits since last PT visit: 2     09/24/2024

## 2024-09-24 NOTE — PLAN OF CARE
Problem: Occupational Therapy  Goal: Occupational Therapy Goal  Description: STG: within 2 weeks  Pt will perform grooming with min a at sinkside from seated MET  Pt will bathe with mod a MET  Pt will perform UE dressing with mod a MET  Pt will perform LE dressing with mod a MET  Pt will sit EOB x 5 min with mod to min assistance MET  Pt will transfer bed/chair/bsc with mod a MET  Pt will perform standing task x 1 min with mod assistance x 1 MET  Pt will tolerate 15 minutes of tx without fatigue MET    UPDATED STG's 2024:  Pt will perform grooming with SBA at sinkside from seated MET  Pt will bathe with min a MET though family is providing more support for this task than patient requires at this time (discussed this with them and how to promote greater independence with safety support measures)  Pt will perform UE dressing with min a MET  Pt will perform LE dressing with min a MET  Pt will sit EOB x 5 min with min assistance  MET  Pt will transfer bed/chair/bsc with CGA  MET inconsistently  Pt will perform standing task x 3 min with CGA MET  Pt will tolerate 45 minutes of tx without fatigue MET    UPDATED STG's 2024:  Pt will bathe with SBA at sinkside with AE  Pt will perform UE dressing with SBA  Pt will perform LE dressing with SBA and with AE   Pt will transfer bed/chair/bsc with SBA  Pt will perform standing task x 5 min with SBA        LT.Restore to max I with self care and mobility. ONGOING/PROGRESSING    Outcome: Progressing

## 2024-09-24 NOTE — PT/OT/SLP PROGRESS
Occupational Therapy  Treatment    Yanet Viramontes   MRN: [id number]   Admitting Diagnosis: S/p left hip fracture    OT Date of Treatment: 09/24/24   OT Start Time: 1008  OT Stop Time: 1048  OT Total Time (min): 40 min    Billable Minutes:  Self Care/Home Management 12, Therapeutic Activity 15, and Therapeutic Exercise 13    OT/CRISELDA: OT          General Precautions: Standard, fall  Orthopedic Precautions: LLE weight bearing as tolerated  Braces: N/A  Respiratory Status: Room air         Subjective:  Communicated with pt and her daughter, Kassi, prior to session.  No new complaints, no new questions, no new concerns       Objective:        Functional Mobility:  Bed Mobility: did not complete with OT today, pt already up in w/c       Transfers: mod a sit to stand today, pt having more difficulty pushing up from w/c to get to  front of RW.        Functional Ambulation: see PT    Activities of Daily Living:     Feeding adaptive equipment:  none required     UE adaptive equipment: none required     LE adaptive equipment: Reacher, Dressing Stick, and Sock aid                     Bathing adaptive equipment: will need shower chair and hand held shower nozzle at home as well as family assist    Balance:   Static Sit: FAIR+: Able to take MINIMAL challenges from all directions  Dynamic Sit: FAIR+: Maintains balance through MINIMAL excursions of active trunk motion  Static Stand: FAIR: Maintains without assist but unable to take challenges  Dynamic stand: FAIR: Needs CONTACT GUARD during gait    Therapeutic Activities and Exercises:    At beginning of session, OT and pt daughter assister her to bathroom to toilet; she was able to ambulate to toilet with CGA, required max assist of 2 persons to get brief donned after toileting from standing position while holding on to RW in order to maintain balance.  Additionally, pt needs cueing for thoroughness of pericare.    To improve UB endurance, strength, OT facilitated pt with:  UBE  "x 8 min with no RB's throughout, low level resist, BUE, seated  RED PUTTY to simulate kitchen prep task and improve FM and     To improve sit to stand and functional transitions and transfers, pushing up from surfaces more effectively and safely:  BLUE therabands in triceps press and lat press x 15 reps x 1 set from seated    At end of session, OT engaged pt in standing at sinkside to wash her hands, cueing her for safe hand placement during sit to stand and stand to sit.  Pt required mod a to day to rise from seated, having difficulty leaning forward      AM-PAC 6 CLICK ADL   How much help from another person does this patient currently need?   1 = Unable, Total/Dependent Assistance  2 = A lot, Maximum/Moderate Assistance  3 = A little, Minimum/Contact Guard/Supervision  4 = None, Modified [place]/Independent          AM-PAC Raw Score CMS "G-Code Modifier Level of Impairment Assistance   6 % Total / Unable   7 - 8 CM 80 - 100% Maximal Assist   9-13 CL 60 - 80% Moderate Assist   14 - 19 CK 40 - 60% Moderate Assist   20 - 22 CJ 20 - 40% Minimal Assist   23 CI 1-20% SBA / CGA   24 CH 0% Independent/ Mod I       Patient left up in chair with  daughter present    ASSESSMENT:  Yanet Viramontes is a 84 y.o. female with a medical diagnosis of S/p left hip fracture and presents with no new complaints.    Rehab identified problem list/impairments:  weakness, impaired endurance, impaired self care skills, impaired functional mobility, gait instability, impaired balance, impaired cognition, decreased safety awareness, impaired skin, orthopedic precautions    Rehab potential is fair.    Activity tolerance: Fair    Discharge recommendations: Low Intensity Therapy   Barriers to discharge: Barriers to Discharge: None    Equipment recommendations:  (TBD)    GOALS:   Multidisciplinary Problems       Occupational Therapy Goals          Problem: Occupational Therapy    Goal Priority Disciplines Outcome Interventions "   Occupational Therapy Goal     OT, PT/OT Progressing    Description: STG: within 2 weeks  Pt will perform grooming with min a at sinkside from seated MET  Pt will bathe with mod a MET  Pt will perform UE dressing with mod a MET  Pt will perform LE dressing with mod a MET  Pt will sit EOB x 5 min with mod to min assistance MET  Pt will transfer bed/chair/bsc with mod a MET  Pt will perform standing task x 1 min with mod assistance x 1 MET  Pt will tolerate 15 minutes of tx without fatigue MET    UPDATED STG's 2024:  Pt will perform grooming with SBA at sinkside from seated MET  Pt will bathe with min a MET though family is providing more support for this task than patient requires at this time (discussed this with them and how to promote greater independence with safety support measures)  Pt will perform UE dressing with min a MET  Pt will perform LE dressing with min a ONGOING/PROGRESSING  Pt will sit EOB x 5 min with min assistance  MET  Pt will transfer bed/chair/bsc with CGA  MET inconsistently  Pt will perform standing task x 3 min with CGA MET  Pt will tolerate 45 minutes of tx without fatigue MET    LT.Restore to max I with self care and mobility. ONGOING/PROGRESSING                         Plan:  Patient to be seen 5 x/week to address the above listed problems via self-care/home management, community/work re-entry, therapeutic activities, therapeutic exercises, neuromuscular re-education, cognitive retraining, sensory integration, wheelchair management/training  Plan of Care expires: 24  Plan of Care reviewed with: patient, caregiver, daughter, family         2024

## 2024-09-24 NOTE — PROGRESS NOTES
Subjective:      Patient ID: Yanet Viramontes is a 84 y.o. female.    Chief Complaint: Wound Check       Chief Complaint: Wound Check    Ms. Viramontes is an elderly white female that lives at home alone. She fell August 19, 2024 and was admitted to [facility]. She was discharged on 8/23/2024 and transferred to the swing bed in the [facility]. She was admitted with a pressure ulcer on the upper left back. I started debridement last visit and ordered santyl for the dressing to help soften the thick adherent nonviable tissue. She is doing well considering. She is repositioning herself for the most part. She denies complaints of pain in the ulcer.     9/16/2024   Ms. Viramontes is an elderly white female that lives at home alone. She fell in August 19, 2024. She stayed in [facility] until 8/23/2024. She is in the swing bed in the [facility]. She was admitted with a pressure ulcer on the upper left back. She states that her back hurts around the ulcer, but not necessarily in the ulcer. Her daughter (Sulema Brewer) was with her when I looked at the wound and discussed the plan of care. Medihoney has been used for the dressing. Santyl has been ordered. If the hospital hasn't gotten it by the time she is discharged, I will call it in to her pharmacy.     Hospital documentation from Hospitalist:     Principal Problem:S/p left hip fracture     Chief Complaint: No chief complaint on file.        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  This was discussed with family who do not wish to have further testing at this time.  She has been at [facility] since 8/19 following a fall at her home which resulted in fracture of the left greater trochanter which required surg. She had an episode of chest pain on Wed with no acute findings.   Echo revealed EF of 60%.  She is being admitted to [facility] for OT/ PT and medical management.        Pt is DNR       Review of Systems   Constitutional:         Elderly white female, frail, chronically ill, no acute distress   Respiratory: Negative.     Cardiovascular:  Positive for leg swelling.   Musculoskeletal:  Positive for back pain and gait problem.   Skin:         Mid upper back stage 3 pressure injury   Neurological:  Positive for weakness.   Psychiatric/Behavioral: Negative.        Objective:     Physical Exam  Vitals and nursing note reviewed.   Constitutional:       Comments: Elderly white female, frail, chronically ill, no acute distress     HENT:      Head: Normocephalic.   Cardiovascular:      Rate and Rhythm: Normal rate.   Pulmonary:      Effort: Pulmonary effort is normal.   Skin:     Capillary Refill: Capillary refill takes 2 to 3 seconds.      Comments: no lower extremity wounds    Wound Assessment(s)  Wound #1 Back - Mid is an acute Stage 3 Pressure Injury Pressure Ulcer acquired on 08/19/2024 and has received a status of Not Healed. Initial wound encounter measurements are 2.5cm length x 1.4cm width x 0.1 cm depth, with an area of 3.5 sq cm and a volume of 0.35 cubic cm. Adipose is exposed. No tunneling has been noted. No sinus tract has been noted. No undermining has been noted. There is a Moderate amount of serosanguineous drainage noted which has no odor. The patient reports a wound pain of level 0/10. The wound margin is attached Wound bed has Yes, bright red, firm, granulation, Yes slough, No eschar, No epithelialization.    The periwound skin texture is normal. The periwound skin moisture is normal. The periwound skin color is normal. The temperature of the periwound skin is WNL. Periwound skin does not exhibit signs or symptoms of infection.       Neurological:      General: No focal deficit present.      Mental Status: She is alert and oriented to person, place, and time.    Psychiatric:         Mood and Affect: Mood normal.         Behavior: Behavior normal.         Thought Content: Thought content normal.         Judgment: Judgment normal.      Procedure:  Wound #1 (Pressure Ulcer) is located on the back - mid. A skin/subcutaneous tissue level excisional/surgical debridement with a total area debrided of 3.5 sq cm. was performed by Savannah Craig NP. Subcutaneous was removed along with devitalized tissue: slough. The following instrument(s) were used: curette. Pain control was achieved using LIDOCAINE VISC 2%. A time out was conducted prior to the start of the procedure. A minimal amount of bleeding was controlled with pressure. The procedure was tolerated well with a pain level of 0 throughout and a pain level of 0 following the procedure. Post Debridement Measurements: 2.5cm length x 1.4cm width x 0.2cm depth; with an area of 3.5 sq cm and a volume of 0.7 cubic cm.    General Notes    3 mm curette  Assessment:     1. Pressure ulcer of other site, stage 3           Plan:            Anesthetic  2% Viscous Lidocaine to wound bed prior to procedure. - clinic use only  Hand Hygiene/Smoking Cessation  Wash hands before and after wound care. Call the Wound Center at [phone] if you have signs or symptoms of infection, increased drainage, increasing odor, or unusual redness. After Wound Care hours, please notify your PCP or go to the ER.  Cleanser  Cleanse Wound with Normal Saline  Dressings  Apply dressing - apply Santyl to wound bed. cover with mepilex border foam. change daily and prn.  Off-Loading  Keep weight off: - wound  Turn every 2 hours. Avoid position directing pressure to Wound site. Limit side lying to 30 degree tilt. Limit HOB elevation to 30 degrees in bed.  Follow-Up Appointments  Return Appointment 1 week - for wound care

## 2024-09-25 PROCEDURE — 97110 THERAPEUTIC EXERCISES: CPT

## 2024-09-25 PROCEDURE — 97535 SELF CARE MNGMENT TRAINING: CPT

## 2024-09-25 PROCEDURE — 11000004 HC SNF PRIVATE

## 2024-09-25 PROCEDURE — S4991 NICOTINE PATCH NONLEGEND: HCPCS | Performed by: NURSE PRACTITIONER

## 2024-09-25 PROCEDURE — 97116 GAIT TRAINING THERAPY: CPT | Mod: CQ

## 2024-09-25 PROCEDURE — 25000003 PHARM REV CODE 250: Performed by: NURSE PRACTITIONER

## 2024-09-25 PROCEDURE — 27000958

## 2024-09-25 PROCEDURE — 25000003 PHARM REV CODE 250: Performed by: HOSPITALIST

## 2024-09-25 PROCEDURE — 97110 THERAPEUTIC EXERCISES: CPT | Mod: CQ

## 2024-09-25 PROCEDURE — 27000987 HC MATTRESS, MATRIX LOW PROFILE

## 2024-09-25 RX ORDER — POLYETHYLENE GLYCOL 3350 17 G/17G
17 POWDER, FOR SOLUTION ORAL EVERY OTHER DAY
Status: DISCONTINUED | OUTPATIENT
Start: 2024-09-26 | End: 2024-10-05 | Stop reason: HOSPADM

## 2024-09-25 RX ADMIN — LEVOTHYROXINE SODIUM 112 MCG: 0.11 TABLET ORAL at 05:09

## 2024-09-25 RX ADMIN — NICOTINE 1 PATCH: 21 PATCH, EXTENDED RELEASE TRANSDERMAL at 08:09

## 2024-09-25 RX ADMIN — DOCUSATE SODIUM 100 MG: 100 CAPSULE, LIQUID FILLED ORAL at 08:09

## 2024-09-25 RX ADMIN — FERROUS SULFATE TAB 325 MG (65 MG ELEMENTAL FE) 1 EACH: 325 (65 FE) TAB at 11:09

## 2024-09-25 RX ADMIN — FERROUS SULFATE TAB 325 MG (65 MG ELEMENTAL FE) 1 EACH: 325 (65 FE) TAB at 05:09

## 2024-09-25 RX ADMIN — Medication 6 MG: at 11:09

## 2024-09-25 RX ADMIN — RIVAROXABAN 10 MG: 10 TABLET, FILM COATED ORAL at 08:09

## 2024-09-25 RX ADMIN — COLLAGENASE SANTYL 1 G: 250 OINTMENT TOPICAL at 08:09

## 2024-09-25 RX ADMIN — QUETIAPINE FUMARATE 25 MG: 25 TABLET ORAL at 08:09

## 2024-09-25 RX ADMIN — POTASSIUM CHLORIDE 10 MEQ: 750 CAPSULE, EXTENDED RELEASE ORAL at 08:09

## 2024-09-25 RX ADMIN — FERROUS SULFATE TAB 325 MG (65 MG ELEMENTAL FE) 1 EACH: 325 (65 FE) TAB at 07:09

## 2024-09-25 RX ADMIN — PANTOPRAZOLE SODIUM 40 MG: 40 TABLET, DELAYED RELEASE ORAL at 08:09

## 2024-09-25 NOTE — PLAN OF CARE
Problem: Adult Inpatient Plan of Care  Goal: Patient-Specific Goal (Individualized)  Outcome: Progressing  Goal: Absence of Hospital-Acquired Illness or Injury  Outcome: Progressing  Goal: Plan of Care Review  Outcome: Progressing

## 2024-09-25 NOTE — PROGRESS NOTES
Clinical Pharmacy Chart Review Note      Admit Date: 8/23/2024   LOS: 33 days       Yanet Viramontes is a 84 y.o. female admitted to SNF for PT/OT after hospitalization for fracture of the left greater trochanter which required surgery.    Active Hospital Problems    Diagnosis  POA    *S/p left hip fracture [Z87.81]  Not Applicable    Delirium [R41.0]  Yes    Severe protein-calorie malnutrition [E43]  Yes    Hypertension [I10]  Yes     Last Assessment & Plan:    Formatting of this note might be different from the original.   BP controlled.   Holding meds      GERD (gastroesophageal reflux disease) [K21.9]  Yes    Disease of thyroid gland [E07.9]  Yes    History of DVT (deep vein thrombosis) [Z86.718]  Not Applicable     Last Assessment & Plan:    Formatting of this note might be different from the original.   Holding Xarelto for now.   Repeat venous dopplers - negative.        Resolved Hospital Problems   No resolved problems to display.     Review of patient's allergies indicates:  No Known Allergies  Patient Active Problem List    Diagnosis Date Noted    Pressure ulcer of other site, stage 3 09/16/2024    Delirium 08/26/2024    Severe protein-calorie malnutrition 08/25/2024    S/p left hip fracture 08/23/2024    Hypertension 03/01/2024    GERD (gastroesophageal reflux disease) 03/01/2024    Disease of thyroid gland 03/01/2024    History of DVT (deep vein thrombosis) 02/25/2024       Scheduled Meds:    collagenase  1 g Topical (Top) Daily    docusate sodium  100 mg Oral BID    ferrous sulfate  1 tablet Oral TID WM    levothyroxine  112 mcg Oral Before breakfast    nicotine  1 patch Transdermal Daily    pantoprazole  40 mg Oral Daily    [START ON 9/26/2024] polyethylene glycol  17 g Oral Every other day    potassium chloride  10 mEq Oral Daily    QUEtiapine  25 mg Oral QHS    rivaroxaban  10 mg Oral Daily     Continuous Infusions:   PRN Meds:   Current Facility-Administered Medications:     0.9% NaCl, , Intravenous,  PRN    acetaminophen, 650 mg, Oral, Q6H PRN    albuterol-ipratropium, 3 mL, Nebulization, Q6H PRN    bisacodyL, 10 mg, Rectal, Daily PRN    calcium carbonate, 500 mg, Oral, BID PRN    guaiFENesin 100 mg/5 ml, 200 mg, Oral, Q4H PRN    HYDROcodone-acetaminophen, 1 tablet, Oral, Q6H PRN    linaCLOtide, 145 mcg, Oral, Daily PRN    LORazepam, 0.5 mg, Oral, Q12H PRN    magnesium hydroxide 400 mg/5 ml, 30 mL, Oral, Daily PRN    melatonin, 6 mg, Oral, Nightly PRN    ondansetron, 4 mg, Oral, Q8H PRN    peg 400-hypromellose-glycerin, 2 drop, Ophthalmic, PRN    senna-docusate 8.6-50 mg, 1 tablet, Oral, BID PRN    sodium chloride 0.9%, 10 mL, Intravenous, PRN    OBJECTIVE:     Vital Signs (Last 24H)  Temp:  [97.4 °F (36.3 °C)-97.5 °F (36.4 °C)]   Pulse:  [56-68]   Resp:  [18-21]   BP: (125-138)/(50-66)   SpO2:  [96 %]     Laboratory:  CBC:   Recent Labs   Lab 09/20/24  0528   WBC 5.13   RBC 3.08*   HGB 9.3*   HCT 30.3*      MCV 98.4*   MCH 30.2   MCHC 30.7*     BMP:   Recent Labs   Lab 09/20/24  0528   GLU 92      K 4.1      CO2 30   BUN 32*   CREATININE 1.46*   CALCIUM 9.7     .    ASSESSMENT/PLAN:     Estimated Creatinine Clearance: 20.4 mL/min (A) (based on SCr of 1.46 mg/dL (H)).Medications reviewed, no dose adjustments needed. Weekly swing bed medication regimen review complete.  Will continue to monitor.  Brian Barriga, Pharm. D.  Director of Pharmacy  [facility]  [phone]  [email or web address]

## 2024-09-25 NOTE — PT/OT/SLP PROGRESS
Physical Therapy Treatment    Patient Name:  Yanet Viramontes   MRN:  [id number]    Recommendations:     Discharge Recommendations: Moderate Intensity Therapy  Discharge Equipment Recommendations: walker, rolling (youth size)  Barriers to discharge: None    Assessment:     Yanet Viramontes is a 84 y.o. female admitted with a medical diagnosis of S/p left hip fracture.  She presents with the following impairments/functional limitations: weakness, impaired endurance, gait instability, decreased lower extremity function, orthopedic precautions .  Pt did well with bed mobility, transfers, gait training (100ft) and exercises     Rehab Prognosis: Good; patient would benefit from acute skilled PT services to address these deficits and reach maximum level of function.    Recent Surgery: * No surgery found *      Plan:     During this hospitalization, patient to be seen 5 x/week to address the identified rehab impairments via gait training, therapeutic exercises and progress toward the following goals:    Plan of Care Expires:  10/04/24    Subjective     Chief Complaint: none  Patient/Family Comments/goals: agrees to PT Tx; daughter present during session  Pain/Comfort:  Pain Rating 1: 0/10 (none at rest)      Objective:     Communicated with patient, daughter  prior to session.  Patient found HOB elevated with peripheral IV upon PT entry to room.     General Precautions: Standard, fall  Orthopedic Precautions: LLE weight bearing as tolerated  Braces:    Respiratory Status: Room air     Functional Mobility:  Bed Mobility:     Supine to Sit: contact guard assistance and minimum assistance  Transfers:     Sit to Stand:  contact guard assistance and minimum assistance with rolling walker  Bed to Chair: contact guard assistance and minimum assistance with  rolling walker  using  Step Transfer  Gait: 50ft x 2 with rolling walker and CGA, vc's prn for safety awareness      AM-PAC 6 CLICK MOBILITY  Turning over in bed (including adjusting  bedclothes, sheets and blankets)?: 3  Sitting down on and standing up from a chair with arms (e.g., wheelchair, bedside commode, etc.): 3  Moving from lying on back to sitting on the side of the bed?: 3  Moving to and from a bed to a chair (including a wheelchair)?: 3  Need to walk in hospital room?: 3  Climbing 3-5 steps with a railing?: 3 (should do well though not assessed today)  Basic Mobility Total Score: 18       Treatment & Education:  Bed mobility as noted  Sitting BLE exercises x 20 repetitions each: marching, long arc quads, hip abduction, ankle rocking  Gait training as noted above with sitting rest break between bouts     Patient left up in chair with call button in reach and daughter present..    GOALS:   Multidisciplinary Problems       Physical Therapy Goals          Problem: Physical Therapy    Goal Priority Disciplines Outcome Goal Variances Interventions   Physical Therapy Goal     PT, PT/OT Progressing     Description: STG - 2 weeks  1. Pt will transfer supine to/from sit with mod Ax1.-MET  2. Pt will perform STS and bed transfer with mod Ax1.-MET  3. Pt will have LLE strength of 3-/5.-MET  4. Pt will amb with RW x 20 ft with mod Ax1.-MET    LTG - 4 weeks  1. Pt will transfer supine to/from sit with min Ax1.-MET  2. Pt will perform STS and bed transfer with min Ax1.-MET  3. Pt will have LLE strength of 3/5.-MET  4. Pt will amb with RW x 50 ft with min Ax1.-MET  5. Pt's ROM LLE will be WFL.-MET  6. Pt will transfer supine to/from sit with SBA.  2. Pt will perform STS and bed transfer with SBA.  3. Pt will have LLE strength of 3/5.-MET  4. Pt will amb with RW x 100 ft with SBA.                       Time Tracking:     PT Received On: 09/25/24  PT Start Time: 1440     PT Stop Time: 1505  PT Total Time (min): 25 min     Billable Minutes: Gait Training 15 and Therapeutic Exercise 10    Treatment Type: Treatment  PT/PTA: PTA     Number of PTA visits since last PT visit: 3     09/25/2024

## 2024-09-25 NOTE — PT/OT/SLP PROGRESS
Occupational Therapy   Treatment    Name: Yanet Viramontes  MRN: [id number]  Admitting Diagnosis:  S/p left hip fracture       Recommendations:     Discharge Recommendations: Low Intensity Therapy  Discharge Equipment Recommendations:   (TBD)  Barriers to discharge:  None    Assessment:     Yanet Viramontes is a 84 y.o. female with a medical diagnosis of S/p left hip fracture.  She presents with in bed covered with several blankets with her daughter at bedside. Performance deficits affecting function are weakness, impaired endurance, impaired self care skills, impaired functional mobility, gait instability, impaired balance, impaired cognition, decreased safety awareness, impaired skin, orthopedic precautions.     Rehab Prognosis:  Fair; patient would benefit from acute skilled OT services to address these deficits and reach maximum level of function.       Plan:     Patient to be seen 3 x/week to address the above listed problems via self-care/home management, therapeutic activities, therapeutic exercises  Plan of Care Expires: 09/27/24  Plan of Care Reviewed with: patient, caregiver, daughter, family    Subjective     Chief Complaint: stiff and painful left hip  Patient/Family Comments/goals: home with family to assist with ADLs as needed  Pain/Comfort:       Objective:     Communicated with: pt and her daughter, Karla, prior to session.  Patient found left sidelying with  (no lines or drains) upon OT entry to room.    General Precautions: Standard, fall    Orthopedic Precautions:LLE weight bearing as tolerated  Braces: N/A  Respiratory Status: Room air     Occupational Performance:     Bed Mobility:    Patient completed Rolling/Turning to Right with moderate assistance  Patient completed Scooting/Bridging with moderate assistance  Patient completed Supine to Sit with moderate assistance     Functional Mobility/Transfers:  Patient completed Sit <> Stand Transfer with minimum assistance  with  hand-held assist and grab  "bars(s)   Patient completed Bed <> Chair Transfer using Step Transfer technique with minimum assistance with hand-held assist and grab bars(s)  Functional Mobility: see PT    Activities of Daily Living:  OT asked pt and her daughter if they had been practicing with the lower body AE.  Pt states, "oh, I can't get that stuff to work!" And daughter states, "no, she was so tired after getting her bath that we just forgot about it."  OT encouraged them to cont practicing and pt was agreeable to practicing with OT today in OT gym as well.  Pt able to complete doffing socks with reacher with repeat demonstration as well as cueing from OT, then use sock aid to put socks on with min a to SBA with cues.  Pt seemed to be proud of herself.      [facility] 6 Click ADL: 20    Treatment & Education:  OT engaged pt in level 2 gripper x 10 reps x 3 sets each hand  Also reciprocal pulleys to promote stamina as well as AROM of BUE shoulders for reaching with Adl needs  As well as 1kg weighted ball in shoulder flexion, ch press, and horiz abd though she wasn't able to finish the horizontal due to her kyphotic wound that is healing on her back    Patient left up in chair with call button in reach, daughter present, and after handwashing completed from seated at sink at end of session    GOALS:   Multidisciplinary Problems       Occupational Therapy Goals          Problem: Occupational Therapy    Goal Priority Disciplines Outcome Interventions   Occupational Therapy Goal     OT, PT/OT Progressing    Description: STG: within 2 weeks  Pt will perform grooming with min a at sinkside from seated MET  Pt will bathe with mod a MET  Pt will perform UE dressing with mod a MET  Pt will perform LE dressing with mod a MET  Pt will sit EOB x 5 min with mod to min assistance MET  Pt will transfer bed/chair/bsc with mod a MET  Pt will perform standing task x 1 min with mod assistance x 1 MET  Pt will tolerate 15 minutes of tx without fatigue MET    UPDATED " STG's 2024:  Pt will perform grooming with SBA at sinkside from seated MET  Pt will bathe with min a MET though family is providing more support for this task than patient requires at this time (discussed this with them and how to promote greater independence with safety support measures)  Pt will perform UE dressing with min a MET  Pt will perform LE dressing with min a MET  Pt will sit EOB x 5 min with min assistance  MET  Pt will transfer bed/chair/bsc with CGA  MET inconsistently  Pt will perform standing task x 3 min with CGA MET  Pt will tolerate 45 minutes of tx without fatigue MET    UPDATED STG's 2024:  Pt will bathe with SBA at sinkside with AE  Pt will perform UE dressing with SBA  Pt will perform LE dressing with SBA and with AE   Pt will transfer bed/chair/bsc with SBA  Pt will perform standing task x 5 min with SBA        LT.Restore to max I with self care and mobility. ONGOING/PROGRESSING                         Time Tracking:     OT Date of Treatment: 24  OT Start Time: 1023  OT Stop Time: 1053  OT Total Time (min): 30 min    Billable Minutes:Self Care/Home Management 15  Therapeutic Exercise 15    OT/CRISELDA: OT          2024

## 2024-09-26 PROCEDURE — 99900035 HC TECH TIME PER 15 MIN (STAT)

## 2024-09-26 PROCEDURE — 97116 GAIT TRAINING THERAPY: CPT

## 2024-09-26 PROCEDURE — S4991 NICOTINE PATCH NONLEGEND: HCPCS | Performed by: NURSE PRACTITIONER

## 2024-09-26 PROCEDURE — 25000003 PHARM REV CODE 250: Performed by: HOSPITALIST

## 2024-09-26 PROCEDURE — 97110 THERAPEUTIC EXERCISES: CPT

## 2024-09-26 PROCEDURE — 27000987 HC MATTRESS, MATRIX LOW PROFILE

## 2024-09-26 PROCEDURE — 27000958

## 2024-09-26 PROCEDURE — 11000004 HC SNF PRIVATE

## 2024-09-26 PROCEDURE — 25000003 PHARM REV CODE 250: Performed by: NURSE PRACTITIONER

## 2024-09-26 PROCEDURE — 97535 SELF CARE MNGMENT TRAINING: CPT

## 2024-09-26 PROCEDURE — 94761 N-INVAS EAR/PLS OXIMETRY MLT: CPT

## 2024-09-26 RX ADMIN — Medication 6 MG: at 08:09

## 2024-09-26 RX ADMIN — LEVOTHYROXINE SODIUM 112 MCG: 0.11 TABLET ORAL at 05:09

## 2024-09-26 RX ADMIN — PANTOPRAZOLE SODIUM 40 MG: 40 TABLET, DELAYED RELEASE ORAL at 07:09

## 2024-09-26 RX ADMIN — DOCUSATE SODIUM 100 MG: 100 CAPSULE, LIQUID FILLED ORAL at 08:09

## 2024-09-26 RX ADMIN — FERROUS SULFATE TAB 325 MG (65 MG ELEMENTAL FE) 1 EACH: 325 (65 FE) TAB at 11:09

## 2024-09-26 RX ADMIN — POLYETHYLENE GLYCOL 3350 17 G: 17 POWDER, FOR SOLUTION ORAL at 07:09

## 2024-09-26 RX ADMIN — FERROUS SULFATE TAB 325 MG (65 MG ELEMENTAL FE) 1 EACH: 325 (65 FE) TAB at 04:09

## 2024-09-26 RX ADMIN — FERROUS SULFATE TAB 325 MG (65 MG ELEMENTAL FE) 1 EACH: 325 (65 FE) TAB at 07:09

## 2024-09-26 RX ADMIN — QUETIAPINE FUMARATE 25 MG: 25 TABLET ORAL at 08:09

## 2024-09-26 RX ADMIN — NICOTINE 1 PATCH: 21 PATCH, EXTENDED RELEASE TRANSDERMAL at 07:09

## 2024-09-26 RX ADMIN — RIVAROXABAN 10 MG: 10 TABLET, FILM COATED ORAL at 07:09

## 2024-09-26 RX ADMIN — POTASSIUM CHLORIDE 10 MEQ: 750 CAPSULE, EXTENDED RELEASE ORAL at 07:09

## 2024-09-26 RX ADMIN — COLLAGENASE SANTYL 1 G: 250 OINTMENT TOPICAL at 08:09

## 2024-09-26 RX ADMIN — HYDROCODONE BITARTRATE AND ACETAMINOPHEN 1 TABLET: 5; 325 TABLET ORAL at 12:09

## 2024-09-26 RX ADMIN — DOCUSATE SODIUM 100 MG: 100 CAPSULE, LIQUID FILLED ORAL at 07:09

## 2024-09-26 NOTE — PLAN OF CARE
Swing Bed Recertification    Patient Name: Yanet Viramontes                                                            MRN: [id number]   : 1940   Diagnosis: Intertrochanteric fracture of left femur [S72.142A]     I certify that continued skilled inpatient care is necessary for the following reasons: Patient requires continued skilled PT and OT at least five days per week due to the patient's functional deficits. Patient requires continued daily skilled nursing care to meet the patient's medical needs, promote recovery, and ensure medical safety.     Estimated discharge date: 10/7/2024

## 2024-09-26 NOTE — CONSULTS
[facility] Surgical [facility]  Adult Nutrition  Consult Note         Reason for Assessment  Reason For Assessment: RD follow-up assess/MST 3  Nutrition Risk Screen: no indicators present    Assessment and Plan  9/26/2024 Patient continues on a regular diet with Boost Plus all meals. Po intakes continue to improve with 50% intakes on average.Current weight 51.1 kg. Noted weight inconsistencies. Discussed at IDT meeting.  Recommend to continue diet as tolerated. Encourage continued po intakes. RD Following.    9/19/2024 RD Follow up: Patient continues on a regular diet with Boost Plus all meals. Po intakes continue to improve with 50% intakes on average. No new weight noted since last follow up. Recommend to continue diet as tolerated. Encourage continued po intakes. RD Following.    9/12/2024 RD Follow up: Patient continues on a regular diet with Boost Plus all meals. Po intakes continue to improve with 50% consistently documented per flowsheets. Current weight 55.1 kg with desired weight increase since admission. Last BM noted 9/11.    Diet order and intakes adequate to meet estimated energy needs. Recommend to continue diet and supplements as tolerated. RD Following.     9/05/2024 RD Follow up: Patient with improvement in condition and is now ordered a regular diet with Boost Plus TID. Family present at RD visit and report patient prefers regular diet. Appetite improved to 25-50% and drinking some of Boost. Patient denies dietary preferences. Current weight 54.9 kg.     Continue diet and ONS as tolerated. RD Following.     8/29/2024 RD follow up: Patient upgraded to a MCS diet with chopped meats per SLP with high aspiration risk and to feed only when patient alert enough to swallow. Patient continues with poor intakes with decreased alertness and delirium. 0% meal intakes documented over the last several meals per flowsheets.     IVF at 50 ml/hr ordered. Noted patient with some improvement in  confusion today. Continue diet as tolerated and feed as patient alert. Consider alterate means of nutrition as aligns with plan of care if po intakes don't improve with decrease in confusion. RD Following.     Consult received and appreciated. Patient admitted 8/23 with a dx of s/p L Hip fx and hx of cancer. Patient is ordered a full liquid diet with issues swallowing. RN reports patient having to be suctioned and having trouble clearing secretions. SLP evaluation pending.     Patient is 54 kg with a BMI of 24.04 which is WNL. Patient with significant weight loss over the last 6 months of 14% per chart review with weight at prior facility of 62.6 kg noted Feb 2024. Patient with poor po intakes endorsed on MST screen.    Patient meets ASPEN criteria for severe protein calorie malnutrition due to weight loss and poor intakes. See malnutrition assessment.     Recommend to add Boost Plus as tolerated. RD Following.           Learning Needs/Social Determinants of Health  Learning Assessment       08/23/2024 1906 [facility] (8/23/2024 - Present)   Created by Addie Alegre RN - RN (Nurse) Status: Complete                 PRIMARY LEARNER     Primary Learner Name:  Karla [facility] 08/23/2024 1906    Relationship:  Family [facility] 08/23/2024 1906    Does the primary learner have any barriers to learning?:  No Barriers [facility] [place] 08/23/2024 1906    What is the preferred language of the primary learner?:  English [facility] 08/23/2024 1906    Is an  required?:  No [facility] 08/23/2024 1906    How does the primary learner prefer to learn new concepts?:  Listening [facility] [place] 08/23/2024 1906    How often do you need to have someone help you read instructions, pamphlets, or written material from your doctor or pharmacy?:  Never [facility] [place] 08/23/2024 1906        CO-LEARNER #1     No question answered        CO-LEARNER #2     No question answered        SPECIAL TOPICS     No question answered        ANSWERED BY:     No  question answered        Comments         Edit History       Addie Alerge, RN - RN (Nurse)   08/23/2024 1906                           Social Determinants of Health     Tobacco Use: High Risk (9/9/2024)    Patient History     Smoking Tobacco Use: Every Day     Smokeless Tobacco Use: Never     Passive Exposure: Not on file   Alcohol Use: Not At Risk (8/26/2024)    AUDIT-C     Frequency of Alcohol Consumption: Never     Average Number of Drinks: Patient does not drink     Frequency of Binge Drinking: Never   Financial Resource Strain: Low Risk  (8/26/2024)    Overall Financial Resource Strain (CARDIA)     Difficulty of Paying Living Expenses: Not hard at all   Food Insecurity: No Food Insecurity (8/26/2024)    Hunger Vital Sign     Worried About Running Out of Food in the Last Year: Never true     Ran Out of Food in the Last Year: Never true   Transportation Needs: No Transportation Needs (8/26/2024)    TRANSPORTATION NEEDS     Transportation : No   Physical Activity: Insufficiently Active (8/26/2024)    Exercise Vital Sign     Days of Exercise per Week: 4 days     Minutes of Exercise per Session: 30 min   Stress: No Stress Concern Present (8/26/2024)    [employer] [place] [employer] - Occupational Stress Questionnaire     Feeling of Stress : Only a little   Housing Stability: Low Risk  (8/26/2024)    Housing Stability Vital Sign     Unable to Pay for Housing in the Last Year: No     Homeless in the Last Year: No   Depression: Not on file   Utilities: Not At Risk (8/26/2024)    [facility] Utilities     Threatened with loss of utilities: No   Health Literacy: Inadequate Health Literacy (8/26/2024)     Health Literacy     Frequency of need for help with medical instructions: Sometimes   Social Isolation: Socially Integrated (8/26/2024)    Social Isolation     Social Isolation: 1            Malnutrition  Is Patient Malnourished: Yes Malnutrition Assessment  Malnutrition Context: chronic  "illness  Malnutrition Level: severe          Weight Loss (Malnutrition): greater than 10% in 6 months  Energy Intake (Malnutrition): less than 75% for greater than or equal to 1 month   [place] Region (Subcutaneous Fat Loss): severe depletion   [place] (Muscle Loss): severe depletion                 Nutrition Diagnosis  Malnutrition (Severe) related to Appetite loss, Chronic illness, and Dysphagia/ difficulty swallowing as evidenced by weight loss of greater than 10% in 6 months, and energy intakes less than 75% for greater than or equal to 1 month  Comments: SLP eval pending; patient having issues swallowing with decreased LOC    No results for input(s): "GLU", "POCGLU" in the last 72 hours.    Comments on Glucose: elevated     Nutrition Prescription / Recommendations  Recommendation/Intervention: Recommend to advance diet appropriate and tolerated; add Boost Plus all meals  Goals: po intakes 50% during admission  Nutrition Goal Status: goal met  Communication of RD Recs: discussed on rounds  Current Diet Order: Regular  Nutrition Order Comments: 50% intakes per flowsheets  Oral Nutrition Supplement: Boost Plus all meals  Chewing or Swallowing Difficulty?: Swallowing difficulty  Recommended Diet:  as tolerated  Recommended Oral Supplement: Boost Plus [360kcals, 14g Protein, 45g Carbs] 3 times a day  Is Nutrition Support Recommended: [facility] Nutrition Support: No  Is Nutrition Education Recommended: No    Monitor and Evaluation  % current Intake: P.O. intake of 50 - 75 %  % intake to meet estimated needs: 50 - 75 %  Food and Nutrient Intake: energy intake, food and beverage intake  Food and Nutrient Adminstration: diet order  Anthropometric Measurements: height/length, weight, weight change, body mass index  Biochemical Data, Medical Tests and Procedures: electrolyte and renal panel, gastrointestinal profile, glucose/endocrine profile, inflammatory profile, lipid profile  Nutrition-Focused Physical " "Findings: overall appearance, head and eyes  Energy Calories Required: meeting needs  Protein Required: meeting needs  Fluid Required: meeting needs  Tolerance: tolerating    Current Medical Diagnosis and Past Medical History     Past Medical History:   Diagnosis Date    Anticoagulant long-term use     Cancer     Hypertension     Thyroid disease        Nutrition/Diet History  Spiritual, Cultural Beliefs, Mu-ism Practices, Values that Affect Care: no  Food Allergies: NKFA  Factors Affecting Nutritional Intake: decreased appetite    Lab/Procedures/Meds  No results for input(s): "NA", "K", "BUN", "CREATININE", "CALCIUM", "ALBUMIN", "CL", "ALT", "AST", "PHOS" in the last 72 hours.    Last A1c: No results found for: "HGBA1C"  Lab Results   Component Value Date    RBC 3.08 (L) 09/20/2024    HGB 9.3 (L) 09/20/2024    HCT 30.3 (L) 09/20/2024    MCV 98.4 (H) 09/20/2024    MCH 30.2 09/20/2024    MCHC 30.7 (L) 09/20/2024     Pertinent Labs Reviewed: reviewed  Pertinent Medications Reviewed: reviewed  Scheduled Meds:   collagenase  1 g Topical (Top) Daily    docusate sodium  100 mg Oral BID    ferrous sulfate  1 tablet Oral TID WM    levothyroxine  112 mcg Oral Before breakfast    nicotine  1 patch Transdermal Daily    pantoprazole  40 mg Oral Daily    polyethylene glycol  17 g Oral Every other day    potassium chloride  10 mEq Oral Daily    QUEtiapine  25 mg Oral QHS    rivaroxaban  10 mg Oral Daily     Continuous Infusions:      PRN Meds:.  Current Facility-Administered Medications:     0.9% NaCl, , Intravenous, PRN    acetaminophen, 650 mg, Oral, Q6H PRN    albuterol-ipratropium, 3 mL, Nebulization, Q6H PRN    bisacodyL, 10 mg, Rectal, Daily PRN    calcium carbonate, 500 mg, Oral, BID PRN    guaiFENesin 100 mg/5 ml, 200 mg, Oral, Q4H PRN    HYDROcodone-acetaminophen, 1 tablet, Oral, Q6H PRN    linaCLOtide, 145 mcg, Oral, Daily PRN    LORazepam, 0.5 mg, Oral, Q12H PRN    magnesium hydroxide 400 mg/5 ml, 30 mL, Oral, Daily " "PRN    melatonin, 6 mg, Oral, Nightly PRN    ondansetron, 4 mg, Oral, Q8H PRN    peg 400-hypromellose-glycerin, 2 drop, Ophthalmic, PRN    senna-docusate 8.6-50 mg, 1 tablet, Oral, BID PRN    sodium chloride 0.9%, 10 mL, Intravenous, PRN    Anthropometrics  Temp: 98.1 °F (36.7 °C)  Height Method: Stated  Height: 4' 11" (149.9 cm)  Height (inches): 59 in  Weight Method: Bed Scale  Weight: 45.8 kg (101 lb)  Weight (lb): 101 lb  Ideal Body Weight (IBW), Female: 95 lb  % Ideal Body Weight, Female (lb): 125.26 %  BMI (Calculated): 24.5       Estimated/Assessed Needs      Temp: 98.1 °F (36.7 °C)Oral  Weight Used For Calorie Calculations: 54 kg (119 lb 0.8 oz)   Energy Need Method: Kcal/kg Energy Calorie Requirements (kcal): 3159-9178  Weight Used For Protein Calculations: 54 kg (119 lb 0.8 oz)  Protein Requirements: 43-54  Estimated Fluid Requirement Method: RDA Method    RDA Method (mL): 1350       Nutrition by Nursing  Diet/Nutrition Received: regular  Intake (%): 25%  Diet/Feeding Assistance: none  Diet/Feeding Tolerance: good  Last Bowel Movement: 09/25/24                Nutrition Follow-Up  RD Follow-up?: Yes      Nutrition Discharge Planning: recommend regular diet as tolerated with Boost/Ensure ONS on d/c            Available via Secure Chat  "

## 2024-09-26 NOTE — PLAN OF CARE
[facility] - Swing Bed   Interdisciplinary Team Meeting    Patient: Yanet Viramontes   Today's Date: 9/26/2024   Estimated D/C Date: 10/7/2024        Physician: Daren Nicole DO Nurse Practitioner: Laurita Zavala NP   Pharmacy: Brian Barriga, PharmD Unit Director: Temitope Chun RN   : Israel Graham RN Physical/Occupational Therapy: Cynthia Flores OT   Speech Therapy: ST Jasmina Activity Therapy: Geetha Carter   Nursing: Temitope Chun RN  Respiratory: Lia Watkins,  Dietary: Kacey Tinoco RD  Other: n/a     Nursing  New Symptoms/Problems: n/a  Last Bowel Movement: 09/25/24  Urine: continent  [place]: No  Bowel: continent   Constipated: No  Diarrhea: No   Isolation: Yes  Wound Care: No  Wound Location/Tx: n/a  Cognition: WNL  Aspiration Precautions: No  Comment(s): n/a    Respiratory:   O2 Device: Room Air  O2 Flow: n/a  SpO2: 96%  Neb Tx: Yes  Comment(s): n/a    Dietary    Nutrition: Regular  Comment(s): n/a    Speech Therapy  Speech/Swallowing: No current speech or swallowing issues  Comment(s): No    Physical Therapy  Gait/Assistive Device: ambulatory with RW ELOS: Plan to DC 10/7/2024    Transfers: Minimal Assistance  Bed Mobility: Minimal Assistance Range of Motion/Restrictions: n/a  Comment(s): n/a     Occupational Therapy  Eating/Grooming: Supervision or Set-up Assistance Toileting: Minimal Assistance   Bathing: Minimal Assistance Dressing (Upper Body): Minimal Assistance   Dressing (Lower Body): Minimal Assistance Comment(s): n/a     Activity Therapy  Level of participation: Active participation  Comment(s): n/a    Pharmacy   Medication Changes (see MD orders in chart): No  Labs Reviewed: Yes  New Lab Orders: No  Comment(s): n/a      Tx Plan/Recommendations reviewed with family and/or patient on (date) 9/26.  Additional family Conference/Training: n/a  D/C Plan/Recommendations: Home with HH and Home with family  QUINN:  10/7/2024  Comment(s): n/a

## 2024-09-26 NOTE — PT/OT/SLP PROGRESS
Occupational Therapy   Treatment    Name: Yanet Viramontes  MRN: [id number]  Admitting Diagnosis:  S/p left hip fracture       Recommendations:     Discharge Recommendations: Low Intensity Therapy  Discharge Equipment Recommendations:   (TBD)  Barriers to discharge:  None    Assessment:     Yanet Viramontes is a 84 y.o. female with a medical diagnosis of S/p left hip fracture.  She presents with laying in bed with her breakfast tray still on tabletop to left of her bedside, uneaten. Performance deficits affecting function are weakness, impaired endurance, impaired self care skills, impaired functional mobility, gait instability, impaired balance, impaired cognition, decreased safety awareness, impaired skin, orthopedic precautions.     Rehab Prognosis:  Fair; patient would benefit from acute skilled OT services to address these deficits and reach maximum level of function.       Plan:     Patient to be seen 3 x/week to address the above listed problems via self-care/home management, therapeutic activities, therapeutic exercises  Plan of Care Expires: 09/27/24  Plan of Care Reviewed with: patient, caregiver, daughter, family    Subjective     Chief Complaint: stiffness, pain generalized, grimacing  Patient/Family Comments/goals: home with family assist  Pain/Comfort:       Objective:     Communicated with: pt prior to session.  Patient found supine with  (no lines or drains) upon OT entry to room.    General Precautions: Standard, fall    Orthopedic Precautions:LLE weight bearing as tolerated  Braces: N/A  Respiratory Status: Room air     Occupational Performance:     Bed Mobility:    Patient completed Rolling/Turning to Right with minimum assistance  Patient completed Scooting/Bridging with minimum assistance  Patient completed Supine to Sit with minimum assistance     Functional Mobility/Transfers:  Patient completed Sit <> Stand Transfer with contact guard assistance  with  rolling walker   Patient completed Bed <> Chair  "Transfer using Step Transfer technique with contact guard assistance with rolling walker  Patient completed Toilet Transfer Step Transfer technique with contact guard assistance with  rolling walker  Functional Mobility: pt needs close svn to CGA for walking to and from toilet in bathroom today, needs items completely moved out of her way because if not she will try to bump them with her RW to move them aside.    Activities of Daily Living:  Feeding:  independence after setup from upright seated in w/c  Toileting: minimum assistance to get brief on correctly and pericare thoroughly completed      [facility] 6 Click ADL:      Treatment & Education:  ADL as noted above.  Pt told OT "you can go ahead.  I got this.  I can do it" regarding self feeding from upright seated in bed side chair.    Patient left up in chair with call button in reach and door left open for nursing staff to check on her.    GOALS:   Multidisciplinary Problems       Occupational Therapy Goals          Problem: Occupational Therapy    Goal Priority Disciplines Outcome Interventions   Occupational Therapy Goal     OT, PT/OT Progressing    Description: STG: within 2 weeks  Pt will perform grooming with min a at sinkside from seated MET  Pt will bathe with mod a MET  Pt will perform UE dressing with mod a MET  Pt will perform LE dressing with mod a MET  Pt will sit EOB x 5 min with mod to min assistance MET  Pt will transfer bed/chair/bsc with mod a MET  Pt will perform standing task x 1 min with mod assistance x 1 MET  Pt will tolerate 15 minutes of tx without fatigue MET    UPDATED STG's 9/9/2024:  Pt will perform grooming with SBA at sinkside from seated MET  Pt will bathe with min a MET though family is providing more support for this task than patient requires at this time (discussed this with them and how to promote greater independence with safety support measures)  Pt will perform UE dressing with min a MET  Pt will perform LE dressing with min a " MET  Pt will sit EOB x 5 min with min assistance  MET  Pt will transfer bed/chair/bsc with CGA  MET inconsistently  Pt will perform standing task x 3 min with CGA MET  Pt will tolerate 45 minutes of tx without fatigue MET    UPDATED STG's 2024:  Pt will bathe with SBA at sinkside with AE  Pt will perform UE dressing with SBA  Pt will perform LE dressing with SBA and with AE   Pt will transfer bed/chair/bsc with SBA  Pt will perform standing task x 5 min with SBA        LT.Restore to max I with self care and mobility. ONGOING/PROGRESSING                         Time Tracking:     OT Date of Treatment: 24  OT Start Time: 830  OT Stop Time: 50  OT Total Time (min): 20 min    Billable Minutes:Self Care/Home Management 20    OT/CRISELDA: OT          2024

## 2024-09-26 NOTE — PT/OT/SLP PROGRESS
Physical Therapy Treatment    Patient Name:  Yanet Viramontes   MRN:  [id number]    Recommendations:     Discharge Recommendations: Low Intensity Therapy  Discharge Equipment Recommendations: walker, rolling (youth size)  Barriers to discharge: Decreased caregiver support    Assessment:     Yanet Viramontes is a 84 y.o. female admitted with a medical diagnosis of S/p left hip fracture.  She presents with the following impairments/functional limitations: weakness, impaired endurance, impaired self care skills, impaired functional mobility, impaired balance, gait instability, pain, orthopedic precautions Patient agreeable to PT and participated well.    Rehab Prognosis: Good; patient would benefit from acute skilled PT services to address these deficits and reach maximum level of function.    Recent Surgery: * No surgery found *      Plan:     During this hospitalization, patient to be seen 5 x/week to address the identified rehab impairments via gait training, therapeutic activities, therapeutic exercises and progress toward the following goals:    Plan of Care Expires:  10/04/24    Subjective     Chief Complaint: left hip pain  Patient/Family Comments/goals: patient wishes to return home to [facility]  Pain/Comfort:  Pain Rating 1: 0/10  Pain Rating Post-Intervention 1: 0/10      Objective:     Communicated with nurse prior to session.  Patient found up in chair with   upon PT entry to room.     General Precautions: Standard, fall  Orthopedic Precautions: LLE weight bearing as tolerated  Braces:    Respiratory Status: Room air     Functional Mobility:  Transfers:     Sit to Stand:  contact guard assistance with rolling walker  Bed to Chair: contact guard assistance with  rolling walker  using  Stand Pivot  Gait: Patient ambulated 60 feet, sat and rested x 1 min, then amb 90 feet with RW and CGA with improved upright stance with less verbal cues needed.       AM-PAC 6 CLICK MOBILITY  Turning over in bed (including adjusting  bedclothes, sheets and blankets)?: 3  Sitting down on and standing up from a chair with arms (e.g., wheelchair, bedside commode, etc.): 3  Moving from lying on back to sitting on the side of the bed?: 3  Moving to and from a bed to a chair (including a wheelchair)?: 3  Need to walk in hospital room?: 3  Climbing 3-5 steps with a railing?: 3  Basic Mobility Total Score: 18       Treatment & Education:  LE exercise bilaterally with 1.5 lb ankle weights: AP, seated march, hip abd/add, LAQ, and add squeezes x 30 each LE.     Patient left up in chair with call button in reach..    GOALS:   Multidisciplinary Problems       Physical Therapy Goals          Problem: Physical Therapy    Goal Priority Disciplines Outcome Goal Variances Interventions   Physical Therapy Goal     PT, PT/OT Progressing     Description: STG - 2 weeks  1. Pt will transfer supine to/from sit with mod Ax1.-MET  2. Pt will perform STS and bed transfer with mod Ax1.-MET  3. Pt will have LLE strength of 3-/5.-MET  4. Pt will amb with RW x 20 ft with mod Ax1.-MET    LTG - 4 weeks  1. Pt will transfer supine to/from sit with min Ax1.-MET  2. Pt will perform STS and bed transfer with min Ax1.-MET  3. Pt will have LLE strength of 3/5.-MET  4. Pt will amb with RW x 50 ft with min Ax1.-MET  5. Pt's ROM LLE will be WFL.-MET  6. Pt will transfer supine to/from sit with SBA.  2. Pt will perform STS and bed transfer with SBA.  3. Pt will have LLE strength of 3/5.-MET  4. Pt will amb with RW x 100 ft with SBA.                       Time Tracking:     PT Received On: 09/26/24  PT Start Time: 1002     PT Stop Time: 1030  PT Total Time (min): 28 min     Billable Minutes: Gait Training 14 and Therapeutic Exercise 14    Treatment Type: Treatment  PT/PTA: PT     Number of PTA visits since last PT visit: 0     09/26/2024

## 2024-09-27 LAB
ANION GAP SERPL CALCULATED.3IONS-SCNC: 13 MMOL/L (ref 7–16)
BASOPHILS # BLD AUTO: 0.02 K/UL (ref 0–0.2)
BASOPHILS NFR BLD AUTO: 0.4 % (ref 0–1)
BUN SERPL-MCNC: 37 MG/DL (ref 7–18)
BUN/CREAT SERPL: 25 (ref 6–20)
CALCIUM SERPL-MCNC: 9.6 MG/DL (ref 8.5–10.1)
CHLORIDE SERPL-SCNC: 105 MMOL/L (ref 98–107)
CO2 SERPL-SCNC: 26 MMOL/L (ref 21–32)
CREAT SERPL-MCNC: 1.47 MG/DL (ref 0.55–1.02)
DIFFERENTIAL METHOD BLD: ABNORMAL
EGFR (NO RACE VARIABLE) (RUSH/TITUS): 35 ML/MIN/1.73M2
EOSINOPHIL # BLD AUTO: 0.15 K/UL (ref 0–0.5)
EOSINOPHIL NFR BLD AUTO: 2.9 % (ref 1–4)
ERYTHROCYTE [DISTWIDTH] IN BLOOD BY AUTOMATED COUNT: 15.5 % (ref 11.5–14.5)
GLUCOSE SERPL-MCNC: 87 MG/DL (ref 74–106)
HCT VFR BLD AUTO: 30.3 % (ref 38–47)
HGB BLD-MCNC: 9.4 G/DL (ref 12–16)
LYMPHOCYTES # BLD AUTO: 1.18 K/UL (ref 1–4.8)
LYMPHOCYTES NFR BLD AUTO: 22.7 % (ref 27–41)
MCH RBC QN AUTO: 30.6 PG (ref 27–31)
MCHC RBC AUTO-ENTMCNC: 31 G/DL (ref 32–36)
MCV RBC AUTO: 98.7 FL (ref 80–96)
MONOCYTES # BLD AUTO: 0.7 K/UL (ref 0–0.8)
MONOCYTES NFR BLD AUTO: 13.5 % (ref 2–6)
MPC BLD CALC-MCNC: 10.2 FL (ref 9.4–12.4)
NEUTROPHILS # BLD AUTO: 3.14 K/UL (ref 1.8–7.7)
NEUTROPHILS NFR BLD AUTO: 60.5 % (ref 53–65)
PLATELET # BLD AUTO: 321 K/UL (ref 150–400)
POTASSIUM SERPL-SCNC: 4 MMOL/L (ref 3.5–5.1)
RBC # BLD AUTO: 3.07 M/UL (ref 4.2–5.4)
SODIUM SERPL-SCNC: 140 MMOL/L (ref 136–145)
WBC # BLD AUTO: 5.19 K/UL (ref 4.5–11)

## 2024-09-27 PROCEDURE — S4991 NICOTINE PATCH NONLEGEND: HCPCS | Performed by: NURSE PRACTITIONER

## 2024-09-27 PROCEDURE — 97110 THERAPEUTIC EXERCISES: CPT

## 2024-09-27 PROCEDURE — 27000958

## 2024-09-27 PROCEDURE — 85025 COMPLETE CBC W/AUTO DIFF WBC: CPT | Performed by: NURSE PRACTITIONER

## 2024-09-27 PROCEDURE — 80048 BASIC METABOLIC PNL TOTAL CA: CPT | Performed by: NURSE PRACTITIONER

## 2024-09-27 PROCEDURE — 36415 COLL VENOUS BLD VENIPUNCTURE: CPT | Performed by: NURSE PRACTITIONER

## 2024-09-27 PROCEDURE — 25000003 PHARM REV CODE 250: Performed by: HOSPITALIST

## 2024-09-27 PROCEDURE — 99900035 HC TECH TIME PER 15 MIN (STAT)

## 2024-09-27 PROCEDURE — 97116 GAIT TRAINING THERAPY: CPT

## 2024-09-27 PROCEDURE — 27000987 HC MATTRESS, MATRIX LOW PROFILE

## 2024-09-27 PROCEDURE — 25000003 PHARM REV CODE 250: Performed by: NURSE PRACTITIONER

## 2024-09-27 PROCEDURE — 11000004 HC SNF PRIVATE

## 2024-09-27 RX ADMIN — DOCUSATE SODIUM 100 MG: 100 CAPSULE, LIQUID FILLED ORAL at 08:09

## 2024-09-27 RX ADMIN — LEVOTHYROXINE SODIUM 112 MCG: 0.11 TABLET ORAL at 06:09

## 2024-09-27 RX ADMIN — FERROUS SULFATE TAB 325 MG (65 MG ELEMENTAL FE) 1 EACH: 325 (65 FE) TAB at 03:09

## 2024-09-27 RX ADMIN — NICOTINE 1 PATCH: 21 PATCH, EXTENDED RELEASE TRANSDERMAL at 07:09

## 2024-09-27 RX ADMIN — Medication 6 MG: at 08:09

## 2024-09-27 RX ADMIN — QUETIAPINE FUMARATE 25 MG: 25 TABLET ORAL at 08:09

## 2024-09-27 RX ADMIN — HYDROCODONE BITARTRATE AND ACETAMINOPHEN 1 TABLET: 5; 325 TABLET ORAL at 08:09

## 2024-09-27 RX ADMIN — DOCUSATE SODIUM 100 MG: 100 CAPSULE, LIQUID FILLED ORAL at 07:09

## 2024-09-27 RX ADMIN — POTASSIUM CHLORIDE 10 MEQ: 750 CAPSULE, EXTENDED RELEASE ORAL at 07:09

## 2024-09-27 RX ADMIN — HYDROCODONE BITARTRATE AND ACETAMINOPHEN 1 TABLET: 5; 325 TABLET ORAL at 01:09

## 2024-09-27 RX ADMIN — PANTOPRAZOLE SODIUM 40 MG: 40 TABLET, DELAYED RELEASE ORAL at 07:09

## 2024-09-27 RX ADMIN — FERROUS SULFATE TAB 325 MG (65 MG ELEMENTAL FE) 1 EACH: 325 (65 FE) TAB at 07:09

## 2024-09-27 RX ADMIN — RIVAROXABAN 10 MG: 10 TABLET, FILM COATED ORAL at 07:09

## 2024-09-27 RX ADMIN — FERROUS SULFATE TAB 325 MG (65 MG ELEMENTAL FE) 1 EACH: 325 (65 FE) TAB at 10:09

## 2024-09-27 NOTE — PLAN OF CARE
Problem: Adult Inpatient Plan of Care  Goal: Optimal Comfort and Wellbeing  Outcome: Progressing     Problem: Fall Injury Risk  Goal: Absence of Fall and Fall-Related Injury  Outcome: Progressing     Problem: Skin Injury Risk Increased  Goal: Skin Health and Integrity  Outcome: Progressing     Problem: Pain Acute  Goal: Optimal Pain Control and Function  Outcome: Progressing

## 2024-09-27 NOTE — PT/OT/SLP PROGRESS
Physical Therapy Treatment    Patient Name:  Yanet Viramontes   MRN:  [id number]    Recommendations:     Discharge Recommendations: Low Intensity Therapy  Discharge Equipment Recommendations: walker, rolling (youth size)  Barriers to discharge: Decreased caregiver support    Assessment:     Yanet Viramontes is a 84 y.o. female admitted with a medical diagnosis of S/p left hip fracture.  She presents with the following impairments/functional limitations: weakness, impaired endurance, impaired self care skills, impaired functional mobility, gait instability, impaired balance, pain Patient agreeable for PT today and with good effort overall.    Rehab Prognosis: Good; patient would benefit from acute skilled PT services to address these deficits and reach maximum level of function.    Recent Surgery: * No surgery found *      Plan:     During this hospitalization, patient to be seen 5 x/week to address the identified rehab impairments via gait training, therapeutic activities, therapeutic exercises and progress toward the following goals:    Plan of Care Expires:  10/04/24    Subjective     Chief Complaint: Weakness   Patient/Family Comments/goals: return home  Pain/Comfort:  Pain Rating 1: 0/10  Pain Rating Post-Intervention 1: 0/10      Objective:     Communicated with nurse prior to session.  Patient found up in chair with   upon PT entry to room.     General Precautions: Standard, fall  Orthopedic Precautions: LLE weight bearing as tolerated  Braces:    Respiratory Status: Room air     Functional Mobility:  Transfers:     Sit to Stand:  contact guard assistance with rolling walker  Gait: Patient amb 60 feet with CGA and RW without LOB with improving reciprocal gait then rested x 2 min and walked an additional 90 feet with RW and CGA with RW and no LOB      AM-PAC 6 CLICK MOBILITY  Turning over in bed (including adjusting bedclothes, sheets and blankets)?: 3  Sitting down on and standing up from a chair with arms (e.g.,  wheelchair, bedside commode, etc.): 3  Moving from lying on back to sitting on the side of the bed?: 3  Moving to and from a bed to a chair (including a wheelchair)?: 3  Need to walk in hospital room?: 3  Climbing 3-5 steps with a railing?: 3  Basic Mobility Total Score: 18       Treatment & Education:  AP, LAQ with 1/2 lb weights, seated march wth 1/2 lb weights, hip abd/add, and add ball squeezes x 30 each LE.     Patient left up in chair with all lines intact and call button in reach..    GOALS:   Multidisciplinary Problems       Physical Therapy Goals          Problem: Physical Therapy    Goal Priority Disciplines Outcome Goal Variances Interventions   Physical Therapy Goal     PT, PT/OT Progressing     Description: STG - 2 weeks  1. Pt will transfer supine to/from sit with mod Ax1.-MET  2. Pt will perform STS and bed transfer with mod Ax1.-MET  3. Pt will have LLE strength of 3-/5.-MET  4. Pt will amb with RW x 20 ft with mod Ax1.-MET    LTG - 4 weeks  1. Pt will transfer supine to/from sit with min Ax1.-MET  2. Pt will perform STS and bed transfer with min Ax1.-MET  3. Pt will have LLE strength of 3/5.-MET  4. Pt will amb with RW x 50 ft with min Ax1.-MET  5. Pt's ROM LLE will be WFL.-MET  6. Pt will transfer supine to/from sit with SBA.  2. Pt will perform STS and bed transfer with SBA.  3. Pt will have LLE strength of 3/5.-MET  4. Pt will amb with RW x 100 ft with SBA.                       Time Tracking:     PT Received On: 09/27/24  PT Start Time: 1001     PT Stop Time: 1027  PT Total Time (min): 26 min     Billable Minutes: Gait Training 14 and Therapeutic Exercise 10    Treatment Type: Treatment  PT/PTA: PT     Number of PTA visits since last PT visit: 0     09/27/2024

## 2024-09-28 PROCEDURE — 11000004 HC SNF PRIVATE

## 2024-09-28 PROCEDURE — S4991 NICOTINE PATCH NONLEGEND: HCPCS | Performed by: NURSE PRACTITIONER

## 2024-09-28 PROCEDURE — 25000003 PHARM REV CODE 250: Performed by: NURSE PRACTITIONER

## 2024-09-28 PROCEDURE — 99900035 HC TECH TIME PER 15 MIN (STAT)

## 2024-09-28 PROCEDURE — 27000958

## 2024-09-28 PROCEDURE — 25000003 PHARM REV CODE 250: Performed by: HOSPITALIST

## 2024-09-28 PROCEDURE — 27000987 HC MATTRESS, MATRIX LOW PROFILE

## 2024-09-28 RX ADMIN — LEVOTHYROXINE SODIUM 112 MCG: 0.11 TABLET ORAL at 05:09

## 2024-09-28 RX ADMIN — FERROUS SULFATE TAB 325 MG (65 MG ELEMENTAL FE) 1 EACH: 325 (65 FE) TAB at 07:09

## 2024-09-28 RX ADMIN — NICOTINE 1 PATCH: 21 PATCH, EXTENDED RELEASE TRANSDERMAL at 07:09

## 2024-09-28 RX ADMIN — POTASSIUM CHLORIDE 10 MEQ: 750 CAPSULE, EXTENDED RELEASE ORAL at 07:09

## 2024-09-28 RX ADMIN — Medication 6 MG: at 08:09

## 2024-09-28 RX ADMIN — FERROUS SULFATE TAB 325 MG (65 MG ELEMENTAL FE) 1 EACH: 325 (65 FE) TAB at 05:09

## 2024-09-28 RX ADMIN — RIVAROXABAN 10 MG: 10 TABLET, FILM COATED ORAL at 07:09

## 2024-09-28 RX ADMIN — DOCUSATE SODIUM 100 MG: 100 CAPSULE, LIQUID FILLED ORAL at 07:09

## 2024-09-28 RX ADMIN — QUETIAPINE FUMARATE 25 MG: 25 TABLET ORAL at 08:09

## 2024-09-28 RX ADMIN — DOCUSATE SODIUM 100 MG: 100 CAPSULE, LIQUID FILLED ORAL at 08:09

## 2024-09-28 RX ADMIN — HYDROCODONE BITARTRATE AND ACETAMINOPHEN 1 TABLET: 5; 325 TABLET ORAL at 07:09

## 2024-09-28 RX ADMIN — PANTOPRAZOLE SODIUM 40 MG: 40 TABLET, DELAYED RELEASE ORAL at 07:09

## 2024-09-28 RX ADMIN — FERROUS SULFATE TAB 325 MG (65 MG ELEMENTAL FE) 1 EACH: 325 (65 FE) TAB at 11:09

## 2024-09-28 NOTE — PLAN OF CARE
Problem: Adult Inpatient Plan of Care  Goal: Patient-Specific Goal (Individualized)  Outcome: Progressing  Goal: Absence of Hospital-Acquired Illness or Injury  Outcome: Progressing  Goal: Optimal Comfort and Wellbeing  Outcome: Progressing  Goal: Readiness for Transition of Care  Outcome: Progressing  Goal: Plan of Care Review  Outcome: Progressing     Problem: Fall Injury Risk  Goal: Absence of Fall and Fall-Related Injury  Outcome: Progressing     Problem: Skin Injury Risk Increased  Goal: Skin Health and Integrity  Outcome: Progressing

## 2024-09-29 PROCEDURE — S4991 NICOTINE PATCH NONLEGEND: HCPCS | Performed by: NURSE PRACTITIONER

## 2024-09-29 PROCEDURE — 11000004 HC SNF PRIVATE

## 2024-09-29 PROCEDURE — 25000003 PHARM REV CODE 250: Performed by: NURSE PRACTITIONER

## 2024-09-29 PROCEDURE — 27000987 HC MATTRESS, MATRIX LOW PROFILE

## 2024-09-29 PROCEDURE — 99900035 HC TECH TIME PER 15 MIN (STAT)

## 2024-09-29 PROCEDURE — 27000958

## 2024-09-29 PROCEDURE — 25000003 PHARM REV CODE 250: Performed by: HOSPITALIST

## 2024-09-29 RX ADMIN — DOCUSATE SODIUM 100 MG: 100 CAPSULE, LIQUID FILLED ORAL at 08:09

## 2024-09-29 RX ADMIN — FERROUS SULFATE TAB 325 MG (65 MG ELEMENTAL FE) 1 EACH: 325 (65 FE) TAB at 07:09

## 2024-09-29 RX ADMIN — DOCUSATE SODIUM 100 MG: 100 CAPSULE, LIQUID FILLED ORAL at 07:09

## 2024-09-29 RX ADMIN — PANTOPRAZOLE SODIUM 40 MG: 40 TABLET, DELAYED RELEASE ORAL at 07:09

## 2024-09-29 RX ADMIN — FERROUS SULFATE TAB 325 MG (65 MG ELEMENTAL FE) 1 EACH: 325 (65 FE) TAB at 10:09

## 2024-09-29 RX ADMIN — HYDROCODONE BITARTRATE AND ACETAMINOPHEN 1 TABLET: 5; 325 TABLET ORAL at 08:09

## 2024-09-29 RX ADMIN — QUETIAPINE FUMARATE 25 MG: 25 TABLET ORAL at 08:09

## 2024-09-29 RX ADMIN — Medication 6 MG: at 08:09

## 2024-09-29 RX ADMIN — LEVOTHYROXINE SODIUM 112 MCG: 0.11 TABLET ORAL at 06:09

## 2024-09-29 RX ADMIN — POTASSIUM CHLORIDE 10 MEQ: 750 CAPSULE, EXTENDED RELEASE ORAL at 07:09

## 2024-09-29 RX ADMIN — RIVAROXABAN 10 MG: 10 TABLET, FILM COATED ORAL at 07:09

## 2024-09-29 RX ADMIN — NICOTINE 1 PATCH: 21 PATCH, EXTENDED RELEASE TRANSDERMAL at 07:09

## 2024-09-29 RX ADMIN — FERROUS SULFATE TAB 325 MG (65 MG ELEMENTAL FE) 1 EACH: 325 (65 FE) TAB at 04:09

## 2024-09-30 ENCOUNTER — CLINICAL SUPPORT (OUTPATIENT)
Dept: WOUND CARE | Facility: HOSPITAL | Age: 84
End: 2024-09-30
Attending: NURSE PRACTITIONER
Payer: MEDICARE

## 2024-09-30 DIAGNOSIS — L89.893 PRESSURE ULCER OF OTHER SITE, STAGE 3: Primary | ICD-10-CM

## 2024-09-30 PROCEDURE — 27000987 HC MATTRESS, MATRIX LOW PROFILE

## 2024-09-30 PROCEDURE — 99308 SBSQ NF CARE LOW MDM 20: CPT | Mod: ,,, | Performed by: HOSPITALIST

## 2024-09-30 PROCEDURE — S4991 NICOTINE PATCH NONLEGEND: HCPCS | Performed by: NURSE PRACTITIONER

## 2024-09-30 PROCEDURE — 25000003 PHARM REV CODE 250: Performed by: HOSPITALIST

## 2024-09-30 PROCEDURE — 99900035 HC TECH TIME PER 15 MIN (STAT)

## 2024-09-30 PROCEDURE — 25000003 PHARM REV CODE 250: Performed by: NURSE PRACTITIONER

## 2024-09-30 PROCEDURE — 97116 GAIT TRAINING THERAPY: CPT | Mod: CQ

## 2024-09-30 PROCEDURE — 97535 SELF CARE MNGMENT TRAINING: CPT

## 2024-09-30 PROCEDURE — 27000958

## 2024-09-30 PROCEDURE — 97110 THERAPEUTIC EXERCISES: CPT

## 2024-09-30 PROCEDURE — 11000004 HC SNF PRIVATE

## 2024-09-30 RX ADMIN — NICOTINE 1 PATCH: 21 PATCH, EXTENDED RELEASE TRANSDERMAL at 09:09

## 2024-09-30 RX ADMIN — FERROUS SULFATE TAB 325 MG (65 MG ELEMENTAL FE) 1 EACH: 325 (65 FE) TAB at 09:09

## 2024-09-30 RX ADMIN — QUETIAPINE FUMARATE 25 MG: 25 TABLET ORAL at 08:09

## 2024-09-30 RX ADMIN — POTASSIUM CHLORIDE 10 MEQ: 750 CAPSULE, EXTENDED RELEASE ORAL at 09:09

## 2024-09-30 RX ADMIN — FERROUS SULFATE TAB 325 MG (65 MG ELEMENTAL FE) 1 EACH: 325 (65 FE) TAB at 04:09

## 2024-09-30 RX ADMIN — Medication 6 MG: at 08:09

## 2024-09-30 RX ADMIN — HYDROCODONE BITARTRATE AND ACETAMINOPHEN 1 TABLET: 5; 325 TABLET ORAL at 03:09

## 2024-09-30 RX ADMIN — PANTOPRAZOLE SODIUM 40 MG: 40 TABLET, DELAYED RELEASE ORAL at 09:09

## 2024-09-30 RX ADMIN — COLLAGENASE SANTYL 1 G: 250 OINTMENT TOPICAL at 10:09

## 2024-09-30 RX ADMIN — LEVOTHYROXINE SODIUM 112 MCG: 0.11 TABLET ORAL at 05:09

## 2024-09-30 RX ADMIN — RIVAROXABAN 10 MG: 10 TABLET, FILM COATED ORAL at 09:09

## 2024-09-30 RX ADMIN — DOCUSATE SODIUM 100 MG: 100 CAPSULE, LIQUID FILLED ORAL at 08:09

## 2024-09-30 RX ADMIN — ACETAMINOPHEN 650 MG: 325 TABLET ORAL at 09:09

## 2024-09-30 RX ADMIN — FERROUS SULFATE TAB 325 MG (65 MG ELEMENTAL FE) 1 EACH: 325 (65 FE) TAB at 12:09

## 2024-09-30 RX ADMIN — DOCUSATE SODIUM 100 MG: 100 CAPSULE, LIQUID FILLED ORAL at 09:09

## 2024-09-30 RX ADMIN — POLYETHYLENE GLYCOL 3350 17 G: 17 POWDER, FOR SOLUTION ORAL at 09:09

## 2024-09-30 NOTE — SUBJECTIVE & OBJECTIVE
Interval History: doing good     Review of Systems   Respiratory:  Negative for shortness of breath.    Cardiovascular:  Negative for chest pain.   Gastrointestinal:  Negative for abdominal pain, nausea and vomiting.   Musculoskeletal:  Positive for arthralgias.   Neurological:  Positive for weakness.   Psychiatric/Behavioral:  Negative for agitation and confusion.    All other systems reviewed and are negative.    Objective:     Vital Signs (Most Recent):  Temp: 98.5 °F (36.9 °C) (09/30/24 0703)  Pulse: (!) 59 (09/30/24 0703)  Resp: 20 (09/30/24 0703)  BP: 125/70 (09/30/24 0703)  SpO2: 96 % (09/30/24 0703) Vital Signs (24h Range):  Temp:  [98.2 °F (36.8 °C)-98.5 °F (36.9 °C)] 98.5 °F (36.9 °C)  Pulse:  [59-75] 59  Resp:  [18-20] 20  SpO2:  [96 %] 96 %  BP: (125-162)/(70-73) 125/70     Weight: 45.8 kg (101 lb)  Body mass index is 20.4 kg/m².    Intake/Output Summary (Last 24 hours) at 9/30/2024 1255  Last data filed at 9/30/2024 0800  Gross per 24 hour   Intake 1138 ml   Output --   Net 1138 ml         Physical Exam  Vitals reviewed.   Constitutional:       General: She is not in acute distress.     Appearance: Normal appearance.   HENT:      Head: Normocephalic and atraumatic.   Eyes:      General: No scleral icterus.     Extraocular Movements: Extraocular movements intact.      Conjunctiva/sclera: Conjunctivae normal.      Pupils: Pupils are equal, round, and reactive to light.   Cardiovascular:      Rate and Rhythm: Normal rate and regular rhythm.      Heart sounds: No murmur heard.     No friction rub. No gallop.   Pulmonary:      Effort: Pulmonary effort is normal. No respiratory distress.      Breath sounds: Normal breath sounds. No wheezing or rales.   Abdominal:      General: Abdomen is flat. Bowel sounds are normal. There is no distension.      Palpations: Abdomen is soft.      Tenderness: There is no abdominal tenderness. There is no guarding.   Musculoskeletal:         General: No swelling.      Right  lower leg: No edema.      Left lower leg: No edema.   Skin:     General: Skin is warm and dry.      Coloration: Skin is not jaundiced.      Findings: No rash.   Neurological:      General: No focal deficit present.      Mental Status: She is alert.      Sensory: No sensory deficit.      Motor: Weakness present.      Comments: Getting stronger    Psychiatric:         Behavior: Behavior normal.             Significant Labs: All pertinent labs within the past 24 hours have been reviewed.  Recent Lab Results       None            Significant Imaging: I have reviewed all pertinent imaging results/findings within the past 24 hours.

## 2024-09-30 NOTE — PT/OT/SLP PROGRESS
Occupational Therapy  Treatment    Yanet Viramontes   MRN: [id number]   Admitting Diagnosis: S/p left hip fracture    OT Date of Treatment: 09/30/24   OT Start Time: 1332  OT Stop Time: 1400  OT Total Time (min): 28 min    Billable Minutes:  Self Care/Home Management 10 and Therapeutic Exercise 18    OT/CRISELDA: OT          General Precautions: Standard, fall  Orthopedic Precautions: LLE weight bearing as tolerated  Braces: N/A  Respiratory Status: Room air         Subjective:  Communicated with pt prior to session.  No new complaints       Objective:        Functional Mobility:  Bed Mobility: SBA overall       Transfers: SBA transfer from hospital bed to w/c        Functional Ambulation: see PT    Activities of Daily Living:     Feeding adaptive equipment:  none     UE adaptive equipment: Reacher     LE adaptive equipment: Reacher, Dressing Stick, and Sock aid , still needing some assist with these equipment                    Bathing adaptive equipment: shower chair will be needed at home, SBA as well for safest environment    Balance:   Static Sit: FAIR+: Able to take MINIMAL challenges from all directions  Dynamic Sit: FAIR+: Maintains balance through MINIMAL excursions of active trunk motion  Static Stand: FAIR+: Takes MINIMAL challenges from all directions  Dynamic stand: FAIR+: Needs CLOSE SUPERVISION during gait and is able to right self with minor LOB    Therapeutic Activities and Exercises:  To improve UB endurance, strength, OT facilitated pt with:  UBE x 10min with no RB's throughout, low level resist, F/R motions    To improve sit to stand and functional transitions and transfers, pushing up from surfaces more effectively and safely:  BLUE Lat pulls and tricep presses at 30 reps x 2 sets from seated position    Pt completed standing hand washing hygiene at her sink in her room to complete session, SBA, cueing required.    AM-PAC 6 CLICK ADL   How much help from another person does this patient currently need?   1  "= Unable, Total/Dependent Assistance  2 = A lot, Maximum/Moderate Assistance  3 = A little, Minimum/Contact Guard/Supervision  4 = None, Modified [place]/Independent    Putting on and taking off regular lower body clothing? : 3  Bathing (including washing, rinsing, drying)?: 4 (requires SVN and setup and cueing)  Toileting, which includes using toilet, bedpan, or urinal? : 4 (SVN and cueing for safety)  Putting on and taking off regular upper body clothing?: 4  Taking care of personal grooming such as brushing teeth?: 4  Eating meals?: 4  Daily Activity Total Score: 23     AM-PAC Raw Score CMS "G-Code Modifier Level of Impairment Assistance   6 % Total / Unable   7 - 8 CM 80 - 100% Maximal Assist   9-13 CL 60 - 80% Moderate Assist   14 - 19 CK 40 - 60% Moderate Assist   20 - 22 CJ 20 - 40% Minimal Assist   23 CI 1-20% SBA / CGA   24 CH 0% Independent/ Mod I       Patient left up in chair with call button in reach    ASSESSMENT:  Yanet Viramontes is a 84 y.o. female with a medical diagnosis of S/p left hip fracture and presents with no new complaints.    Rehab identified problem list/impairments:  weakness, impaired endurance, impaired self care skills, impaired functional mobility, gait instability, impaired balance, impaired cognition, decreased safety awareness, impaired skin, orthopedic precautions    Rehab potential is good.    Activity tolerance: Good    Discharge recommendations: Low Intensity Therapy   Barriers to discharge: Barriers to Discharge: None    Equipment recommendations:  (TBD)    GOALS:   Multidisciplinary Problems       Occupational Therapy Goals          Problem: Occupational Therapy    Goal Priority Disciplines Outcome Interventions   Occupational Therapy Goal     OT, PT/OT Progressing    Description: STG: within 2 weeks  Pt will perform grooming with min a at sinkside from seated MET  Pt will bathe with mod a MET  Pt will perform UE dressing with mod a MET  Pt will perform LE dressing " with mod a MET  Pt will sit EOB x 5 min with mod to min assistance MET  Pt will transfer bed/chair/bsc with mod a MET  Pt will perform standing task x 1 min with mod assistance x 1 MET  Pt will tolerate 15 minutes of tx without fatigue MET    UPDATED STG's 2024:  Pt will perform grooming with SBA at sinkside from seated MET  Pt will bathe with min a MET though family is providing more support for this task than patient requires at this time (discussed this with them and how to promote greater independence with safety support measures)  Pt will perform UE dressing with min a MET  Pt will perform LE dressing with min a MET  Pt will sit EOB x 5 min with min assistance  MET  Pt will transfer bed/chair/bsc with CGA  MET inconsistently  Pt will perform standing task x 3 min with CGA MET  Pt will tolerate 45 minutes of tx without fatigue MET    UPDATED STG's 2024:  Pt will bathe with SBA at sinkside with AE OVERALL MET with SVN and cueing provided  Pt will perform UE dressing with SBA  MET  Pt will perform LE dressing with SBA and with AE  ONGOING/PROGRESSING SLOWLY  Pt will transfer bed/chair/bsc with SBA MET  Pt will perform standing task x 5 min with SBA MET        LT.Restore to max I with self care and mobility. ONGOING/PROGRESSING                         Plan:  Patient to be seen 3 x/week to address the above listed problems via self-care/home management, therapeutic activities, therapeutic exercises  Plan of Care expires: 24  Plan of Care reviewed with: patient, caregiver, daughter, family         2024

## 2024-09-30 NOTE — PROGRESS NOTES
[facility] [place] [facility] Medicine  Progress Note    Patient Name: Yanet Viramontes  MRN: [id number]  Patient Class: IP- Swing   Admission Date: 8/23/2024  Length of Stay: 38 days  Attending Physician: Daren Nicole DO  Primary Care Provider: No, Primary Doctor    Patient will need home equipment at [place].  The patient has a mobility limitation that significantly impairs his or her ability to participate in one or more mobility related activities of daily living. (MRADLS) such as toileting, feeding, dressing grooming and bathing in customary locations in the home, AND    The mobility limitation cannot be sufficiently resolved by the use of an appropriately fitted cane or walker AND    Use of a manual wheelchair will significantly improve the patient's ability to participate in MRADLS and the patient will use it on a regular basis in the home AND    The patient has not expressed an unwillingness to use the manual wheelchair that is provided in the home AND    The patient has sufficient upper extremity function and other physical and mental capabilities needed to safely self propel the manual wheelchair that is provided in the home during a typical day OR the patient has a caregiver who is available, willing and able to provide assistance with the wheelchair.       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  This was discussed with family who do not wish to have further testing at this time.  She has been at [facility] since 8/19 following a fall at her home which resulted in fracture of the left greater trochanter which required surg. She had an episode of chest pain on Wed with no acute findings.  Echo revealed EF of 60%.  She is being admitted to [facility] for OT/  PT and medical management.       Pt is DNR     Overview/Hospital Course:  8/27 Agitation yesterday, did sleep last night and was good this am, however, after PT she became agitated and aggressive.  Will monitor and redirect.  Try Seroquel this PM this time.      8/28 Maybe a little better today.  She did sleep last night.  Was sitting up this am and not as agitated.  Will continue plan of care for now.  Hopefully some better tomorrow.     8/29 better night and this am, recognizing people and nurses.  Not eating or drinking much though.      8/30 Didn't sleep good last night, but she is alert today just tired     9/2 repair of hip fracture 2 weeks ago, will DC staples    9/6 some increased swelling in surgical leg, on Xarelto ppx already.  Did get a lot of fluid over last few days.     9/9 still some swelling, will dose lasix x 1 today and see how she responds.     9/12 Lasix did help with single dose, will dose again today.     9/13 Swelling better, sitting up eating lunch.  Doing well.     9/16 working with therapy, continues to do well     9/23 She is much better, showing progress.     9/30 Continues to improve.  Working with therapy     Interval History: doing good     Review of Systems   Respiratory:  Negative for shortness of breath.    Cardiovascular:  Negative for chest pain.   Gastrointestinal:  Negative for abdominal pain, nausea and vomiting.   Musculoskeletal:  Positive for arthralgias.   Neurological:  Positive for weakness.   Psychiatric/Behavioral:  Negative for agitation and confusion.    All other systems reviewed and are negative.    Objective:     Vital Signs (Most Recent):  Temp: 98.5 °F (36.9 °C) (09/30/24 0703)  Pulse: (!) 59 (09/30/24 0703)  Resp: 20 (09/30/24 0703)  BP: 125/70 (09/30/24 0703)  SpO2: 96 % (09/30/24 0703) Vital Signs (24h Range):  Temp:  [98.2 °F (36.8 °C)-98.5 °F (36.9 °C)] 98.5 °F (36.9 °C)  Pulse:  [59-75] 59  Resp:  [18-20] 20  SpO2:  [96 %] 96 %  BP: (125-162)/(70-73)  125/70     Weight: 45.8 kg (101 lb)  Body mass index is 20.4 kg/m².    Intake/Output Summary (Last 24 hours) at 9/30/2024 1255  Last data filed at 9/30/2024 0800  Gross per 24 hour   Intake 1138 ml   Output --   Net 1138 ml         Physical Exam  Vitals reviewed.   Constitutional:       General: She is not in acute distress.     Appearance: Normal appearance.   HENT:      Head: Normocephalic and atraumatic.   Eyes:      General: No scleral icterus.     Extraocular Movements: Extraocular movements intact.      Conjunctiva/sclera: Conjunctivae normal.      Pupils: Pupils are equal, round, and reactive to light.   Cardiovascular:      Rate and Rhythm: Normal rate and regular rhythm.      Heart sounds: No murmur heard.     No friction rub. No gallop.   Pulmonary:      Effort: Pulmonary effort is normal. No respiratory distress.      Breath sounds: Normal breath sounds. No wheezing or rales.   Abdominal:      General: Abdomen is flat. Bowel sounds are normal. There is no distension.      Palpations: Abdomen is soft.      Tenderness: There is no abdominal tenderness. There is no guarding.   Musculoskeletal:         General: No swelling.      Right lower leg: No edema.      Left lower leg: No edema.   Skin:     General: Skin is warm and dry.      Coloration: Skin is not jaundiced.      Findings: No rash.   Neurological:      General: No focal deficit present.      Mental Status: She is alert.      Sensory: No sensory deficit.      Motor: Weakness present.      Comments: Getting stronger    Psychiatric:         Behavior: Behavior normal.             Significant Labs: All pertinent labs within the past 24 hours have been reviewed.  Recent Lab Results       None            Significant Imaging: I have reviewed all pertinent imaging results/findings within the past 24 hours.    Assessment/Plan:      * S/p left hip fracture  PT/ OT  Fall precautions  Pain control    Patient will need:   FELISHA ROLLING WALKER  SHOWER  BENCH  BEDSIDE COMMODE  HOSPITAL BED  At home for high fall risk, transfers and repositioning.          Delirium  Seems to have resolved.  Will monitor closely.       Severe protein-calorie malnutrition  Nutrition consulted. Most recent weight and BMI monitored-     Measurements:  Wt Readings from Last 1 Encounters:   08/23/24 54 kg (119 lb)   Body mass index is 24.04 kg/m².    Patient has been screened and assessed by RD.    Malnutrition Type:  Context: chronic illness  Level: severe    Malnutrition Characteristic Summary:  Weight Loss (Malnutrition): greater than 10% in 6 months  Energy Intake (Malnutrition): less than 75% for greater than or equal to 1 month    Interventions/Recommendations (treatment strategy):  Recommend to advance diet appropriate and tolerated; add Boost Plus all meals      Disease of thyroid gland  Continue home meds      GERD (gastroesophageal reflux disease)  Continue home meds      History of DVT (deep vein thrombosis)  Continue xeralto      Hypertension  Chronic, controlled. Latest blood pressure and vitals reviewed-     Temp:  [98.2 °F (36.8 °C)]   Pulse:  [68]   Resp:  [22]   BP: (117)/(66)   SpO2:  [96 %] .   Home meds for hypertension were reviewed and noted below.   Hypertension Medications               diltiaZEM HCl (TIAZAC) 120 mg 24 hr capsule Take 120 mg by mouth.    triamterene-hydrochlorothiazide 37.5-25 mg (DYAZIDE) 37.5-25 mg per capsule Take 1 capsule by mouth every morning.            While in the hospital, will manage blood pressure as follows; BP has been low, will hold meds for now    Monitor BP, replace home medication as warrented.      VTE Risk Mitigation (From admission, onward)           Ordered     rivaroxaban tablet 10 mg  Daily         08/23/24 1802     IP VTE LOW RISK PATIENT  Once         08/23/24 1725                    Discharge Planning   QUINN: 10/7/2024     Code Status: DNR   Is the patient medically ready for discharge?:     Reason for patient still in  hospital (select all that apply): PT / OT recommendations and Pending disposition  Discharge Plan A: Other                  Daren Nicole DO  Department of Hospital Medicine   [facility]

## 2024-09-30 NOTE — PLAN OF CARE
Problem: Fall Injury Risk  Goal: Absence of Fall and Fall-Related Injury  Outcome: Progressing  Intervention: Identify and Manage Contributors  Flowsheets (Taken 9/30/2024 3683)  Medication Review/Management: medications reviewed   Plan of care reviewed with patient. Patients status ongoing progressing.

## 2024-09-30 NOTE — PROGRESS NOTES
Subjective:      Patient ID: Yanet Viramontes is a 84 y.o. female.    Chief Complaint: No chief complaint on file.    Chief Complaint: Wound Check    Ms. Viramontes is an elderly white female that lives at home alone. She fell August 19, 2024 and was admitted to [facility]. She was discharged on 8/23/2024 and transferred to the swing bed in the [facility]. She was admitted with a pressure ulcer on the upper left back. I started debridement last visit and ordered santyl for the dressing to help soften the thick adherent nonviable tissue. She is doing well considering. She is repositioning herself for the most part. She denies complaints of pain in the ulcer. She is ambulating in the marcus with a walker. She may be discharged next week.     9/16/2024   Ms. Viramontes is an elderly white female that lives at home alone. She fell in August 19, 2024. She stayed in [facility] until 8/23/2024. She is in the swing bed in the [facility]. She was admitted with a pressure ulcer on the upper left back. She states that her back hurts around the ulcer, but not necessarily in the ulcer. Her daughter (Sulema Brewer) was with her when I looked at the wound and discussed the plan of care. Medihoney has been used for the dressing. Santyl has been ordered. If the hospital hasn't gotten it by the time she is discharged, I will call it in to her pharmacy.     Hospital documentation from Hospitalist:     Principal Problem:S/p left hip fracture     Chief Complaint: No chief complaint on file.        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  This was discussed with family who do not wish to have further testing at this time.  She has been at [facility] since 8/19 following a fall at her home which resulted in fracture of the left greater  trochanter which required surg. She had an episode of chest pain on Wed with no acute findings.  Echo revealed EF of 60%.  She is being admitted to [facility] for OT/ PT and medical management.        Pt is DNR       Review of Systems   Constitutional:         Elderly white female, frail, chronically ill, no acute distress   Respiratory: Negative.     Cardiovascular:  Positive for leg swelling.   Musculoskeletal:  Positive for back pain and gait problem.   Skin:         Mid upper back stage 3 pressure injury   Neurological:  Positive for weakness.   Psychiatric/Behavioral: Negative.           Review of Systems   Objective:     Physical Exam  Vitals and nursing note reviewed.   Constitutional:       Comments: Elderly white female, chronically ill, no acute distress, ambulatory with assistance   HENT:      Head: Normocephalic.   Cardiovascular:      Rate and Rhythm: Normal rate.   Pulmonary:      Effort: Pulmonary effort is normal.   Skin:     Capillary Refill: Capillary refill takes 2 to 3 seconds.      Comments: General Notes  no lower extremity wounds   Wound Assessment(s)  Wound #1 Back - Mid is an acute Stage 3 Pressure Injury Pressure Ulcer acquired on 08/19/2024 and has received a status of Not Healed. Initial wound encounter measurements are 1.8cm length x 0.7cm width x 0.1 cm depth, with an area of 1.26 sq cm and a volume of 0.126 cubic cm. Adipose is exposed. No tunneling has been noted. No sinus tract has been noted. No undermining has been noted. There is a Moderate amount of serosanguineous drainage noted which has no odor. The patient reports a wound pain of level 0/10. The wound margin is attached Wound bed has Yes, bright red, firm, granulation, Yes slough, No eschar, No epithelialization.  The periwound skin texture is normal. The periwound skin moisture is normal. The periwound skin color is normal. The temperature of the periwound skin is WNL. Periwound skin does not exhibit signs or symptoms of  infection.         Neurological:      General: No focal deficit present.      Mental Status: She is alert and oriented to person, place, and time.   Psychiatric:         Mood and Affect: Mood normal.         Behavior: Behavior normal.         Thought Content: Thought content normal.         Judgment: Judgment normal.       Procedure:  Wound #1 (Pressure Ulcer) is located on the back - mid. A skin/subcutaneous tissue level excisional/surgical debridement with a total area debrided of 1.44 sq cm. was performed by Savannah Craig NP. Subcutaneous was removed along with devitalized tissue: slough. The following instrument(s) were used: curette. Pain control was achieved using LIDOCAINE VISC 2%. A time out was conducted prior to the start of the procedure. A minimal amount of bleeding was controlled with pressure. The procedure was tolerated well with a pain level of 0 throughout and a pain level of 0 following the procedure. Post Debridement Measurements: 1.8cm length x 0.8cm width x 0.2cm depth; with an area of 1.44 sq cm and a volume of 0.288 cubic cm.   Assessment:     1. Pressure ulcer of other site, stage 3           Plan:              Wound #1 Back - Mid  Anesthetic  2% Viscous Lidocaine to wound bed prior to procedure. - clinic use only  Hand Hygiene/Smoking Cessation  Wash hands before and after wound care. Call the Wound Center at [phone] if you have signs or symptoms of infection, increased drainage, increasing odor, or unusual redness. After Wound Care hours, please notify your PCP or go to the ER.  Cleanser  Cleanse Wound with Normal Saline  Dressings  Apply dressing - apply Santyl to wound bed. cover with mepilex border foam. change daily and prn.  Off-Loading  Keep weight off: - wound  Turn every 2 hours. Avoid position directing pressure to Wound site. Limit side lying to 30 degree tilt. Limit HOB elevation to 30 degrees in bed.  Follow-Up Appointments  Return Appointment 1 week - for wound  care.  will follow up with [facility]

## 2024-09-30 NOTE — PLAN OF CARE
Problem: Occupational Therapy  Goal: Occupational Therapy Goal  Description: STG: within 2 weeks  Pt will perform grooming with min a at sinkside from seated MET  Pt will bathe with mod a MET  Pt will perform UE dressing with mod a MET  Pt will perform LE dressing with mod a MET  Pt will sit EOB x 5 min with mod to min assistance MET  Pt will transfer bed/chair/bsc with mod a MET  Pt will perform standing task x 1 min with mod assistance x 1 MET  Pt will tolerate 15 minutes of tx without fatigue MET    UPDATED STG's 2024:  Pt will perform grooming with SBA at sinkside from seated MET  Pt will bathe with min a MET though family is providing more support for this task than patient requires at this time (discussed this with them and how to promote greater independence with safety support measures)  Pt will perform UE dressing with min a MET  Pt will perform LE dressing with min a MET  Pt will sit EOB x 5 min with min assistance  MET  Pt will transfer bed/chair/bsc with CGA  MET inconsistently  Pt will perform standing task x 3 min with CGA MET  Pt will tolerate 45 minutes of tx without fatigue MET    UPDATED STG's 2024:  Pt will bathe with SBA at sinkside with AE OVERALL MET with SVN and cueing provided  Pt will perform UE dressing with SBA  MET  Pt will perform LE dressing with SBA and with AE  ONGOING/PROGRESSING SLOWLY  Pt will transfer bed/chair/bsc with SBA MET  Pt will perform standing task x 5 min with SBA MET        LT.Restore to max I with self care and mobility. ONGOING/PROGRESSING    Outcome: Progressing

## 2024-09-30 NOTE — PT/OT/SLP PROGRESS
Physical Therapy Treatment    Patient Name:  Yanet Viramontes   MRN:  [id number]    Recommendations:     Discharge Recommendations: Low Intensity Therapy  Discharge Equipment Recommendations: walker, rolling (youth size)  Barriers to discharge: None    Assessment:     Yanet Viramontes is a 84 y.o. female admitted with a medical diagnosis of S/p left hip fracture.  She presents with the following impairments/functional limitations: weakness, impaired endurance, gait instability, impaired functional mobility .  Pt pleasant and participated well with treatment.    Rehab Prognosis: Good; patient would benefit from acute skilled PT services to address these deficits and reach maximum level of function.    Recent Surgery: * No surgery found *      Plan:     During this hospitalization, patient to be seen 5 x/week to address the identified rehab impairments via gait training, therapeutic activities, therapeutic exercises and progress toward the following goals:    Plan of Care Expires:  10/04/24    Continue per Plan of Care per Leonarda Priest PT     Subjective     Chief Complaint: none; tender spot at spine of mid back being treated for wound care  Patient/Family Comments/goals: agrees to PT Tx as she wants to get better, stronger; daughter present  Pain/Comfort:  Pain Rating 1: 0/10      Objective:     Communicated with patient, PT, daughter prior to session.  Patient found up in chair with peripheral IV upon PT entry to room.     General Precautions: Standard, fall  Orthopedic Precautions: LLE weight bearing as tolerated  Braces:    Respiratory Status: Room air     Functional Mobility:  Bed Mobility:     Scooting: modified independence  Bridging: modified independence  Sit to Supine: contact guard assistance  Transfers:     Sit to Stand:  stand by assistance and contact guard assistance with rolling walker  Gait: 60ft, 100ft with rolling walker and CGA, vc's for posture correction, step/stride length with focus on step through  with RLE      AM-PAC 6 CLICK MOBILITY  Turning over in bed (including adjusting bedclothes, sheets and blankets)?: 3  Sitting down on and standing up from a chair with arms (e.g., wheelchair, bedside commode, etc.): 3  Moving from lying on back to sitting on the side of the bed?: 3  Moving to and from a bed to a chair (including a wheelchair)?: 3  Need to walk in hospital room?: 3  Climbing 3-5 steps with a railing?: 3  Basic Mobility Total Score: 18       Treatment & Education:  Sitting BLE exercises x 20 repetitions each: marching, long arc quads, hip abduction, ankle rocking  Bed mobility and transfers as noted above  Gait training as noted above     Patient left HOB elevated with call button in reach..    GOALS:   Multidisciplinary Problems       Physical Therapy Goals          Problem: Physical Therapy    Goal Priority Disciplines Outcome Interventions   Physical Therapy Goal     PT, PT/OT Progressing    Description: STG - 2 weeks  1. Pt will transfer supine to/from sit with mod Ax1.-MET  2. Pt will perform STS and bed transfer with mod Ax1.-MET  3. Pt will have LLE strength of 3-/5.-MET  4. Pt will amb with RW x 20 ft with mod Ax1.-MET    LTG - 4 weeks  1. Pt will transfer supine to/from sit with min Ax1.-MET  2. Pt will perform STS and bed transfer with min Ax1.-MET  3. Pt will have LLE strength of 3/5.-MET  4. Pt will amb with RW x 50 ft with min Ax1.-MET  5. Pt's ROM LLE will be WFL.-MET  6. Pt will transfer supine to/from sit with SBA.  2. Pt will perform STS and bed transfer with SBA.  3. Pt will have LLE strength of 3/5.-MET  4. Pt will amb with RW x 100 ft with SBA.                       Time Tracking:     PT Received On: 09/30/24  PT Start Time: 0955     PT Stop Time: 1015  PT Total Time (min): 20 min     Billable Minutes: Gait Training 15 and Therapeutic Exercise 5    Treatment Type: Treatment  PT/PTA: PTA     Number of PTA visits since last PT visit: 1 09/30/2024

## 2024-10-01 PROCEDURE — 97116 GAIT TRAINING THERAPY: CPT | Mod: CQ

## 2024-10-01 PROCEDURE — 25000003 PHARM REV CODE 250: Performed by: NURSE PRACTITIONER

## 2024-10-01 PROCEDURE — 27000987 HC MATTRESS, MATRIX LOW PROFILE

## 2024-10-01 PROCEDURE — 11000004 HC SNF PRIVATE

## 2024-10-01 PROCEDURE — 25000003 PHARM REV CODE 250: Performed by: HOSPITALIST

## 2024-10-01 PROCEDURE — 27000958

## 2024-10-01 PROCEDURE — 99900035 HC TECH TIME PER 15 MIN (STAT)

## 2024-10-01 PROCEDURE — S4991 NICOTINE PATCH NONLEGEND: HCPCS | Performed by: NURSE PRACTITIONER

## 2024-10-01 PROCEDURE — 97110 THERAPEUTIC EXERCISES: CPT | Mod: CQ

## 2024-10-01 RX ADMIN — POTASSIUM CHLORIDE 10 MEQ: 750 CAPSULE, EXTENDED RELEASE ORAL at 08:10

## 2024-10-01 RX ADMIN — FERROUS SULFATE TAB 325 MG (65 MG ELEMENTAL FE) 1 EACH: 325 (65 FE) TAB at 08:10

## 2024-10-01 RX ADMIN — FERROUS SULFATE TAB 325 MG (65 MG ELEMENTAL FE) 1 EACH: 325 (65 FE) TAB at 11:10

## 2024-10-01 RX ADMIN — RIVAROXABAN 10 MG: 10 TABLET, FILM COATED ORAL at 08:10

## 2024-10-01 RX ADMIN — LEVOTHYROXINE SODIUM 112 MCG: 0.11 TABLET ORAL at 05:10

## 2024-10-01 RX ADMIN — NICOTINE 1 PATCH: 21 PATCH, EXTENDED RELEASE TRANSDERMAL at 08:10

## 2024-10-01 RX ADMIN — DOCUSATE SODIUM 100 MG: 100 CAPSULE, LIQUID FILLED ORAL at 08:10

## 2024-10-01 RX ADMIN — QUETIAPINE FUMARATE 25 MG: 25 TABLET ORAL at 08:10

## 2024-10-01 RX ADMIN — ACETAMINOPHEN 650 MG: 325 TABLET ORAL at 08:10

## 2024-10-01 RX ADMIN — COLLAGENASE SANTYL 1 G: 250 OINTMENT TOPICAL at 09:10

## 2024-10-01 RX ADMIN — PANTOPRAZOLE SODIUM 40 MG: 40 TABLET, DELAYED RELEASE ORAL at 08:10

## 2024-10-01 RX ADMIN — Medication 6 MG: at 08:10

## 2024-10-01 NOTE — PROGRESS NOTES
Clinical Pharmacy Chart Review Note      Admit Date: 8/23/2024   LOS: 39 days       Yanet Viramontes is a 84 y.o. female admitted to SNF for PT/OT after hospitalization for fracture of left greater trochanter which required surgery.     Active Hospital Problems    Diagnosis  POA    *S/p left hip fracture [Z87.81]  Not Applicable    Delirium [R41.0]  Yes    Severe protein-calorie malnutrition [E43]  Yes    Hypertension [I10]  Yes     Last Assessment & Plan:    Formatting of this note might be different from the original.   BP controlled.   Holding meds      GERD (gastroesophageal reflux disease) [K21.9]  Yes    Disease of thyroid gland [E07.9]  Yes    History of DVT (deep vein thrombosis) [Z86.718]  Not Applicable     Last Assessment & Plan:    Formatting of this note might be different from the original.   Holding Xarelto for now.   Repeat venous dopplers - negative.        Resolved Hospital Problems   No resolved problems to display.     Review of patient's allergies indicates:  No Known Allergies  Patient Active Problem List    Diagnosis Date Noted    Pressure ulcer of other site, stage 3 09/16/2024    Delirium 08/26/2024    Severe protein-calorie malnutrition 08/25/2024    S/p left hip fracture 08/23/2024    Hypertension 03/01/2024    GERD (gastroesophageal reflux disease) 03/01/2024    Disease of thyroid gland 03/01/2024    History of DVT (deep vein thrombosis) 02/25/2024       Scheduled Meds:    collagenase  1 g Topical (Top) Daily    docusate sodium  100 mg Oral BID    ferrous sulfate  1 tablet Oral TID WM    levothyroxine  112 mcg Oral Before breakfast    nicotine  1 patch Transdermal Daily    pantoprazole  40 mg Oral Daily    polyethylene glycol  17 g Oral Every other day    potassium chloride  10 mEq Oral Daily    QUEtiapine  25 mg Oral QHS    rivaroxaban  10 mg Oral Daily     Continuous Infusions:   PRN Meds:   Current Facility-Administered Medications:     0.9% NaCl, , Intravenous, PRN    acetaminophen, 650  mg, Oral, Q6H PRN    albuterol-ipratropium, 3 mL, Nebulization, Q6H PRN    bisacodyL, 10 mg, Rectal, Daily PRN    calcium carbonate, 500 mg, Oral, BID PRN    guaiFENesin 100 mg/5 ml, 200 mg, Oral, Q4H PRN    HYDROcodone-acetaminophen, 1 tablet, Oral, Q6H PRN    linaCLOtide, 145 mcg, Oral, Daily PRN    LORazepam, 0.5 mg, Oral, Q12H PRN    magnesium hydroxide 400 mg/5 ml, 30 mL, Oral, Daily PRN    melatonin, 6 mg, Oral, Nightly PRN    ondansetron, 4 mg, Oral, Q8H PRN    peg 400-hypromellose-glycerin, 2 drop, Ophthalmic, PRN    senna-docusate 8.6-50 mg, 1 tablet, Oral, BID PRN    sodium chloride 0.9%, 10 mL, Intravenous, PRN    OBJECTIVE:     Vital Signs (Last 24H)  Temp:  [97.8 °F (36.6 °C)-98 °F (36.7 °C)]   Pulse:  [58-65]   Resp:  [18]   BP: ()/(54-74)   SpO2:  [96 %-97 %]     Laboratory:  CBC:   Recent Labs   Lab 09/27/24  0533   WBC 5.19   RBC 3.07*   HGB 9.4*   HCT 30.3*      MCV 98.7*   MCH 30.6   MCHC 31.0*     BMP:   Recent Labs   Lab 09/27/24  0533   GLU 87      K 4.0      CO2 26   BUN 37*   CREATININE 1.47*   CALCIUM 9.6         ASSESSMENT/PLAN:     Estimated Creatinine Clearance: 20.2 mL/min (A) (based on SCr of 1.47 mg/dL (H)).  Medications reviewed, no dose adjustments needed . Weekly swing bed medication regimen review completed. Will continue to monitor.

## 2024-10-01 NOTE — PT/OT/SLP PROGRESS
Physical Therapy Treatment    Patient Name:  Yanet Viramontes   MRN:  [id number]    Recommendations:     Discharge Recommendations: Low Intensity Therapy  Discharge Equipment Recommendations: walker, rolling (youth size)  Barriers to discharge: None    Assessment:     Yanet Viramontes is a 84 y.o. female admitted with a medical diagnosis of S/p left hip fracture.  She presents with the following impairments/functional limitations: weakness, impaired endurance, gait instability, impaired functional mobility .  Pt pleasant, motivated to do well; still fatigues easily and requires rest breaks    Rehab Prognosis: Good; patient would benefit from acute skilled PT services to address these deficits and reach maximum level of function.    Recent Surgery: * No surgery found *      Plan:     During this hospitalization, patient to be seen 5 x/week to address the identified rehab impairments via gait training, therapeutic activities, therapeutic exercises and progress toward the following goals:    Plan of Care Expires:  10/04/24    Subjective     Chief Complaint: none  Patient/Family Comments/goals: agrees to PT tx  Pain/Comfort:  Pain Rating 1: 0/10      Objective:     Communicated with patient, daughter prior to session.  Patient found up in chair with peripheral IV upon PT entry to room.     General Precautions: Standard, fall  Orthopedic Precautions: LLE weight bearing as tolerated  Braces:    Respiratory Status: Room air     Functional Mobility:  Transfers:     Sit to Stand:  contact guard assistance with rolling walker  Gait: 110ft with rolling walker, CGA and vc's for step/stride length, speed control, reciprocal pattern; pt with 50% improvement/carry over      AM-PAC 6 CLICK MOBILITY          Treatment & Education:  Nustep x 8 minutes   Sit to stand transfers x 10 repetitions with vc's for eccentric control, proper and safe technique  Gait training as noted above    Patient left up in chair with call button in reach and  daughter present..    GOALS:   Multidisciplinary Problems       Physical Therapy Goals          Problem: Physical Therapy    Goal Priority Disciplines Outcome Interventions   Physical Therapy Goal     PT, PT/OT Progressing    Description: STG - 2 weeks  1. Pt will transfer supine to/from sit with mod Ax1.-MET  2. Pt will perform STS and bed transfer with mod Ax1.-MET  3. Pt will have LLE strength of 3-/5.-MET  4. Pt will amb with RW x 20 ft with mod Ax1.-MET    LTG - 4 weeks  1. Pt will transfer supine to/from sit with min Ax1.-MET  2. Pt will perform STS and bed transfer with min Ax1.-MET  3. Pt will have LLE strength of 3/5.-MET  4. Pt will amb with RW x 50 ft with min Ax1.-MET  5. Pt's ROM LLE will be WFL.-MET  6. Pt will transfer supine to/from sit with SBA.  2. Pt will perform STS and bed transfer with SBA.  3. Pt will have LLE strength of 3/5.-MET  4. Pt will amb with RW x 100 ft with SBA.                       Time Tracking:     PT Received On: 10/01/24  PT Start Time: 0945     PT Stop Time: 1012  PT Total Time (min): 27 min     Billable Minutes: Gait Training 10 and Therapeutic Exercise 17    Treatment Type: Treatment  PT/PTA: PTA     Number of PTA visits since last PT visit: 2     10/01/2024

## 2024-10-02 PROCEDURE — 25000003 PHARM REV CODE 250: Performed by: HOSPITALIST

## 2024-10-02 PROCEDURE — 97110 THERAPEUTIC EXERCISES: CPT | Mod: CQ

## 2024-10-02 PROCEDURE — 25000003 PHARM REV CODE 250: Performed by: NURSE PRACTITIONER

## 2024-10-02 PROCEDURE — 97535 SELF CARE MNGMENT TRAINING: CPT

## 2024-10-02 PROCEDURE — 27000987 HC MATTRESS, MATRIX LOW PROFILE

## 2024-10-02 PROCEDURE — 97110 THERAPEUTIC EXERCISES: CPT

## 2024-10-02 PROCEDURE — 97530 THERAPEUTIC ACTIVITIES: CPT

## 2024-10-02 PROCEDURE — 97530 THERAPEUTIC ACTIVITIES: CPT | Mod: CQ

## 2024-10-02 PROCEDURE — 27000958

## 2024-10-02 PROCEDURE — 99900035 HC TECH TIME PER 15 MIN (STAT)

## 2024-10-02 PROCEDURE — 97116 GAIT TRAINING THERAPY: CPT | Mod: CQ

## 2024-10-02 PROCEDURE — S4991 NICOTINE PATCH NONLEGEND: HCPCS | Performed by: NURSE PRACTITIONER

## 2024-10-02 PROCEDURE — 11000004 HC SNF PRIVATE

## 2024-10-02 RX ADMIN — NICOTINE 1 PATCH: 21 PATCH, EXTENDED RELEASE TRANSDERMAL at 08:10

## 2024-10-02 RX ADMIN — HYDROCODONE BITARTRATE AND ACETAMINOPHEN 1 TABLET: 5; 325 TABLET ORAL at 08:10

## 2024-10-02 RX ADMIN — RIVAROXABAN 10 MG: 10 TABLET, FILM COATED ORAL at 08:10

## 2024-10-02 RX ADMIN — Medication 6 MG: at 08:10

## 2024-10-02 RX ADMIN — COLLAGENASE SANTYL 1 G: 250 OINTMENT TOPICAL at 08:10

## 2024-10-02 RX ADMIN — ACETAMINOPHEN 650 MG: 325 TABLET ORAL at 08:10

## 2024-10-02 RX ADMIN — DOCUSATE SODIUM 100 MG: 100 CAPSULE, LIQUID FILLED ORAL at 08:10

## 2024-10-02 RX ADMIN — QUETIAPINE FUMARATE 25 MG: 25 TABLET ORAL at 08:10

## 2024-10-02 RX ADMIN — LEVOTHYROXINE SODIUM 112 MCG: 0.11 TABLET ORAL at 05:10

## 2024-10-02 RX ADMIN — FERROUS SULFATE TAB 325 MG (65 MG ELEMENTAL FE) 1 EACH: 325 (65 FE) TAB at 07:10

## 2024-10-02 RX ADMIN — PANTOPRAZOLE SODIUM 40 MG: 40 TABLET, DELAYED RELEASE ORAL at 08:10

## 2024-10-02 RX ADMIN — POTASSIUM CHLORIDE 10 MEQ: 750 CAPSULE, EXTENDED RELEASE ORAL at 08:10

## 2024-10-02 RX ADMIN — POLYETHYLENE GLYCOL 3350 17 G: 17 POWDER, FOR SOLUTION ORAL at 08:10

## 2024-10-02 NOTE — PT/OT/SLP PROGRESS
Patient: Kiera Iraheta    Procedure: Procedure(s):   SECTION       Anesthesia Type:  CSE    Note:  Disposition: Inpatient   Postop Pain Control: Uneventful            Sign Out: Well controlled pain   PONV: No   Neuro/Psych: Uneventful            Sign Out: Acceptable/Baseline neuro status   Airway/Respiratory: Uneventful            Sign Out: Acceptable/Baseline resp. status   CV/Hemodynamics: Uneventful            Sign Out: Acceptable CV status; No obvious hypovolemia; No obvious fluid overload   Other NRE: NONE   DID A NON-ROUTINE EVENT OCCUR? No           Last vitals:  Vitals:    10/23/23 1743 10/23/23 1744 10/23/23 2040   BP:   124/77   Pulse:   114   Resp:   16   Temp:   36.7  C (98  F)   SpO2: 91% 98% 98%       Electronically Signed By: Vale Barton MD  2023  9:19 PM   Physical Therapy Treatment    Patient Name:  Yanet Viramontes   MRN:  [id number]    Recommendations:     Discharge Recommendations: Low Intensity Therapy  Discharge Equipment Recommendations: walker, rolling (youth size)  Barriers to discharge: None    Assessment:     Yanet Viramontes is a 84 y.o. female admitted with a medical diagnosis of S/p left hip fracture.  She presents with the following impairments/functional limitations: weakness, impaired endurance.  Pt progressing exceptionally well with gait from her room to the rehab gym, sit to stand transfers x 10, standing exercises.     Rehab Prognosis: Good; patient would benefit from acute skilled PT services to address these deficits and reach maximum level of function.    Recent Surgery: * No surgery found *      Plan:     During this hospitalization, patient to be seen 5 x/week to address the identified rehab impairments via gait training, therapeutic activities, therapeutic exercises and progress toward the following goals:    Plan of Care Expires:  10/04/24    Subjective     Chief Complaint: none  Patient/Family Comments/goals: agrees to PT tx   Pain/Comfort:  Pain Rating 1: 0/10      Objective:     Communicated with patient prior to session.  Patient found HOB elevated with peripheral IV upon PT entry to room.     General Precautions: Standard, fall  Orthopedic Precautions: LLE weight bearing as tolerated  Braces:    Respiratory Status: Room air     Functional Mobility:  Bed Mobility:     Bridging: modified independence  Supine to Sit: modified independence  Sit to Supine: contact guard assistance  Transfers:     Sit to Stand:  modified independence with rolling walker  Bed to Chair: contact guard assistance and minimum assistance with  rolling walker  using  Step Transfer  Gait: 130ft with rolling walker, CGA/SVN, vc's for step/stride length which has improved overall       AM-PAC 6 CLICK MOBILITY  Turning over in bed (including adjusting bedclothes, sheets and  blankets)?: 3  Sitting down on and standing up from a chair with arms (e.g., wheelchair, bedside commode, etc.): 3  Moving from lying on back to sitting on the side of the bed?: 3  Moving to and from a bed to a chair (including a wheelchair)?: 3  Need to walk in hospital room?: 3  Climbing 3-5 steps with a railing?: 3  Basic Mobility Total Score: 18       Treatment & Education:  Bed mobility, transfers and gait as noted  Nustep x 8 minutes   Sit to  // bars x 10 repetitions w/ BUE support  Standing BLE exercises x 10-12 repetitions each in // bars: marching, mini squats, heel/toe raises, hip abduction    Patient left sitting edge of bed with call button in reach..    GOALS:   Multidisciplinary Problems       Physical Therapy Goals          Problem: Physical Therapy    Goal Priority Disciplines Outcome Interventions   Physical Therapy Goal     PT, PT/OT Progressing    Description: STG - 2 weeks  1. Pt will transfer supine to/from sit with mod Ax1.-MET  2. Pt will perform STS and bed transfer with mod Ax1.-MET  3. Pt will have LLE strength of 3-/5.-MET  4. Pt will amb with RW x 20 ft with mod Ax1.-MET    LTG - 4 weeks  1. Pt will transfer supine to/from sit with min Ax1.-MET  2. Pt will perform STS and bed transfer with min Ax1.-MET  3. Pt will have LLE strength of 3/5.-MET  4. Pt will amb with RW x 50 ft with min Ax1.-MET  5. Pt's ROM LLE will be WFL.-MET  6. Pt will transfer supine to/from sit with SBA.  2. Pt will perform STS and bed transfer with SBA.  3. Pt will have LLE strength of 3/5.-MET  4. Pt will amb with RW x 100 ft with SBA.                       Time Tracking:     PT Received On: 10/02/24  PT Start Time: 1340     PT Stop Time: 1418  PT Total Time (min): 38 min     Billable Minutes: Gait Training 10, Therapeutic Activity 13, and Therapeutic Exercise 15    Treatment Type: Treatment  PT/PTA: PTA     Number of PTA visits since last PT visit: 3     10/02/2024

## 2024-10-02 NOTE — PLAN OF CARE
Problem: Adult Inpatient Plan of Care  Goal: Patient-Specific Goal (Individualized)  10/2/2024 1647 by Steve Staley RN  Outcome: Progressing  10/2/2024 1122 by Steve Staley RN  Outcome: Progressing  Goal: Absence of Hospital-Acquired Illness or Injury  10/2/2024 1647 by Steve Staley RN  Outcome: Progressing  10/2/2024 1122 by Steve Staley RN  Outcome: Progressing  Goal: Optimal Comfort and Wellbeing  10/2/2024 1647 by Steve Staley RN  Outcome: Progressing  10/2/2024 1122 by Steve Staley RN  Outcome: Progressing  Goal: Readiness for Transition of Care  10/2/2024 1647 by Steve Staley RN  Outcome: Progressing  10/2/2024 1122 by Steve Staley RN  Outcome: Progressing  Goal: Plan of Care Review  10/2/2024 1647 by Steve Staley RN  Outcome: Progressing  10/2/2024 1122 by Steve Staley RN  Outcome: Progressing

## 2024-10-02 NOTE — PT/OT/SLP DISCHARGE
Occupational Therapy Discharge Summary    Yanet Viramontes  MRN: [id number]   Principal Problem: S/p left hip fracture      Patient Discharged from acute Occupational Therapy on 10/2/2024.  Please refer to prior OT note dated 10/2/2024 for functional status.    Assessment:      Patient has met all goals and is not appropriate for therapy.    Objective:     GOALS:   Multidisciplinary Problems       Occupational Therapy Goals       Not on file              Multidisciplinary Problems (Resolved)          Problem: Occupational Therapy    Goal Priority Disciplines Outcome Interventions   Occupational Therapy Goal   (Resolved)     OT, PT/OT Met    Description: STG: within 2 weeks  Pt will perform grooming with min a at sinkside from seated MET  Pt will bathe with mod a MET  Pt will perform UE dressing with mod a MET  Pt will perform LE dressing with mod a MET  Pt will sit EOB x 5 min with mod to min assistance MET  Pt will transfer bed/chair/bsc with mod a MET  Pt will perform standing task x 1 min with mod assistance x 1 MET  Pt will tolerate 15 minutes of tx without fatigue MET    UPDATED STG's 9/9/2024:  Pt will perform grooming with SBA at sinkside from seated MET  Pt will bathe with min a MET though family is providing more support for this task than patient requires at this time (discussed this with them and how to promote greater independence with safety support measures)  Pt will perform UE dressing with min a MET  Pt will perform LE dressing with min a MET  Pt will sit EOB x 5 min with min assistance  MET  Pt will transfer bed/chair/bsc with CGA  MET inconsistently  Pt will perform standing task x 3 min with CGA MET  Pt will tolerate 45 minutes of tx without fatigue MET    UPDATED STG's 9/24/2024:  Pt will bathe with SBA at sinkside with AE OVERALL MET with SVN and cueing provided  Pt will perform UE dressing with SBA  MET  Pt will perform LE dressing with SBA and with AE  MET  Pt will transfer bed/chair/bsc with SBA  MET  Pt will perform standing task x 5 min with SBA MET        LT.Restore to max I with self care and mobility. READY TO DISCHARGE HOME ON SATURDAY 10/5/2024 WITH FAMILY                         Reasons for Discontinuation of Therapy Services  Satisfactory goal achievement.      Plan:     Patient Discharged to: Home with Home Health Service and after d/c from hospital on this coming 10/5/2024    10/2/2024

## 2024-10-02 NOTE — PT/OT/SLP PROGRESS
Occupational Therapy   Treatment    Name: Yanet Viramontes  MRN: [id number]  Admitting Diagnosis:  S/p left hip fracture       Recommendations:     Discharge Recommendations: Low Intensity Therapy  Discharge Equipment Recommendations:   (TBD)  Barriers to discharge:  None    Assessment:     Yanet Viramontes is a 84 y.o. female with a medical diagnosis of S/p left hip fracture.  She presents with sitting up in w/c with family in room. Performance deficits affecting function are weakness, impaired endurance, impaired self care skills, impaired functional mobility, gait instability, impaired balance, impaired cognition, decreased safety awareness, impaired skin, orthopedic precautions.     Rehab Prognosis:  Good; patient would benefit from acute skilled OT services to address these deficits and reach maximum level of function.       Plan:     Patient to be seen 3 x/week to address the above listed problems via self-care/home management, therapeutic activities, therapeutic exercises  Plan of Care Expires: 09/27/24  Plan of Care Reviewed with: patient, caregiver, daughter, family    Subjective     Chief Complaint: still gets some stiffness to L hip  Patient/Family Comments/goals: home with family SVN  Pain/Comfort:       Objective:     Communicated with: pt and family prior to session.  Patient found up in chair with  (no lines or drains) upon OT entry to room.    General Precautions: Standard, fall    Orthopedic Precautions:LLE weight bearing as tolerated  Braces: N/A  Respiratory Status: Room air     Occupational Performance:     Bed Mobility:    Pt completing with mod I use of hospital bed rails for support    Functional Mobility/Transfers:  Patient completed Sit <> Stand Transfer with contact guard assistance  with  rolling walker   Functional Mobility: walking in hallway with CGA with use  of RW >100 feet with w/c trailing not closely, however    Activities of Daily Living:  Pt family state she is not using LB AE with just  "setup, "we are not helping her, she is doing it herself."  She uses RW to get to bathroom where her BSC frame is over regular toilet to provide place to push up for support, SVN  only required overall      [facility] 6 Click ADL: 24    Treatment & Education:  To improve UB endurance, strength, OT facilitated pt with:  UBE x 10 min with no RB's throughout, low level resist F/R motion with cueing from OT to do so  GREEN PUTTY to simulate kitchen prep task and improve FM and , distributing to pt today to take home, instructions given to pt and family    To increase functional mobility skills:  OT engaged pt in walking in hallway to get to OT room >100 feet, pt requiring no RB's and occasional tactile cueing for safety/CGA, fatigued and needed to ride the rest of the way to OT clinic in hallway in w/c      Patient left up in chair with call button in reach and family present    GOALS:   Multidisciplinary Problems       Occupational Therapy Goals       Not on file              Multidisciplinary Problems (Resolved)          Problem: Occupational Therapy    Goal Priority Disciplines Outcome Interventions   Occupational Therapy Goal   (Resolved)     OT, PT/OT Met    Description: STG: within 2 weeks  Pt will perform grooming with min a at sinkside from seated MET  Pt will bathe with mod a MET  Pt will perform UE dressing with mod a MET  Pt will perform LE dressing with mod a MET  Pt will sit EOB x 5 min with mod to min assistance MET  Pt will transfer bed/chair/bsc with mod a MET  Pt will perform standing task x 1 min with mod assistance x 1 MET  Pt will tolerate 15 minutes of tx without fatigue MET    UPDATED STG's 9/9/2024:  Pt will perform grooming with SBA at sinkside from seated MET  Pt will bathe with min a MET though family is providing more support for this task than patient requires at this time (discussed this with them and how to promote greater independence with safety support measures)  Pt will perform UE " dressing with min a MET  Pt will perform LE dressing with min a MET  Pt will sit EOB x 5 min with min assistance  MET  Pt will transfer bed/chair/bsc with CGA  MET inconsistently  Pt will perform standing task x 3 min with CGA MET  Pt will tolerate 45 minutes of tx without fatigue MET    UPDATED STG's 2024:  Pt will bathe with SBA at sinkside with AE OVERALL MET with SVN and cueing provided  Pt will perform UE dressing with SBA  MET  Pt will perform LE dressing with SBA and with AE  MET  Pt will transfer bed/chair/bsc with SBA MET  Pt will perform standing task x 5 min with SBA MET        LT.Restore to max I with self care and mobility. READY TO DISCHARGE HOME ON SATURDAY 10/5/2024 WITH FAMILY                         Time Tracking:     OT Date of Treatment: 10/02/24  OT Start Time: 903  OT Stop Time: 943  OT Total Time (min): 40 min    Billable Minutes:Self Care/Home Management 12  Therapeutic Activity 15  Therapeutic Exercise 13    OT/CRISELDA: OT          10/2/2024

## 2024-10-02 NOTE — PLAN OF CARE
Problem: Occupational Therapy  Goal: Occupational Therapy Goal  Description: STG: within 2 weeks  Pt will perform grooming with min a at sinkside from seated MET  Pt will bathe with mod a MET  Pt will perform UE dressing with mod a MET  Pt will perform LE dressing with mod a MET  Pt will sit EOB x 5 min with mod to min assistance MET  Pt will transfer bed/chair/bsc with mod a MET  Pt will perform standing task x 1 min with mod assistance x 1 MET  Pt will tolerate 15 minutes of tx without fatigue MET    UPDATED STG's 2024:  Pt will perform grooming with SBA at sinkside from seated MET  Pt will bathe with min a MET though family is providing more support for this task than patient requires at this time (discussed this with them and how to promote greater independence with safety support measures)  Pt will perform UE dressing with min a MET  Pt will perform LE dressing with min a MET  Pt will sit EOB x 5 min with min assistance  MET  Pt will transfer bed/chair/bsc with CGA  MET inconsistently  Pt will perform standing task x 3 min with CGA MET  Pt will tolerate 45 minutes of tx without fatigue MET    UPDATED STG's 2024:  Pt will bathe with SBA at sinkside with AE OVERALL MET with SVN and cueing provided  Pt will perform UE dressing with SBA  MET  Pt will perform LE dressing with SBA and with AE  MET  Pt will transfer bed/chair/bsc with SBA MET  Pt will perform standing task x 5 min with SBA MET        LT.Restore to max I with self care and mobility. READY TO DISCHARGE HOME ON SATURDAY 10/5/2024 WITH FAMILY    Outcome: Met

## 2024-10-03 PROCEDURE — 27000958

## 2024-10-03 PROCEDURE — 27000987 HC MATTRESS, MATRIX LOW PROFILE

## 2024-10-03 PROCEDURE — 99900035 HC TECH TIME PER 15 MIN (STAT)

## 2024-10-03 PROCEDURE — 25000003 PHARM REV CODE 250: Performed by: HOSPITALIST

## 2024-10-03 PROCEDURE — 25000003 PHARM REV CODE 250: Performed by: NURSE PRACTITIONER

## 2024-10-03 PROCEDURE — 97116 GAIT TRAINING THERAPY: CPT | Mod: CQ

## 2024-10-03 PROCEDURE — 11000004 HC SNF PRIVATE

## 2024-10-03 PROCEDURE — 97110 THERAPEUTIC EXERCISES: CPT | Mod: CQ

## 2024-10-03 PROCEDURE — S4991 NICOTINE PATCH NONLEGEND: HCPCS | Performed by: NURSE PRACTITIONER

## 2024-10-03 RX ADMIN — POTASSIUM CHLORIDE 10 MEQ: 750 CAPSULE, EXTENDED RELEASE ORAL at 08:10

## 2024-10-03 RX ADMIN — ACETAMINOPHEN 650 MG: 325 TABLET ORAL at 08:10

## 2024-10-03 RX ADMIN — LEVOTHYROXINE SODIUM 112 MCG: 0.11 TABLET ORAL at 06:10

## 2024-10-03 RX ADMIN — Medication 6 MG: at 08:10

## 2024-10-03 RX ADMIN — HYDROCODONE BITARTRATE AND ACETAMINOPHEN 1 TABLET: 5; 325 TABLET ORAL at 01:10

## 2024-10-03 RX ADMIN — DOCUSATE SODIUM 100 MG: 100 CAPSULE, LIQUID FILLED ORAL at 08:10

## 2024-10-03 RX ADMIN — HYDROCODONE BITARTRATE AND ACETAMINOPHEN 1 TABLET: 5; 325 TABLET ORAL at 08:10

## 2024-10-03 RX ADMIN — QUETIAPINE FUMARATE 25 MG: 25 TABLET ORAL at 08:10

## 2024-10-03 RX ADMIN — COLLAGENASE SANTYL 1 G: 250 OINTMENT TOPICAL at 08:10

## 2024-10-03 RX ADMIN — PANTOPRAZOLE SODIUM 40 MG: 40 TABLET, DELAYED RELEASE ORAL at 08:10

## 2024-10-03 RX ADMIN — NICOTINE 1 PATCH: 21 PATCH, EXTENDED RELEASE TRANSDERMAL at 08:10

## 2024-10-03 RX ADMIN — RIVAROXABAN 10 MG: 10 TABLET, FILM COATED ORAL at 08:10

## 2024-10-03 NOTE — PLAN OF CARE
Problem: Adult Inpatient Plan of Care  Goal: Patient-Specific Goal (Individualized)  Outcome: Progressing  Goal: Optimal Comfort and Wellbeing  Outcome: Progressing     Problem: Fall Injury Risk  Goal: Absence of Fall and Fall-Related Injury  Outcome: Progressing

## 2024-10-03 NOTE — PLAN OF CARE
[facility]   Interdisciplinary Team Meeting    Patient: Yanet Viramontes   Today's Date: 10/3/2024   Estimated D/C Date: 10/7/2024        Physician: Daren Nicole DO Nurse Practitioner: Laurita Zavala NP   Pharmacy: Brian Barriga, PharmD Unit Director: Temitope Chun RN   : Israel Graham RN Physical/Occupational Therapy: Cynthia Flores OT   Speech Therapy: ST Jasmina Activity Therapy: Geetha Carter   Nursing: Temitope Chun RN  Respiratory: Lia Watkins,  Dietary: Kacey Tinoco RD  Other: n/a     Nursing  New Symptoms/Problems: n/a  Last Bowel Movement: 10/02/24  Urine: continent  [place]: No  Bowel: continent   Constipated: No  Diarrhea: No   Isolation: No  Wound Care: Yes  Wound Location/Tx: back  Cognition: WNL  Aspiration Precautions: No  Comment(s): n/a    Respiratory:   O2 Device: Room Air  O2 Flow: n/a  SpO2: 98%  Neb Tx: Yes  Comment(s): n/a    Dietary    Nutrition: Regular  Comment(s): n/a    Speech Therapy  Speech/Swallowing: No current speech or swallowing issues  Comment(s): No    Physical Therapy  Gait/Assistive Device: ambulatory with RW ELOS: Plan to DC 10/7/2024    Transfers: Modified Independent  Bed Mobility: Modified Independent Range of Motion/Restrictions: n/a  Comment(s): n/a     Occupational Therapy  Eating/Grooming: Independent Toileting: Independent   Bathing: Independent Dressing (Upper Body): Independent   Dressing (Lower Body): Modified Independent Comment(s): n/a     Activity Therapy  Level of participation: Active participation  Comment(s): n/a    Pharmacy   Medication Changes (see MD orders in chart): No  Labs Reviewed: Yes  New Lab Orders: No  Comment(s): n/a      Tx Plan/Recommendations reviewed with family and/or patient on (date) 10/3.  Additional family Conference/Training: patient to dc home with home health on 10/5. DME ordered from [facility]  D/C Plan/Recommendations: Home with   and Home with family  QUINN: 10/7/2024  Comment(s): n/a

## 2024-10-03 NOTE — PLAN OF CARE
Problem: Adult Inpatient Plan of Care  Goal: Readiness for Transition of Care  Outcome: Met  Goal: Plan of Care Review  Outcome: Met

## 2024-10-03 NOTE — CONSULTS
[facility] Surgical [facility]  Adult Nutrition  Consult Note         Reason for Assessment  Reason For Assessment: RD follow-up assess/MST 3  Nutrition Risk Screen: no indicators present    Assessment and Plan  10/03/2024 RD Follow up : Patient continue on a regular diet with Boost Plus al meals. Po intakes 25-75% per flowsheets. Current weight 45.6 kg. Notified of issue being addressed with bed scales. Unsure of accuracy of weights. Recommend to continue diet as tolerated. Encourage po intakes. RD Following.     9/26/2024 Patient continues on a regular diet with Boost Plus all meals. Po intakes continue to improve with 50% intakes on average.Current weight 51.1 kg. Noted weight inconsistencies. Discussed at IDT meeting.  Recommend to continue diet as tolerated. Encourage continued po intakes. RD Following.    9/19/2024 RD Follow up: Patient continues on a regular diet with Boost Plus all meals. Po intakes continue to improve with 50% intakes on average. No new weight noted since last follow up. Recommend to continue diet as tolerated. Encourage continued po intakes. RD Following.    9/12/2024 RD Follow up: Patient continues on a regular diet with Boost Plus all meals. Po intakes continue to improve with 50% consistently documented per flowsheets. Current weight 55.1 kg with desired weight increase since admission. Last BM noted 9/11.    Diet order and intakes adequate to meet estimated energy needs. Recommend to continue diet and supplements as tolerated. RD Following.     9/05/2024 RD Follow up: Patient with improvement in condition and is now ordered a regular diet with Boost Plus TID. Family present at RD visit and report patient prefers regular diet. Appetite improved to 25-50% and drinking some of Boost. Patient denies dietary preferences. Current weight 54.9 kg.     Continue diet and ONS as tolerated. RD Following.     8/29/2024 RD follow up: Patient upgraded to a MCS diet with chopped meats  per SLP with high aspiration risk and to feed only when patient alert enough to swallow. Patient continues with poor intakes with decreased alertness and delirium. 0% meal intakes documented over the last several meals per flowsheets.     IVF at 50 ml/hr ordered. Noted patient with some improvement in confusion today. Continue diet as tolerated and feed as patient alert. Consider alterate means of nutrition as aligns with plan of care if po intakes don't improve with decrease in confusion. RD Following.     Consult received and appreciated. Patient admitted 8/23 with a dx of s/p L Hip fx and hx of cancer. Patient is ordered a full liquid diet with issues swallowing. RN reports patient having to be suctioned and having trouble clearing secretions. SLP evaluation pending.     Patient is 54 kg with a BMI of 24.04 which is WNL. Patient with significant weight loss over the last 6 months of 14% per chart review with weight at prior facility of 62.6 kg noted Feb 2024. Patient with poor po intakes endorsed on MST screen.    Patient meets ASPEN criteria for severe protein calorie malnutrition due to weight loss and poor intakes. See malnutrition assessment.     Recommend to add Boost Plus as tolerated. RD Following.           Learning Needs/Social Determinants of Health  Learning Assessment       08/23/2024 1906 [facility] (8/23/2024 - Present)   Created by Addie Alegre, RN - RN (Nurse) Status: Complete                 PRIMARY LEARNER     Primary Learner Name:  Karla [facility] 08/23/2024 1906    Relationship:  Family [facility] 08/23/2024 1906    Does the primary learner have any barriers to learning?:  No Barriers [facility] 08/23/2024 1906    What is the preferred language of the primary learner?:  English [facility] 08/23/2024 1906    Is an  required?:  No [facility] 08/23/2024 1906    How does the primary learner prefer to learn new concepts?:  Listening [facility] 08/23/2024 1906    How often do you  need to have someone help you read instructions, pamphlets, or written material from your doctor or pharmacy?:  Never KP - 08/23/2024 1906        CO-LEARNER #1     No question answered        CO-LEARNER #2     No question answered        SPECIAL TOPICS     No question answered        ANSWERED BY:     No question answered        Comments         Edit History       Addie Alegre, RN - RN (Nurse)   08/23/2024 1906                           Social Drivers of Health     Tobacco Use: High Risk (9/9/2024)    Patient History     Smoking Tobacco Use: Every Day     Smokeless Tobacco Use: Never     Passive Exposure: Not on file   Alcohol Use: Not At Risk (8/26/2024)    AUDIT-C     Frequency of Alcohol Consumption: Never     Average Number of Drinks: Patient does not drink     Frequency of Binge Drinking: Never   Financial Resource Strain: Low Risk  (8/26/2024)    Overall Financial Resource Strain (CARDIA)     Difficulty of Paying Living Expenses: Not hard at all   Food Insecurity: No Food Insecurity (8/26/2024)    Hunger Vital Sign     Worried About Running Out of Food in the Last Year: Never true     Ran Out of Food in the Last Year: Never true   Transportation Needs: No Transportation Needs (8/26/2024)    TRANSPORTATION NEEDS     Transportation : No   Physical Activity: Insufficiently Active (8/26/2024)    Exercise Vital Sign     Days of Exercise per Week: 4 days     Minutes of Exercise per Session: 30 min   Stress: No Stress Concern Present (8/26/2024)    [employer] - Occupational Stress Questionnaire     Feeling of Stress : Only a little   Housing Stability: Low Risk  (8/26/2024)    Housing Stability Vital Sign     Unable to Pay for Housing in the Last Year: No     Homeless in the Last Year: No   Depression: Not on file   Utilities: Not At Risk (8/26/2024)    [facility] Utilities     Threatened with loss of utilities: No   Health Literacy: Inadequate Health Literacy (8/26/2024)     Health  "Literacy     Frequency of need for help with medical instructions: Sometimes   Social Isolation: Socially Integrated (8/26/2024)    Social Isolation     Social Isolation: 1            Malnutrition  Is Patient Malnourished: Yes Malnutrition Assessment  Malnutrition Context: chronic illness  Malnutrition Level: severe          Weight Loss (Malnutrition): greater than 10% in 6 months  Energy Intake (Malnutrition): less than 75% for greater than or equal to 1 month   Orbital Region (Subcutaneous Fat Loss): severe depletion   [place] Region (Muscle Loss): severe depletion                 Nutrition Diagnosis  Malnutrition (Severe) related to Appetite loss, Chronic illness, and Dysphagia/ difficulty swallowing as evidenced by weight loss of greater than 10% in 6 months, and energy intakes less than 75% for greater than or equal to 1 month  Comments: improving 10/3/24    No results for input(s): "GLU", "POCGLU" in the last 72 hours.    Comments on Glucose: elevated     Nutrition Prescription / Recommendations  Recommendation/Intervention: Recommend to advance diet appropriate and tolerated; add Boost Plus all meals  Goals: po intakes 50% during admission  Nutrition Goal Status: goal met  Communication of RD Recs: discussed on rounds  Current Diet Order: Regular  Nutrition Order Comments: 50% intakes per flowsheets  Oral Nutrition Supplement: Boost Plus all meals  Chewing or Swallowing Difficulty?: Swallowing difficulty  Recommended Diet:  as tolerated  Recommended Oral Supplement: Boost Plus [360kcals, 14g Protein, 45g Carbs] 3 times a day  Is Nutrition Support Recommended: [facility] Nutrition Support: No  Is Nutrition Education Recommended: No    Monitor and Evaluation  % current Intake: P.O. intake of 25 - 50 % and P.O. intake of 50 - 75 %  % intake to meet estimated needs: 50 - 75 %  Food and Nutrient Intake: energy intake, food and beverage intake  Food and Nutrient Adminstration: diet order  Anthropometric " "Measurements: height/length, weight, weight change, body mass index  Biochemical Data, Medical Tests and Procedures: electrolyte and renal panel, gastrointestinal profile, glucose/endocrine profile, inflammatory profile, lipid profile  Nutrition-Focused Physical Findings: overall appearance, head and eyes  Energy Calories Required: meeting needs  Protein Required: meeting needs  Fluid Required: meeting needs  Tolerance: tolerating    Current Medical Diagnosis and Past Medical History     Past Medical History:   Diagnosis Date    Anticoagulant long-term use     Cancer     Hypertension     Thyroid disease        Nutrition/Diet History  Spiritual, Cultural Beliefs, Cheondoism Practices, Values that Affect Care: no  Food Allergies: NKFA  Factors Affecting Nutritional Intake: decreased appetite    Lab/Procedures/Meds  No results for input(s): "NA", "K", "BUN", "CREATININE", "CALCIUM", "ALBUMIN", "CL", "ALT", "AST", "PHOS" in the last 72 hours.    Last A1c: No results found for: "HGBA1C"  Lab Results   Component Value Date    RBC 3.07 (L) 09/27/2024    HGB 9.4 (L) 09/27/2024    HCT 30.3 (L) 09/27/2024    MCV 98.7 (H) 09/27/2024    MCH 30.6 09/27/2024    MCHC 31.0 (L) 09/27/2024     Pertinent Labs Reviewed: reviewed  Pertinent Medications Reviewed: reviewed  Scheduled Meds:   collagenase  1 g Topical (Top) Daily    docusate sodium  100 mg Oral BID    ferrous sulfate  1 tablet Oral TID WM    levothyroxine  112 mcg Oral Before breakfast    nicotine  1 patch Transdermal Daily    pantoprazole  40 mg Oral Daily    polyethylene glycol  17 g Oral Every other day    potassium chloride  10 mEq Oral Daily    QUEtiapine  25 mg Oral QHS    rivaroxaban  10 mg Oral Daily     Continuous Infusions:      PRN Meds:.  Current Facility-Administered Medications:     0.9% NaCl, , Intravenous, PRN    acetaminophen, 650 mg, Oral, Q6H PRN    albuterol-ipratropium, 3 mL, Nebulization, Q6H PRN    bisacodyL, 10 mg, Rectal, Daily PRN    calcium " "carbonate, 500 mg, Oral, BID PRN    guaiFENesin 100 mg/5 ml, 200 mg, Oral, Q4H PRN    HYDROcodone-acetaminophen, 1 tablet, Oral, Q6H PRN    linaCLOtide, 145 mcg, Oral, Daily PRN    LORazepam, 0.5 mg, Oral, Q12H PRN    magnesium hydroxide 400 mg/5 ml, 30 mL, Oral, Daily PRN    melatonin, 6 mg, Oral, Nightly PRN    ondansetron, 4 mg, Oral, Q8H PRN    peg 400-hypromellose-glycerin, 2 drop, Ophthalmic, PRN    senna-docusate 8.6-50 mg, 1 tablet, Oral, BID PRN    sodium chloride 0.9%, 10 mL, Intravenous, PRN    Anthropometrics  Temp: 97.8 °F (36.6 °C)  Height Method: Stated  Height: 4' 11" (149.9 cm)  Height (inches): 59 in  Weight Method: Bed Scale  Weight: 45.6 kg (100 lb 8.5 oz)  Weight (lb): 100.53 lb  Ideal Body Weight (IBW), Female: 95 lb  % Ideal Body Weight, Female (lb): 125.26 %  BMI (Calculated): 24.5       Estimated/Assessed Needs      Temp: 97.8 °F (36.6 °C)Oral  Weight Used For Calorie Calculations: 54 kg (119 lb 0.8 oz)   Energy Need Method: Kcal/kg Energy Calorie Requirements (kcal): 7273-1784  Weight Used For Protein Calculations: 54 kg (119 lb 0.8 oz)  Protein Requirements: 43-54  Estimated Fluid Requirement Method: RDA Method    RDA Method (mL): 1350       Nutrition by Nursing  Diet/Nutrition Received: regular  Intake (%): 25%  Diet/Feeding Assistance: none  Diet/Feeding Tolerance: good  Last Bowel Movement: 10/02/24                Nutrition Follow-Up  RD Follow-up?: Yes      Nutrition Discharge Planning: recommend regular diet as tolerated with Boost/Ensure ONS on d/c            Available via Secure Chat  "

## 2024-10-03 NOTE — PROGRESS NOTES
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  This was discussed with family who do not wish to have further testing at this time.  She has been at [facility] since 8/19 following a fall at her home which resulted in fracture of the left greater trochanter which required surg. She had an episode of chest pain on Wed with no acute findings.  Echo revealed EF of 60%.  She is being admitted to [facility] for OT/ PT and medical management.       Pt is DNR     Overview/Hospital Course:  8/27 Agitation yesterday, did sleep last night and was good this am, however, after PT she became agitated and aggressive.  Will monitor and redirect.  Try Seroquel this PM this time.      8/28 Maybe a little better today.  She did sleep last night.  Was sitting up this am and not as agitated.  Will continue plan of care for now.  Hopefully some better tomorrow.     8/29 better night and this am, recognizing people and nurses.  Not eating or drinking much though.      8/30 Didn't sleep good last night, but she is alert today just tired     9/2 repair of hip fracture 2 weeks ago, will DC staples    9/6 some increased swelling in surgical leg, on Xarelto ppx already.  Did get a lot of fluid over last few days.     9/9 still some swelling, will dose lasix x 1 today and see how she responds.     9/12 Lasix did help with single dose, will dose again today.     9/13 Swelling better, sitting up eating lunch.  Doing well.     9/16 working  with therapy, continues to do well     9/23 She is much better, showing progress.     9/30 Continues to improve.  Working with therapy     10/03 Pt has continued to improved.  Is working well with therapy.  She is hoping to DC home this weekend but will need medical equipment to do so.  She will need a semi-electric/ electric hospital bed to assist with frequent position changes.  She has a lesion on the upper back that resulted from the fall and will need to keep from placing pressure on that area to allow healing.  She will also need to be able to elevate the HOB at least 35-40 degrees due to COPD.  As well as, elevate the head and feet to help reduce back pain related to kyphosis. She will also need a bedside commode chair due to her room at home is 150 feet from the bathroom and the farthest she has walked with therapy is  feet using a walker with occasional rest stops. She will need a shower chair since she is unable to set over into the tub due to recent hip fracture/ repair.   We will place an order for items needed.      Interval History: improving    Review of Systems   Constitutional: Negative.    Respiratory: Negative.     Cardiovascular: Negative.    Gastrointestinal: Negative.    Musculoskeletal:  Positive for arthralgias and back pain.        Left hip pain at times, back pain at times   Skin:  Positive for wound.        Wound to mid back - that resulted from fall, healing    Neurological:  Positive for weakness.        Generalized weakness but gradually improving     Objective:     Vital Signs (Most Recent):  Temp: 97.8 °F (36.6 °C) (10/03/24 0722)  Pulse: (!) 51 (10/03/24 0722)  Resp: (!) 24 (10/03/24 0722)  BP: (!) 167/72 (10/03/24 0722)  SpO2: 98 % (10/03/24 0722) Vital Signs (24h Range):  Temp:  [97.8 °F (36.6 °C)-98.2 °F (36.8 °C)] 97.8 °F (36.6 °C)  Pulse:  [51-81] 51  Resp:  [19-24] 24  SpO2:  [98 %-99 %] 98 %  BP: (167-176)/(68-72) 167/72     Weight: 45.6 kg (100 lb 8.5 oz)  Body mass  index is 20.3 kg/m².    Intake/Output Summary (Last 24 hours) at 10/3/2024 1249  Last data filed at 10/3/2024 1239  Gross per 24 hour   Intake 760 ml   Output --   Net 760 ml         Physical Exam  Constitutional:       General: She is not in acute distress.  HENT:      Mouth/Throat:      Mouth: Mucous membranes are moist.   Cardiovascular:      Rate and Rhythm: Normal rate and regular rhythm.   Pulmonary:      Effort: Pulmonary effort is normal.      Breath sounds: Normal breath sounds.   Abdominal:      General: Bowel sounds are normal.      Palpations: Abdomen is soft.      Tenderness: There is no abdominal tenderness.   Musculoskeletal:      Comments: Reduced movement of left hip but improving   Skin:     General: Skin is warm and dry.   Neurological:      Mental Status: She is alert and oriented to person, place, and time.   Psychiatric:         Mood and Affect: Mood normal.             Significant Labs: All pertinent labs within the past 24 hours have been reviewed.    Significant Imaging: I have reviewed all pertinent imaging results/findings within the past 24 hours.    Assessment/Plan:      * S/p left hip fracture  PT/ OT  Fall precautions  Pain control    Patient will need:   FELISHA ROLLING WALKER  SHOWER BENCH  BEDSIDE COMMODE  HOSPITAL BED  At home for high fall risk, transfers and repositioning.          Delirium  Seems to have resolved.  Will monitor closely.       Severe protein-calorie malnutrition  Nutrition consulted. Most recent weight and BMI monitored-     Measurements:  Wt Readings from Last 1 Encounters:   08/23/24 54 kg (119 lb)   Body mass index is 24.04 kg/m².    Patient has been screened and assessed by RD.    Malnutrition Type:  Context: chronic illness  Level: severe    Malnutrition Characteristic Summary:  Weight Loss (Malnutrition): greater than 10% in 6 months  Energy Intake (Malnutrition): less than 75% for greater than or equal to 1 month    Interventions/Recommendations (treatment  strategy):  Recommend to advance diet appropriate and tolerated; add Boost Plus all meals      Disease of thyroid gland  Continue home meds      GERD (gastroesophageal reflux disease)  Continue home meds      History of DVT (deep vein thrombosis)  Continue xeralto      Hypertension  Chronic, controlled. Latest blood pressure and vitals reviewed-     Temp:  [98.2 °F (36.8 °C)]   Pulse:  [68]   Resp:  [22]   BP: (117)/(66)   SpO2:  [96 %] .   Home meds for hypertension were reviewed and noted below.   Hypertension Medications               diltiaZEM HCl (TIAZAC) 120 mg 24 hr capsule Take 120 mg by mouth.    triamterene-hydrochlorothiazide 37.5-25 mg (DYAZIDE) 37.5-25 mg per capsule Take 1 capsule by mouth every morning.            While in the hospital, will manage blood pressure as follows; BP has been low, will hold meds for now    Monitor BP, replace home medication as warrented.      VTE Risk Mitigation (From admission, onward)           Ordered     rivaroxaban tablet 10 mg  Daily         08/23/24 1802     IP VTE LOW RISK PATIENT  Once         08/23/24 1725                    Discharge Planning   QUINN: 10/7/2024     Code Status: DNR   Is the patient medically ready for discharge?:     Reason for patient still in hospital (select all that apply): Patient trending condition  Discharge Plan A: Other                  YUSUF Beck  Department of Hospital Medicine   [facility]

## 2024-10-03 NOTE — PT/OT/SLP PROGRESS
Physical Therapy Treatment    Patient Name:  Yanet Viramontes   MRN:  [id number]    Recommendations:     Discharge Recommendations: Low Intensity Therapy  Discharge Equipment Recommendations: walker, rolling (youth size)  Barriers to discharge: None    Assessment:     Yanet Viramontes is a 84 y.o. female admitted with a medical diagnosis of S/p left hip fracture.  She presents with the following impairments/functional limitations: weakness, impaired endurance .  Continues to do well with gait, transfers and exercises    Rehab Prognosis: Good; patient would benefit from acute skilled PT services to address these deficits and reach maximum level of function.    Recent Surgery: * No surgery found *      Plan:     During this hospitalization, patient to be seen 5 x/week to address the identified rehab impairments via gait training, therapeutic activities, therapeutic exercises and progress toward the following goals:    Plan of Care Expires:  10/04/24    Subjective     Chief Complaint: none; just finished with bathing per DIL assist  Patient/Family Comments/goals: agrees to PT tx  Pain/Comfort:  Pain Rating 1: 0/10      Objective:     Communicated with patient, DIL prior to session.  Patient found up in chair with peripheral IV upon PT entry to room.     General Precautions: Standard, fall  Orthopedic Precautions: LLE weight bearing as tolerated  Braces:    Respiratory Status: Room air     Functional Mobility:  Transfers:     Sit to Stand:  modified independence with rolling walker  Gait: 70ft, 110ft with rolling walker; vc's for increased step length on RLE/step through with 50% correction/carryover       AM-PAC 6 CLICK MOBILITY          Treatment & Education:  Nustep x 10 minutes   Sit to stand x 5 with focus on proper technique  Sitting exercises x 20 repetitions each with 2# weights each: marching, long arc quads, hip abduction (w/ blue band), ankle rocking  Standing exercises in // bars x 10 repetitions each: marching, mini  squats, heel/toe raises, hip abduction     Patient left up in chair with all lines intact and call button in reach..    GOALS:   Multidisciplinary Problems       Physical Therapy Goals          Problem: Physical Therapy    Goal Priority Disciplines Outcome Interventions   Physical Therapy Goal     PT, PT/OT Progressing    Description: STG - 2 weeks  1. Pt will transfer supine to/from sit with mod Ax1.-MET  2. Pt will perform STS and bed transfer with mod Ax1.-MET  3. Pt will have LLE strength of 3-/5.-MET  4. Pt will amb with RW x 20 ft with mod Ax1.-MET    LTG - 4 weeks  1. Pt will transfer supine to/from sit with min Ax1.-MET  2. Pt will perform STS and bed transfer with min Ax1.-MET  3. Pt will have LLE strength of 3/5.-MET  4. Pt will amb with RW x 50 ft with min Ax1.-MET  5. Pt's ROM LLE will be WFL.-MET  6. Pt will transfer supine to/from sit with SBA.  2. Pt will perform STS and bed transfer with SBA.  3. Pt will have LLE strength of 3/5.-MET  4. Pt will amb with RW x 100 ft with SBA.                       Time Tracking:     PT Received On: 10/03/24  PT Start Time: 0913     PT Stop Time: 0945  PT Total Time (min): 32 min     Billable Minutes: Gait Training 12 and Therapeutic Exercise 20    Treatment Type: Treatment  PT/PTA: PTA     Number of PTA visits since last PT visit: 4     10/03/2024

## 2024-10-03 NOTE — PLAN OF CARE
Problem: Adult Inpatient Plan of Care  Goal: Patient-Specific Goal (Individualized)  10/3/2024 1846 by Theodora Aragon RN  Outcome: Progressing  10/3/2024 1840 by Theodora Aragon RN  Outcome: Progressing  Goal: Optimal Comfort and Wellbeing  10/3/2024 1846 by Theodora Aragon RN  Outcome: Progressing  10/3/2024 1840 by Theodora Aragon RN  Outcome: Progressing     Problem: Fall Injury Risk  Goal: Absence of Fall and Fall-Related Injury  10/3/2024 1846 by Theodora Aragon RN  Outcome: Progressing  10/3/2024 1840 by Theodora Aragon RN  Outcome: Progressing     Problem: Skin Injury Risk Increased  Goal: Skin Health and Integrity  Outcome: Progressing     Problem: Wound  Goal: Optimal Coping  Outcome: Progressing

## 2024-10-03 NOTE — SUBJECTIVE & OBJECTIVE
Interval History: improving    Review of Systems   Constitutional: Negative.    Respiratory: Negative.     Cardiovascular: Negative.    Gastrointestinal: Negative.    Musculoskeletal:  Positive for arthralgias and back pain.        Left hip pain at times, back pain at times   Skin:  Positive for wound.        Wound to mid back - that resulted from fall, healing    Neurological:  Positive for weakness.        Generalized weakness but gradually improving     Objective:     Vital Signs (Most Recent):  Temp: 97.8 °F (36.6 °C) (10/03/24 0722)  Pulse: (!) 51 (10/03/24 0722)  Resp: (!) 24 (10/03/24 0722)  BP: (!) 167/72 (10/03/24 0722)  SpO2: 98 % (10/03/24 0722) Vital Signs (24h Range):  Temp:  [97.8 °F (36.6 °C)-98.2 °F (36.8 °C)] 97.8 °F (36.6 °C)  Pulse:  [51-81] 51  Resp:  [19-24] 24  SpO2:  [98 %-99 %] 98 %  BP: (167-176)/(68-72) 167/72     Weight: 45.6 kg (100 lb 8.5 oz)  Body mass index is 20.3 kg/m².    Intake/Output Summary (Last 24 hours) at 10/3/2024 1249  Last data filed at 10/3/2024 1239  Gross per 24 hour   Intake 760 ml   Output --   Net 760 ml         Physical Exam  Constitutional:       General: She is not in acute distress.  HENT:      Mouth/Throat:      Mouth: Mucous membranes are moist.   Cardiovascular:      Rate and Rhythm: Normal rate and regular rhythm.   Pulmonary:      Effort: Pulmonary effort is normal.      Breath sounds: Normal breath sounds.   Abdominal:      General: Bowel sounds are normal.      Palpations: Abdomen is soft.      Tenderness: There is no abdominal tenderness.   Musculoskeletal:      Comments: Reduced movement of left hip but improving   Skin:     General: Skin is warm and dry.   Neurological:      Mental Status: She is alert and oriented to person, place, and time.   Psychiatric:         Mood and Affect: Mood normal.             Significant Labs: All pertinent labs within the past 24 hours have been reviewed.    Significant Imaging: I have reviewed all pertinent imaging  results/findings within the past 24 hours.

## 2024-10-04 PROBLEM — Z87.81 S/P LEFT HIP FRACTURE: Status: RESOLVED | Noted: 2024-08-23 | Resolved: 2024-10-04

## 2024-10-04 PROBLEM — R41.0 DELIRIUM: Status: RESOLVED | Noted: 2024-08-26 | Resolved: 2024-10-04

## 2024-10-04 LAB
ANION GAP SERPL CALCULATED.3IONS-SCNC: 8 MMOL/L (ref 7–16)
BASOPHILS # BLD AUTO: 0.03 K/UL (ref 0–0.2)
BASOPHILS NFR BLD AUTO: 0.6 % (ref 0–1)
BUN SERPL-MCNC: 44 MG/DL (ref 7–18)
BUN/CREAT SERPL: 25 (ref 6–20)
CALCIUM SERPL-MCNC: 9.8 MG/DL (ref 8.5–10.1)
CHLORIDE SERPL-SCNC: 105 MMOL/L (ref 98–107)
CO2 SERPL-SCNC: 28 MMOL/L (ref 21–32)
CREAT SERPL-MCNC: 1.76 MG/DL (ref 0.55–1.02)
DIFFERENTIAL METHOD BLD: ABNORMAL
EGFR (NO RACE VARIABLE) (RUSH/TITUS): 28 ML/MIN/1.73M2
EOSINOPHIL # BLD AUTO: 0.14 K/UL (ref 0–0.5)
EOSINOPHIL NFR BLD AUTO: 2.7 % (ref 1–4)
ERYTHROCYTE [DISTWIDTH] IN BLOOD BY AUTOMATED COUNT: 14.9 % (ref 11.5–14.5)
GLUCOSE SERPL-MCNC: 92 MG/DL (ref 74–106)
HCT VFR BLD AUTO: 31.9 % (ref 38–47)
HGB BLD-MCNC: 9.8 G/DL (ref 12–16)
LYMPHOCYTES # BLD AUTO: 1.2 K/UL (ref 1–4.8)
LYMPHOCYTES NFR BLD AUTO: 23.1 % (ref 27–41)
MCH RBC QN AUTO: 30.5 PG (ref 27–31)
MCHC RBC AUTO-ENTMCNC: 30.7 G/DL (ref 32–36)
MCV RBC AUTO: 99.4 FL (ref 80–96)
MONOCYTES # BLD AUTO: 0.74 K/UL (ref 0–0.8)
MONOCYTES NFR BLD AUTO: 14.3 % (ref 2–6)
MPC BLD CALC-MCNC: 10.5 FL (ref 9.4–12.4)
NEUTROPHILS # BLD AUTO: 3.08 K/UL (ref 1.8–7.7)
NEUTROPHILS NFR BLD AUTO: 59.3 % (ref 53–65)
PLATELET # BLD AUTO: 339 K/UL (ref 150–400)
POTASSIUM SERPL-SCNC: 4.1 MMOL/L (ref 3.5–5.1)
RBC # BLD AUTO: 3.21 M/UL (ref 4.2–5.4)
SODIUM SERPL-SCNC: 137 MMOL/L (ref 136–145)
WBC # BLD AUTO: 5.19 K/UL (ref 4.5–11)

## 2024-10-04 PROCEDURE — 80048 BASIC METABOLIC PNL TOTAL CA: CPT | Performed by: NURSE PRACTITIONER

## 2024-10-04 PROCEDURE — 85025 COMPLETE CBC W/AUTO DIFF WBC: CPT | Performed by: NURSE PRACTITIONER

## 2024-10-04 PROCEDURE — 36415 COLL VENOUS BLD VENIPUNCTURE: CPT | Performed by: NURSE PRACTITIONER

## 2024-10-04 PROCEDURE — 27000987 HC MATTRESS, MATRIX LOW PROFILE

## 2024-10-04 PROCEDURE — 97116 GAIT TRAINING THERAPY: CPT | Mod: CQ

## 2024-10-04 PROCEDURE — 99900035 HC TECH TIME PER 15 MIN (STAT)

## 2024-10-04 PROCEDURE — 27000958

## 2024-10-04 PROCEDURE — 25000003 PHARM REV CODE 250: Performed by: NURSE PRACTITIONER

## 2024-10-04 PROCEDURE — 97110 THERAPEUTIC EXERCISES: CPT | Mod: CQ

## 2024-10-04 PROCEDURE — 25000003 PHARM REV CODE 250: Performed by: HOSPITALIST

## 2024-10-04 PROCEDURE — 11000004 HC SNF PRIVATE

## 2024-10-04 PROCEDURE — S4991 NICOTINE PATCH NONLEGEND: HCPCS | Performed by: NURSE PRACTITIONER

## 2024-10-04 RX ORDER — QUETIAPINE FUMARATE 25 MG/1
25 TABLET, FILM COATED ORAL NIGHTLY
Qty: 30 TABLET | Refills: 11 | Status: SHIPPED | OUTPATIENT
Start: 2024-10-04 | End: 2025-10-04

## 2024-10-04 RX ADMIN — POTASSIUM CHLORIDE 10 MEQ: 750 CAPSULE, EXTENDED RELEASE ORAL at 08:10

## 2024-10-04 RX ADMIN — DOCUSATE SODIUM 100 MG: 100 CAPSULE, LIQUID FILLED ORAL at 08:10

## 2024-10-04 RX ADMIN — NICOTINE 1 PATCH: 21 PATCH, EXTENDED RELEASE TRANSDERMAL at 08:10

## 2024-10-04 RX ADMIN — RIVAROXABAN 10 MG: 10 TABLET, FILM COATED ORAL at 08:10

## 2024-10-04 RX ADMIN — PANTOPRAZOLE SODIUM 40 MG: 40 TABLET, DELAYED RELEASE ORAL at 08:10

## 2024-10-04 RX ADMIN — ACETAMINOPHEN 650 MG: 325 TABLET ORAL at 08:10

## 2024-10-04 RX ADMIN — COLLAGENASE SANTYL 1 G: 250 OINTMENT TOPICAL at 08:10

## 2024-10-04 RX ADMIN — POLYETHYLENE GLYCOL 3350 17 G: 17 POWDER, FOR SOLUTION ORAL at 08:10

## 2024-10-04 RX ADMIN — Medication 6 MG: at 08:10

## 2024-10-04 RX ADMIN — QUETIAPINE FUMARATE 25 MG: 25 TABLET ORAL at 08:10

## 2024-10-04 RX ADMIN — LEVOTHYROXINE SODIUM 112 MCG: 0.11 TABLET ORAL at 05:10

## 2024-10-04 NOTE — PLAN OF CARE
Problem: Breathing Pattern Ineffective  Goal: Effective Breathing Pattern  10/4/2024 1555 by Marcin Lizama, RRT  Outcome: Progressing  10/4/2024 1554 by Marcin Lizama, RRT  Outcome: Progressing     Problem: Gas Exchange Impaired  Goal: Optimal Gas Exchange  10/4/2024 1555 by Marcin Lizama, RRT  Outcome: Progressing  10/4/2024 1554 by Marcin Lizama, RRT  Outcome: Progressing

## 2024-10-04 NOTE — PT/OT/SLP PROGRESS
Physical Therapy Treatment    Patient Name:  Yanet Viramontes   MRN:  [id number]    Recommendations:     Discharge Recommendations: Low Intensity Therapy  Discharge Equipment Recommendations: walker, rolling (youth size)  Barriers to discharge: None    Assessment:     Yanet Viramontes is a 84 y.o. female admitted with a medical diagnosis of S/p left hip fracture.  She presents with the following impairments/functional limitations: weakness, impaired endurance .  Pt pleasant and ready to go home; participates well with treatment     Rehab Prognosis: Good; patient would benefit from acute skilled PT services to address these deficits and reach maximum level of function.    Recent Surgery: * No surgery found *      Plan:     During this hospitalization, patient to be seen 5 x/week to address the identified rehab impairments via gait training, therapeutic activities, therapeutic exercises and progress toward the following goals:    Plan of Care Expires:  10/04/24    Subjective     Chief Complaint: none  Patient/Family Comments/goals: agrees to PT Tx   Pain/Comfort:  Pain Rating 1: 0/10      Objective:     Communicated with patient, RN prior to session.  Patient found up in chair with peripheral IV upon PT entry to room.     General Precautions: Standard, fall  Orthopedic Precautions: LLE weight bearing as tolerated  Braces:    Respiratory Status: Room air     Functional Mobility:  Transfers:     Sit to Stand:  modified independence with rolling walker and or // bars  Gait: 86ft, 60ft with rolling walker, SVN and vc's for step through with RLE, posture correction      AM-PAC 6 CLICK MOBILITY          Treatment & Education:  Sitting BLE exercises x 20 repetitions with 2# weights: marching, long arc quads, hip abduction, ankle rocking  Standing BLE exercises x 10 repetitions each with 2# weights: marching, mini squats, heel/toe raises, hip abduction   Sit to stand x 10 repetitions     Patient left up in chair with call button in  reach..    GOALS:   Multidisciplinary Problems       Physical Therapy Goals          Problem: Physical Therapy    Goal Priority Disciplines Outcome Interventions   Physical Therapy Goal     PT, PT/OT Progressing    Description: STG - 2 weeks  1. Pt will transfer supine to/from sit with mod Ax1.-MET  2. Pt will perform STS and bed transfer with mod Ax1.-MET  3. Pt will have LLE strength of 3-/5.-MET  4. Pt will amb with RW x 20 ft with mod Ax1.-MET    LTG - 4 weeks  1. Pt will transfer supine to/from sit with min Ax1.-MET  2. Pt will perform STS and bed transfer with min Ax1.-MET  3. Pt will have LLE strength of 3/5.-MET  4. Pt will amb with RW x 50 ft with min Ax1.-MET  5. Pt's ROM LLE will be WFL.-MET  6. Pt will transfer supine to/from sit with SBA.  2. Pt will perform STS and bed transfer with SBA.  3. Pt will have LLE strength of 3/5.-MET  4. Pt will amb with RW x 100 ft with SBA.                       Time Tracking:     PT Received On: 10/04/24  PT Start Time: 1310     PT Stop Time: 1340  PT Total Time (min): 30 min     Billable Minutes: Gait Training 15 and Therapeutic Exercise 15    Treatment Type: Treatment  PT/PTA: PTA     Number of PTA visits since last PT visit: 5     10/04/2024

## 2024-10-04 NOTE — PLAN OF CARE
Problem: Adult Inpatient Plan of Care  Goal: Patient-Specific Goal (Individualized)  Outcome: Progressing  Goal: Absence of Hospital-Acquired Illness or Injury  Outcome: Progressing  Goal: Optimal Comfort and Wellbeing  Outcome: Progressing     Problem: Fall Injury Risk  Goal: Absence of Fall and Fall-Related Injury  Outcome: Progressing     Problem: Skin Injury Risk Increased  Goal: Skin Health and Integrity  Outcome: Progressing     Problem: Wound  Goal: Optimal Coping  Outcome: Progressing  Goal: Optimal Functional Ability  Outcome: Progressing  Goal: Absence of Infection Signs and Symptoms  Outcome: Progressing  Goal: Improved Oral Intake  Outcome: Progressing  Goal: Optimal Pain Control and Function  Outcome: Progressing  Goal: Skin Health and Integrity  Outcome: Progressing

## 2024-10-04 NOTE — PLAN OF CARE
Problem: Adult Inpatient Plan of Care  Goal: Absence of Hospital-Acquired Illness or Injury  Outcome: Progressing     Problem: Fall Injury Risk  Goal: Absence of Fall and Fall-Related Injury  Outcome: Progressing

## 2024-10-04 NOTE — DISCHARGE SUMMARY
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  This was discussed with family who do not wish to have further testing at this time.  She has been at [facility] since 8/19 following a fall at her home which resulted in fracture of the left greater trochanter which required surg. She had an episode of chest pain on Wed with no acute findings.  Echo revealed EF of 60%.  She is being admitted to [facility] for OT/ PT and medical management.       Pt is DNR     * No surgery found *      Hospital Course:   8/27 Agitation yesterday, did sleep last night and was good this am, however, after PT she became agitated and aggressive.  Will monitor and redirect.  Try Seroquel this PM this time.      8/28 Maybe a little better today.  She did sleep last night.  Was sitting up this am and not as agitated.  Will continue plan of care for now.  Hopefully some better tomorrow.     8/29 better night and this am, recognizing people and nurses.  Not eating or drinking much though.      8/30 Didn't sleep good last night, but she is alert today just tired     9/2 repair of hip fracture 2 weeks ago, will DC staples    9/6 some increased swelling in surgical leg, on Xarelto ppx already.  Did get a lot of fluid over last few days.     9/9 still some swelling, will dose lasix x 1 today and see how she responds.     9/12  Lasix did help with single dose, will dose again today.     9/13 Swelling better, sitting up eating lunch.  Doing well.     9/16 working with therapy, continues to do well     9/23 She is much better, showing progress.     9/30 Continues to improve.  Working with therapy     10/03 Pt has continued to improved.  Is working well with therapy.  She is hoping to DC home this weekend but will need medical equipment to do so.  She will need a semi-electric/ electric hospital bed to assist with frequent position changes.  She has a lesion on the upper back that resulted from the fall and will need to keep from placing pressure on that area to allow healing.  She will also need to be able to elevate the HOB at least 35-40 degrees due to COPD.  As well as, elevate the head and feet to help reduce back pain related to kyphosis. She will also need a bedside commode chair due to her room at home is 150 feet from the bathroom and the farthest she has walked with therapy is  feet using a walker with occasional rest stops. She will need a shower chair since she is unable to set over into the tub due to recent hip fracture/ repair.   We will place an order for items needed.      10/05 Pt has improved greatly with therapy and is going home with family.       Goals of Care Treatment Preferences:  Code Status: DNR      SDOH Screening:  The patient was screened for utility difficulties, food insecurity, transport difficulties, housing insecurity, and interpersonal safety and there were no concerns identified this admission.     Consults:   Consults (From admission, onward)          Status Ordering Provider     Inpatient consult to Registered Dietitian/Nutritionist  Once        Provider:  (Not yet assigned)    MAJO Mcginnis            No new Assessment & Plan notes have been filed under this hospital service since the last note was generated.  Service: Hospital Medicine    Final Active Diagnoses:    Diagnosis Date  "Noted POA    Severe protein-calorie malnutrition [E43] 08/25/2024 Yes    Hypertension [I10] 03/01/2024 Yes    GERD (gastroesophageal reflux disease) [K21.9] 03/01/2024 Yes    Disease of thyroid gland [E07.9] 03/01/2024 Yes    History of DVT (deep vein thrombosis) [Z86.718] 02/25/2024 Not Applicable      Problems Resolved During this Admission:    Diagnosis Date Noted Date Resolved POA    PRINCIPAL PROBLEM:  S/p left hip fracture [Z87.81] 08/23/2024 10/04/2024 Not Applicable    Delirium [R41.0] 08/26/2024 10/04/2024 Yes       Discharged Condition: good    Disposition: Home or Self Care    Follow Up:    Patient Instructions:      WALKER FOR HOME USE     Order Specific Question Answer Comments   Type of Walker: Isacc (4'4"-5'6")    With wheels? Yes    Height: 4' 11" (1.499 m)    Weight: 55.1 kg (121 lb 7.6 oz)    Length of need (1-99 months): 99    Does patient have medical equipment at home? wheelchair    Please check all that apply: Patient's condition impairs ambulation.    Please check all that apply: Patient is unable to safely ambulate without equipment.    Please check all that apply: Patient needs help to get in and out of chair.      HOSPITAL BED FOR HOME USE     Order Specific Question Answer Comments   Type: Electric    Length of need (1-99 months): 99    Does patient have medical equipment at home? wheelchair    Height: 4' 11" (1.499 m)    Weight: 55.1 kg (121 lb 7.6 oz)    Please check all that apply: Patient requires positioning of the body in ways not feasible in an ordinary bed due to a medical condition which is expected to last at least one month.    Please check all that apply: Patient requires the head of bed to be elevated more than 30 degrees most of the time due to congestive heart failure, chronic pulmonary disease, or aspiration.  Pillows and wedges have been considered and ruled out.      COMMODE FOR HOME USE     Order Specific Question Answer Comments   Type: Standard    Height: 4' 11" " "(1.499 m)    Weight: 55.1 kg (121 lb 7.6 oz)    Does patient have medical equipment at home? wheelchair    Length of need (1-99 months): 99      BATH/SHOWER CHAIR FOR HOME USE     Order Specific Question Answer Comments   Height: 4' 11" (1.499 m)    Weight: 55.1 kg (121 lb 7.6 oz)    Does patient have medical equipment at home? wheelchair    Length of need (1-99 months): 99    Type: With back        Significant Diagnostic Studies: Labs: All labs within the past 24 hours have been reviewed    Pending Diagnostic Studies:       None           Medications:  Reconciled Home Medications:      Medication List        START taking these medications      collagenase ointment  Commonly known as: SANTYL  Apply 1 g topically once daily.     QUEtiapine 25 MG Tab  Commonly known as: SEROQUEL  Take 1 tablet (25 mg total) by mouth every evening.            CONTINUE taking these medications      esomeprazole 40 MG capsule  Commonly known as: NEXIUM  Take 40 mg by mouth before breakfast.     ferrous sulfate 325 (65 FE) MG EC tablet  Take 325 mg by mouth 3 (three) times daily with meals.     levothyroxine 112 MCG tablet  Commonly known as: SYNTHROID  Take 112 mcg by mouth every morning.     potassium chloride SA 10 MEQ tablet  Commonly known as: K-DUR,KLOR-CON M  Take 10 mEq by mouth once daily.     XARELTO 20 mg Tab  Generic drug: rivaroxaban  Take 10 mg by mouth once daily.            STOP taking these medications      albuterol-ipratropium 2.5 mg-0.5 mg/3 mL nebulizer solution  Commonly known as: DUO-NEB     celecoxib 200 MG capsule  Commonly known as: CeleBREX     diltiaZEM HCl 120 mg 24 hr capsule  Commonly known as: TIAZAC     docusate sodium 100 MG capsule  Commonly known as: COLACE     famotidine 20 MG tablet  Commonly known as: PEPCID     ondansetron 4 MG tablet  Commonly known as: ZOFRAN     oxyCODONE 5 mg Cap  Commonly known as: OXY-IR     triamterene-hydrochlorothiazide 37.5-25 mg 37.5-25 mg per capsule  Commonly known as: " DYAZIDE     zolpidem 10 mg Tab  Commonly known as: AMBIEN              Indwelling Lines/Drains at time of discharge:   Lines/Drains/Airways       None                   Time spent on the discharge of patient: 35 minutes         YUSUF Beck  Department of Hospital Medicine  [facility]

## 2024-10-04 NOTE — PLAN OF CARE
[facility]  Discharge Final Note    Primary Care Provider: No, Primary Doctor    Expected Discharge Date: 10/5/2024    Final Discharge Note (most recent)       Final Note - 10/04/24 1302          Final Note    Assessment Type Final Discharge Note     Anticipated Discharge Disposition Home-Health Care Svc     What phone number can be called within the next 1-3 days to see how you are doing after discharge? [phone]     Hospital Resources/Appts/Education Provided Provided patient/caregiver with written discharge plan information;Appointments scheduled and added to AVS        Post-Acute Status    Post-Acute Authorization Home Health     Home Health Status Referrals Sent     Patient choice form signed by patient/caregiver List with quality metrics by geographic area provided;List from CMS Compare;List from System Post-Acute Care     Discharge Delays None known at this time                     Important Message from Medicare  Important Message from Medicare regarding Discharge Appeal Rights: Given to patient/caregiver, Explained to patient/caregiver, Signed/date by patient/caregiver     Date IMM was signed: 10/03/24  Time IMM was signed: 1130

## 2024-10-04 NOTE — PLAN OF CARE
Problem: Adult Inpatient Plan of Care  Goal: Patient-Specific Goal (Individualized)  Outcome: Progressing  Goal: Absence of Hospital-Acquired Illness or Injury  Outcome: Progressing     Problem: Fall Injury Risk  Goal: Absence of Fall and Fall-Related Injury  Outcome: Progressing     Problem: Skin Injury Risk Increased  Goal: Skin Health and Integrity  Outcome: Progressing     Problem: Wound  Goal: Optimal Coping  Outcome: Progressing

## 2024-10-05 VITALS
HEIGHT: 59 IN | SYSTOLIC BLOOD PRESSURE: 148 MMHG | TEMPERATURE: 99 F | OXYGEN SATURATION: 95 % | HEART RATE: 56 BPM | RESPIRATION RATE: 20 BRPM | WEIGHT: 100.5 LBS | DIASTOLIC BLOOD PRESSURE: 69 MMHG | BODY MASS INDEX: 20.26 KG/M2

## 2024-10-05 PROCEDURE — 25000003 PHARM REV CODE 250: Performed by: NURSE PRACTITIONER

## 2024-10-05 RX ADMIN — DOCUSATE SODIUM 100 MG: 100 CAPSULE, LIQUID FILLED ORAL at 08:10

## 2024-10-05 RX ADMIN — POTASSIUM CHLORIDE 10 MEQ: 750 CAPSULE, EXTENDED RELEASE ORAL at 08:10

## 2024-10-05 RX ADMIN — PANTOPRAZOLE SODIUM 40 MG: 40 TABLET, DELAYED RELEASE ORAL at 08:10

## 2024-10-05 RX ADMIN — LEVOTHYROXINE SODIUM 112 MCG: 0.11 TABLET ORAL at 06:10

## 2024-10-05 RX ADMIN — RIVAROXABAN 10 MG: 10 TABLET, FILM COATED ORAL at 08:10

## 2024-10-05 NOTE — PLAN OF CARE
Problem: Adult Inpatient Plan of Care  Goal: Patient-Specific Goal (Individualized)  Outcome: Met  Goal: Absence of Hospital-Acquired Illness or Injury  Outcome: Met  Goal: Optimal Comfort and Wellbeing  Outcome: Met     Problem: Fall Injury Risk  Goal: Absence of Fall and Fall-Related Injury  Outcome: Met     Problem: Skin Injury Risk Increased  Goal: Skin Health and Integrity  Outcome: Met     Problem: Wound  Goal: Optimal Coping  Outcome: Met  Goal: Optimal Functional Ability  Outcome: Met  Goal: Absence of Infection Signs and Symptoms  Outcome: Met  Goal: Improved Oral Intake  Outcome: Met  Goal: Optimal Pain Control and Function  Outcome: Met  Goal: Skin Health and Integrity  Outcome: Met  Goal: Optimal Wound Healing  Outcome: Met     Problem: Infection  Goal: Absence of Infection Signs and Symptoms  Outcome: Met     Problem: Breathing Pattern Ineffective  Goal: Effective Breathing Pattern  Outcome: Met     Problem: Gas Exchange Impaired  Goal: Optimal Gas Exchange  Outcome: Met     Problem: Pain Acute  Goal: Optimal Pain Control and Function  Outcome: Met

## 2024-10-05 NOTE — NURSING
Pt left facility with daughter in law at 9:30 am.  Alert, orientated x 4. No distress or complaints.

## 2024-10-05 NOTE — PLAN OF CARE
Problem: Adult Inpatient Plan of Care  Goal: Patient-Specific Goal (Individualized)  Outcome: Progressing  Goal: Absence of Hospital-Acquired Illness or Injury  Outcome: Progressing  Goal: Optimal Comfort and Wellbeing  Outcome: Progressing     Problem: Fall Injury Risk  Goal: Absence of Fall and Fall-Related Injury  Outcome: Progressing     Problem: Skin Injury Risk Increased  Goal: Skin Health and Integrity  Outcome: Progressing     Problem: Wound  Goal: Optimal Coping  Outcome: Progressing  Goal: Optimal Functional Ability  Outcome: Progressing  Goal: Absence of Infection Signs and Symptoms  Outcome: Progressing

## 2025-06-14 ENCOUNTER — HOSPITAL ENCOUNTER (EMERGENCY)
Facility: HOSPITAL | Age: 85
Discharge: HOME | End: 2025-06-14
Payer: MEDICARE

## 2025-06-14 VITALS
HEIGHT: 59 IN | SYSTOLIC BLOOD PRESSURE: 165 MMHG | RESPIRATION RATE: 20 BRPM | TEMPERATURE: 98 F | HEART RATE: 68 BPM | OXYGEN SATURATION: 97 % | BODY MASS INDEX: 25.76 KG/M2 | WEIGHT: 127.81 LBS | DIASTOLIC BLOOD PRESSURE: 65 MMHG

## 2025-06-14 DIAGNOSIS — R05.9 COUGH: ICD-10-CM

## 2025-06-14 DIAGNOSIS — R00.1 BRADYCARDIA: ICD-10-CM

## 2025-06-14 DIAGNOSIS — J40 BRONCHITIS: Primary | ICD-10-CM

## 2025-06-14 LAB
ALBUMIN SERPL BCP-MCNC: 3.1 G/DL (ref 3.4–4.8)
ALBUMIN/GLOB SERPL: 0.7 {RATIO}
ALP SERPL-CCNC: 65 U/L (ref 40–150)
ALT SERPL W P-5'-P-CCNC: <7 U/L
ANION GAP SERPL CALCULATED.3IONS-SCNC: 16 MMOL/L (ref 7–16)
AST SERPL W P-5'-P-CCNC: 30 U/L (ref 11–45)
BASOPHILS # BLD AUTO: 0.04 K/UL (ref 0–0.2)
BASOPHILS NFR BLD AUTO: 0.6 % (ref 0–1)
BILIRUB SERPL-MCNC: 0.2 MG/DL
BUN SERPL-MCNC: 30 MG/DL (ref 10–20)
BUN/CREAT SERPL: 22 (ref 6–20)
CALCIUM SERPL-MCNC: 9.8 MG/DL (ref 8.4–10.2)
CHLORIDE SERPL-SCNC: 107 MMOL/L (ref 98–107)
CO2 SERPL-SCNC: 23 MMOL/L (ref 23–31)
CREAT SERPL-MCNC: 1.38 MG/DL (ref 0.55–1.02)
DIFFERENTIAL METHOD BLD: ABNORMAL
EGFR (NO RACE VARIABLE) (RUSH/TITUS): 37 ML/MIN/1.73M2
EOSINOPHIL # BLD AUTO: 0.19 K/UL (ref 0–0.5)
EOSINOPHIL NFR BLD AUTO: 2.7 % (ref 1–4)
ERYTHROCYTE [DISTWIDTH] IN BLOOD BY AUTOMATED COUNT: 13 % (ref 11.5–14.5)
GLOBULIN SER-MCNC: 4.7 G/DL (ref 2–4)
GLUCOSE SERPL-MCNC: 99 MG/DL (ref 82–115)
HCT VFR BLD AUTO: 37.9 % (ref 38–47)
HGB BLD-MCNC: 11.9 G/DL (ref 12–16)
INFLUENZA A MOLECULAR (OHS): NEGATIVE
INFLUENZA B MOLECULAR (OHS): NEGATIVE
LYMPHOCYTES # BLD AUTO: 1.76 K/UL (ref 1–4.8)
LYMPHOCYTES NFR BLD AUTO: 24.6 % (ref 27–41)
MCH RBC QN AUTO: 30.7 PG (ref 27–31)
MCHC RBC AUTO-ENTMCNC: 31.4 G/DL (ref 32–36)
MCV RBC AUTO: 97.9 FL (ref 80–96)
MONOCYTES # BLD AUTO: 0.89 K/UL (ref 0–0.8)
MONOCYTES NFR BLD AUTO: 12.4 % (ref 2–6)
MPC BLD CALC-MCNC: 10.7 FL (ref 9.4–12.4)
NEUTROPHILS # BLD AUTO: 4.28 K/UL (ref 1.8–7.7)
NEUTROPHILS NFR BLD AUTO: 59.7 % (ref 53–65)
PLATELET # BLD AUTO: 348 K/UL (ref 150–400)
POTASSIUM SERPL-SCNC: 5.8 MMOL/L (ref 3.5–5.1)
PROT SERPL-MCNC: 7.8 G/DL (ref 5.8–7.6)
RBC # BLD AUTO: 3.87 M/UL (ref 4.2–5.4)
SARS-COV-2 RDRP RESP QL NAA+PROBE: NEGATIVE
SODIUM SERPL-SCNC: 140 MMOL/L (ref 136–145)
WBC # BLD AUTO: 7.16 K/UL (ref 4.5–11)

## 2025-06-14 PROCEDURE — 80053 COMPREHEN METABOLIC PANEL: CPT | Performed by: NURSE PRACTITIONER

## 2025-06-14 PROCEDURE — 93005 ELECTROCARDIOGRAM TRACING: CPT

## 2025-06-14 PROCEDURE — 93010 ELECTROCARDIOGRAM REPORT: CPT | Mod: ,,, | Performed by: INTERNAL MEDICINE

## 2025-06-14 PROCEDURE — 96374 THER/PROPH/DIAG INJ IV PUSH: CPT

## 2025-06-14 PROCEDURE — 87502 INFLUENZA DNA AMP PROBE: CPT | Performed by: NURSE PRACTITIONER

## 2025-06-14 PROCEDURE — 94640 AIRWAY INHALATION TREATMENT: CPT

## 2025-06-14 PROCEDURE — 25000242 PHARM REV CODE 250 ALT 637 W/ HCPCS: Performed by: NURSE PRACTITIONER

## 2025-06-14 PROCEDURE — 85025 COMPLETE CBC W/AUTO DIFF WBC: CPT | Performed by: NURSE PRACTITIONER

## 2025-06-14 PROCEDURE — 96361 HYDRATE IV INFUSION ADD-ON: CPT

## 2025-06-14 PROCEDURE — 63600175 PHARM REV CODE 636 W HCPCS: Performed by: NURSE PRACTITIONER

## 2025-06-14 PROCEDURE — 87635 SARS-COV-2 COVID-19 AMP PRB: CPT | Performed by: NURSE PRACTITIONER

## 2025-06-14 PROCEDURE — 25000003 PHARM REV CODE 250: Performed by: NURSE PRACTITIONER

## 2025-06-14 PROCEDURE — 99284 EMERGENCY DEPT VISIT MOD MDM: CPT | Mod: GF | Performed by: NURSE PRACTITIONER

## 2025-06-14 PROCEDURE — 99285 EMERGENCY DEPT VISIT HI MDM: CPT | Mod: 25

## 2025-06-14 RX ORDER — ZOLPIDEM TARTRATE 10 MG/1
10 TABLET ORAL NIGHTLY
COMMUNITY
Start: 2024-08-20 | End: 2025-06-14 | Stop reason: CLARIF

## 2025-06-14 RX ORDER — POTASSIUM CHLORIDE 750 MG/1
10 TABLET, EXTENDED RELEASE ORAL
COMMUNITY
End: 2025-06-14 | Stop reason: CLARIF

## 2025-06-14 RX ORDER — QUETIAPINE FUMARATE 100 MG/1
100 TABLET, FILM COATED ORAL NIGHTLY
COMMUNITY
Start: 2025-06-12 | End: 2025-06-14 | Stop reason: CLARIF

## 2025-06-14 RX ORDER — CELECOXIB 200 MG/1
200 CAPSULE ORAL
COMMUNITY

## 2025-06-14 RX ORDER — LEVOTHYROXINE SODIUM 112 UG/1
112 TABLET ORAL EVERY MORNING
COMMUNITY
End: 2025-06-14 | Stop reason: CLARIF

## 2025-06-14 RX ORDER — IPRATROPIUM BROMIDE AND ALBUTEROL SULFATE 2.5; .5 MG/3ML; MG/3ML
3 SOLUTION RESPIRATORY (INHALATION)
Status: COMPLETED | OUTPATIENT
Start: 2025-06-14 | End: 2025-06-14

## 2025-06-14 RX ORDER — OMEPRAZOLE 40 MG/1
40 CAPSULE, DELAYED RELEASE ORAL
COMMUNITY
Start: 2024-08-20 | End: 2025-06-14 | Stop reason: CLARIF

## 2025-06-14 RX ORDER — DEXAMETHASONE SODIUM PHOSPHATE 4 MG/ML
4 INJECTION, SOLUTION INTRA-ARTICULAR; INTRALESIONAL; INTRAMUSCULAR; INTRAVENOUS; SOFT TISSUE
Status: COMPLETED | OUTPATIENT
Start: 2025-06-14 | End: 2025-06-14

## 2025-06-14 RX ORDER — TRIAMTERENE AND HYDROCHLOROTHIAZIDE 37.5; 25 MG/1; MG/1
1 TABLET ORAL EVERY MORNING
COMMUNITY
Start: 2024-08-20 | End: 2025-06-14 | Stop reason: CLARIF

## 2025-06-14 RX ORDER — BROMPHENIRAMINE MALEATE, DEXTROMETHORPHAN HYDROBROMIDE, PHENYLEPHRINE HYDROCHLORIDE 1; 5; 2.5 MG/5ML; MG/5ML; MG/5ML
LIQUID ORAL
COMMUNITY
Start: 2025-06-12

## 2025-06-14 RX ORDER — DILTIAZEM HYDROCHLORIDE 120 MG/1
120 CAPSULE, EXTENDED RELEASE ORAL
COMMUNITY
Start: 2025-06-09

## 2025-06-14 RX ADMIN — IPRATROPIUM BROMIDE AND ALBUTEROL SULFATE 3 ML: .5; 3 SOLUTION RESPIRATORY (INHALATION) at 05:06

## 2025-06-14 RX ADMIN — DEXAMETHASONE SODIUM PHOSPHATE 4 MG: 4 INJECTION, SOLUTION INTRA-ARTICULAR; INTRALESIONAL; INTRAMUSCULAR; INTRAVENOUS; SOFT TISSUE at 06:06

## 2025-06-14 RX ADMIN — SODIUM CHLORIDE 500 ML: 9 INJECTION, SOLUTION INTRAVENOUS at 05:06

## 2025-06-14 NOTE — DISCHARGE INSTRUCTIONS
Continue [place],.  Follow up with your primary care provider in 2-3 days.  Return for worsening condition or emergency needs.

## 2025-06-14 NOTE — ED PROVIDER NOTES
Encounter Date: 6/14/2025       History     Chief Complaint   Patient presents with    Cough     Pt presents to the Ed for a cough x 4 days.     85 yr old WF with PMH of CA -renal, thyroid, colon (no treatment in years), DVT - on xeralto, to ED with c/o cough off and on for a while.  Denies chest pain, denies SOB.  States worse for the past 4 days.  Pt is smoker.      The history is provided by the patient.   Cough  This is a chronic problem. The current episode started several weeks ago. The problem occurs every few minutes. The problem has been waxing and waning. Cough characteristics: occasional clear sputum. There has been no fever. Pertinent negatives include no shortness of breath. She has tried cough syrup for the symptoms. The treatment provided no relief.     Review of patient's allergies indicates:  No Known Allergies  Past Medical History:   Diagnosis Date    Anticoagulant long-term use     Cancer     Hypertension     Thyroid disease      Past Surgical History:   Procedure Laterality Date    HYSTERECTOMY       No family history on file.  Social History[1]  Review of Systems   Constitutional: Negative.    Respiratory:  Positive for cough. Negative for chest tightness and shortness of breath.    Cardiovascular: Negative.    Genitourinary: Negative.    Musculoskeletal: Negative.    Skin: Negative.        Physical Exam     Initial Vitals [06/14/25 1614]   BP Pulse Resp Temp SpO2   (!) 166/64 (!) 44 18 98 °F (36.7 °C) 97 %      MAP       --         Physical Exam    Constitutional: She appears well-developed and well-nourished.   Neck: Neck supple.   Cardiovascular:  Regular rhythm.   Bradycardia present.         Pulmonary/Chest: She has wheezes.   Occasional scattered wheeze     Abdominal: Abdomen is soft. There is no abdominal tenderness.   Musculoskeletal:         General: Normal range of motion.      Cervical back: Neck supple.     Neurological: She is alert and oriented to person, place, and time. GCS  score is 15. GCS eye subscore is 4. GCS verbal subscore is 5. GCS motor subscore is 6.   Skin: Skin is warm and dry.   Psychiatric: She has a normal mood and affect.         Medical Screening Exam   See Full Note    ED Course   Procedures  Labs Reviewed   COMPREHENSIVE METABOLIC PANEL - Abnormal       Result Value    Sodium 140      Potassium 5.8 (*)     Chloride 107      CO2 23      Anion Gap 16      Glucose 99      BUN 30 (*)     Creatinine 1.38 (*)     BUN/Creatinine Ratio 22 (*)     Calcium 9.8      Total Protein 7.8 (*)     Albumin 3.1 (*)     Globulin 4.7 (*)     A/G Ratio 0.7      Bilirubin, Total 0.2      Alk Phos 65      ALT <7      AST 30      eGFR 37 (*)    CBC WITH DIFFERENTIAL - Abnormal    WBC 7.16      RBC 3.87 (*)     Hemoglobin 11.9 (*)     Hematocrit 37.9 (*)     MCV 97.9 (*)     MCH 30.7      MCHC 31.4 (*)     RDW 13.0      Platelet Count 348      MPV 10.7      Neutrophils % 59.7      Lymphocytes % 24.6 (*)     Neutrophils, Abs 4.28      Lymphocytes, Absolute 1.76      Diff Type Auto      Monocytes % 12.4 (*)     Eosinophils % 2.7      Basophils % 0.6      Monocytes, Absolute 0.89 (*)     Eosinophils, Absolute 0.19      Basophils, Absolute 0.04     INFLUENZA A & B BY MOLECULAR - Normal    INFLUENZA A MOLECULAR Negative      INFLUENZA B MOLECULAR  Negative     SARS-COV-2 RNA AMPLIFICATION, QUAL - Normal    SARS COV-2 Molecular Negative      Narrative:     Negative SARS-CoV results should not be used as the sole basis for treatment or patient management decisions; negative results should be considered in the context of a patient's recent exposures, history and the presene of clinical signs and symptoms consistent with COVID-19.  Negative results should be treated as presumptive and confirmed by molecular assay, if necessary for patient management.   CBC W/ AUTO DIFFERENTIAL    Narrative:     The following orders were created for panel order CBC Auto Differential.  Procedure                                Abnormality         Status                     ---------                               -----------         ------                     CBC with Differential[[id number]]       Abnormal            Final result                 Please view results for these tests on the individual orders.          Imaging Results              X-Ray Chest AP Portable (Final result)  Result time 06/14/25 16:41:45      Final result by Robert Ayoub MD (06/14/25 16:41:45)                   Impression:      No acute abnormality.      Electronically signed by: Robert Ayoub  Date:    06/14/2025  Time:    16:41               Narrative:    EXAMINATION:  XR CHEST AP PORTABLE    CLINICAL HISTORY:  Cough, unspecified    TECHNIQUE:  Single frontal view of the chest was performed.    COMPARISON:  02/25/2024    FINDINGS:  Suboptimal inspiration.The lungs are clear, with normal appearance of pulmonary vasculature and no pleural effusion or pneumothorax.    The cardiac silhouette is normal in size. The hilar and mediastinal contours are unremarkable.    Bones are intact.    No significant change.                                       Medications   albuterol-ipratropium 2.5 mg-0.5 mg/3 mL nebulizer solution 3 mL (3 mLs Nebulization Given 6/14/25 1744)   sodium chloride 0.9% bolus 500 mL 500 mL (0 mLs Intravenous Stopped 6/14/25 1826)   dexAMETHasone injection 4 mg (4 mg Intravenous Given 6/14/25 1824)     Medical Decision Making  85 yr old WF with PMH of CA -renal, thyroid, colon (no treatment in years), DVT - on xeralto, to ED with c/o cough off and on for a while.  Denies chest pain, denies SOB.  States worse for the past 4 days.  Pt is smoker.    States loose stools yesterday but reports took a linzess for constipation    Amount and/or Complexity of Data Reviewed  Labs: ordered. Decision-making details documented in ED Course.  Radiology: ordered.  ECG/medicine tests: ordered.     Details: Sinus [place], HR 44, 1st degree AV block, NO ST  elevation     Risk  Prescription drug management.               ED Course as of 06/14/25 1905   Sat Jun 14, 2025   1659 Pulse(!): 44  Hx of same rate   [CG]   1709 Hemoglobin(!): 11.9 [CG]   1709 Hematocrit(!): 37.9  Hx of anemia   [CG]   1714 BUN(!): 30 [CG]   1714 Creatinine(!): 1.38  Admits does not drink enough water. But BUN/Creat and GFR better than results in past.  [CG]   1800 Reports is feeling better, still with dry cough but not as frequent.  Pt refuses admit.  Will prepare for DC [CG]      ED Course User Index  [CG] Tanna Virmaontes FNP                           Clinical Impression:   Final diagnoses:  [R00.1] Bradycardia  [R05.9] Cough  [J40] Bronchitis (Primary)        ED Disposition Condition    Discharge Stable          ED Prescriptions    None       Follow-up Information       Follow up With Specialties Details Why Contact Info    Demetrio Brewer NP Family Medicine Go in 3 days  [address] [place] [address] [place] [id number]  [phone]                   [1]   Social History  Tobacco Use    Smoking status: Every Day     Current packs/day: 0.50     Average packs/day: 0.5 packs/day for 3.4 years (1.7 ttl pk-yrs)     Types: Cigarettes     Start date: 2/1/2022    Smokeless tobacco: Never    Tobacco comments:     [id number]   Substance Use Topics    Alcohol use: Not Currently    Drug use: Never        Tanna Viramontes FNP  06/14/25 1905

## 2025-06-16 LAB
OHS QRS DURATION: 84 MS
OHS QTC CALCULATION: 376 MS